# Patient Record
Sex: FEMALE | Race: WHITE | NOT HISPANIC OR LATINO | Employment: FULL TIME | URBAN - METROPOLITAN AREA
[De-identification: names, ages, dates, MRNs, and addresses within clinical notes are randomized per-mention and may not be internally consistent; named-entity substitution may affect disease eponyms.]

---

## 2017-09-11 ENCOUNTER — OFFICE VISIT (OUTPATIENT)
Dept: OBSTETRICS AND GYNECOLOGY | Age: 42
End: 2017-09-11

## 2017-09-11 VITALS
DIASTOLIC BLOOD PRESSURE: 82 MMHG | SYSTOLIC BLOOD PRESSURE: 138 MMHG | BODY MASS INDEX: 35.88 KG/M2 | WEIGHT: 195 LBS | HEIGHT: 62 IN

## 2017-09-11 DIAGNOSIS — E03.9 ACQUIRED HYPOTHYROIDISM: ICD-10-CM

## 2017-09-11 DIAGNOSIS — Z01.419 WELL WOMAN EXAM WITH ROUTINE GYNECOLOGICAL EXAM: Primary | ICD-10-CM

## 2017-09-11 DIAGNOSIS — N95.1 PERIMENOPAUSE: ICD-10-CM

## 2017-09-11 LAB
BILIRUB BLD-MCNC: NEGATIVE MG/DL
GLUCOSE UR STRIP-MCNC: NEGATIVE MG/DL
KETONES UR QL: NEGATIVE
LEUKOCYTE EST, POC: NEGATIVE
NITRITE UR-MCNC: NEGATIVE MG/ML
PH UR: 6 [PH] (ref 5–8)
PROT UR STRIP-MCNC: NEGATIVE MG/DL
RBC # UR STRIP: NEGATIVE /UL
SP GR UR: 1.02 (ref 1–1.03)
UROBILINOGEN UR QL: NORMAL

## 2017-09-11 PROCEDURE — 81003 URINALYSIS AUTO W/O SCOPE: CPT | Performed by: OBSTETRICS & GYNECOLOGY

## 2017-09-11 PROCEDURE — 99396 PREV VISIT EST AGE 40-64: CPT | Performed by: OBSTETRICS & GYNECOLOGY

## 2017-09-11 RX ORDER — FLUTICASONE PROPIONATE 50 MCG
SPRAY, SUSPENSION (ML) NASAL
Refills: 0 | COMMUNITY
Start: 2017-08-17 | End: 2022-01-28

## 2017-09-11 RX ORDER — CYANOCOBALAMIN 1000 UG/ML
INJECTION, SOLUTION INTRAMUSCULAR; SUBCUTANEOUS
Refills: 0 | COMMUNITY
Start: 2017-08-11 | End: 2021-06-21

## 2017-09-11 RX ORDER — METFORMIN HYDROCHLORIDE 500 MG/1
TABLET, EXTENDED RELEASE ORAL
Refills: 0 | COMMUNITY
Start: 2017-08-28 | End: 2021-10-26 | Stop reason: SDUPTHER

## 2017-09-11 RX ORDER — ERGOCALCIFEROL 1.25 MG/1
CAPSULE ORAL
Refills: 0 | COMMUNITY
Start: 2017-06-07 | End: 2021-08-02 | Stop reason: SDUPTHER

## 2017-09-11 RX ORDER — HYOSCYAMINE SULFATE 0.125 MG
TABLET ORAL
Refills: 0 | COMMUNITY
Start: 2017-08-14 | End: 2021-07-07

## 2017-09-11 RX ORDER — MONTELUKAST SODIUM 10 MG/1
10 TABLET ORAL NIGHTLY
Refills: 0 | COMMUNITY
Start: 2017-08-11 | End: 2022-05-19 | Stop reason: SDUPTHER

## 2017-09-11 NOTE — PROGRESS NOTES
ANNUAL GYN EXAM        2017    Subjective     Sahara Delarosa is a 42 y.o., , White  Female.    Chief Complaint   Patient presents with   • Gynecologic Exam     Sahara is here for her Annual Exam and Mammogram. Her last pap smear was 2016, negative pap , negative HR-HPV. She is having hot flashes, mood swings and night sweats          Gyn Complaints:  Hot flashes, mood swings, and night sweats.  She had labs drawn at different location and was told she needed the hormone pellets but they were too expensive ([employer]).I recommended she obtain the labs that they did in either fax them or mail them to me.      Current contraception: ablation   Exercise:   yes - 2 to 3 times per week   Mammogram: done today.  Colonoscopy: not indicated.  DEXA: not indicated.  Last Pap Smear:  2016, Negative Pap smear, Negative HR-HPV.   History of abnormal Pap smear: yes - atypical           1. Menstrual Hx:  Ablation, 2015.  Very irregular minimal bleeding since then.    2. Pap Smear Hx: Never had dysplasia.  , ASCUS ([facility]).  [id number], normal annual Pap smears.   - , normal annual Pap smears.  , negative Pap smear, negative HR-HPV.    , negative Pap smear, negative HR-HPV.    3. Breast / Ovarian Hx:   Regular SBE.  Manny (Peer39) genetic testing= Negative.      Family history of breast cancer: Maternal Aunt x4 , Paternal Aunt x1       Family history of ovarian cancer: None    4. Family Hx of Colon Ca: None      Family Hx of Uterine Ca: None    5. New Past Medical History:  Endoscopy for stomach issues, inflammation in stomach, a cyst in liver, gallbladder issues, Ultrasound for swollen lymph node under right arm, mole removed from a back, and dx with sleep apnea, she has machine.         Past Medical History:   Diagnosis Date   • Anxiety and depression    • Asthma     exercise induced   • Condyloma acuminata    • Endometrial polyp    • Family history of  breast cancer    • Fibroids    • Fibromyalgia    • History of Papanicolaou smear of cervix 2016    2. Pap Smear Hx: Never had dysplasia.1998, ASCUS ([facility]).[id number], normal annual Pap smears. - , normal annual Pap smears. ,& 2016, negative Pap smear, negative HR-HPV.   • Menorrhagia    • Osteoarthritis     In addition to fibromyalgia   • Vaginal delivery     x2        6. New Past Surgical History: Endoscopy         Past Surgical History:   Procedure Laterality Date   • BREAST BIOPSY      Hyper-pigmented lesion, left nipple.= Benign   • CYST REMOVAL      on chest and groin area    • ENDOMETRIAL ABLATION      HSC, DNC, Novasure Endometrial Ablation   • ENDOSCOPY  2017    EGD: Stomach inflammation, Neg for H Pylori.        7. New Family Medical History: None          Family History   Problem Relation Age of Onset   • Stomach cancer Maternal Grandfather      dx >50   • Breast cancer Maternal Aunt      4 different mat aunts with breast cancer.   • Cancer Maternal Aunt      stomach   • Other Mother      Multiple lipomas.   • Diabetes type II Father    • Fibromyalgia Sister    • No Known Problems Daughter    • Osteoporosis Maternal Grandmother      ?   • Parkinsonism Maternal Grandmother    • No Known Problems Paternal Grandmother    • Breast cancer Paternal Aunt 60     dx ~ age 60   • Brain cancer Paternal Grandfather      dx >50   • No Known Problems Son    • BRCA 1/2 Neg Hx    • Colon cancer Neg Hx    • Endometrial cancer Neg Hx    • Ovarian cancer Neg Hx          8.   OB History    Para Term  AB SAB TAB Ectopic Multiple Living   2 2 2       4      # Outcome Date GA Lbr Roni/2nd Weight Sex Delivery Anes PTL Lv   2 Term     M Vag-Spont   Y   1 Term     F Vag-Spont   Y           9.   Health Maintenance   Topic Date Due   • TDAP/TD VACCINES (1 - Tdap) 1992   • INFLUENZA VACCINE  2017   • PAP SMEAR  2017       The following portions  "of the patient's history were reviewed / updated as appropriate: allergies, current medications, past medical history, past surgical history, past family history, past social history, and problem list.    Review of Systems   Constitutional: Positive for fatigue and unexpected weight change.   HENT: Positive for congestion.    Eyes: Negative.    Respiratory: Negative.    Cardiovascular: Negative.    Gastrointestinal: Positive for abdominal pain.        Intolerance to certain foods   Endocrine: Positive for cold intolerance and heat intolerance.   Genitourinary: Positive for frequency.   Musculoskeletal:        Joint pain    Skin: Negative.    Allergic/Immunologic: Negative.    Neurological: Positive for headaches.   Hematological: Bruises/bleeds easily.   Psychiatric/Behavioral: Negative.        Objective     /82  Ht 62\" (157.5 cm)  Wt 195 lb (88.5 kg)  BMI 35.67 kg/m2    PHYSICAL EXAM    Constitutional: Well-developed, well-nourished, obese  female, in no distress, alert and well oriented ×3.    Skin is warm, dry, without rash.       Neck appears normal, trachea in the midline.  Thyroid exam normal.          No cervical lymphadenopathy.    Lungs clear to auscultation.  Chest wall appears normal.    Heart with regular rhythm without murmur or gallop.    Breasts:         Right breast nontender, without dominant mass.  No nipple discharge.            No superficial skin changes.  No axillary adenopathy.        Left breast nontender, without dominant mass.  No nipple discharge.            No superficial skin changes.  No axillary adenopathy.      Abdomen is round, soft and nontender.No palpable mass.           No hepatosplenomegaly.  Flanks are negative.          Bowel sounds normal.  No abdominal bruit.          No inguinal lymphadenopathy bilaterally.     Pelvic exam :  Vulva normal.           External genitalia normal.  BUS negative.             Introitus normal.  Vaginal mucosa normal.            " Cervix normal, no Pap smear, no cervical motion tenderness.          Uterus mildly retroverted, normal size, normal shape, and position.          Adnexa are negative bilaterally.  No tenderness.  No palpable mass.          Rectovaginal exam negative .     Musc /Skel: Lower extremities without edema.     Neuro: Coordination is grossly normal.  Gait is normal.    Psych: Mood and affect is normal.  Behavior normal.  Thought content normal.            Judgment normal.       =All questions were answered.   =Breast self-examination technique was reviewed and the patient encouraged to perform self breast exams monthly.   = We discussed healthy lifestyle modifications.  Concerned about weight gain  =I recommended 30 minutes of aerobic exercise 5 times a week.         Assessment/Plan   Sahara was seen today for gynecologic exam.    Diagnoses and all orders for this visit:    Well woman exam with routine gynecological exam  -     POC Urinalysis Dipstick, Automated    Perimenopause    Acquired hypothyroidism    I will mail her an order for testosterone, total and free, TSH, free T3 and free T4, FSH and estradiol.  COMMENTS: Despite her symptoms I will be a little bit surprised if she is in menopause.  But if so then we should have a discussion about HRT and testosterone.    Mekhi Nickerson MD  9/12/2017

## 2018-02-05 ENCOUNTER — OFFICE VISIT CONVERTED (OUTPATIENT)
Dept: FAMILY MEDICINE CLINIC | Facility: CLINIC | Age: 43
End: 2018-02-05
Attending: NURSE PRACTITIONER

## 2018-03-09 ENCOUNTER — OFFICE VISIT CONVERTED (OUTPATIENT)
Dept: FAMILY MEDICINE CLINIC | Facility: CLINIC | Age: 43
End: 2018-03-09
Attending: NURSE PRACTITIONER

## 2018-04-24 ENCOUNTER — OFFICE VISIT CONVERTED (OUTPATIENT)
Dept: FAMILY MEDICINE CLINIC | Facility: CLINIC | Age: 43
End: 2018-04-24
Attending: NURSE PRACTITIONER

## 2018-05-29 ENCOUNTER — OFFICE VISIT CONVERTED (OUTPATIENT)
Dept: FAMILY MEDICINE CLINIC | Facility: CLINIC | Age: 43
End: 2018-05-29
Attending: NURSE PRACTITIONER

## 2018-06-13 ENCOUNTER — CONVERSION ENCOUNTER (OUTPATIENT)
Dept: FAMILY MEDICINE CLINIC | Facility: CLINIC | Age: 43
End: 2018-06-13

## 2018-06-13 ENCOUNTER — OFFICE VISIT CONVERTED (OUTPATIENT)
Dept: FAMILY MEDICINE CLINIC | Facility: CLINIC | Age: 43
End: 2018-06-13
Attending: NURSE PRACTITIONER

## 2018-07-20 ENCOUNTER — OFFICE VISIT CONVERTED (OUTPATIENT)
Dept: FAMILY MEDICINE CLINIC | Facility: CLINIC | Age: 43
End: 2018-07-20
Attending: NURSE PRACTITIONER

## 2018-08-20 ENCOUNTER — OFFICE VISIT CONVERTED (OUTPATIENT)
Dept: FAMILY MEDICINE CLINIC | Facility: CLINIC | Age: 43
End: 2018-08-20
Attending: NURSE PRACTITIONER

## 2018-10-08 ENCOUNTER — OFFICE VISIT CONVERTED (OUTPATIENT)
Dept: FAMILY MEDICINE CLINIC | Facility: CLINIC | Age: 43
End: 2018-10-08
Attending: NURSE PRACTITIONER

## 2018-10-23 ENCOUNTER — OFFICE VISIT CONVERTED (OUTPATIENT)
Dept: FAMILY MEDICINE CLINIC | Facility: CLINIC | Age: 43
End: 2018-10-23
Attending: FAMILY MEDICINE

## 2018-11-12 ENCOUNTER — OFFICE VISIT CONVERTED (OUTPATIENT)
Dept: FAMILY MEDICINE CLINIC | Facility: CLINIC | Age: 43
End: 2018-11-12
Attending: NURSE PRACTITIONER

## 2018-12-07 ENCOUNTER — CONVERSION ENCOUNTER (OUTPATIENT)
Dept: ORTHOPEDIC SURGERY | Facility: CLINIC | Age: 43
End: 2018-12-07

## 2018-12-07 ENCOUNTER — OFFICE VISIT CONVERTED (OUTPATIENT)
Dept: ORTHOPEDIC SURGERY | Facility: CLINIC | Age: 43
End: 2018-12-07
Attending: PHYSICIAN ASSISTANT

## 2018-12-17 ENCOUNTER — OFFICE VISIT CONVERTED (OUTPATIENT)
Dept: FAMILY MEDICINE CLINIC | Facility: CLINIC | Age: 43
End: 2018-12-17
Attending: NURSE PRACTITIONER

## 2019-01-02 ENCOUNTER — OFFICE VISIT CONVERTED (OUTPATIENT)
Dept: FAMILY MEDICINE CLINIC | Facility: CLINIC | Age: 44
End: 2019-01-02
Attending: NURSE PRACTITIONER

## 2019-01-08 ENCOUNTER — OFFICE VISIT CONVERTED (OUTPATIENT)
Dept: ORTHOPEDIC SURGERY | Facility: CLINIC | Age: 44
End: 2019-01-08
Attending: PHYSICIAN ASSISTANT

## 2019-01-08 ENCOUNTER — HOSPITAL ENCOUNTER (OUTPATIENT)
Dept: MRI IMAGING | Facility: HOSPITAL | Age: 44
Discharge: HOME | End: 2019-01-08
Attending: NURSE PRACTITIONER

## 2019-01-11 ENCOUNTER — HOSPITAL ENCOUNTER (OUTPATIENT)
Dept: OTHER | Facility: HOSPITAL | Age: 44
Discharge: HOME | End: 2019-01-11
Attending: PHYSICIAN ASSISTANT

## 2019-01-15 ENCOUNTER — OFFICE VISIT CONVERTED (OUTPATIENT)
Dept: ORTHOPEDIC SURGERY | Facility: CLINIC | Age: 44
End: 2019-01-15
Attending: PHYSICIAN ASSISTANT

## 2019-01-31 ENCOUNTER — OFFICE VISIT CONVERTED (OUTPATIENT)
Dept: NEUROSURGERY | Facility: CLINIC | Age: 44
End: 2019-01-31
Attending: PHYSICIAN ASSISTANT

## 2019-02-28 ENCOUNTER — OFFICE VISIT CONVERTED (OUTPATIENT)
Dept: FAMILY MEDICINE CLINIC | Facility: CLINIC | Age: 44
End: 2019-02-28
Attending: NURSE PRACTITIONER

## 2019-03-14 ENCOUNTER — OFFICE VISIT CONVERTED (OUTPATIENT)
Dept: ORTHOPEDIC SURGERY | Facility: CLINIC | Age: 44
End: 2019-03-14
Attending: PHYSICIAN ASSISTANT

## 2019-03-26 ENCOUNTER — OFFICE VISIT CONVERTED (OUTPATIENT)
Dept: NEUROSURGERY | Facility: CLINIC | Age: 44
End: 2019-03-26
Attending: PHYSICIAN ASSISTANT

## 2019-03-26 ENCOUNTER — CONVERSION ENCOUNTER (OUTPATIENT)
Dept: NEUROLOGY | Facility: CLINIC | Age: 44
End: 2019-03-26

## 2019-03-27 ENCOUNTER — HOSPITAL ENCOUNTER (OUTPATIENT)
Dept: OTHER | Facility: HOSPITAL | Age: 44
Discharge: HOME | End: 2019-03-27
Attending: PHYSICIAN ASSISTANT

## 2019-04-02 ENCOUNTER — OFFICE VISIT CONVERTED (OUTPATIENT)
Dept: ORTHOPEDIC SURGERY | Facility: CLINIC | Age: 44
End: 2019-04-02
Attending: PHYSICIAN ASSISTANT

## 2019-04-02 ENCOUNTER — CONVERSION ENCOUNTER (OUTPATIENT)
Dept: ORTHOPEDIC SURGERY | Facility: CLINIC | Age: 44
End: 2019-04-02

## 2019-04-05 ENCOUNTER — HOSPITAL ENCOUNTER (OUTPATIENT)
Dept: OTHER | Facility: HOSPITAL | Age: 44
Discharge: HOME | End: 2019-04-05
Attending: PHYSICIAN ASSISTANT

## 2019-04-09 ENCOUNTER — CONVERSION ENCOUNTER (OUTPATIENT)
Dept: ORTHOPEDIC SURGERY | Facility: CLINIC | Age: 44
End: 2019-04-09

## 2019-04-09 ENCOUNTER — OFFICE VISIT CONVERTED (OUTPATIENT)
Dept: ORTHOPEDIC SURGERY | Facility: CLINIC | Age: 44
End: 2019-04-09
Attending: PHYSICIAN ASSISTANT

## 2019-04-25 ENCOUNTER — OFFICE VISIT CONVERTED (OUTPATIENT)
Dept: FAMILY MEDICINE CLINIC | Facility: CLINIC | Age: 44
End: 2019-04-25
Attending: NURSE PRACTITIONER

## 2019-04-25 ENCOUNTER — HOSPITAL ENCOUNTER (OUTPATIENT)
Dept: FAMILY MEDICINE CLINIC | Facility: CLINIC | Age: 44
Discharge: HOME | End: 2019-04-25
Attending: NURSE PRACTITIONER

## 2019-04-25 LAB — 25(OH)D3 SERPL-MCNC: 29.2 NG/ML (ref 30–100)

## 2019-04-27 LAB
BACTERIA SPEC AEROBE CULT: NORMAL
CONV EBV EARLY ANTIGEN: <5 U/ML (ref 0–10.9)
CONV EBV NUCLEAR ANTIGEN: <3 U/ML (ref 0–21.9)
CONV EPSTEIN BARR VIRAL CAPSID IGM: <10 U/ML (ref 0–43.9)
CONV EPSTEIN BARR VIRUS ANTIBODY TO VIRAL CAPSID IGG: 110 U/ML (ref 0–21.9)

## 2019-07-25 ENCOUNTER — OFFICE VISIT CONVERTED (OUTPATIENT)
Dept: FAMILY MEDICINE CLINIC | Facility: CLINIC | Age: 44
End: 2019-07-25
Attending: NURSE PRACTITIONER

## 2019-07-26 ENCOUNTER — HOSPITAL ENCOUNTER (OUTPATIENT)
Dept: FAMILY MEDICINE CLINIC | Facility: CLINIC | Age: 44
Discharge: HOME | End: 2019-07-26
Attending: NURSE PRACTITIONER

## 2019-07-26 LAB
25(OH)D3 SERPL-MCNC: 30.8 NG/ML (ref 30–100)
ALBUMIN SERPL-MCNC: 4.2 G/DL (ref 3.5–5)
ALBUMIN/GLOB SERPL: 1.8 {RATIO} (ref 1.4–2.6)
ALP SERPL-CCNC: 67 U/L (ref 42–98)
ALT SERPL-CCNC: 30 U/L (ref 10–40)
AMPHETAMINES UR QL SCN: NEGATIVE
ANION GAP SERPL CALC-SCNC: 25 MMOL/L (ref 8–19)
AST SERPL-CCNC: 19 U/L (ref 15–50)
BARBITURATES UR QL SCN: NEGATIVE
BASOPHILS # BLD AUTO: 0.02 10*3/UL (ref 0–0.2)
BASOPHILS NFR BLD AUTO: 0.3 % (ref 0–3)
BENZODIAZ UR QL SCN: NEGATIVE
BILIRUB SERPL-MCNC: 0.8 MG/DL (ref 0.2–1.3)
BUN SERPL-MCNC: 11 MG/DL (ref 5–25)
BUN/CREAT SERPL: 16 {RATIO} (ref 6–20)
CALCIUM SERPL-MCNC: 9.4 MG/DL (ref 8.7–10.4)
CHLORIDE SERPL-SCNC: 100 MMOL/L (ref 99–111)
CHOLEST SERPL-MCNC: 178 MG/DL (ref 107–200)
CHOLEST/HDLC SERPL: 4.6 {RATIO} (ref 3–6)
CONV ABS IMM GRAN: 0.03 10*3/UL (ref 0–0.2)
CONV CO2: 25 MMOL/L (ref 22–32)
CONV COCAINE, UR: NEGATIVE
CONV IMMATURE GRAN: 0.5 % (ref 0–1.8)
CONV TOTAL PROTEIN: 6.6 G/DL (ref 6.3–8.2)
CREAT UR-MCNC: 0.67 MG/DL (ref 0.5–0.9)
DEPRECATED RDW RBC AUTO: 45.3 FL (ref 36.4–46.3)
EOSINOPHIL # BLD AUTO: 0.13 10*3/UL (ref 0–0.7)
EOSINOPHIL # BLD AUTO: 2.2 % (ref 0–7)
ERYTHROCYTE [DISTWIDTH] IN BLOOD BY AUTOMATED COUNT: 12.9 % (ref 11.7–14.4)
EST. AVERAGE GLUCOSE BLD GHB EST-MCNC: 97 MG/DL
FOLATE SERPL-MCNC: 18.8 NG/ML (ref 4.8–20)
GFR SERPLBLD BASED ON 1.73 SQ M-ARVRAT: >60 ML/MIN/{1.73_M2}
GLOBULIN UR ELPH-MCNC: 2.4 G/DL (ref 2–3.5)
GLUCOSE SERPL-MCNC: 79 MG/DL (ref 65–99)
HBA1C MFR BLD: 13 G/DL (ref 12–16)
HBA1C MFR BLD: 5 % (ref 3.5–5.7)
HCT VFR BLD AUTO: 40.5 % (ref 37–47)
HDLC SERPL-MCNC: 39 MG/DL (ref 40–60)
LDLC SERPL CALC-MCNC: 116 MG/DL (ref 70–100)
LYMPHOCYTES # BLD AUTO: 1.28 10*3/UL (ref 1–5)
MCH RBC QN AUTO: 30.7 PG (ref 27–31)
MCHC RBC AUTO-ENTMCNC: 32.1 G/DL (ref 33–37)
MCV RBC AUTO: 95.5 FL (ref 81–99)
METHADONE UR QL SCN: NEGATIVE
MONOCYTES # BLD AUTO: 0.34 10*3/UL (ref 0.2–1.2)
MONOCYTES NFR BLD AUTO: 5.9 % (ref 3–10)
NEUTROPHILS # BLD AUTO: 4.01 10*3/UL (ref 2–8)
NEUTROPHILS NFR BLD AUTO: 69.1 % (ref 30–85)
NRBC CBCN: 0 % (ref 0–0.7)
OPIATES TESTED UR SCN: POSITIVE
OSMOLALITY SERPL CALC.SUM OF ELEC: 300 MOSM/KG (ref 273–304)
OXYCODONE UR QL SCN: NEGATIVE
PCP UR QL: NEGATIVE
PLATELET # BLD AUTO: 284 10*3/UL (ref 130–400)
PMV BLD AUTO: 9.6 FL (ref 9.4–12.3)
POTASSIUM SERPL-SCNC: 4.2 MMOL/L (ref 3.5–5.3)
RBC # BLD AUTO: 4.24 10*6/UL (ref 4.2–5.4)
SODIUM SERPL-SCNC: 146 MMOL/L (ref 135–147)
T4 FREE SERPL-MCNC: 1.2 NG/DL (ref 0.9–1.8)
THC SERPLBLD CFM-MCNC: NEGATIVE NG/ML
TRIGL SERPL-MCNC: 114 MG/DL (ref 40–150)
TSH SERPL-ACNC: 1.39 M[IU]/L (ref 0.27–4.2)
VARIANT LYMPHS NFR BLD MANUAL: 22 % (ref 20–45)
VIT B12 SERPL-MCNC: 542 PG/ML (ref 211–911)
VLDLC SERPL-MCNC: 23 MG/DL (ref 5–37)
WBC # BLD AUTO: 5.81 10*3/UL (ref 4.8–10.8)

## 2019-10-03 ENCOUNTER — CONVERSION ENCOUNTER (OUTPATIENT)
Dept: FAMILY MEDICINE CLINIC | Facility: CLINIC | Age: 44
End: 2019-10-03

## 2019-10-03 ENCOUNTER — OFFICE VISIT CONVERTED (OUTPATIENT)
Dept: FAMILY MEDICINE CLINIC | Facility: CLINIC | Age: 44
End: 2019-10-03
Attending: NURSE PRACTITIONER

## 2019-10-14 ENCOUNTER — HOSPITAL ENCOUNTER (OUTPATIENT)
Dept: OTHER | Facility: HOSPITAL | Age: 44
Discharge: HOME | End: 2019-10-14
Attending: NURSE PRACTITIONER

## 2019-10-24 ENCOUNTER — HOSPITAL ENCOUNTER (OUTPATIENT)
Dept: SLEEP MEDICINE | Facility: HOSPITAL | Age: 44
Discharge: HOME | End: 2019-10-24
Attending: INTERNAL MEDICINE

## 2019-11-04 ENCOUNTER — OFFICE VISIT CONVERTED (OUTPATIENT)
Dept: FAMILY MEDICINE CLINIC | Facility: CLINIC | Age: 44
End: 2019-11-04
Attending: NURSE PRACTITIONER

## 2019-11-04 ENCOUNTER — HOSPITAL ENCOUNTER (OUTPATIENT)
Dept: FAMILY MEDICINE CLINIC | Facility: CLINIC | Age: 44
Discharge: HOME | End: 2019-11-04
Attending: NURSE PRACTITIONER

## 2019-11-04 LAB
ALBUMIN SERPL-MCNC: 4.4 G/DL (ref 3.5–5)
ALBUMIN/GLOB SERPL: 1.6 {RATIO} (ref 1.4–2.6)
ALP SERPL-CCNC: 73 U/L (ref 42–98)
ALT SERPL-CCNC: 15 U/L (ref 10–40)
ANION GAP SERPL CALC-SCNC: 16 MMOL/L (ref 8–19)
AST SERPL-CCNC: 13 U/L (ref 15–50)
BASOPHILS # BLD AUTO: 0.03 10*3/UL (ref 0–0.2)
BASOPHILS NFR BLD AUTO: 0.5 % (ref 0–3)
BILIRUB SERPL-MCNC: 0.39 MG/DL (ref 0.2–1.3)
BUN SERPL-MCNC: 8 MG/DL (ref 5–25)
BUN/CREAT SERPL: 11 {RATIO} (ref 6–20)
CALCIUM SERPL-MCNC: 9.7 MG/DL (ref 8.7–10.4)
CHLORIDE SERPL-SCNC: 103 MMOL/L (ref 99–111)
CONV ABS IMM GRAN: 0.03 10*3/UL (ref 0–0.2)
CONV CO2: 26 MMOL/L (ref 22–32)
CONV IMMATURE GRAN: 0.5 % (ref 0–1.8)
CONV TOTAL PROTEIN: 7.1 G/DL (ref 6.3–8.2)
CREAT UR-MCNC: 0.71 MG/DL (ref 0.5–0.9)
DEPRECATED RDW RBC AUTO: 43.5 FL (ref 36.4–46.3)
EOSINOPHIL # BLD AUTO: 0.09 10*3/UL (ref 0–0.7)
EOSINOPHIL # BLD AUTO: 1.4 % (ref 0–7)
ERYTHROCYTE [DISTWIDTH] IN BLOOD BY AUTOMATED COUNT: 12.4 % (ref 11.7–14.4)
FOLATE SERPL-MCNC: 14.7 NG/ML (ref 4.8–20)
GFR SERPLBLD BASED ON 1.73 SQ M-ARVRAT: >60 ML/MIN/{1.73_M2}
GLOBULIN UR ELPH-MCNC: 2.7 G/DL (ref 2–3.5)
GLUCOSE SERPL-MCNC: 86 MG/DL (ref 65–99)
HCT VFR BLD AUTO: 41.6 % (ref 37–47)
HGB BLD-MCNC: 13.4 G/DL (ref 12–16)
LYMPHOCYTES # BLD AUTO: 1.58 10*3/UL (ref 1–5)
LYMPHOCYTES NFR BLD AUTO: 24.2 % (ref 20–45)
MCH RBC QN AUTO: 30.8 PG (ref 27–31)
MCHC RBC AUTO-ENTMCNC: 32.2 G/DL (ref 33–37)
MCV RBC AUTO: 95.6 FL (ref 81–99)
MONOCYTES # BLD AUTO: 0.47 10*3/UL (ref 0.2–1.2)
MONOCYTES NFR BLD AUTO: 7.2 % (ref 3–10)
NEUTROPHILS # BLD AUTO: 4.33 10*3/UL (ref 2–8)
NEUTROPHILS NFR BLD AUTO: 66.2 % (ref 30–85)
NRBC CBCN: 0 % (ref 0–0.7)
OSMOLALITY SERPL CALC.SUM OF ELEC: 290 MOSM/KG (ref 273–304)
PLATELET # BLD AUTO: 292 10*3/UL (ref 130–400)
PMV BLD AUTO: 9.6 FL (ref 9.4–12.3)
POTASSIUM SERPL-SCNC: 4.3 MMOL/L (ref 3.5–5.3)
RBC # BLD AUTO: 4.35 10*6/UL (ref 4.2–5.4)
SODIUM SERPL-SCNC: 141 MMOL/L (ref 135–147)
TSH SERPL-ACNC: 2.24 M[IU]/L (ref 0.27–4.2)
VIT B12 SERPL-MCNC: 517 PG/ML (ref 211–911)
WBC # BLD AUTO: 6.53 10*3/UL (ref 4.8–10.8)

## 2019-11-05 LAB — 25(OH)D3 SERPL-MCNC: 30.9 NG/ML (ref 30–100)

## 2019-11-06 ENCOUNTER — HOSPITAL ENCOUNTER (OUTPATIENT)
Dept: OTHER | Facility: HOSPITAL | Age: 44
Discharge: HOME | End: 2019-11-06
Attending: NURSE PRACTITIONER

## 2019-11-06 LAB — T3REVERSE SERPL-MCNC: 18.5 NG/DL (ref 9.2–24.1)

## 2019-12-27 ENCOUNTER — CONVERSION ENCOUNTER (OUTPATIENT)
Dept: GASTROENTEROLOGY | Facility: CLINIC | Age: 44
End: 2019-12-27

## 2019-12-27 ENCOUNTER — OFFICE VISIT CONVERTED (OUTPATIENT)
Dept: GASTROENTEROLOGY | Facility: CLINIC | Age: 44
End: 2019-12-27
Attending: PHYSICIAN ASSISTANT

## 2019-12-31 ENCOUNTER — OFFICE VISIT CONVERTED (OUTPATIENT)
Dept: FAMILY MEDICINE CLINIC | Facility: CLINIC | Age: 44
End: 2019-12-31
Attending: NURSE PRACTITIONER

## 2020-01-20 ENCOUNTER — HOSPITAL ENCOUNTER (OUTPATIENT)
Dept: GASTROENTEROLOGY | Facility: HOSPITAL | Age: 45
Setting detail: HOSPITAL OUTPATIENT SURGERY
Discharge: HOME | End: 2020-01-20
Attending: INTERNAL MEDICINE

## 2020-01-20 LAB
GLUCOSE BLD-MCNC: 81 MG/DL (ref 65–99)
HCG UR QL: NEGATIVE

## 2020-03-16 ENCOUNTER — OFFICE VISIT CONVERTED (OUTPATIENT)
Dept: GASTROENTEROLOGY | Facility: CLINIC | Age: 45
End: 2020-03-16
Attending: PHYSICIAN ASSISTANT

## 2020-03-17 ENCOUNTER — OFFICE VISIT CONVERTED (OUTPATIENT)
Dept: ORTHOPEDIC SURGERY | Facility: CLINIC | Age: 45
End: 2020-03-17
Attending: PHYSICIAN ASSISTANT

## 2020-03-23 ENCOUNTER — HOSPITAL ENCOUNTER (OUTPATIENT)
Dept: FAMILY MEDICINE CLINIC | Facility: CLINIC | Age: 45
Discharge: HOME | End: 2020-03-23
Attending: NURSE PRACTITIONER

## 2020-03-23 ENCOUNTER — OFFICE VISIT CONVERTED (OUTPATIENT)
Dept: FAMILY MEDICINE CLINIC | Facility: CLINIC | Age: 45
End: 2020-03-23
Attending: NURSE PRACTITIONER

## 2020-03-24 LAB
ASO AB SERPL-ACNC: 20 [IU]/ML (ref 0–200)
CONV RHEUMATOID FACTOR IGM: <10 [IU]/ML (ref 0–14)
CRP SERPL-MCNC: 1.1 MG/L (ref 0–5)
DSDNA AB SER-ACNC: NEGATIVE [IU]/ML
ENA AB SER IA-ACNC: ABNORMAL {RATIO}
URATE SERPL-MCNC: 4 MG/DL (ref 2.5–7.5)

## 2020-03-25 LAB
CONV ESTROGENS, TOTAL, SERUM: 95 PG/ML
FSH SERPL-ACNC: 79.8 M[IU]/ML
LH SERPL-ACNC: 54.1 M[IU]/ML
PROGEST SERPL-MCNC: 0.1 NG/ML

## 2020-03-26 LAB
CENTROMERE AB TITR SER IF: <0.4 [IU]/ML (ref 0–7)
ENA SCL70 AB SER QL IA: 6.5 [IU]/ML (ref 0–7)
JO-1 (WB)*: <0.3 [IU]/ML (ref 0–7)
RNP: 1.9 [IU]/ML (ref 0–5)
RNP: <0.3 [IU]/ML (ref 0–7)
SMI: 1.8 [IU]/ML (ref 0–7)
SSA (RO) (ENA) AB, IGG: <0.3 [IU]/ML (ref 0–7)
SSB (LA) ENA AB, IGG: <0.3 [IU]/ML (ref 0–7)

## 2020-05-11 ENCOUNTER — TELEPHONE CONVERTED (OUTPATIENT)
Dept: GASTROENTEROLOGY | Facility: CLINIC | Age: 45
End: 2020-05-11
Attending: PHYSICIAN ASSISTANT

## 2020-06-26 ENCOUNTER — HOSPITAL ENCOUNTER (OUTPATIENT)
Dept: FAMILY MEDICINE CLINIC | Facility: CLINIC | Age: 45
Discharge: HOME | End: 2020-06-26
Attending: INTERNAL MEDICINE

## 2020-06-28 LAB — SARS-COV-2 RNA SPEC QL NAA+PROBE: NOT DETECTED

## 2020-06-30 ENCOUNTER — HOSPITAL ENCOUNTER (OUTPATIENT)
Dept: GASTROENTEROLOGY | Facility: HOSPITAL | Age: 45
Setting detail: HOSPITAL OUTPATIENT SURGERY
Discharge: HOME | End: 2020-06-30
Attending: INTERNAL MEDICINE

## 2020-06-30 LAB
GLUCOSE BLD-MCNC: 96 MG/DL (ref 65–99)
HCG UR QL: NEGATIVE

## 2020-07-07 ENCOUNTER — HOSPITAL ENCOUNTER (OUTPATIENT)
Dept: OTHER | Facility: HOSPITAL | Age: 45
Discharge: HOME | End: 2020-07-07

## 2020-07-20 ENCOUNTER — HOSPITAL ENCOUNTER (OUTPATIENT)
Dept: FAMILY MEDICINE CLINIC | Facility: CLINIC | Age: 45
Discharge: HOME | End: 2020-07-20
Attending: NURSE PRACTITIONER

## 2020-07-20 ENCOUNTER — OFFICE VISIT CONVERTED (OUTPATIENT)
Dept: FAMILY MEDICINE CLINIC | Facility: CLINIC | Age: 45
End: 2020-07-20
Attending: NURSE PRACTITIONER

## 2020-07-21 LAB
AMPHETAMINES UR QL SCN: NEGATIVE
BARBITURATES UR QL SCN: NEGATIVE
BENZODIAZ UR QL SCN: NEGATIVE
CONV COCAINE, UR: NEGATIVE
METHADONE UR QL SCN: NEGATIVE
OPIATES TESTED UR SCN: POSITIVE
OXYCODONE UR QL SCN: NEGATIVE
PCP UR QL: NEGATIVE
THC SERPLBLD CFM-MCNC: NEGATIVE NG/ML

## 2020-09-03 ENCOUNTER — OFFICE VISIT CONVERTED (OUTPATIENT)
Dept: FAMILY MEDICINE CLINIC | Facility: CLINIC | Age: 45
End: 2020-09-03
Attending: NURSE PRACTITIONER

## 2020-09-03 ENCOUNTER — HOSPITAL ENCOUNTER (OUTPATIENT)
Dept: FAMILY MEDICINE CLINIC | Facility: CLINIC | Age: 45
Discharge: HOME | End: 2020-09-03
Attending: NURSE PRACTITIONER

## 2020-09-03 ENCOUNTER — CONVERSION ENCOUNTER (OUTPATIENT)
Dept: FAMILY MEDICINE CLINIC | Facility: CLINIC | Age: 45
End: 2020-09-03

## 2020-09-06 LAB — SARS-COV-2 RNA SPEC QL NAA+PROBE: NOT DETECTED

## 2020-09-25 ENCOUNTER — OFFICE VISIT CONVERTED (OUTPATIENT)
Dept: FAMILY MEDICINE CLINIC | Facility: CLINIC | Age: 45
End: 2020-09-25
Attending: FAMILY MEDICINE

## 2020-09-25 ENCOUNTER — CONVERSION ENCOUNTER (OUTPATIENT)
Dept: FAMILY MEDICINE CLINIC | Facility: CLINIC | Age: 45
End: 2020-09-25

## 2020-09-30 ENCOUNTER — OFFICE VISIT CONVERTED (OUTPATIENT)
Dept: FAMILY MEDICINE CLINIC | Facility: CLINIC | Age: 45
End: 2020-09-30
Attending: FAMILY MEDICINE

## 2020-09-30 ENCOUNTER — CONVERSION ENCOUNTER (OUTPATIENT)
Dept: FAMILY MEDICINE CLINIC | Facility: CLINIC | Age: 45
End: 2020-09-30

## 2020-10-22 ENCOUNTER — HOSPITAL ENCOUNTER (OUTPATIENT)
Dept: SLEEP MEDICINE | Facility: HOSPITAL | Age: 45
Discharge: HOME | End: 2020-10-22
Attending: INTERNAL MEDICINE

## 2020-10-27 ENCOUNTER — HOSPITAL ENCOUNTER (OUTPATIENT)
Dept: FAMILY MEDICINE CLINIC | Facility: CLINIC | Age: 45
Discharge: HOME | End: 2020-10-27
Attending: NURSE PRACTITIONER

## 2020-10-27 ENCOUNTER — OFFICE VISIT CONVERTED (OUTPATIENT)
Dept: FAMILY MEDICINE CLINIC | Facility: CLINIC | Age: 45
End: 2020-10-27
Attending: NURSE PRACTITIONER

## 2020-10-27 LAB
BASOPHILS # BLD AUTO: 0.03 10*3/UL (ref 0–0.2)
BASOPHILS NFR BLD AUTO: 0.6 % (ref 0–3)
CONV ABS IMM GRAN: 0.04 10*3/UL (ref 0–0.2)
CONV IMMATURE GRAN: 0.7 % (ref 0–1.8)
DEPRECATED RDW RBC AUTO: 50.6 FL (ref 36.4–46.3)
EOSINOPHIL # BLD AUTO: 0.09 10*3/UL (ref 0–0.7)
EOSINOPHIL # BLD AUTO: 1.7 % (ref 0–7)
ERYTHROCYTE [DISTWIDTH] IN BLOOD BY AUTOMATED COUNT: 14.6 % (ref 11.7–14.4)
HCT VFR BLD AUTO: 39.8 % (ref 37–47)
HGB BLD-MCNC: 12.7 G/DL (ref 12–16)
LYMPHOCYTES # BLD AUTO: 1.45 10*3/UL (ref 1–5)
LYMPHOCYTES NFR BLD AUTO: 27.1 % (ref 20–45)
MCH RBC QN AUTO: 30.6 PG (ref 27–31)
MCHC RBC AUTO-ENTMCNC: 31.9 G/DL (ref 33–37)
MCV RBC AUTO: 95.9 FL (ref 81–99)
MONOCYTES # BLD AUTO: 0.4 10*3/UL (ref 0.2–1.2)
MONOCYTES NFR BLD AUTO: 7.5 % (ref 3–10)
NEUTROPHILS # BLD AUTO: 3.34 10*3/UL (ref 2–8)
NEUTROPHILS NFR BLD AUTO: 62.4 % (ref 30–85)
NRBC CBCN: 0 % (ref 0–0.7)
PLATELET # BLD AUTO: 320 10*3/UL (ref 130–400)
PMV BLD AUTO: 9.5 FL (ref 9.4–12.3)
RBC # BLD AUTO: 4.15 10*6/UL (ref 4.2–5.4)
WBC # BLD AUTO: 5.35 10*3/UL (ref 4.8–10.8)

## 2020-10-28 LAB
25(OH)D3 SERPL-MCNC: 26.6 NG/ML (ref 30–100)
ALBUMIN SERPL-MCNC: 4.3 G/DL (ref 3.5–5)
ALBUMIN/GLOB SERPL: 1.7 {RATIO} (ref 1.4–2.6)
ALP SERPL-CCNC: 74 U/L (ref 42–98)
ALT SERPL-CCNC: 23 U/L (ref 10–40)
ANION GAP SERPL CALC-SCNC: 18 MMOL/L (ref 8–19)
AST SERPL-CCNC: 18 U/L (ref 15–50)
BILIRUB SERPL-MCNC: 0.43 MG/DL (ref 0.2–1.3)
BUN SERPL-MCNC: 9 MG/DL (ref 5–25)
BUN/CREAT SERPL: 11 {RATIO} (ref 6–20)
CALCIUM SERPL-MCNC: 10.1 MG/DL (ref 8.7–10.4)
CHLORIDE SERPL-SCNC: 104 MMOL/L (ref 99–111)
CONV CO2: 25 MMOL/L (ref 22–32)
CONV TOTAL PROTEIN: 6.8 G/DL (ref 6.3–8.2)
CREAT UR-MCNC: 0.83 MG/DL (ref 0.5–0.9)
FOLATE SERPL-MCNC: 13.2 NG/ML (ref 4.8–20)
GFR SERPLBLD BASED ON 1.73 SQ M-ARVRAT: >60 ML/MIN/{1.73_M2}
GLOBULIN UR ELPH-MCNC: 2.5 G/DL (ref 2–3.5)
GLUCOSE SERPL-MCNC: 76 MG/DL (ref 65–99)
OSMOLALITY SERPL CALC.SUM OF ELEC: 293 MOSM/KG (ref 273–304)
POTASSIUM SERPL-SCNC: 4 MMOL/L (ref 3.5–5.3)
SODIUM SERPL-SCNC: 143 MMOL/L (ref 135–147)
T4 FREE SERPL-MCNC: 1.2 NG/DL (ref 0.9–1.8)
TSH SERPL-ACNC: 1.12 M[IU]/L (ref 0.27–4.2)
VIT B12 SERPL-MCNC: 483 PG/ML (ref 211–911)

## 2021-01-25 ENCOUNTER — OFFICE VISIT CONVERTED (OUTPATIENT)
Dept: FAMILY MEDICINE CLINIC | Facility: CLINIC | Age: 46
End: 2021-01-25
Attending: NURSE PRACTITIONER

## 2021-01-25 ENCOUNTER — HOSPITAL ENCOUNTER (OUTPATIENT)
Dept: FAMILY MEDICINE CLINIC | Facility: CLINIC | Age: 46
Discharge: HOME | End: 2021-01-25
Attending: NURSE PRACTITIONER

## 2021-01-25 ENCOUNTER — CONVERSION ENCOUNTER (OUTPATIENT)
Dept: FAMILY MEDICINE CLINIC | Facility: CLINIC | Age: 46
End: 2021-01-25

## 2021-01-25 LAB
25(OH)D3 SERPL-MCNC: 38.6 NG/ML (ref 30–100)
ALBUMIN SERPL-MCNC: 4.3 G/DL (ref 3.5–5)
ALBUMIN/GLOB SERPL: 1.7 {RATIO} (ref 1.4–2.6)
ALP SERPL-CCNC: 87 U/L (ref 42–98)
ALT SERPL-CCNC: 14 U/L (ref 10–40)
ANION GAP SERPL CALC-SCNC: 15 MMOL/L (ref 8–19)
AST SERPL-CCNC: 13 U/L (ref 15–50)
BASOPHILS # BLD AUTO: 0.02 10*3/UL (ref 0–0.2)
BASOPHILS NFR BLD AUTO: 0.4 % (ref 0–3)
BILIRUB SERPL-MCNC: 0.34 MG/DL (ref 0.2–1.3)
BUN SERPL-MCNC: 10 MG/DL (ref 5–25)
BUN/CREAT SERPL: 14 {RATIO} (ref 6–20)
CALCIUM SERPL-MCNC: 9.7 MG/DL (ref 8.7–10.4)
CHLORIDE SERPL-SCNC: 104 MMOL/L (ref 99–111)
CONV ABS IMM GRAN: 0.03 10*3/UL (ref 0–0.2)
CONV CO2: 28 MMOL/L (ref 22–32)
CONV IMMATURE GRAN: 0.6 % (ref 0–1.8)
CONV TOTAL PROTEIN: 6.9 G/DL (ref 6.3–8.2)
CREAT UR-MCNC: 0.73 MG/DL (ref 0.5–0.9)
DEPRECATED RDW RBC AUTO: 44.6 FL (ref 36.4–46.3)
EOSINOPHIL # BLD AUTO: 0.08 10*3/UL (ref 0–0.7)
EOSINOPHIL # BLD AUTO: 1.5 % (ref 0–7)
ERYTHROCYTE [DISTWIDTH] IN BLOOD BY AUTOMATED COUNT: 12.7 % (ref 11.7–14.4)
GFR SERPLBLD BASED ON 1.73 SQ M-ARVRAT: >60 ML/MIN/{1.73_M2}
GLOBULIN UR ELPH-MCNC: 2.6 G/DL (ref 2–3.5)
GLUCOSE SERPL-MCNC: 87 MG/DL (ref 65–99)
HCT VFR BLD AUTO: 43.3 % (ref 37–47)
HGB BLD-MCNC: 13.8 G/DL (ref 12–16)
LYMPHOCYTES # BLD AUTO: 1.44 10*3/UL (ref 1–5)
LYMPHOCYTES NFR BLD AUTO: 26.6 % (ref 20–45)
MCH RBC QN AUTO: 30.3 PG (ref 27–31)
MCHC RBC AUTO-ENTMCNC: 31.9 G/DL (ref 33–37)
MCV RBC AUTO: 95.2 FL (ref 81–99)
MONOCYTES # BLD AUTO: 0.49 10*3/UL (ref 0.2–1.2)
MONOCYTES NFR BLD AUTO: 9 % (ref 3–10)
NEUTROPHILS # BLD AUTO: 3.36 10*3/UL (ref 2–8)
NEUTROPHILS NFR BLD AUTO: 61.9 % (ref 30–85)
NRBC CBCN: 0 % (ref 0–0.7)
OSMOLALITY SERPL CALC.SUM OF ELEC: 294 MOSM/KG (ref 273–304)
PLATELET # BLD AUTO: 286 10*3/UL (ref 130–400)
PMV BLD AUTO: 10 FL (ref 9.4–12.3)
POTASSIUM SERPL-SCNC: 4.3 MMOL/L (ref 3.5–5.3)
RBC # BLD AUTO: 4.55 10*6/UL (ref 4.2–5.4)
SODIUM SERPL-SCNC: 143 MMOL/L (ref 135–147)
T4 FREE SERPL-MCNC: 1.3 NG/DL (ref 0.9–1.8)
TSH SERPL-ACNC: 1.59 M[IU]/L (ref 0.27–4.2)
WBC # BLD AUTO: 5.42 10*3/UL (ref 4.8–10.8)

## 2021-01-26 LAB
FOLATE SERPL-MCNC: 11.8 NG/ML (ref 4.8–20)
VIT B12 SERPL-MCNC: 435 PG/ML (ref 211–911)

## 2021-04-26 ENCOUNTER — OFFICE VISIT CONVERTED (OUTPATIENT)
Dept: FAMILY MEDICINE CLINIC | Facility: CLINIC | Age: 46
End: 2021-04-26
Attending: NURSE PRACTITIONER

## 2021-04-30 ENCOUNTER — HOSPITAL ENCOUNTER (OUTPATIENT)
Dept: FAMILY MEDICINE CLINIC | Facility: CLINIC | Age: 46
Discharge: HOME | End: 2021-04-30
Attending: NURSE PRACTITIONER

## 2021-04-30 LAB — 25(OH)D3 SERPL-MCNC: 24.5 NG/ML (ref 30–100)

## 2021-05-05 ENCOUNTER — CONVERSION ENCOUNTER (OUTPATIENT)
Dept: FAMILY MEDICINE CLINIC | Facility: CLINIC | Age: 46
End: 2021-05-05

## 2021-05-05 ENCOUNTER — OFFICE VISIT CONVERTED (OUTPATIENT)
Dept: FAMILY MEDICINE CLINIC | Facility: CLINIC | Age: 46
End: 2021-05-05
Attending: NURSE PRACTITIONER

## 2021-05-09 VITALS — TEMPERATURE: 98.2 F | HEART RATE: 94 BPM | OXYGEN SATURATION: 98 %

## 2021-05-09 VITALS
BODY MASS INDEX: 29.13 KG/M2 | DIASTOLIC BLOOD PRESSURE: 78 MMHG | WEIGHT: 192.19 LBS | HEIGHT: 68 IN | TEMPERATURE: 98.4 F | OXYGEN SATURATION: 98 % | SYSTOLIC BLOOD PRESSURE: 120 MMHG | HEART RATE: 92 BPM

## 2021-05-09 VITALS — TEMPERATURE: 98.8 F | OXYGEN SATURATION: 94 % | HEART RATE: 113 BPM

## 2021-05-09 VITALS
DIASTOLIC BLOOD PRESSURE: 72 MMHG | BODY MASS INDEX: 36.81 KG/M2 | HEART RATE: 103 BPM | OXYGEN SATURATION: 98 % | SYSTOLIC BLOOD PRESSURE: 110 MMHG | TEMPERATURE: 98.4 F | WEIGHT: 201.25 LBS

## 2021-05-09 VITALS
DIASTOLIC BLOOD PRESSURE: 76 MMHG | OXYGEN SATURATION: 97 % | HEIGHT: 68 IN | BODY MASS INDEX: 30.17 KG/M2 | HEART RATE: 92 BPM | SYSTOLIC BLOOD PRESSURE: 132 MMHG | WEIGHT: 199.06 LBS | TEMPERATURE: 98 F

## 2021-05-09 VITALS
WEIGHT: 199.44 LBS | SYSTOLIC BLOOD PRESSURE: 128 MMHG | DIASTOLIC BLOOD PRESSURE: 72 MMHG | TEMPERATURE: 96.9 F | OXYGEN SATURATION: 98 % | HEIGHT: 67 IN | BODY MASS INDEX: 31.3 KG/M2 | HEART RATE: 107 BPM

## 2021-05-09 VITALS
WEIGHT: 200.37 LBS | TEMPERATURE: 96.9 F | HEART RATE: 83 BPM | HEIGHT: 68 IN | OXYGEN SATURATION: 100 % | SYSTOLIC BLOOD PRESSURE: 118 MMHG | BODY MASS INDEX: 30.37 KG/M2 | DIASTOLIC BLOOD PRESSURE: 80 MMHG

## 2021-05-09 VITALS
HEART RATE: 100 BPM | OXYGEN SATURATION: 97 % | SYSTOLIC BLOOD PRESSURE: 120 MMHG | WEIGHT: 195 LBS | BODY MASS INDEX: 35.67 KG/M2 | TEMPERATURE: 98 F | DIASTOLIC BLOOD PRESSURE: 80 MMHG

## 2021-05-09 VITALS
HEIGHT: 68 IN | OXYGEN SATURATION: 97 % | DIASTOLIC BLOOD PRESSURE: 72 MMHG | HEART RATE: 118 BPM | TEMPERATURE: 97.2 F | SYSTOLIC BLOOD PRESSURE: 124 MMHG | WEIGHT: 195.25 LBS | BODY MASS INDEX: 29.59 KG/M2

## 2021-05-09 VITALS
HEART RATE: 133 BPM | SYSTOLIC BLOOD PRESSURE: 150 MMHG | TEMPERATURE: 97.9 F | DIASTOLIC BLOOD PRESSURE: 86 MMHG | OXYGEN SATURATION: 98 % | BODY MASS INDEX: 36.03 KG/M2 | WEIGHT: 197 LBS

## 2021-05-09 VITALS
BODY MASS INDEX: 30.09 KG/M2 | DIASTOLIC BLOOD PRESSURE: 70 MMHG | OXYGEN SATURATION: 97 % | SYSTOLIC BLOOD PRESSURE: 128 MMHG | WEIGHT: 198.56 LBS | HEIGHT: 68 IN | TEMPERATURE: 98.9 F | HEART RATE: 107 BPM

## 2021-05-09 VITALS
DIASTOLIC BLOOD PRESSURE: 80 MMHG | HEIGHT: 67 IN | BODY MASS INDEX: 32.18 KG/M2 | OXYGEN SATURATION: 98 % | SYSTOLIC BLOOD PRESSURE: 120 MMHG | TEMPERATURE: 98 F | HEART RATE: 92 BPM | WEIGHT: 205 LBS

## 2021-05-09 VITALS
TEMPERATURE: 97.6 F | OXYGEN SATURATION: 97 % | SYSTOLIC BLOOD PRESSURE: 120 MMHG | DIASTOLIC BLOOD PRESSURE: 80 MMHG | BODY MASS INDEX: 30.24 KG/M2 | HEIGHT: 68 IN | HEART RATE: 106 BPM | WEIGHT: 199.56 LBS

## 2021-05-09 VITALS
HEART RATE: 104 BPM | HEIGHT: 68 IN | SYSTOLIC BLOOD PRESSURE: 132 MMHG | TEMPERATURE: 98 F | DIASTOLIC BLOOD PRESSURE: 72 MMHG | WEIGHT: 204.31 LBS | OXYGEN SATURATION: 98 % | BODY MASS INDEX: 30.96 KG/M2

## 2021-05-09 VITALS
DIASTOLIC BLOOD PRESSURE: 90 MMHG | WEIGHT: 198 LBS | TEMPERATURE: 97.6 F | HEART RATE: 103 BPM | OXYGEN SATURATION: 98 % | SYSTOLIC BLOOD PRESSURE: 140 MMHG | BODY MASS INDEX: 36.21 KG/M2

## 2021-05-09 VITALS
WEIGHT: 203.25 LBS | HEART RATE: 111 BPM | BODY MASS INDEX: 30.8 KG/M2 | HEIGHT: 68 IN | DIASTOLIC BLOOD PRESSURE: 80 MMHG | OXYGEN SATURATION: 98 % | TEMPERATURE: 98 F | SYSTOLIC BLOOD PRESSURE: 140 MMHG

## 2021-05-09 VITALS — TEMPERATURE: 98 F | OXYGEN SATURATION: 96 % | HEART RATE: 94 BPM

## 2021-05-09 VITALS
DIASTOLIC BLOOD PRESSURE: 74 MMHG | BODY MASS INDEX: 31.6 KG/M2 | HEART RATE: 117 BPM | TEMPERATURE: 98 F | OXYGEN SATURATION: 97 % | HEIGHT: 67 IN | WEIGHT: 201.31 LBS | SYSTOLIC BLOOD PRESSURE: 128 MMHG

## 2021-05-09 VITALS
HEART RATE: 122 BPM | BODY MASS INDEX: 32.2 KG/M2 | DIASTOLIC BLOOD PRESSURE: 74 MMHG | WEIGHT: 205.19 LBS | TEMPERATURE: 98.6 F | SYSTOLIC BLOOD PRESSURE: 124 MMHG | HEIGHT: 67 IN | OXYGEN SATURATION: 97 %

## 2021-05-09 VITALS
SYSTOLIC BLOOD PRESSURE: 118 MMHG | BODY MASS INDEX: 30.47 KG/M2 | TEMPERATURE: 97.3 F | HEIGHT: 68 IN | OXYGEN SATURATION: 99 % | HEART RATE: 101 BPM | WEIGHT: 201.06 LBS | DIASTOLIC BLOOD PRESSURE: 76 MMHG

## 2021-05-09 VITALS
HEIGHT: 68 IN | WEIGHT: 198.37 LBS | DIASTOLIC BLOOD PRESSURE: 80 MMHG | TEMPERATURE: 98 F | OXYGEN SATURATION: 99 % | BODY MASS INDEX: 30.06 KG/M2 | HEART RATE: 97 BPM | SYSTOLIC BLOOD PRESSURE: 124 MMHG

## 2021-05-09 VITALS
HEART RATE: 117 BPM | OXYGEN SATURATION: 98 % | BODY MASS INDEX: 30.35 KG/M2 | TEMPERATURE: 97.8 F | SYSTOLIC BLOOD PRESSURE: 136 MMHG | HEIGHT: 68 IN | DIASTOLIC BLOOD PRESSURE: 86 MMHG | WEIGHT: 200.25 LBS

## 2021-05-09 VITALS
HEART RATE: 91 BPM | WEIGHT: 195.37 LBS | BODY MASS INDEX: 35.73 KG/M2 | TEMPERATURE: 97.8 F | SYSTOLIC BLOOD PRESSURE: 130 MMHG | OXYGEN SATURATION: 99 % | DIASTOLIC BLOOD PRESSURE: 82 MMHG

## 2021-05-09 VITALS
WEIGHT: 199 LBS | HEIGHT: 68 IN | BODY MASS INDEX: 30.16 KG/M2 | TEMPERATURE: 98.4 F | OXYGEN SATURATION: 99 % | HEART RATE: 89 BPM

## 2021-05-09 VITALS
DIASTOLIC BLOOD PRESSURE: 74 MMHG | HEART RATE: 117 BPM | BODY MASS INDEX: 36.95 KG/M2 | OXYGEN SATURATION: 99 % | TEMPERATURE: 97.8 F | WEIGHT: 202 LBS | SYSTOLIC BLOOD PRESSURE: 126 MMHG

## 2021-05-09 VITALS — OXYGEN SATURATION: 98 % | HEART RATE: 96 BPM | TEMPERATURE: 97 F

## 2021-05-13 NOTE — PROGRESS NOTES
Quick Note      Patient Name: Sahara Delarosa   Patient ID: [id number]   Sex: Female   YOB: 1975    Primary Care Provider: Maira Gama [facility]    Visit Date: May 11, 2020    Provider: Gaetano Navarro PA-C   Location: [facility]   Location Address: [address] [place] [address]  [id number]   Location Phone: [phone]          History Of Present Illness  TELEHEALTH TELEPHONE VISIT  Chief Complaint: 8 week follow up   Sahara Delarosa is a 44 year old /White female who is presenting for evaluation via telehealth telephone visit. Verbal consent obtained before beginning visit.   Provider spent 11 minutes with the patient during telehealth visit.   The following staff were present during this visit: Arturo Wan MA   Past Medical History/Overview of Patient Symptoms     44-year-old female with history of small hiatal hernia, sleep apnea, and GERD agrees to a telephone appointment for followup.  Review of her EGD by Dr. Ghotra 01/20/2020 showed a small hiatal hernia, Schatzki's ring in the GE junction, and fundic gland polyps.  She is taking omeprazole 40mg daily with good control of esopahgeal reflux symptoms.  She was having occasional loose bowel movements and abdominal cramping.  She had previously failed a trial of bentyl and was given a trial of levsin.  She reports improvement with levsin, but still has loose bowel once a week.           Assessment  · GERD (gastroesophageal reflux disease)     530.81/K21.9  · Altered bowel habits     787.99/R19.4      Plan  · Orders  o Physician Telephone Evaluation, 11-20 minutes (31270) - 530.81/K21.9 - 05/11/2020  o Consent for Colonoscopy with Possible Biopsy - Possible risks/complications, benefits, and alternatives to surgical or invasive procedure have been explained to patient and/or legal guardian. -Patient has been evaluated and can tolerate anesthesia and/or sedation. Risks, benefits, and alternatives to anesthesia and sedation have  been explained to patient and/or legal guardian. ([id number]) - 787.99/R19.4 - 05/11/2020  · Medications  o Medications have been Reconciled  o Transition of Care or Provider Policy  · Instructions  o Plan Of Care: 44 year old female with altered bowel habits. She is to continue levsin prn. I will schedule her for a colonoscopy and she is aware of she will have to be tested for COVID-19 days prior to the procedure.            Electronically Signed by: Gaetano Navarro PA-C -Author on May 11, 2020 08:26:29 AM  Electronically Co-signed by: Michael Ghotra MD -Reviewer on May 12, 2020 03:44:44 PM

## 2021-05-15 VITALS — WEIGHT: 201 LBS | OXYGEN SATURATION: 98 % | BODY MASS INDEX: 30.46 KG/M2 | HEART RATE: 93 BPM | HEIGHT: 68 IN

## 2021-05-15 VITALS — WEIGHT: 199 LBS | HEART RATE: 93 BPM | HEIGHT: 68 IN | OXYGEN SATURATION: 99 % | BODY MASS INDEX: 30.16 KG/M2

## 2021-05-15 VITALS
WEIGHT: 198.25 LBS | HEIGHT: 68 IN | DIASTOLIC BLOOD PRESSURE: 74 MMHG | SYSTOLIC BLOOD PRESSURE: 134 MMHG | BODY MASS INDEX: 30.04 KG/M2 | HEART RATE: 95 BPM | OXYGEN SATURATION: 99 %

## 2021-05-15 VITALS — HEIGHT: 68 IN | HEART RATE: 84 BPM | WEIGHT: 195.5 LBS | OXYGEN SATURATION: 99 % | BODY MASS INDEX: 29.63 KG/M2

## 2021-05-15 VITALS — HEIGHT: 68 IN | WEIGHT: 200 LBS | BODY MASS INDEX: 30.31 KG/M2 | HEART RATE: 86 BPM | OXYGEN SATURATION: 96 %

## 2021-05-15 VITALS — OXYGEN SATURATION: 98 % | WEIGHT: 205 LBS | BODY MASS INDEX: 31.07 KG/M2 | HEART RATE: 88 BPM | HEIGHT: 68 IN

## 2021-05-15 VITALS
RESPIRATION RATE: 16 BRPM | SYSTOLIC BLOOD PRESSURE: 127 MMHG | BODY MASS INDEX: 30.16 KG/M2 | OXYGEN SATURATION: 100 % | DIASTOLIC BLOOD PRESSURE: 81 MMHG | HEIGHT: 68 IN | WEIGHT: 199 LBS | HEART RATE: 92 BPM

## 2021-05-15 VITALS — HEART RATE: 100 BPM | HEIGHT: 68 IN | WEIGHT: 200.5 LBS | OXYGEN SATURATION: 98 % | BODY MASS INDEX: 30.39 KG/M2

## 2021-05-15 VITALS — OXYGEN SATURATION: 98 % | HEART RATE: 75 BPM | BODY MASS INDEX: 30.45 KG/M2 | HEIGHT: 68 IN

## 2021-05-15 VITALS — HEIGHT: 68 IN | HEART RATE: 85 BPM | OXYGEN SATURATION: 98 % | BODY MASS INDEX: 30.31 KG/M2 | WEIGHT: 200 LBS

## 2021-05-15 VITALS
SYSTOLIC BLOOD PRESSURE: 133 MMHG | BODY MASS INDEX: 31.37 KG/M2 | DIASTOLIC BLOOD PRESSURE: 82 MMHG | RESPIRATION RATE: 16 BRPM | HEART RATE: 78 BPM | WEIGHT: 207 LBS | HEIGHT: 68 IN | OXYGEN SATURATION: 99 %

## 2021-06-05 NOTE — PROGRESS NOTES
Progress Note      Patient Name: Sahara Delarosa   Patient ID: [id number]   Sex: Female   YOB: 1975        Visit Date: May 5, 2021    Provider: ROSEANNA Short   [place] [facility]   Location Address: [address] [place] [address] [address] [place]  [address]   Location Phone: [phone]          Chief Complaint     Refills       History Of Present Illness  Sahara Delarosa is a 45 year old /White female who presents for evaluation and treatment of:      pt here for the refills on the lyrica--for the fibromyalgia pain and does well on this and no SE no issues--pt very compliant and shows no s/s of misuse nor abuse and no aberrant behaviors --pt takes meds as written--    pt also in pain mgt as well for other meds      pt reports the LLE the foot and last 3 toes left foot numbness and tingling and reported this to the pain mgt and pt reports the last 3 toes don't move--and she menntioned all this to the pain mht and they said if not improved then at F/U next month they will do MRI --sees them for the lower back and hip    pt also needs refills on the asthma and allergy meds as well--the inhaler and the nasal spray--pt reports this is the time she needs them--with season changes --overall doing well --no asthma attacks --but noticed with the masks and warmer weather some SOBO with mask on     also would like the right ear looked at again as it's hurting her again and if it is OM again she would like the referral to the ENT       Past Medical History  Disease Name Date Onset Notes   Allergic rhinitis --  --    Asthma --  --    Fibromyalgia --  --    GERD (gastroesophageal reflux disease) --  --    Irritable bowel syndrome --  --    Metabolic syndrome --  --    Sleep apnea --  --    Vitamin B 12 deficiency --  --          Medication List  Name Date Started Instructions   BD Syringe 1 mL miscellaneous syringe 03/23/2020 use as directed use once monthly for B12 injections    cyanocobalamin (vitamin B-12) 1,000 mcg/mL injection solution 01/25/2021 ADMINISTER 1 ML(1000 MCG) UNDER THE SKIN 1 TIME A MONTH   fluticasone propionate 50 mcg/actuation nasal spray,suspension 05/05/2021 spray 1 spray (50 mcg) in each nostril by intranasal route once daily for 30 days   ipratropium-albuterol 0.5 mg-3 mg(2.5 mg base)/3 mL inhalation solution for nebulization 09/30/2020 inhale 3 milliliters by nebulization route 4 times per day and as needed, up to 6 doses per day   metformin 500 mg oral tablet 01/25/2021 TAKE 2 TABLETS BY MOUTH DAILY WITH THE EVENING MEAL   omeprazole 40 mg oral capsule,delayed release(DR/EC) 01/25/2021 TAKE 1 CAPSULE BY MOUTH DAILY   oxycodone-acetaminophen 5-325 mg oral tablet  1 every 8 hrs as needed.   pregabalin 100 mg oral capsule 05/05/2021 take 1 capsule PO of am and 2 caps PO at HS   ProAir HFA 90 mcg/actuation inhalation HFA aerosol inhaler 05/05/2021 inhale 1 puff (90 mcg) by inhalation route every 6 hours as needed for 30 days   Singulair 10 mg oral tablet 01/25/2021 take 1 tablet (10 mg) by oral route once daily in the evening for 90 days   Vitamin D2 1,250 mcg (50,000 unit) oral capsule 05/03/2021 take 1 capsule by oral route once a week for 90 days         Allergy List  Allergen Name Date Reaction Notes   Bactrim --  --  --    Codiene --  --  --          Family Medical History  Disease Name Relative/Age Notes   DM Type II Father/   Father   Arthrtis Mother/   Mother         Social History  Finding Status Start/Stop Quantity Notes   Alcohol Never --/-- --  never drinks alcohol   Exercises regularly --  --/-- --  occasionally   Recreational Drug Use Never --/-- --  never used   Second hand smoke exposure Unknown --/-- --  no   Tobacco Never --/-- --  never smoker, never uses other tobacco products   Uses seatbelts --  --/-- --  yes         Immunizations  NameDate Admin Mfg Trade Name Lot Number Route Inj VIS Given VIS Publication   COVID [id number]/03/2021 MOD Moderna  COVID-19 Vaccine  NE NE 04/26/2021    Comments:    COVID [id number]/03/2021 MOD Moderna COVID-19 Vaccine  NE NE 04/26/2021    Comments:          Review of Systems  · Constitutional  o Admits  o : fatigue  · HENT  o Admits  o : nasal congestion  o Denies  o : vertigo  · Cardiovascular  o Denies  o : chest pain, irregular heart beats  · Respiratory  o Admits  o : asthma or wheezing  · Gastrointestinal  o Denies  o : nausea, vomiting, fecal incontinence  · Genitourinary  o Denies  o : incontinence  · Neurologic  o Admits  o : tingling or numbness  o Denies  o : altered mental status, seizures, tremors  · Musculoskeletal  o Admits  o : joint pain, muscle pain, limitation of motion, neck pain, back pain, hip pain, knee pain  o Denies  o : joint swelling  · Endocrine  o Denies  o : cold intolerance, heat intolerance  · Heme-Lymph  o Denies  o : petechiae, lymph node enlargement or tenderness  · Allergic-Immunologic  o Denies  o : frequent illnesses      Vitals  Date Time BP Position Site L\R Cuff Size HR RR TEMP (F) WT  HT  BMI kg/m2 BSA m2 O2 Sat FR L/min FiO2        05/05/2021 05:33 PM        98.2 204lbs 16oz    96 %            Physical Examination  · Constitutional  o Appearance  o : well developed, well-nourished, in no acute distress  · Head and Face  o HEENT  o : Unremarkable--right ear canal with tenderness and redness   · Eyes  o Conjunctivae  o : conjunctivae normal  · Neck  o Inspection/Palpation  o : supple  o Thyroid  o : no thyromegaly  · Respiratory  o Respiratory Effort  o : breathing unlabored  o Auscultation of Lungs  o : clear to ascultation  · Cardiovascular  o Heart  o :   § Auscultation of Heart  § : regular rate and rhythm  o Peripheral Vascular System  o :   § Extremities  § : no edema  · Gastrointestinal  o Abdominal Examination  o :   § Abdomen  § : bowel sounds present, non-distended, non-tender  · Lymphatic  o Neck  o : no lymphadenopathy present  · Musculoskeletal  o General  o :   § General  Musculoskeletal  § : (+) PTTP of the bilat upper and mid and lower muscles of the back and to the BUE and BLE and upper chest wall --then also to the right hip with standing and change of positiona nd AROM painful--also noted the last 3 toes on the LLE/foot that with shoe off and curls her toes they do nothing--and then the other foot and other toes she is able to do--and she already reported this to the pain mgt and they will be ordering the MRI next month  · Skin and Subcutaneous Tissue  o General Inspection  o : skin turgor normal, texture normal  · Neurologic  o Gait and Station  o :   § Gait Screening  § : normal gait  · Psychiatric  o Mood and Affect  o : mood normal, affect appropriate          Assessment  · Allergic rhinitis due to allergen     477.9/J30.9  · Asthma     493.90/J45.909  · Otitis externa     380.10/H60.90  · Fibromyalgia     729.1/M79.7  · Hip pain     719.45/M25.559  · Paresthesia     782.0/R20.2      Plan  · Orders  o ACO-39: Current medications updated and reviewed (1159F, ) - - 05/05/2021  · Medications  o ofloxacin 0.3 % otic (ear) drops   SIG: instill 10 drops into right ear by otic route once daily for 10 days   DISP: (1) Bottle with 0 refills  Prescribed on 05/05/2021     o fluticasone propionate 50 mcg/actuation nasal spray,suspension   SIG: spray 1 spray (50 mcg) in each nostril by intranasal route once daily for 30 days   DISP: (1) Inhaler with 5 refills  Adjusted on 05/05/2021     o pregabalin 100 mg oral capsule   SIG: take 1 capsule PO of am and 2 caps PO at HS   DISP: (90) Capsule with 2 refills  Adjusted on 05/05/2021     o ProAir HFA 90 mcg/actuation inhalation HFA aerosol inhaler   SIG: inhale 1 puff (90 mcg) by inhalation route every 6 hours as needed for 30 days   DISP: (1) Inhaler Refill with 1 refills  Adjusted on 05/05/2021     o Medications have been Reconciled  o Transition of Care or Provider Policy  · Instructions  o Obtained a written consent for APARNA query.  Discussed the risk and benefits of the use of controlled substances with the patient, including the risk of tolerance and drug dependence. The patient has been counseled on the need to have an exit strategy, including potentially discontinuing the use of controlled substances. APARNA has or will be reviewed as soon as it becomes avaliable.  o Take all medications as prescribed/directed.  o Patient was educated/instructed on their diagnosis, treatment and medications prior to discharge from the clinic today.  o Patient instructed to seek medical attention urgently for new or worsening symptoms.  o Call the office with any concerns or questions.  o Risks, benefits, and alternatives were discussed with the patient. The patient is aware of risks associated with:  o Chronic conditions reviewed and taken into consideration for today's treatment plan.  · Disposition  o Call or Return if symptoms worsen or persist.  o Return Visit Request in/on 3 months +/- 2 days ([id number]).            Electronically Signed by: ROSEANNA Short -Author on May 5, 2021 05:45:02 PM

## 2021-06-21 RX ORDER — CYANOCOBALAMIN 1000 UG/ML
INJECTION, SOLUTION INTRAMUSCULAR; SUBCUTANEOUS
Qty: 1 ML | Refills: 5 | Status: SHIPPED | OUTPATIENT
Start: 2021-06-21 | End: 2021-10-26 | Stop reason: SDUPTHER

## 2021-07-06 PROBLEM — E88.810 METABOLIC SYNDROME: Status: ACTIVE | Noted: 2021-07-06

## 2021-07-06 PROBLEM — M19.90 ARTHRITIS: Status: ACTIVE | Noted: 2021-07-06

## 2021-07-06 PROBLEM — J30.2 SEASONAL ALLERGIC RHINITIS: Status: ACTIVE | Noted: 2021-07-06

## 2021-07-06 PROBLEM — N32.9 BLADDER PROBLEM: Status: ACTIVE | Noted: 2021-07-06

## 2021-07-06 PROBLEM — E88.81 METABOLIC SYNDROME: Status: ACTIVE | Noted: 2021-07-06

## 2021-07-06 PROBLEM — K21.9 GERD (GASTROESOPHAGEAL REFLUX DISEASE): Status: ACTIVE | Noted: 2021-07-06

## 2021-07-06 PROBLEM — M79.7 FIBROMYALGIA: Status: ACTIVE | Noted: 2021-07-06

## 2021-07-06 PROBLEM — M46.1 SACROILIITIS (HCC): Status: ACTIVE | Noted: 2019-03-26

## 2021-07-06 PROBLEM — J45.909 ASTHMA: Status: ACTIVE | Noted: 2021-07-06

## 2021-07-06 PROBLEM — K58.9 IRRITABLE BOWEL SYNDROME: Status: ACTIVE | Noted: 2021-07-06

## 2021-07-06 PROBLEM — G47.30 SLEEP APNEA: Status: ACTIVE | Noted: 2021-07-06

## 2021-07-06 PROBLEM — M54.30 SCIATICA: Status: ACTIVE | Noted: 2019-03-26

## 2021-07-06 PROBLEM — E53.8 VITAMIN B 12 DEFICIENCY: Status: ACTIVE | Noted: 2021-07-06

## 2021-07-06 PROBLEM — J30.9 ALLERGIC RHINITIS: Status: ACTIVE | Noted: 2021-07-06

## 2021-07-06 PROBLEM — K21.00 GASTROESOPHAGEAL REFLUX DISEASE WITH ESOPHAGITIS WITHOUT HEMORRHAGE: Status: ACTIVE | Noted: 2021-07-06

## 2021-07-06 RX ORDER — OXYCODONE HYDROCHLORIDE 5 MG/1
TABLET ORAL
COMMUNITY
End: 2021-07-07

## 2021-07-06 RX ORDER — TRAMADOL HYDROCHLORIDE 50 MG/1
TABLET ORAL
COMMUNITY
End: 2021-07-07

## 2021-07-06 RX ORDER — TURMERIC 100 %
POWDER (GRAM) MISCELLANEOUS
COMMUNITY
End: 2022-01-19

## 2021-07-06 RX ORDER — METAXALONE 800 MG/1
TABLET ORAL
COMMUNITY
End: 2021-07-07

## 2021-07-06 RX ORDER — OXYCODONE HYDROCHLORIDE AND ACETAMINOPHEN 5; 325 MG/1; MG/1
TABLET ORAL
COMMUNITY

## 2021-07-06 RX ORDER — ESOMEPRAZOLE MAGNESIUM 40 MG/1
CAPSULE, DELAYED RELEASE ORAL
COMMUNITY
End: 2021-07-07

## 2021-07-06 RX ORDER — NAPROXEN 375 MG/1
TABLET ORAL
COMMUNITY
End: 2021-07-07

## 2021-07-06 RX ORDER — HYDROCODONE BITARTRATE AND ACETAMINOPHEN 5; 325 MG/1; MG/1
TABLET ORAL
COMMUNITY
End: 2021-07-07

## 2021-07-07 ENCOUNTER — OFFICE VISIT (OUTPATIENT)
Dept: FAMILY MEDICINE CLINIC | Facility: CLINIC | Age: 46
End: 2021-07-07

## 2021-07-07 VITALS
WEIGHT: 205.6 LBS | HEIGHT: 68 IN | BODY MASS INDEX: 31.16 KG/M2 | OXYGEN SATURATION: 98 % | DIASTOLIC BLOOD PRESSURE: 74 MMHG | TEMPERATURE: 97.6 F | HEART RATE: 86 BPM | SYSTOLIC BLOOD PRESSURE: 130 MMHG

## 2021-07-07 DIAGNOSIS — M51.36 DEGENERATION OF LUMBAR INTERVERTEBRAL DISC: ICD-10-CM

## 2021-07-07 DIAGNOSIS — M70.61 TROCHANTERIC BURSITIS OF RIGHT HIP: Primary | ICD-10-CM

## 2021-07-07 DIAGNOSIS — R11.0 NAUSEA: ICD-10-CM

## 2021-07-07 DIAGNOSIS — S73.191S TEAR OF RIGHT ACETABULAR LABRUM, SEQUELA: ICD-10-CM

## 2021-07-07 PROBLEM — Z98.890 S/P HIP ARTHROSCOPY: Status: ACTIVE | Noted: 2019-06-10

## 2021-07-07 PROBLEM — J30.9 ALLERGIC RHINITIS: Status: RESOLVED | Noted: 2021-07-06 | Resolved: 2021-07-07

## 2021-07-07 PROBLEM — S73.191A TEAR OF RIGHT ACETABULAR LABRUM: Status: ACTIVE | Noted: 2019-04-18

## 2021-07-07 PROBLEM — M51.369 DEGENERATION OF LUMBAR INTERVERTEBRAL DISC: Status: ACTIVE | Noted: 2019-04-16

## 2021-07-07 PROBLEM — M25.551 RIGHT HIP PAIN: Status: ACTIVE | Noted: 2019-04-16

## 2021-07-07 PROCEDURE — 99213 OFFICE O/P EST LOW 20 MIN: CPT | Performed by: NURSE PRACTITIONER

## 2021-07-07 PROCEDURE — 96372 THER/PROPH/DIAG INJ SC/IM: CPT | Performed by: NURSE PRACTITIONER

## 2021-07-07 RX ORDER — OMEPRAZOLE 40 MG/1
CAPSULE, DELAYED RELEASE ORAL
COMMUNITY
End: 2022-05-19 | Stop reason: SDUPTHER

## 2021-07-07 RX ORDER — KETOROLAC TROMETHAMINE 30 MG/ML
30 INJECTION, SOLUTION INTRAMUSCULAR; INTRAVENOUS ONCE
Status: COMPLETED | OUTPATIENT
Start: 2021-07-07 | End: 2021-07-07

## 2021-07-07 RX ORDER — KETOROLAC TROMETHAMINE 30 MG/ML
60 INJECTION, SOLUTION INTRAMUSCULAR; INTRAVENOUS ONCE
Status: DISCONTINUED | OUTPATIENT
Start: 2021-07-07 | End: 2021-07-07

## 2021-07-07 RX ORDER — ALBUTEROL SULFATE 90 UG/1
AEROSOL, METERED RESPIRATORY (INHALATION)
COMMUNITY
Start: 2021-05-05 | End: 2021-12-30

## 2021-07-07 RX ORDER — KETOROLAC TROMETHAMINE 10 MG/1
10 TABLET, FILM COATED ORAL EVERY 6 HOURS PRN
Status: SHIPPED | OUTPATIENT
Start: 2021-07-07 | End: 2021-07-12

## 2021-07-07 RX ORDER — IPRATROPIUM BROMIDE AND ALBUTEROL SULFATE 2.5; .5 MG/3ML; MG/3ML
SOLUTION RESPIRATORY (INHALATION)
COMMUNITY
End: 2022-01-19

## 2021-07-07 RX ORDER — ONDANSETRON 4 MG/1
4 TABLET, ORALLY DISINTEGRATING ORAL EVERY 8 HOURS PRN
Qty: 30 TABLET | Refills: 0 | Status: SHIPPED | OUTPATIENT
Start: 2021-07-07 | End: 2022-01-19

## 2021-07-07 RX ADMIN — KETOROLAC TROMETHAMINE 30 MG: 30 INJECTION, SOLUTION INTRAMUSCULAR; INTRAVENOUS at 15:09

## 2021-07-07 RX ADMIN — KETOROLAC TROMETHAMINE 30 MG: 30 INJECTION, SOLUTION INTRAMUSCULAR; INTRAVENOUS at 15:08

## 2021-07-07 NOTE — PROGRESS NOTES
"Chief Complaint  Back Pain (all on the Right side, started bad Saturday and the throbbing sensation all the way down) and Hip Pain (also shooting pains in her groin)    Subjective          Sahara Delarosa presents to [facility]  Pt here for the low back pain and all right sided and started Saturday and was interrupting her sleep and in pain mgt     And pt reports the ortho just gave her a shot in the groin area for the hip and tear approx May--steroid shot--and was told if still hurting in a few weeks F/U with rheumatologist and let them know could be SI joint     Then to the rheumatologist and they did an SI joint shot of steroids --in end of may----helped for approx 1 month--and pt unsure if needful and will really help--    Just had surgery May 2019 approx     Pt reports that the following areas hurts: tailbone area, right hip and groin area and the muscles, and lower back as well-and neck pain--all on the right side     And hurts more with certain positions and then they told pt the labrum was tore again and they will do another surgery if necessary and then pt       Objective   Vital Signs:   /74 (BP Location: Right arm, Patient Position: Sitting, Cuff Size: Adult)   Pulse 86   Temp 97.6 °F (36.4 °C) (Temporal)   Ht 172.1 cm (67.75\")   Wt 93.3 kg (205 lb 9.6 oz)   SpO2 98%   BMI 31.49 kg/m²     Physical Exam  Constitutional:       Appearance: Normal appearance.   HENT:      Head: Normocephalic.      Right Ear: Tympanic membrane and external ear normal.      Left Ear: Tympanic membrane and external ear normal.      Nose: Nose normal.      Mouth/Throat:      Comments: Wearing mask  Eyes:      Pupils: Pupils are equal, round, and reactive to light.   Cardiovascular:      Rate and Rhythm: Normal rate and regular rhythm.      Heart sounds: Normal heart sounds.   Pulmonary:      Effort: Pulmonary effort is normal.      Breath sounds: Normal breath sounds.   Abdominal:      " General: Bowel sounds are normal.      Palpations: Abdomen is soft.   Musculoskeletal:      Cervical back: Normal range of motion and neck supple.      Lumbar back: Tenderness and bony tenderness present. Decreased range of motion.      Right hip: Tenderness present. Decreased range of motion.   Skin:     General: Skin is warm and dry.   Neurological:      Mental Status: She is alert and oriented to person, place, and time.   Psychiatric:         Mood and Affect: Mood normal.         Behavior: Behavior normal.         Judgment: Judgment normal.        Result Review :                 Assessment and Plan    Diagnoses and all orders for this visit:    1. Trochanteric bursitis of right hip (Primary)  -     Discontinue: ketorolac (TORADOL) injection 60 mg  -     ketorolac (TORADOL) tablet 10 mg  -     ketorolac (TORADOL) injection 30 mg  -     ketorolac (TORADOL) injection 30 mg    2. Tear of right acetabular labrum, sequela  -     Discontinue: ketorolac (TORADOL) injection 60 mg  -     ketorolac (TORADOL) tablet 10 mg  -     ketorolac (TORADOL) injection 30 mg  -     ketorolac (TORADOL) injection 30 mg    3. Degeneration of lumbar intervertebral disc  -     Discontinue: ketorolac (TORADOL) injection 60 mg  -     ketorolac (TORADOL) tablet 10 mg  -     ketorolac (TORADOL) injection 30 mg  -     ketorolac (TORADOL) injection 30 mg    4. Nausea  -     ondansetron ODT (Zofran ODT) 4 MG disintegrating tablet; Place 1 tablet on the tongue Every 8 (Eight) Hours As Needed for Nausea or Vomiting.  Dispense: 30 tablet; Refill: 0        Follow Up   Return if symptoms worsen or fail to improve.  Patient was given instructions and counseling regarding her condition or for health maintenance advice. Please see specific information pulled into the AVS if appropriate.

## 2021-07-15 VITALS — BODY MASS INDEX: 32.11 KG/M2 | WEIGHT: 205 LBS | OXYGEN SATURATION: 96 % | TEMPERATURE: 98.2 F

## 2021-07-19 ENCOUNTER — OFFICE VISIT (OUTPATIENT)
Dept: FAMILY MEDICINE CLINIC | Facility: CLINIC | Age: 46
End: 2021-07-19

## 2021-07-19 VITALS
BODY MASS INDEX: 31.86 KG/M2 | SYSTOLIC BLOOD PRESSURE: 120 MMHG | TEMPERATURE: 98 F | HEART RATE: 91 BPM | DIASTOLIC BLOOD PRESSURE: 80 MMHG | OXYGEN SATURATION: 98 % | WEIGHT: 208 LBS

## 2021-07-19 DIAGNOSIS — Z09 FOLLOW UP: ICD-10-CM

## 2021-07-19 DIAGNOSIS — E66.9 CLASS 1 OBESITY WITH BODY MASS INDEX (BMI) OF 31.0 TO 31.9 IN ADULT, UNSPECIFIED OBESITY TYPE, UNSPECIFIED WHETHER SERIOUS COMORBIDITY PRESENT: ICD-10-CM

## 2021-07-19 DIAGNOSIS — M25.551 RIGHT HIP PAIN: ICD-10-CM

## 2021-07-19 DIAGNOSIS — S73.191D TEAR OF RIGHT ACETABULAR LABRUM, SUBSEQUENT ENCOUNTER: Primary | ICD-10-CM

## 2021-07-19 DIAGNOSIS — M53.3 SACRAL PAIN: ICD-10-CM

## 2021-07-19 PROCEDURE — 99214 OFFICE O/P EST MOD 30 MIN: CPT | Performed by: NURSE PRACTITIONER

## 2021-07-19 RX ORDER — KETOROLAC TROMETHAMINE 10 MG/1
10 TABLET, FILM COATED ORAL EVERY 6 HOURS PRN
COMMUNITY
Start: 2021-07-09 | End: 2021-08-19

## 2021-07-19 NOTE — PROGRESS NOTES
Chief Complaint  Hip Pain (RT hip )    Subjective            Sahara Delarosa presents to [facility]  Pt here for the persistent recurrent right hip pain and last 2 MRIs 2019 and 2020 showed the labral tear of the right hip and also OA and degenerative changes and bursitis as well--pt had one surgery regarding this in 2019 and pt with persistent pain and worsening limited ROM and pain with ROM--rates the pain daily persistent 8/10 daily with the hips R>L --throbbing pain to the right thigh and hip and calf     Also pt reports pain with even attempting to bend forward and to even try to do laundry or vacuum --she cannot do--and even issues with reaching around to wipe when on toilet     The pain mgt right now seeing rheumatologist and pain mgt and orthopedist and between the two they did injections to the SI joints and groin area--pt with no relief     And then pt reports pain mgt unable to do any injections or Tx for the back pain related to the other interventions being done--so just on Rx med as right now     Pt denies no injury no falls --pt also reports (pain with: sitting standing laying walking--pt has to change position freq--best relief if lays on belly side      Pt was sent to pain mgt several yrs ago for the back pain--and per the MRI in 2019 the Lumbar spine was fine --it was more the SI joints     Pt also would liek to try the daily injectable for  WT loss if covered by insurance       PHQ-2 Total Score:    PHQ-9 Total Score:      Past Medical History:   Diagnosis Date   • Allergic rhinitis    • Anxiety and depression    • Asthma     exercise induced   • Condyloma acuminata    • Endometrial polyp    • Family history of breast cancer    • Fibroids    • Fibromyalgia    • GERD (gastroesophageal reflux disease)    • History of Papanicolaou smear of cervix 06/23/2016    2. Pap Smear Hx: Never had dysplasia.1998, ASCUS ([facility]).[id number], normal annual  Pap smears. 2007- 2010, normal annual Pap smears. 2013,& 2016, negative Pap smear, negative HR-HPV.   • Irritable bowel syndrome    • Menorrhagia    • Metabolic syndrome    • Osteoarthritis     In addition to fibromyalgia   • Sleep apnea    • Vaginal delivery     x2   • Vitamin B 12 deficiency        Allergies   Allergen Reactions   • Bactrim [Sulfamethoxazole-Trimethoprim] Rash   • Codeine Rash        Past Surgical History:   Procedure Laterality Date   • BREAST BIOPSY  2013    Hyper-pigmented lesion, left nipple.= Benign   • CYST REMOVAL      on chest and groin area    • ENDOMETRIAL ABLATION  2015    [facility], [facility], Novasure Endometrial Ablation   • ENDOSCOPY  2017    EGD: Stomach inflammation, Neg for H Pylori.        Social History     Tobacco Use   • Smoking status: Never Smoker   • Smokeless tobacco: Never Used   Vaping Use   • Vaping Use: Never used   Substance Use Topics   • Alcohol use: Never   • Drug use: Never       Family History   Problem Relation Age of Onset   • Stomach cancer Maternal Grandfather         dx >50   • Breast cancer Maternal Aunt         4 different mat aunts with breast cancer.   • Cancer Maternal Aunt         stomach   • Other Mother         Multiple lipomas.   • Arthritis Mother    • Diabetes type II Father    • Fibromyalgia Sister    • No Known Problems Daughter    • Osteoporosis Maternal Grandmother         ?   • Parkinsonism Maternal Grandmother    • No Known Problems Paternal Grandmother    • Breast cancer Paternal Aunt 60        dx ~ age 60   • Brain cancer Paternal Grandfather         dx >50   • No Known Problems Son    • BRCA 1/2 Neg Hx    • Colon cancer Neg Hx    • Endometrial cancer Neg Hx    • Ovarian cancer Neg Hx         Health Maintenance Due   Topic Date Due   • ANNUAL PHYSICAL  Never done   • Pneumococcal Vaccine 0-64 (1 of 1 - PPSV23) Never done   • HEPATITIS C SCREENING  Never done   • PAP SMEAR  Never done        Current Outpatient Medications on File Prior to Visit    Medication Sig   • albuterol sulfate  (90 Base) MCG/ACT inhaler INHALE ONE PUFF BY MOUTH EVERY 6 HOURS AS NEEDED   • cyanocobalamin 1000 MCG/ML injection ADMINISTER 1 ML(1000 MCG) UNDER THE SKIN 1 TIME A MONTH   • fluticasone (FLONASE) 50 MCG/ACT nasal spray instill 2 sprays into each nostril at bedtime   • ipratropium-albuterol (DUO-NEB) 0.5-2.5 mg/3 ml nebulizer ipratropium 0.5 mg-albuterol 3 mg (2.5 mg base)/3 mL nebulization soln   • ketorolac (TORADOL) 10 MG tablet Take 10 mg by mouth Every 6 (Six) Hours As Needed.   • LYRICA 50 MG capsule    • metFORMIN ER (GLUCOPHAGE-XR) 500 MG 24 hr tablet    • montelukast (SINGULAIR) 10 MG tablet    • omeprazole (priLOSEC) 40 MG capsule omeprazole 40 mg capsule,delayed release   TAKE 1 CAPSULE BY MOUTH DAILY   • ondansetron ODT (Zofran ODT) 4 MG disintegrating tablet Place 1 tablet on the tongue Every 8 (Eight) Hours As Needed for Nausea or Vomiting.   • oxyCODONE-acetaminophen (PERCOCET) 5-325 MG per tablet oxycodone-acetaminophen 5-325 mg oral tablet 1 every 8 hrs as needed.   Active   • Turmeric, Curcuma Longa, (Turmeric Root) powder turmeric root extract 500 mg oral capsule take 1 capsule by oral route 2 times a day   Active   • vitamin D (ERGOCALCIFEROL) 82258 units capsule capsule      No current facility-administered medications on file prior to visit.       Immunization History   Administered Date(s) Administered   • COVID-19 (MODERNA) 02/03/2021, 03/03/2021   • Flu Vaccine Split Quad 10/19/2016, 09/20/2017, 10/14/2018   • Hepatitis A 12/06/2018   • Influenza, Unspecified 10/14/2018, 10/01/2019   • Tdap 10/14/2018       [facility]  Review of Systems   Constitutional: Positive for fatigue.   Respiratory: Negative.    Cardiovascular: Negative.    Gastrointestinal: Negative.         Denies loss of control of bowels or bladder   Genitourinary: Negative.         Denies loss of control of bowels or bladder   Musculoskeletal: Positive for arthralgias, back pain,  gait problem, myalgias, neck pain and neck stiffness.        As per HPI --also reports the neck on the right side hurts and stiffness and then also the gait changes R/T the hip pain as well    Neurological: Positive for weakness. Negative for numbness.   Hematological: Negative.         Objective     /80   Pulse 91   Temp 98 °F (36.7 °C)   Wt 94.3 kg (208 lb)   SpO2 98%   BMI 31.86 kg/m²       Physical Exam  Constitutional:       Appearance: Normal appearance. She is obese.   HENT:      Head: Normocephalic.      Right Ear: External ear normal.      Left Ear: External ear normal.      Nose: Nose normal.      Mouth/Throat:      Comments: Wearing mask   Eyes:      Pupils: Pupils are equal, round, and reactive to light.   Pulmonary:      Breath sounds: Normal breath sounds.   Abdominal:      Palpations: Abdomen is soft.   Musculoskeletal:         General: Tenderness present.      Cervical back: Normal range of motion.      Lumbar back: Spasms and tenderness present. Decreased range of motion.      Right hip: Tenderness and bony tenderness present. Decreased range of motion.      Left hip: Decreased range of motion.      Right lower leg: No edema.      Left lower leg: No edema.      Comments: Pt unable to cross the flexed right leg over to the left knee--but able to do the left--then also (+) PTTP of the right SI joint > L and then also (+) PTTP of the posterior right hip and into the greater trochanter area and then radiates tot he right groin and then also to the right calf --also limited ROM of the lower back and the right hip --also the tight tense muscles noted to the right upper rhomboid area up into the right side of the neck    Skin:     General: Skin is warm and dry.   Neurological:      Mental Status: She is alert and oriented to person, place, and time.   Psychiatric:         Mood and Affect: Mood normal.         Behavior: Behavior normal.         Thought Content: Thought content normal.          Judgment: Judgment normal.         Result Review :     The following data was reviewed by: ROSEANNA Jameson on 07/19/2021:      MRI Hip Right Without Contrast (07/07/2020 16:39)  MRI Hip Right Without Contrast (04/05/2019 16:47)  MRI Lumbar Spine Without Contrast (01/09/2019 01:11)  XR Hip With or Without Pelvis 2 - 3 View Right (03/27/2019 16:50)           Assessment and Plan      Diagnoses and all orders for this visit:    1. Tear of right acetabular labrum, subsequent encounter (Primary)  -     MRI Hip Right Without Contrast; Future    2. Right hip pain  -     MRI Hip Right Without Contrast; Future    3. Sacral pain  -     MRI Hip Right Without Contrast; Future    4. Follow up  -     MRI Hip Right Without Contrast; Future    5. Class 1 obesity with body mass index (BMI) of 31.0 to 31.9 in adult, unspecified obesity type, unspecified whether serious comorbidity present  -     Liraglutide (SAXENDA) 18 MG/3ML injection pen; Inject 0.6mg under skin daily for week one, THEN 1.2mg daily for week two, THEN 1.8mg daily for week three, then 2.4mg daily for week four.  Dispense: 3 pen; Refill: 0            Follow Up     Return in about 1 month (around 8/19/2021).

## 2021-07-30 ENCOUNTER — HOSPITAL ENCOUNTER (OUTPATIENT)
Dept: MRI IMAGING | Facility: HOSPITAL | Age: 46
Discharge: HOME | End: 2021-07-30
Admitting: NURSE PRACTITIONER

## 2021-07-30 DIAGNOSIS — Z09 FOLLOW UP: ICD-10-CM

## 2021-07-30 DIAGNOSIS — M53.3 SACRAL PAIN: ICD-10-CM

## 2021-07-30 DIAGNOSIS — S73.191D TEAR OF RIGHT ACETABULAR LABRUM, SUBSEQUENT ENCOUNTER: ICD-10-CM

## 2021-07-30 DIAGNOSIS — M25.551 RIGHT HIP PAIN: ICD-10-CM

## 2021-07-30 PROCEDURE — 73721 MRI JNT OF LWR EXTRE W/O DYE: CPT | Performed by: RADIOLOGY

## 2021-07-30 PROCEDURE — 73721 MRI JNT OF LWR EXTRE W/O DYE: CPT

## 2021-08-02 DIAGNOSIS — E55.9 VITAMIN D DEFICIENCY: Primary | ICD-10-CM

## 2021-08-02 RX ORDER — ERGOCALCIFEROL 1.25 MG/1
50000 CAPSULE ORAL
Qty: 5 CAPSULE | Refills: 2 | Status: SHIPPED | OUTPATIENT
Start: 2021-08-02 | End: 2022-03-28

## 2021-08-19 ENCOUNTER — OFFICE VISIT (OUTPATIENT)
Dept: FAMILY MEDICINE CLINIC | Facility: CLINIC | Age: 46
End: 2021-08-19

## 2021-08-19 VITALS
DIASTOLIC BLOOD PRESSURE: 70 MMHG | WEIGHT: 203 LBS | OXYGEN SATURATION: 96 % | TEMPERATURE: 98.1 F | BODY MASS INDEX: 32.62 KG/M2 | HEART RATE: 91 BPM | HEIGHT: 66 IN | SYSTOLIC BLOOD PRESSURE: 124 MMHG

## 2021-08-19 DIAGNOSIS — K59.00 CONSTIPATION, UNSPECIFIED CONSTIPATION TYPE: ICD-10-CM

## 2021-08-19 DIAGNOSIS — E66.9 CLASS 1 OBESITY WITH BODY MASS INDEX (BMI) OF 31.0 TO 31.9 IN ADULT, UNSPECIFIED OBESITY TYPE, UNSPECIFIED WHETHER SERIOUS COMORBIDITY PRESENT: Primary | ICD-10-CM

## 2021-08-19 PROBLEM — M16.11 PRIMARY OSTEOARTHRITIS OF RIGHT HIP: Status: ACTIVE | Noted: 2021-08-05

## 2021-08-19 PROCEDURE — 99213 OFFICE O/P EST LOW 20 MIN: CPT | Performed by: NURSE PRACTITIONER

## 2021-08-19 RX ORDER — PEN NEEDLE, DIABETIC 31 GX5/16"
NEEDLE, DISPOSABLE MISCELLANEOUS
COMMUNITY
Start: 2021-07-21 | End: 2021-11-08

## 2021-08-19 NOTE — PROGRESS NOTES
Answers for HPI/ROS submitted by the patient on 8/16/2021  Please describe your symptoms.: refill on medication  Have you had these symptoms before?: No  How long have you been having these symptoms?: 1-4 days  Please list any medications you are currently taking for this condition.: SAXENDA  What is the primary reason for your visit?: Other    Chief Complaint  Medication Problem (you put her on a shot and she was suppose to come back in)    Subjective            Sahara Delarosa presents to [facility]  F/U on the saxenda --pt currently on the 2.4mg daily and we tapered up slowly--each week and pt done well and lost 5# this month--  doess reports some nausea but finally passes and pt figuring out how to tolerate       PHQ-2 Total Score:    PHQ-9 Total Score:      Past Medical History:   Diagnosis Date   • Allergic rhinitis    • Anxiety and depression    • Asthma     exercise induced   • Condyloma acuminata    • Endometrial polyp    • Family history of breast cancer    • Fibroids    • Fibromyalgia    • GERD (gastroesophageal reflux disease)    • History of Papanicolaou smear of cervix 06/23/2016    2. Pap Smear Hx: Never had dysplasia.1998, ASCUS ([facility]).[id number], normal annual Pap smears. 2007- 2010, normal annual Pap smears. 2013,& 2016, negative Pap smear, negative HR-HPV.   • Irritable bowel syndrome    • Menorrhagia    • Metabolic syndrome    • Osteoarthritis     In addition to fibromyalgia   • Sleep apnea    • Vaginal delivery     x2   • Vitamin B 12 deficiency        Allergies   Allergen Reactions   • Bactrim [Sulfamethoxazole-Trimethoprim] Rash   • Codeine Rash        Past Surgical History:   Procedure Laterality Date   • BREAST BIOPSY  2013    Hyper-pigmented lesion, left nipple.= Benign   • CYST REMOVAL      on chest and groin area    • ENDOMETRIAL ABLATION  2015    [facility], [facility], Novasure Endometrial Ablation   • ENDOSCOPY  2017    EGD: Stomach  inflammation, Neg for H Pylori.        Social History     Tobacco Use   • Smoking status: Never Smoker   • Smokeless tobacco: Never Used   Vaping Use   • Vaping Use: Never used   Substance Use Topics   • Alcohol use: Never   • Drug use: Never       Family History   Problem Relation Age of Onset   • Stomach cancer Maternal Grandfather         dx >50   • Breast cancer Maternal Aunt         4 different mat aunts with breast cancer.   • Cancer Maternal Aunt         stomach   • Other Mother         Multiple lipomas.   • Arthritis Mother    • Diabetes type II Father    • Fibromyalgia Sister    • No Known Problems Daughter    • Osteoporosis Maternal Grandmother         ?   • Parkinsonism Maternal Grandmother    • No Known Problems Paternal Grandmother    • Breast cancer Paternal Aunt 60        dx ~ age 60   • Brain cancer Paternal Grandfather         dx >50   • No Known Problems Son    • BRCA 1/2 Neg Hx    • Colon cancer Neg Hx    • Endometrial cancer Neg Hx    • Ovarian cancer Neg Hx         Health Maintenance Due   Topic Date Due   • ANNUAL PHYSICAL  Never done   • Pneumococcal Vaccine 0-64 (1 of 2 - PPSV23) Never done   • HEPATITIS C SCREENING  Never done   • PAP SMEAR  Never done        Current Outpatient Medications on File Prior to Visit   Medication Sig   • albuterol sulfate  (90 Base) MCG/ACT inhaler INHALE ONE PUFF BY MOUTH EVERY 6 HOURS AS NEEDED   • B-D ULTRAFINE III SHORT PEN 31G X 8 MM misc USE WITH SAXENDA   • cyanocobalamin 1000 MCG/ML injection ADMINISTER 1 ML(1000 MCG) UNDER THE SKIN 1 TIME A MONTH   • fluticasone (FLONASE) 50 MCG/ACT nasal spray instill 2 sprays into each nostril at bedtime   • ipratropium-albuterol (DUO-NEB) 0.5-2.5 mg/3 ml nebulizer ipratropium 0.5 mg-albuterol 3 mg (2.5 mg base)/3 mL nebulization soln   • LYRICA 50 MG capsule    • metFORMIN ER (GLUCOPHAGE-XR) 500 MG 24 hr tablet    • montelukast (SINGULAIR) 10 MG tablet    • omeprazole (priLOSEC) 40 MG capsule omeprazole 40 mg  "capsule,delayed release   TAKE 1 CAPSULE BY MOUTH DAILY   • ondansetron ODT (Zofran ODT) 4 MG disintegrating tablet Place 1 tablet on the tongue Every 8 (Eight) Hours As Needed for Nausea or Vomiting.   • oxyCODONE-acetaminophen (PERCOCET) 5-325 MG per tablet oxycodone-acetaminophen 5-325 mg oral tablet 1 every 8 hrs as needed.   Active   • Turmeric, Curcuma Longa, (Turmeric Root) powder turmeric root extract 500 mg oral capsule take 1 capsule by oral route 2 times a day   Active   • vitamin D (ERGOCALCIFEROL) 1.25 MG (73243 UT) capsule capsule Take 1 capsule by mouth Every 7 (Seven) Days.   • [DISCONTINUED] Liraglutide (SAXENDA) 18 MG/3ML injection pen Inject 0.6mg under skin daily for week one, THEN 1.2mg daily for week two, THEN 1.8mg daily for week three, then 2.4mg daily for week four.   • [DISCONTINUED] ketorolac (TORADOL) 10 MG tablet Take 10 mg by mouth Every 6 (Six) Hours As Needed.     No current facility-administered medications on file prior to visit.       Immunization History   Administered Date(s) Administered   • COVID-19 (MODERNA) 02/03/2021, 03/03/2021   • Flu Vaccine Split Quad 10/19/2016, 09/20/2017, 10/14/2018   • Hepatitis A 12/06/2018   • Influenza, Unspecified 10/14/2018, 10/01/2019   • Tdap 10/14/2018       [facility]  Review of Systems   Constitutional:        5# wt loss    HENT: Negative.    Respiratory: Negative.    Cardiovascular: Negative.    Gastrointestinal: Negative.         Just some nausea as per HPI    Skin: Negative.         Objective     /70 (BP Location: Right arm, Patient Position: Sitting, Cuff Size: Adult)   Pulse 91   Temp 98.1 °F (36.7 °C) (Temporal)   Ht 167.6 cm (66\")   Wt 92.1 kg (203 lb)   SpO2 96%   BMI 32.77 kg/m²       Physical Exam  Vitals and nursing note reviewed.   Constitutional:       Appearance: Normal appearance.   HENT:      Head: Normocephalic.      Right Ear: External ear normal.      Left Ear: External ear normal.      Nose: Nose normal.     "  Mouth/Throat:      Comments: Wearing mask  Eyes:      Pupils: Pupils are equal, round, and reactive to light.   Neck:      Thyroid: No thyroid mass, thyromegaly or thyroid tenderness.   Cardiovascular:      Rate and Rhythm: Normal rate and regular rhythm.      Heart sounds: Normal heart sounds.   Pulmonary:      Effort: Pulmonary effort is normal.      Breath sounds: Normal breath sounds.   Abdominal:      General: Bowel sounds are normal.      Palpations: Abdomen is soft.   Musculoskeletal:         General: Normal range of motion.      Cervical back: Normal range of motion and neck supple.   Skin:     General: Skin is warm and dry.   Neurological:      Mental Status: She is alert and oriented to person, place, and time.   Psychiatric:         Mood and Affect: Mood normal.         Behavior: Behavior normal.         Judgment: Judgment normal.         Result Review :                          Assessment and Plan      Diagnoses and all orders for this visit:    1. Class 1 obesity with body mass index (BMI) of 31.0 to 31.9 in adult, unspecified obesity type, unspecified whether serious comorbidity present (Primary)  -     Liraglutide (SAXENDA) 18 MG/3ML injection pen; Inject 3 mg under the skin into the appropriate area as directed Daily.  Dispense: 15 pen; Refill: 0    2. Constipation, unspecified constipation type    Advised patient to make sure she is staying well-hydrated to use Colace twice daily she could use MiraLAX along with that and fiber pills and if she really needed to an occasional use of a fleets enema that she already used over-the-counter last week or glycerin suppositories patient will let me know if anything persists with constipation as she may need something like Amitiza or Trulance-we did discuss that this could be a combination of her pain management medications and/or the addition of the Saxenda        Follow Up     Return in about 3 months (around 11/19/2021), or if symptoms worsen or fail to  improve.

## 2021-08-19 NOTE — PATIENT INSTRUCTIONS
Liraglutide injection (Weight Management)  What is this medicine?  LIRAGLUTIDE (LIR a GLOO tide) is used to help people lose weight and maintain weight loss. It is used with a reduced-calorie diet and exercise.  This medicine may be used for other purposes; ask your health care provider or pharmacist if you have questions.  COMMON BRAND NAME(S): Michael  What should I tell my health care provider before I take this medicine?  They need to know if you have any of these conditions:  · endocrine tumors (MEN 2) or if someone in your family had these tumors  · gallbladder disease  · high cholesterol  · history of alcohol abuse problem  · history of pancreatitis  · kidney disease or if you are on dialysis  · liver disease  · previous swelling of the tongue, face, or lips with difficulty breathing, difficulty swallowing, hoarseness, or tightening of the throat  · stomach problems  · suicidal thoughts, plans, or attempt; a previous suicide attempt by you or a family member  · thyroid cancer or if someone in your family had thyroid cancer  · an unusual or allergic reaction to liraglutide, other medicines, foods, dyes, or preservatives  · pregnant or trying to get pregnant  · breast-feeding  How should I use this medicine?  This medicine is for injection under the skin of your upper leg, stomach area, or upper arm. You will be taught how to prepare and give this medicine. Use exactly as directed. Take your medicine at regular intervals. Do not take it more often than directed.  This medicine comes with INSTRUCTIONS FOR USE. Ask your pharmacist for directions on how to use this medicine. Read the information carefully. Talk to your pharmacist or health care provider if you have questions.  It is important that you put your used needles and syringes in a special sharps container. Do not put them in a trash can. If you do not have a sharps container, call your pharmacist or healthcare provider to get one.  A special MedGuide  will be given to you by the pharmacist with each prescription and refill. Be sure to read this information carefully each time.  Talk to your health care provider about the use of this medicine in children. While it may be prescribed for children as young as 12 years of age for selected conditions, precautions do apply.  Overdosage: If you think you have taken too much of this medicine contact a poison control center or emergency room at once.  NOTE: This medicine is only for you. Do not share this medicine with others.  What if I miss a dose?  If you miss a dose, take it as soon as you can. If it is almost time for your next dose, take only that dose. Do not take double or extra doses. If you miss your dose for 3 days or more, call your doctor or health care professional to talk about how to restart this medicine.  What may interact with this medicine?  · insulin and other medicines for diabetes  This list may not describe all possible interactions. Give your health care provider a list of all the medicines, herbs, non-prescription drugs, or dietary supplements you use. Also tell them if you smoke, drink alcohol, or use illegal drugs. Some items may interact with your medicine.  What should I watch for while using this medicine?  Visit your doctor or health care professional for regular checks on your progress.  Drink plenty of fluids while taking this medicine. Check with your doctor or health care professional if you get an attack of severe diarrhea, nausea, and vomiting. The loss of too much body fluid can make it dangerous for you to take this medicine.  This medicine may affect blood sugar levels. Ask your healthcare provider if changes in diet or medicines are needed if you have diabetes.  Patients and their families should watch out for worsening depression or thoughts of suicide. Also watch out for sudden changes in feelings such as feeling anxious, agitated, panicky, irritable, hostile, aggressive,  impulsive, severely restless, overly excited and hyperactive, or not being able to sleep. If this happens, especially at the beginning of treatment or after a change in dose, call your health care professional.  Women should inform their health care provider if they wish to become pregnant or think they might be pregnant. Losing weight while pregnant is not advised and may cause harm to the unborn child. Talk to your health care provider for more information.  What side effects may I notice from receiving this medicine?  Side effects that you should report to your doctor or health care professional as soon as possible:  · allergic reactions like skin rash, itching or hives, swelling of the face, lips, or tongue  · breathing problems  · diarrhea that continues or is severe  · lump or swelling on the neck  · severe nausea  · signs and symptoms of infection like fever or chills; cough; sore throat; pain or trouble passing urine  · signs and symptoms of low blood sugar such as feeling anxious; confusion; dizziness; increased hunger; unusually weak or tired; increased sweating; shakiness; cold, clammy skin; irritable; headache; blurred vision; fast heartbeat; loss of consciousness  · signs and symptoms of kidney injury like trouble passing urine or change in the amount of urine  · trouble swallowing  · unusual stomach upset or pain  · vomiting  Side effects that usually do not require medical attention (report to your doctor or health care professional if they continue or are bothersome):  · constipation  · decreased appetite  · diarrhea  · fatigue  · headache  · nausea  · pain, redness, or irritation at site where injected  · stomach upset  · stuffy or runny nose  This list may not describe all possible side effects. Call your doctor for medical advice about side effects. You may report side effects to FDA at 7-771-FDA-8782.  Where should I keep my medicine?  Keep out of the reach of children.  Store unopened pen in a  refrigerator between 2 and 8 degrees C (36 and 46 degrees F). Do not freeze or use if the medicine has been frozen. Protect from light and excessive heat. After you first use the pen, it can be stored at room temperature between 15 and 30 degrees C (59 and 86 degrees F) or in a refrigerator. Throw away your used pen after 30 days or after the expiration date, whichever comes first.  Do not store your pen with the needle attached. If the needle is left on, medicine may leak from the pen.  NOTE: This sheet is a summary. It may not cover all possible information. If you have questions about this medicine, talk to your doctor, pharmacist, or health care provider.  © 2021 [employer] (2020-12-10 13:56:25)

## 2021-10-09 DIAGNOSIS — M79.7 FIBROMYALGIA: Primary | ICD-10-CM

## 2021-10-11 RX ORDER — PREGABALIN 100 MG/1
CAPSULE ORAL
Qty: 90 CAPSULE | OUTPATIENT
Start: 2021-10-11

## 2021-10-11 RX ORDER — PREGABALIN 100 MG/1
CAPSULE ORAL
Qty: 90 CAPSULE | Refills: 0 | Status: SHIPPED | OUTPATIENT
Start: 2021-10-11 | End: 2021-11-23

## 2021-10-26 ENCOUNTER — OFFICE VISIT (OUTPATIENT)
Dept: FAMILY MEDICINE CLINIC | Facility: CLINIC | Age: 46
End: 2021-10-26

## 2021-10-26 VITALS
DIASTOLIC BLOOD PRESSURE: 82 MMHG | OXYGEN SATURATION: 97 % | HEIGHT: 66 IN | SYSTOLIC BLOOD PRESSURE: 124 MMHG | WEIGHT: 206.4 LBS | HEART RATE: 95 BPM | TEMPERATURE: 98 F | BODY MASS INDEX: 33.17 KG/M2

## 2021-10-26 DIAGNOSIS — J45.30 MILD PERSISTENT ASTHMA WITHOUT COMPLICATION: ICD-10-CM

## 2021-10-26 DIAGNOSIS — M79.7 FIBROMYALGIA: Primary | ICD-10-CM

## 2021-10-26 DIAGNOSIS — Z09 FOLLOW UP: ICD-10-CM

## 2021-10-26 DIAGNOSIS — J30.2 SEASONAL ALLERGIC RHINITIS, UNSPECIFIED TRIGGER: ICD-10-CM

## 2021-10-26 DIAGNOSIS — E53.8 VITAMIN B 12 DEFICIENCY: ICD-10-CM

## 2021-10-26 DIAGNOSIS — E88.81 METABOLIC SYNDROME: ICD-10-CM

## 2021-10-26 DIAGNOSIS — Z79.899 MEDICATION MANAGEMENT: ICD-10-CM

## 2021-10-26 DIAGNOSIS — K21.9 GASTROESOPHAGEAL REFLUX DISEASE WITHOUT ESOPHAGITIS: ICD-10-CM

## 2021-10-26 DIAGNOSIS — E55.9 VITAMIN D DEFICIENCY: ICD-10-CM

## 2021-10-26 DIAGNOSIS — E66.9 CLASS 1 OBESITY WITH BODY MASS INDEX (BMI) OF 31.0 TO 31.9 IN ADULT, UNSPECIFIED OBESITY TYPE, UNSPECIFIED WHETHER SERIOUS COMORBIDITY PRESENT: ICD-10-CM

## 2021-10-26 LAB
ALBUMIN SERPL-MCNC: 4.6 G/DL (ref 3.5–5.2)
ALBUMIN/GLOB SERPL: 1.8 G/DL
ALP SERPL-CCNC: 93 U/L (ref 39–117)
ALT SERPL W P-5'-P-CCNC: 12 U/L (ref 1–33)
ANION GAP SERPL CALCULATED.3IONS-SCNC: 8.7 MMOL/L (ref 5–15)
AST SERPL-CCNC: 11 U/L (ref 1–32)
BILIRUB SERPL-MCNC: 0.3 MG/DL (ref 0–1.2)
BUN SERPL-MCNC: 8 MG/DL (ref 6–20)
BUN/CREAT SERPL: 12.5 (ref 7–25)
CALCIUM SPEC-SCNC: 9.7 MG/DL (ref 8.6–10.5)
CHLORIDE SERPL-SCNC: 105 MMOL/L (ref 98–107)
CO2 SERPL-SCNC: 28.3 MMOL/L (ref 22–29)
CREAT SERPL-MCNC: 0.64 MG/DL (ref 0.57–1)
DEPRECATED RDW RBC AUTO: 42.4 FL (ref 37–54)
ERYTHROCYTE [DISTWIDTH] IN BLOOD BY AUTOMATED COUNT: 12.7 % (ref 12.3–15.4)
FOLATE SERPL-MCNC: 12.2 NG/ML (ref 4.78–24.2)
GFR SERPL CREATININE-BSD FRML MDRD: 99 ML/MIN/1.73
GLOBULIN UR ELPH-MCNC: 2.5 GM/DL
GLUCOSE SERPL-MCNC: 78 MG/DL (ref 65–99)
HCT VFR BLD AUTO: 39.4 % (ref 34–46.6)
HGB BLD-MCNC: 13 G/DL (ref 12–15.9)
MCH RBC QN AUTO: 30.4 PG (ref 26.6–33)
MCHC RBC AUTO-ENTMCNC: 33 G/DL (ref 31.5–35.7)
MCV RBC AUTO: 92.1 FL (ref 79–97)
PLATELET # BLD AUTO: 342 10*3/MM3 (ref 140–450)
PMV BLD AUTO: 9.8 FL (ref 6–12)
POTASSIUM SERPL-SCNC: 4 MMOL/L (ref 3.5–5.2)
PROT SERPL-MCNC: 7.1 G/DL (ref 6–8.5)
RBC # BLD AUTO: 4.28 10*6/MM3 (ref 3.77–5.28)
SODIUM SERPL-SCNC: 142 MMOL/L (ref 136–145)
VIT B12 BLD-MCNC: 540 PG/ML (ref 211–946)
WBC # BLD AUTO: 4.23 10*3/MM3 (ref 3.4–10.8)

## 2021-10-26 PROCEDURE — 36415 COLL VENOUS BLD VENIPUNCTURE: CPT | Performed by: NURSE PRACTITIONER

## 2021-10-26 PROCEDURE — 82306 VITAMIN D 25 HYDROXY: CPT | Performed by: NURSE PRACTITIONER

## 2021-10-26 PROCEDURE — 99214 OFFICE O/P EST MOD 30 MIN: CPT | Performed by: NURSE PRACTITIONER

## 2021-10-26 PROCEDURE — 80053 COMPREHEN METABOLIC PANEL: CPT | Performed by: NURSE PRACTITIONER

## 2021-10-26 PROCEDURE — 85027 COMPLETE CBC AUTOMATED: CPT | Performed by: NURSE PRACTITIONER

## 2021-10-26 PROCEDURE — 82607 VITAMIN B-12: CPT | Performed by: NURSE PRACTITIONER

## 2021-10-26 PROCEDURE — 82746 ASSAY OF FOLIC ACID SERUM: CPT | Performed by: NURSE PRACTITIONER

## 2021-10-26 RX ORDER — CYANOCOBALAMIN 1000 UG/ML
1000 INJECTION, SOLUTION INTRAMUSCULAR; SUBCUTANEOUS
Qty: 3 ML | Refills: 3 | Status: SHIPPED | OUTPATIENT
Start: 2021-10-26 | End: 2021-11-24

## 2021-10-26 RX ORDER — NALOXEGOL OXALATE 25 MG/1
25 TABLET, FILM COATED ORAL DAILY
COMMUNITY
End: 2022-01-19

## 2021-10-26 RX ORDER — METFORMIN HYDROCHLORIDE 500 MG/1
500 TABLET, EXTENDED RELEASE ORAL
Qty: 90 TABLET | Refills: 1 | Status: SHIPPED | OUTPATIENT
Start: 2021-10-26 | End: 2022-05-19 | Stop reason: SDUPTHER

## 2021-10-26 NOTE — PROGRESS NOTES
Venipuncture Blood Specimen Collection  Venipuncture performed in left arm  by Nevin Sierra with good hemostasis. Patient tolerated the procedure well without complications.   10/26/21   Nevin Sierra

## 2021-10-26 NOTE — PROGRESS NOTES
Answers for HPI/ROS submitted by the patient on 10/19/2021  Please describe your symptoms.: Need Medication refill  Have you had these symptoms before?: No  How long have you been having these symptoms?: Greater than 2 weeks  Please list any medications you are currently taking for this condition.: All Medication  Please describe any probable cause for these symptoms. : none  What is the primary reason for your visit?: Other    Chief Complaint  Vitamin D Deficiency (refills and labs to check Vitamin D) and Weight Check (refill Saxenda)    Subjective            Sahara Delarosa presents to [facility]  Pt here for the med refills on the ones due and then in 1-2 months will need the others--and needing labs as well    --allergies: well controlled on the singulair-no SE    --fibromyalgia: pretty well controlled on the lyrica--but pt reports still has some days of more pain and fatigue     --GERD: stable on the meds     --Vit D def and Vit B12 def--pt cont's on the monthly shots and the once weekly Vit D high dose was completed and needs rechecking     --metabolic syndrome--pt on the metformin and tolerates this well    _____________________________________________________    Obesity: pt tolerates the saxsenda well--but for the recent hip replacement surgery--pt had to stop the saxenda x 1 month then start all over again and tapered back up slowly and now ready to do the 3mg again     Then with regards to her DDD and DJD pt in pain mgt and they added the movantik--for the constipation --was only going BM once weekly             PHQ-2 Total Score:    PHQ-9 Total Score:      Past Medical History:   Diagnosis Date   • Allergic rhinitis    • Anxiety and depression    • Asthma     exercise induced   • Condyloma acuminata    • Endometrial polyp    • Family history of breast cancer    • Fibroids    • Fibromyalgia    • GERD (gastroesophageal reflux disease)    • History of Papanicolaou smear of  cervix 06/23/2016    2. Pap Smear Hx: Never had dysplasia.1998, ASCUS ([facility]).[id number], normal annual Pap smears. 2007- 2010, normal annual Pap smears. 2013,& 2016, negative Pap smear, negative HR-HPV.   • Irritable bowel syndrome    • Menorrhagia    • Metabolic syndrome    • Osteoarthritis     In addition to fibromyalgia   • Sleep apnea    • Vaginal delivery     x2   • Vitamin B 12 deficiency        Allergies   Allergen Reactions   • Bactrim [Sulfamethoxazole-Trimethoprim] Rash   • Codeine Rash        Past Surgical History:   Procedure Laterality Date   • BREAST BIOPSY  2013    Hyper-pigmented lesion, left nipple.= Benign   • CYST REMOVAL      on chest and groin area    • ENDOMETRIAL ABLATION  2015    HSC, DNC, Novasure Endometrial Ablation   • ENDOSCOPY  2017    EGD: Stomach inflammation, Neg for H Pylori.        Social History     Tobacco Use   • Smoking status: Never Smoker   • Smokeless tobacco: Never Used   Vaping Use   • Vaping Use: Never used   Substance Use Topics   • Alcohol use: Never   • Drug use: Never       Family History   Problem Relation Age of Onset   • Stomach cancer Maternal Grandfather         dx >50   • Breast cancer Maternal Aunt         4 different mat aunts with breast cancer.   • Cancer Maternal Aunt         stomach   • Other Mother         Multiple lipomas.   • Arthritis Mother    • Diabetes type II Father    • Fibromyalgia Sister    • No Known Problems Daughter    • Osteoporosis Maternal Grandmother         ?   • Parkinsonism Maternal Grandmother    • No Known Problems Paternal Grandmother    • Breast cancer Paternal Aunt 60        dx ~ age 60   • Brain cancer Paternal Grandfather         dx >50   • No Known Problems Son    • BRCA 1/2 Neg Hx    • Colon cancer Neg Hx    • Endometrial cancer Neg Hx    • Ovarian cancer Neg Hx         Health Maintenance Due   Topic Date Due   • URINE MICROALBUMIN  Never done   • ANNUAL PHYSICAL  Never done   • Pneumococcal Vaccine  0-64 (1 of 2 - PPSV23) Never done   • HEPATITIS C SCREENING  Never done   • DIABETIC FOOT EXAM  Never done   • PAP SMEAR  Never done   • DIABETIC EYE EXAM  Never done   • INFLUENZA VACCINE  08/01/2021        Current Outpatient Medications on File Prior to Visit   Medication Sig   • albuterol sulfate  (90 Base) MCG/ACT inhaler INHALE ONE PUFF BY MOUTH EVERY 6 HOURS AS NEEDED   • B-D ULTRAFINE III SHORT PEN 31G X 8 MM misc USE WITH SAXENDA   • fluticasone (FLONASE) 50 MCG/ACT nasal spray instill 2 sprays into each nostril at bedtime   • montelukast (SINGULAIR) 10 MG tablet    • Naloxegol Oxalate (Movantik) 25 MG tablet Take 25 mg by mouth Daily.   • omeprazole (priLOSEC) 40 MG capsule omeprazole 40 mg capsule,delayed release   TAKE 1 CAPSULE BY MOUTH DAILY   • ondansetron ODT (Zofran ODT) 4 MG disintegrating tablet Place 1 tablet on the tongue Every 8 (Eight) Hours As Needed for Nausea or Vomiting.   • oxyCODONE-acetaminophen (PERCOCET) 5-325 MG per tablet oxycodone-acetaminophen 5-325 mg oral tablet 1 every 8 hrs as needed.   Active   • pregabalin (LYRICA) 100 MG capsule TAKE ONE CAPSULE BY MOUTH IN THE MORNING AND TWO AT BEDTIME   • Turmeric, Curcuma Longa, (Turmeric Root) powder turmeric root extract 500 mg oral capsule take 1 capsule by oral route 2 times a day   Active   • vitamin D (ERGOCALCIFEROL) 1.25 MG (81444 UT) capsule capsule Take 1 capsule by mouth Every 7 (Seven) Days.   • [DISCONTINUED] cyanocobalamin 1000 MCG/ML injection ADMINISTER 1 ML(1000 MCG) UNDER THE SKIN 1 TIME A MONTH   • [DISCONTINUED] Liraglutide (SAXENDA) 18 MG/3ML injection pen Inject 3 mg under the skin into the appropriate area as directed Daily.   • [DISCONTINUED] metFORMIN ER (GLUCOPHAGE-XR) 500 MG 24 hr tablet    • ipratropium-albuterol (DUO-NEB) 0.5-2.5 mg/3 ml nebulizer ipratropium 0.5 mg-albuterol 3 mg (2.5 mg base)/3 mL nebulization soln   • [DISCONTINUED] LYRICA 50 MG capsule      No current facility-administered  "medications on file prior to visit.       Immunization History   Administered Date(s) Administered   • COVID-19 (MODERNA) 02/03/2021, 03/03/2021   • Flu Vaccine Quad PF >36MO 10/19/2016, 09/20/2017, 10/14/2018   • Flu Vaccine Split Quad 10/19/2016, 09/20/2017, 10/14/2018   • Hepatitis A 12/06/2018   • Influenza, Unspecified 10/14/2018, 10/01/2019   • Tdap 10/14/2018       Review of Systems   Constitutional: Positive for fatigue.   HENT: Negative.    Respiratory: Negative.         Asthma acted up approx 3 days ago and used the inhaler x 1 day and then cleared up    Cardiovascular: Negative.    Gastrointestinal: Positive for constipation and GERD. Negative for blood in stool, diarrhea, nausea and vomiting.   Genitourinary: Negative for dysuria.   Musculoskeletal: Positive for arthralgias, back pain, myalgias and neck pain.   Allergic/Immunologic: Positive for environmental allergies.   Neurological: Positive for numbness. Negative for dizziness, syncope and headache.        Left foot    Psychiatric/Behavioral: Negative for self-injury, suicidal ideas and depressed mood. The patient is not nervous/anxious.         Objective     /82 (BP Location: Left arm)   Pulse 95   Temp 98 °F (36.7 °C)   Ht 167.6 cm (66\")   Wt 93.6 kg (206 lb 6.4 oz)   SpO2 97%   BMI 33.31 kg/m²       Physical Exam  Vitals and nursing note reviewed.   Constitutional:       Appearance: Normal appearance.   HENT:      Head: Normocephalic.      Right Ear: Tympanic membrane, ear canal and external ear normal.      Left Ear: Tympanic membrane, ear canal and external ear normal.      Nose: Nose normal.      Mouth/Throat:      Comments: Wearing mask  Eyes:      Pupils: Pupils are equal, round, and reactive to light.   Cardiovascular:      Rate and Rhythm: Normal rate and regular rhythm.      Heart sounds: Normal heart sounds.   Pulmonary:      Effort: Pulmonary effort is normal.      Breath sounds: Normal breath sounds.   Abdominal:      " General: Bowel sounds are normal.      Palpations: Abdomen is soft.   Musculoskeletal:      Cervical back: Neck supple. Spasms and tenderness present. Decreased range of motion.      Lumbar back: Spasms and tenderness present. Decreased range of motion.      Right hip: Tenderness present. Decreased range of motion.      Right knee: Crepitus present.      Left knee: Crepitus present.      Comments: (+) PTTP of the bilat upper back and mid and lower back muscles and upper chest wall and BUE and BLE --pt using her cane today    Skin:     General: Skin is warm and dry.   Neurological:      Mental Status: She is alert and oriented to person, place, and time.   Psychiatric:         Mood and Affect: Mood normal.         Behavior: Behavior normal.         Judgment: Judgment normal.         Result Review :     The following data was reviewed by: ROSEANNA Jameson on 10/26/2021:      Urine Drug Screen - (07/20/2020 16:24)           Assessment and Plan      Diagnoses and all orders for this visit:    1. Fibromyalgia (Primary)    2. Vitamin D deficiency  -     Vitamin D 25 Hydroxy    3. Class 1 obesity with body mass index (BMI) of 31.0 to 31.9 in adult, unspecified obesity type, unspecified whether serious comorbidity present  -     Liraglutide (SAXENDA) 18 MG/3ML injection pen; Inject 3 mg under the skin into the appropriate area as directed Daily.  Dispense: 15 pen; Refill: 1    4. BMI 33.0-33.9,adult  -     Liraglutide (SAXENDA) 18 MG/3ML injection pen; Inject 3 mg under the skin into the appropriate area as directed Daily.  Dispense: 15 pen; Refill: 1  -     Comprehensive Metabolic Panel  -     CBC (No Diff)    5. Gastroesophageal reflux disease without esophagitis  -     CBC (No Diff)    6. Seasonal allergic rhinitis, unspecified trigger    7. Metabolic syndrome  -     metFORMIN ER (GLUCOPHAGE-XR) 500 MG 24 hr tablet; Take 1 tablet by mouth Daily With Breakfast.  Dispense: 90 tablet; Refill: 1    8. Vitamin B 12  deficiency  -     cyanocobalamin 1000 MCG/ML injection; Inject 1 mL into the appropriate muscle as directed by prescriber Every 30 (Thirty) Days.  Dispense: 3 mL; Refill: 3  -     Vitamin B12 & Folate  -     CBC (No Diff)    9. Follow up    10. Medication management  -     Comprehensive Metabolic Panel  -     CBC (No Diff)    11. Mild persistent asthma without complication    I will refill the lyrica when needed (just did the refills) x 3 months then the rest when needed x 6 months         Follow Up     Return in about 3 months (around 1/26/2022), or if symptoms worsen or fail to improve.

## 2021-10-27 LAB — 25(OH)D3 SERPL-MCNC: 60.8 NG/ML

## 2021-10-27 NOTE — PROGRESS NOTES
Please mail letter to patient stating    Sahara, your blood counts were completely normal range; your vitamin D was outstanding it was at 60.8 and normal range is only  so now you only need to take like a 1000 IU over-the-counter daily vitamin D; comprehensive panel reveals: Normal glucose normal electrolytes normal kidney and liver function tests; your B12 and folic acid were in good range but I would still continue the monthly B12 shots

## 2021-11-08 RX ORDER — PEN NEEDLE, DIABETIC 31 GX5/16"
NEEDLE, DISPOSABLE MISCELLANEOUS
Qty: 100 EACH | Refills: 1 | Status: SHIPPED | OUTPATIENT
Start: 2021-11-08 | End: 2022-01-19

## 2021-11-11 ENCOUNTER — OFFICE VISIT (OUTPATIENT)
Dept: SLEEP MEDICINE | Facility: HOSPITAL | Age: 46
End: 2021-11-11

## 2021-11-11 VITALS
SYSTOLIC BLOOD PRESSURE: 107 MMHG | DIASTOLIC BLOOD PRESSURE: 68 MMHG | OXYGEN SATURATION: 98 % | WEIGHT: 200 LBS | HEIGHT: 68 IN | HEART RATE: 81 BPM | BODY MASS INDEX: 30.31 KG/M2 | TEMPERATURE: 98.1 F

## 2021-11-11 DIAGNOSIS — G47.33 OSA (OBSTRUCTIVE SLEEP APNEA): Primary | ICD-10-CM

## 2021-11-11 PROCEDURE — G0463 HOSPITAL OUTPT CLINIC VISIT: HCPCS | Performed by: INTERNAL MEDICINE

## 2021-11-11 NOTE — PROGRESS NOTES
"      [facility]         Dr Renea Whaley  [unfilled]  Patient Care Team:  Maira Gama APRN as PCP - General (Family Medicine)    Chief Complaint:Initial sleep study AHI is 10.5 events per    hour.     Interval History: Patient here for compliance visit annual.  Currently on auto CPAP 5 to 15 cm.  Compliance today 83% average daily use of 5 hours 57 minutes.  AHI and leak look excellent.  She has Jose and currently her CPAP is on recall.  She is currently using soap clean and trying to limit the use of humidification.  Goes to bed 930 gets up 530 gets about 5 hours of sleep and feels rested.  No tobacco no alcohol no caffeine abuse.  Currently has a mask that fits well which is a nasal pillow.  Supplies been adequate    REVIEW OF SYSTEMS:   CARDIOVASCULAR: No chest pain, chest pressure or chest discomfort. No palpitations or edema.   RESPIRATORY: No shortness of breath, cough or sputum.   GASTROINTESTINAL: No anorexia, nausea, vomiting or diarrhea. No abdominal pain or blood.   HEMATOLOGIC: No bleeding or bruising.  Positive for nasal congestion  Armstrong 11 out of 24 consistent with sleepiness    Ventilator/Non-Invasive Ventilation Settings (From admission, onward)            None            Vital Signs  Temp:  [98.1 °F (36.7 °C)] 98.1 °F (36.7 °C)  Heart Rate:  [81] 81  BP: (107)/(68) 107/68  [unfilled]  Flowsheet Rows      First Filed Value   Admission Height 172.7 cm (68\") Documented at 11/11/2021 0900   Admission Weight 90.7 kg (200 lb) Documented at 11/11/2021 0900          Physical Exam:  Patient is examined using the personal protective equipment as per guidelines from infection control for this particular patient as enacted.  Hand hygiene was performed before and after patient interaction.   General Appearance:    Alert, cooperative, in no acute distress.  Following simple commands  Neck midline trachea, no thyromegaly   Lungs:     Clear to auscultation, respirations regular, even and "                  unlabored  ENT Mallampati between 3 and 4 normal nasal exam    Heart:    Regular rhythm and normal rate, normal S1 and S2, no            murmur, no gallop, no rub, no click   Chest Wall:    No abnormalities observed   Abdomen:     Normal bowel sounds, no masses, no organomegaly, soft        nontender, nondistended, no guarding, no rebound                tenderness   Extremities:   Moves all extremities well, no edema, no cyanosis, no             redness  CNS no focal neurological deficits normal sensory exam  Skin no rashes no nodules  Musculoskeletal no cyanosis no clubbing normal range of motion     Results Review:                                          I reviewed the patient's new clinical results.  I personally viewed and interpreted the patient's chest x-ray.        Medication Review:       No current facility-administered medications for this visit.      ASSESSMENT:   1.  Obstructive sleep apnea.    2.  Fibromyalgia.    3.  Diabetes mellitus.    PLAN:  Reviewed compliance download with the patient  Compliance AHI leak look excellent.  Jose recall information given.  Advised patient not to use soap clean and avoid using humidifier as much as possible  Patient has currently registered her CPAP machine for the Jose recall  Treatment of underlying comorbidities  Avoidance of alcohol at bedtime.      Renea Whaley MD  11/11/21  09:36 EST

## 2021-11-23 DIAGNOSIS — M79.7 FIBROMYALGIA: ICD-10-CM

## 2021-11-23 RX ORDER — PREGABALIN 100 MG/1
CAPSULE ORAL
Qty: 90 CAPSULE | Refills: 2 | Status: SHIPPED | OUTPATIENT
Start: 2021-11-23 | End: 2022-02-23 | Stop reason: SDUPTHER

## 2021-11-24 DIAGNOSIS — E53.8 VITAMIN B 12 DEFICIENCY: ICD-10-CM

## 2021-11-24 RX ORDER — CYANOCOBALAMIN 1000 UG/ML
INJECTION, SOLUTION INTRAMUSCULAR; SUBCUTANEOUS
Qty: 3 ML | Refills: 3 | Status: SHIPPED | OUTPATIENT
Start: 2021-11-24 | End: 2022-11-10 | Stop reason: SDUPTHER

## 2021-12-03 ENCOUNTER — OFFICE VISIT (OUTPATIENT)
Dept: FAMILY MEDICINE CLINIC | Facility: CLINIC | Age: 46
End: 2021-12-03

## 2021-12-03 VITALS
HEART RATE: 83 BPM | BODY MASS INDEX: 30.56 KG/M2 | SYSTOLIC BLOOD PRESSURE: 132 MMHG | DIASTOLIC BLOOD PRESSURE: 84 MMHG | TEMPERATURE: 98.6 F | OXYGEN SATURATION: 98 % | WEIGHT: 201 LBS

## 2021-12-03 DIAGNOSIS — J02.9 ACUTE PHARYNGITIS, UNSPECIFIED ETIOLOGY: ICD-10-CM

## 2021-12-03 DIAGNOSIS — J45.901 EXACERBATION OF ASTHMA, UNSPECIFIED ASTHMA SEVERITY, UNSPECIFIED WHETHER PERSISTENT: ICD-10-CM

## 2021-12-03 DIAGNOSIS — R04.0 EPISTAXIS: Primary | ICD-10-CM

## 2021-12-03 PROCEDURE — 99214 OFFICE O/P EST MOD 30 MIN: CPT | Performed by: FAMILY MEDICINE

## 2021-12-03 RX ORDER — PREDNISONE 20 MG/1
40 TABLET ORAL DAILY
Qty: 10 TABLET | Refills: 0 | Status: SHIPPED | OUTPATIENT
Start: 2021-12-03 | End: 2021-12-08

## 2021-12-03 RX ORDER — ECHINACEA PURPUREA EXTRACT 125 MG
1 TABLET ORAL AS NEEDED
Qty: 30 ML | Refills: 1 | Status: SHIPPED | OUTPATIENT
Start: 2021-12-03 | End: 2023-01-26

## 2021-12-03 NOTE — PROGRESS NOTES
Chief Complaint    Nasal Congestion, Cough, and Nose Bleed (WHEN BLOWING NOSE)    Subjective      Sahara Delarosa presents to [facility]     History of Present Illness    1.) ACUTE ILLNESS : Onset - > 24 hours. Patient reports initial epistaxis. She then reports postnasal drainage + sore throat. Now reporting a cough. (+) Asthma. Patient reports using her rescue inhaler > than usual. No fevers or chills. Tested for COVID 19 this AM - neg.     Objective      Vital Signs:     /84   Pulse 83   Temp 98.6 °F (37 °C)   Wt 91.2 kg (201 lb)   SpO2 98%   BMI 30.56 kg/m²       Physical Exam  Vitals reviewed.   Constitutional:       General: She is not in acute distress.     Appearance: Normal appearance. She is well-developed.   HENT:      Head: Normocephalic and atraumatic.      Right Ear: Hearing and external ear normal. Tympanic membrane is not erythematous or bulging.      Left Ear: Hearing and external ear normal. Tympanic membrane is not erythematous or bulging.      Nose: Nose normal.      Mouth/Throat:      Pharynx: No oropharyngeal exudate.   Eyes:      General: Lids are normal.         Right eye: No discharge.         Left eye: No discharge.      Conjunctiva/sclera: Conjunctivae normal.   Pulmonary:      Effort: Pulmonary effort is normal.      Breath sounds: No decreased breath sounds.   Abdominal:      General: There is no distension.   Musculoskeletal:         General: No swelling.      Cervical back: Neck supple.   Skin:     Coloration: Skin is not jaundiced.      Findings: No erythema.   Neurological:      Mental Status: She is alert. Mental status is at baseline.   Psychiatric:         Mood and Affect: Mood and affect normal.         Thought Content: Thought content normal.     Assessment and Plan     Diagnoses and all orders for this visit:    1. Epistaxis (Primary)  Comments:  1.) Recommend nasal saline.     2. Exacerbation of asthma, unspecified asthma severity,  unspecified whether persistent  Comments:  1.) Start PO steroid course, as noted. Continue SIRI, as prescribed. Follow up with any concerning sxs.     3. Acute pharyngitis, unspecified etiology  Comments:  1.) Likely secondary to post-nasal drip - recommend NSAID/cough drops/warm liquids.     Other orders  -     sodium chloride (Ocean Nasal Spray) 0.65 % nasal spray; 1 spray into the nostril(s) as directed by provider As Needed (dry nasal mucosa).  Dispense: 30 mL; Refill: 1  -     predniSONE (DELTASONE) 20 MG tablet; Take 2 tablets by mouth Daily for 5 days.  Dispense: 10 tablet; Refill: 0    Follow Up     Return if symptoms worsen or fail to improve.     Patient was given instructions and counseling regarding her condition or for health maintenance advice. Please see specific information pulled into the AVS if appropriate.

## 2021-12-09 ENCOUNTER — TELEPHONE (OUTPATIENT)
Dept: FAMILY MEDICINE CLINIC | Facility: CLINIC | Age: 46
End: 2021-12-09

## 2021-12-09 DIAGNOSIS — J01.90 ACUTE NON-RECURRENT SINUSITIS, UNSPECIFIED LOCATION: Primary | ICD-10-CM

## 2021-12-09 RX ORDER — AZITHROMYCIN 250 MG/1
TABLET, FILM COATED ORAL
Qty: 6 TABLET | Refills: 0 | Status: SHIPPED | OUTPATIENT
Start: 2021-12-09 | End: 2022-01-19

## 2021-12-09 NOTE — TELEPHONE ENCOUNTER
Caller: Sahara Delarosa    Relationship: Self    Best call back number:[phone]     What medication are you requesting: ANTIBIOTIC    What are your current symptoms: SINUS, CONGESTION    How long have you been experiencing symptoms: ABOUT A WEEK    Have you had these symptoms before:    [x] Yes  [] No    Have you been treated for these symptoms before:   [x] Yes  [] No    If a prescription is needed, what is your preferred pharmacy and phone number: [id number] [place], [place] - [address] AT [facility] - [phone]  - [phone] FX     Additional notes:PATIENT WAS SEEN ON Friday AND SAID SHE STILL HAS SINUS SYMPTOMS.

## 2021-12-30 RX ORDER — ALBUTEROL SULFATE 90 UG/1
AEROSOL, METERED RESPIRATORY (INHALATION)
Qty: 8.5 G | Refills: 0 | Status: SHIPPED | OUTPATIENT
Start: 2021-12-30 | End: 2022-02-21

## 2022-01-19 ENCOUNTER — APPOINTMENT (OUTPATIENT)
Dept: WOMENS IMAGING | Facility: HOSPITAL | Age: 47
End: 2022-01-19

## 2022-01-19 ENCOUNTER — OFFICE VISIT (OUTPATIENT)
Dept: OBSTETRICS AND GYNECOLOGY | Age: 47
End: 2022-01-19

## 2022-01-19 ENCOUNTER — PROCEDURE VISIT (OUTPATIENT)
Dept: OBSTETRICS AND GYNECOLOGY | Age: 47
End: 2022-01-19

## 2022-01-19 VITALS
BODY MASS INDEX: 30.31 KG/M2 | DIASTOLIC BLOOD PRESSURE: 70 MMHG | WEIGHT: 200 LBS | HEIGHT: 68 IN | SYSTOLIC BLOOD PRESSURE: 110 MMHG

## 2022-01-19 DIAGNOSIS — Z12.31 VISIT FOR SCREENING MAMMOGRAM: Primary | ICD-10-CM

## 2022-01-19 DIAGNOSIS — N81.4 UTERINE PROLAPSE: ICD-10-CM

## 2022-01-19 DIAGNOSIS — E66.9 OBESITY (BMI 30-39.9): ICD-10-CM

## 2022-01-19 DIAGNOSIS — Z80.3 FAMILY HISTORY OF BREAST CANCER: ICD-10-CM

## 2022-01-19 DIAGNOSIS — Z12.4 SCREENING FOR CERVICAL CANCER: ICD-10-CM

## 2022-01-19 DIAGNOSIS — K59.00 CONSTIPATION, UNSPECIFIED CONSTIPATION TYPE: ICD-10-CM

## 2022-01-19 DIAGNOSIS — Z01.419 WELL WOMAN EXAM WITH ROUTINE GYNECOLOGICAL EXAM: Primary | ICD-10-CM

## 2022-01-19 PROCEDURE — 99396 PREV VISIT EST AGE 40-64: CPT | Performed by: NURSE PRACTITIONER

## 2022-01-19 PROCEDURE — 77063 BREAST TOMOSYNTHESIS BI: CPT | Performed by: NURSE PRACTITIONER

## 2022-01-19 PROCEDURE — 77063 BREAST TOMOSYNTHESIS BI: CPT | Performed by: RADIOLOGY

## 2022-01-19 PROCEDURE — 77067 SCR MAMMO BI INCL CAD: CPT | Performed by: RADIOLOGY

## 2022-01-19 PROCEDURE — 77067 SCR MAMMO BI INCL CAD: CPT | Performed by: NURSE PRACTITIONER

## 2022-01-19 NOTE — PROGRESS NOTES
"Subjective     Chief Complaint   Patient presents with   • Gynecologic Exam     AE, mg 2022, csy 2020       History of Present Illness    Sahara Delarosa is a 46 y.o.  who presents for annual exam.    New GYN  Previous Dr Nickerson patient, last seen in , new to me  Mammogram today  Pt had colonoscopy in    Family hx of breast cancer - pt has had negative myrisk screening  Mother had estrogen depending breast cancer and recently had a mastectomy   S/p endometrial ablation  Does have HF/NS but not candidate for HRT due to family hx  Declines SSRI at this time  Was previously on saxenda for weight loss but had severe constipation r/t this  She takes movantik   Still having constipation. Has been on narcotic pain medication for years.   She has noticed a bulge from vagina that causes her discomfort  Doesn't notice all the time but after using restroom states \"it is hanging out\"  Denies urinary incontinence  Pt will see Dr. Chiu going forward    Obstetric History:  OB History        2    Para   2    Term   2            AB        Living   4       SAB        IAB        Ectopic        Molar        Multiple        Live Births   2               Menstrual History:     No LMP recorded. Patient has had an ablation.         Current contraception: none  History of abnormal Pap smear: yes - ascus, , normal since  Received Gardasil immunization: no  Perform regular self breast exam: yes - regularly  Family history of uterine or ovarian cancer: no  Family History of colon cancer: no  Family history of breast cancer: yes - see hx - pt had negative myriad    Mammogram: done today.  Colonoscopy: up to date.  DEXA: not indicated.    Exercise: moderately active  Calcium/Vitamin D: adequate intake, takes supplements     The following portions of the patient's history were reviewed and updated as appropriate: allergies, current medications, past family history, past medical history, past social " history, past surgical history and problem list.    Review of Systems   Constitutional: Negative.    Respiratory: Negative.    Cardiovascular: Negative.    Gastrointestinal: Positive for constipation.   Genitourinary: Negative.    Skin: Negative.    Psychiatric/Behavioral: Negative.            Objective   Physical Exam  Constitutional:       General: She is awake.      Appearance: Normal appearance. She is well-developed. She is obese.   HENT:      Head: Normocephalic and atraumatic.      Nose: Nose normal.   Neck:      Thyroid: No thyroid mass, thyromegaly or thyroid tenderness.   Cardiovascular:      Rate and Rhythm: Normal rate and regular rhythm.      Pulses: Normal pulses.      Heart sounds: Normal heart sounds.   Pulmonary:      Effort: Pulmonary effort is normal.      Breath sounds: Normal breath sounds.   Chest:   Breasts: Breasts are symmetrical.      Right: Normal. No swelling, bleeding, inverted nipple, mass, nipple discharge, skin change, tenderness or supraclavicular adenopathy.      Left: Normal. No swelling, bleeding, inverted nipple, mass, nipple discharge, skin change, tenderness or supraclavicular adenopathy.       Abdominal:      General: Abdomen is flat. Bowel sounds are normal.      Palpations: Abdomen is soft.      Tenderness: There is no abdominal tenderness.   Genitourinary:     General: Normal vulva.      Labia:         Right: No rash, tenderness, lesion or injury.         Left: No rash, tenderness, lesion or injury.       Urethra: No prolapse, urethral pain, urethral swelling or urethral lesion.      Vagina: Normal. No signs of injury. No vaginal discharge, erythema, tenderness, bleeding, lesions or prolapsed vaginal walls.      Cervix: No discharge, friability, lesion, erythema or cervical bleeding.      Uterus: Normal. Not enlarged, not tender and no uterine prolapse.       Adnexa: Right adnexa normal and left adnexa normal.        Right: No mass, tenderness or fullness.          Left: No  "mass, tenderness or fullness.        Rectum: Normal. No mass.      Comments: Grade 1 uterine prolapse noted on dorsal lithotomy position   Musculoskeletal:      Cervical back: Normal range of motion and neck supple.   Lymphadenopathy:      Upper Body:      Right upper body: No supraclavicular adenopathy.      Left upper body: No supraclavicular adenopathy.   Skin:     General: Skin is warm and dry.   Neurological:      General: No focal deficit present.      Mental Status: She is alert and oriented to person, place, and time.   Psychiatric:         Mood and Affect: Mood normal.         Behavior: Behavior normal. Behavior is cooperative.         Thought Content: Thought content normal.         Judgment: Judgment normal.         /70   Ht 172.7 cm (68\")   Wt 90.7 kg (200 lb)   BMI 30.41 kg/m²     Assessment/Plan   Diagnoses and all orders for this visit:    1. Well woman exam with routine gynecological exam (Primary)    2. Screening for cervical cancer  -     IGP, Apt HPV,rfx 16 / 18,45    3. Obesity (BMI 30-39.9)    4. Uterine prolapse    5. Constipation, unspecified constipation type    6. Family history of breast cancer        All questions answered.  Breast self exam technique reviewed and patient encouraged to perform self-exam monthly.  Discussed healthy lifestyle modifications.  Recommended 30 minutes of aerobic exercise five times per week.  Discussed calcium needs to prevent osteoporosis.    -Pap smear and mammogram today  -Colonoscopy up to date  -Pt will return with Dr. Chiu to discuss treatment options for POP                 "

## 2022-01-21 LAB
CYTOLOGIST CVX/VAG CYTO: NORMAL
CYTOLOGY CVX/VAG DOC CYTO: NORMAL
CYTOLOGY CVX/VAG DOC THIN PREP: NORMAL
DX ICD CODE: NORMAL
HIV 1 & 2 AB SER-IMP: NORMAL
HPV I/H RISK 4 DNA CVX QL PROBE+SIG AMP: NEGATIVE
OTHER STN SPEC: NORMAL
STAT OF ADQ CVX/VAG CYTO-IMP: NORMAL

## 2022-01-24 ENCOUNTER — OFFICE VISIT (OUTPATIENT)
Dept: OBSTETRICS AND GYNECOLOGY | Age: 47
End: 2022-01-24

## 2022-01-24 VITALS
SYSTOLIC BLOOD PRESSURE: 120 MMHG | WEIGHT: 201 LBS | BODY MASS INDEX: 30.46 KG/M2 | HEIGHT: 68 IN | DIASTOLIC BLOOD PRESSURE: 78 MMHG

## 2022-01-24 DIAGNOSIS — K59.09 CHRONIC CONSTIPATION: ICD-10-CM

## 2022-01-24 DIAGNOSIS — N81.10 VAGINAL PROLAPSE: Primary | ICD-10-CM

## 2022-01-24 PROCEDURE — 99214 OFFICE O/P EST MOD 30 MIN: CPT | Performed by: STUDENT IN AN ORGANIZED HEALTH CARE EDUCATION/TRAINING PROGRAM

## 2022-01-24 NOTE — PROGRESS NOTES
"[facility]   Obstetrics and Gynecology     2022      Patient:  Sahara Delarosa   MR#:[id number]    Office note    Chief Complaint   Patient presents with   • Follow-up     Gyn follow up uterine prolapse US today       Subjective     History of Present Illness  46 y.o. female  presents for \"bulge\" in vagina.  Reports severe constipation that started about 5 months ago when she started taking Saxenda injections for weight loss.  She is also on chronic opioids for back pain (5 mg/day) so this is likely contributory.  She now takes MiraLAX and docusate daily, as well as another stool softener prescribed by her pain management doctor that she cannot recall the name of.  She still has to do enemas sometimes.  She has also stopped the Saxenda injections since 10/2021.  Reports last BM Saturday now.    Reports seeing GI in [place] named Dr. Michael Moseley for screening colonoscopy in .  Reports no abnormalities.  She thinks constipation worsens as the bulge increases.  Thinks bulge gets worse throughout day.  Never sees anything sticking out of vagina but can feel hard object if she inserts fingers into vagina.  Has not tried splinting.  Reports one painful intercourse experience recently but this is not her usual.  Denies vaginal bleeding.    H/o  x 2, largest almost 8#    Relevant data reviewed:  IGP, Apt HPV,rfx 16 / 18,45 (2022 13:55) - NIL/HPV neg    Patient Active Problem List   Diagnosis   • Vitamin B 12 deficiency   • Sacroiliitis (HCC)   • Metabolic syndrome   • Irritable bowel syndrome   • Fibromyalgia   • Sciatica   • GERD (gastroesophageal reflux disease)   • Bladder problem   • Sleep apnea   • Asthma   • Arthritis   • Seasonal allergic rhinitis   • Gastroesophageal reflux disease with esophagitis without hemorrhage   • Degeneration of lumbar intervertebral disc   • Right hip pain   • S/P hip arthroscopy   • Tear of right acetabular labrum   • Trochanteric bursitis of " right hip   • Primary osteoarthritis of right hip   • Vitamin D deficiency       Past Medical History:   Diagnosis Date   • Abnormal Pap smear of cervix    • Allergic rhinitis    • Anxiety and depression    • Asthma     exercise induced   • Condyloma acuminata    • Endometrial polyp    • Family history of breast cancer    • Fibroids    • Fibromyalgia    • GERD (gastroesophageal reflux disease)    • History of Papanicolaou smear of cervix 2016    2. Pap Smear Hx: Never had dysplasia., [facility] ([facility][id number], normal annual Pap smears. - , normal annual Pap smears. ,& , negative Pap smear, negative HR-HPV.   • Irritable bowel syndrome    • Metabolic syndrome    • Osteoarthritis     In addition to fibromyalgia   • Sleep apnea    • Vitamin B 12 deficiency      Past Surgical History:   Procedure Laterality Date   • BREAST BIOPSY      Hyper-pigmented lesion, left nipple.= Benign   • CYST REMOVAL      on chest and groin area    • ENDOMETRIAL ABLATION      [facility], [facility], Novasure Endometrial Ablation   • ENDOSCOPY      EGD: Stomach inflammation, Neg for H Pylori.   • HIP SURGERY  2021     Obstetric History:  OB History        2    Para   2    Term   2            AB        Living   2       SAB        IAB        Ectopic        Molar        Multiple        Live Births   2               Menstrual History:     No LMP recorded (lmp unknown). Patient has had an ablation.       # 1 - Date: , Sex: Female, Weight: 3600 g (7 lb 15 oz), GA: None, Delivery: Vaginal, Spontaneous, Apgar1: None, Apgar5: None, Living: Living, Birth Comments: None    # 2 - Date: , Sex: Male, Weight: 3232 g (7 lb 2 oz), GA: None, Delivery: Vaginal, Spontaneous, Apgar1: None, Apgar5: None, Living: Living, Birth Comments: None    Family History   Problem Relation Age of Onset   • Stomach cancer Maternal Grandfather         dx >50   • Breast cancer Maternal Aunt         4  different mat aunts with breast cancer.   • Cancer Maternal Aunt         stomach   • Other Mother         Multiple lipomas.   • Arthritis Mother    • Breast cancer Mother 70        Double mastectomy    • Diabetes type II Father    • Fibromyalgia Sister    • No Known Problems Daughter    • Osteoporosis Maternal Grandmother         ?   • Parkinsonism Maternal Grandmother    • No Known Problems Paternal Grandmother    • Breast cancer Paternal Aunt 60        dx ~ age 60   • Brain cancer Paternal Grandfather         dx >50   • No Known Problems Son    • BRCA 1/2 Neg Hx    • Colon cancer Neg Hx    • Endometrial cancer Neg Hx    • Ovarian cancer Neg Hx      Social History     Tobacco Use   • Smoking status: Never Smoker   • Smokeless tobacco: Never Used   Vaping Use   • Vaping Use: Never used   Substance Use Topics   • Alcohol use: Never   • Drug use: Never     Bactrim [sulfamethoxazole-trimethoprim] and Codeine    Current Outpatient Medications:   •  albuterol sulfate  (90 Base) MCG/ACT inhaler, INHALE ONE PUFF BY MOUTH EVERY 6 HOURS AS NEEDED, Disp: 8.5 g, Rfl: 0  •  cyanocobalamin 1000 MCG/ML injection, ADMINISTER 1 ML(1000 MCG) UNDER THE SKIN 1 TIME A MONTH, Disp: 3 mL, Rfl: 3  •  fluticasone (FLONASE) 50 MCG/ACT nasal spray, instill 2 sprays into each nostril at bedtime, Disp: , Rfl: 0  •  metFORMIN ER (GLUCOPHAGE-XR) 500 MG 24 hr tablet, Take 1 tablet by mouth Daily With Breakfast., Disp: 90 tablet, Rfl: 1  •  montelukast (SINGULAIR) 10 MG tablet, , Disp: , Rfl: 0  •  omeprazole (priLOSEC) 40 MG capsule, omeprazole 40 mg capsule,delayed release  TAKE 1 CAPSULE BY MOUTH DAILY, Disp: , Rfl:   •  oxyCODONE-acetaminophen (PERCOCET) 5-325 MG per tablet, oxycodone-acetaminophen 5-325 mg oral tablet 1 every 8 hrs as needed.   Active, Disp: , Rfl:   •  pregabalin (LYRICA) 100 MG capsule, TAKE 1 CAPSULE BY MOUTH EVERY MORNING AND 2 CAPSULE EVERY NIGHT AT BEDTIME, Disp: 90 capsule, Rfl: 2  •  vitamin D (ERGOCALCIFEROL)  "1.25 MG (62283 UT) capsule capsule, Take 1 capsule by mouth Every 7 (Seven) Days., Disp: 5 capsule, Rfl: 2  •  sodium chloride (Ocean Nasal Spray) 0.65 % nasal spray, 1 spray into the nostril(s) as directed by provider As Needed (dry nasal mucosa)., Disp: 30 mL, Rfl: 1    The following portions of the patient's history were reviewed and updated as appropriate: allergies, current medications, past family history, past medical history, past social history, past surgical history and problem list.    Review of Systems   Gastrointestinal: Positive for constipation.   Genitourinary:        Vaginal bulge   All other systems reviewed and are negative.      BP Readings from Last 3 Encounters:   01/24/22 120/78   01/19/22 110/70   12/03/21 132/84      Wt Readings from Last 3 Encounters:   01/24/22 91.2 kg (201 lb)   01/19/22 90.7 kg (200 lb)   12/03/21 91.2 kg (201 lb)      BMI: Estimated body mass index is 30.56 kg/m² as calculated from the following:    Height as of this encounter: 172.7 cm (68\").    Weight as of this encounter: 91.2 kg (201 lb). BSA: Estimated body surface area is 2.05 meters squared as calculated from the following:    Height as of this encounter: 172.7 cm (68\").    Weight as of this encounter: 91.2 kg (201 lb).    Objective   Physical Exam  Vitals and nursing note reviewed.   Constitutional:       General: She is not in acute distress.     Appearance: Normal appearance.   Pulmonary:      Effort: Pulmonary effort is normal. No respiratory distress.   Genitourinary:     General: Normal vulva.      Vagina: Prolapsed vaginal walls present. Lesions: grade 1 apical prolapse, minimal A/P prolapse.      Cervix: Normal.      Uterus: Normal.       Adnexa: Right adnexa normal and left adnexa normal.      Rectum: Normal.   Musculoskeletal:         General: Normal range of motion.   Skin:     General: Skin is warm and dry.   Neurological:      General: No focal deficit present.      Mental Status: She is alert and " oriented to person, place, and time.   Psychiatric:         Mood and Affect: Mood normal.         Behavior: Behavior normal.         Thought Content: Thought content normal.         Judgment: Judgment normal.         Assessment/Plan     Diagnoses and all orders for this visit:    1. Vaginal prolapse (Primary)  -     Ambulatory Referral to Gynecologic Urology    2. Chronic constipation  -     Ambulatory Referral to Gastroenterology    -Discussed some apical prolapse noted on exam but minimal anterior or posterior prolapse.  However I do think evaluation by urogynecologist would be useful.  -We discussed that hysterectomy could relieve the bulge sensation but may not improve her constipation.  I do think the constipation and subsequent straining may be contributing to her prolapse however.  I would like her to go to her GI Dr. Maki for management of her chronic constipation.  While rectocele can cause mechanical obstruction, I do not see this on exam today so suspect her constipation is more related to her medications.  -Will plan for urogyn eval for any additional procedures.  They can perform hysterectomy as well.  If no other procedures recommended, I will happily perform her hysterectomy.  -Will schedule appt after Urogyn scheduled to discuss next steps and schedule hyst if needed    I spent 45 minutes caring for Sahara on this date of service. This time includes time spent by me in the following activities: preparing for the visit, reviewing tests, obtaining and/or reviewing a separately obtained history, performing a medically appropriate examination and/or evaluation, counseling and educating the patient/family/caregiver and documenting information in the medical record    Rosalba Chiu MD   1/24/2022 12:55 EST

## 2022-01-25 ENCOUNTER — TELEPHONE (OUTPATIENT)
Dept: OBSTETRICS AND GYNECOLOGY | Age: 47
End: 2022-01-25

## 2022-01-25 NOTE — TELEPHONE ENCOUNTER
Dr. Chiu,    Another update for pt, Gastro has her on a cancellation list, but she is scheduled for April, seeing Dr. Carson 1/31.    Tonie

## 2022-01-28 RX ORDER — FLUTICASONE PROPIONATE 50 MCG
SPRAY, SUSPENSION (ML) NASAL
Qty: 16 G | Refills: 1 | Status: SHIPPED | OUTPATIENT
Start: 2022-01-28 | End: 2022-05-19 | Stop reason: SDUPTHER

## 2022-01-31 ENCOUNTER — TELEPHONE (OUTPATIENT)
Dept: OBSTETRICS AND GYNECOLOGY | Age: 47
End: 2022-01-31

## 2022-02-17 ENCOUNTER — OFFICE VISIT (OUTPATIENT)
Dept: OBSTETRICS AND GYNECOLOGY | Age: 47
End: 2022-02-17

## 2022-02-17 VITALS
DIASTOLIC BLOOD PRESSURE: 78 MMHG | HEIGHT: 68 IN | SYSTOLIC BLOOD PRESSURE: 138 MMHG | BODY MASS INDEX: 30.98 KG/M2 | WEIGHT: 204.4 LBS

## 2022-02-17 DIAGNOSIS — R10.2 PELVIC PAIN: ICD-10-CM

## 2022-02-17 DIAGNOSIS — N81.4 UTERINE PROLAPSE: Primary | ICD-10-CM

## 2022-02-17 PROCEDURE — 99214 OFFICE O/P EST MOD 30 MIN: CPT | Performed by: STUDENT IN AN ORGANIZED HEALTH CARE EDUCATION/TRAINING PROGRAM

## 2022-02-17 RX ORDER — ACETAMINOPHEN 500 MG
1000 TABLET ORAL ONCE
Status: CANCELLED | OUTPATIENT
Start: 2022-03-30 | End: 2022-02-17

## 2022-02-17 RX ORDER — GABAPENTIN 300 MG/1
600 CAPSULE ORAL ONCE
Status: CANCELLED | OUTPATIENT
Start: 2022-03-30 | End: 2022-02-17

## 2022-02-17 RX ORDER — PHENAZOPYRIDINE HYDROCHLORIDE 200 MG/1
200 TABLET, FILM COATED ORAL ONCE
Status: CANCELLED | OUTPATIENT
Start: 2022-03-30 | End: 2022-02-17

## 2022-02-17 RX ORDER — CEFAZOLIN SODIUM 2 G/100ML
2 INJECTION, SOLUTION INTRAVENOUS ONCE
Status: CANCELLED | OUTPATIENT
Start: 2022-03-30 | End: 2022-02-17

## 2022-02-17 RX ORDER — SCOLOPAMINE TRANSDERMAL SYSTEM 1 MG/1
1 PATCH, EXTENDED RELEASE TRANSDERMAL CONTINUOUS
Status: CANCELLED | OUTPATIENT
Start: 2022-03-30 | End: 2022-04-02

## 2022-02-17 RX ORDER — NALOXEGOL OXALATE 25 MG/1
25 TABLET, FILM COATED ORAL EVERY MORNING
COMMUNITY
End: 2022-05-11

## 2022-02-17 NOTE — PROGRESS NOTES
[facility]   Obstetrics and Gynecology     2022      Patient:  Sahara Delarosa   MR#:[id number]    Office note    Chief Complaint   Patient presents with   • Follow-up     Gyn follow up       Subjective     History of Present Illness  46 y.o. female  with h/o endometrial ablation presents for f/u of vaginal bulge.  Recently saw Dr. Carson for eval.  He agreed with stage 2 apical prolapse and recommended TVH with USLS.  Ultimately she was not pleased with his care and declined surgery from him.  She would like me to complete procedure instead.  She reports rare stress incontinence but this is not bothersome to her.  The bulge sensation is her prior concern.    Codeine allergy - rash, okay with oxycodone  H/o endometrial ablation but no pelvic procedures  H/o  x 2, largest 7#15oz    Relevant data reviewed:  US Non-ob Transvaginal (2022 09:05)  IGP, Apt HPV,rfx 16 / 18,45 (2022 13:55)      Patient Active Problem List   Diagnosis   • Vitamin B 12 deficiency   • Sacroiliitis (HCC)   • Metabolic syndrome   • Irritable bowel syndrome   • Fibromyalgia   • Sciatica   • GERD (gastroesophageal reflux disease)   • Bladder problem   • Sleep apnea   • Asthma   • Arthritis   • Seasonal allergic rhinitis   • Gastroesophageal reflux disease with esophagitis without hemorrhage   • Degeneration of lumbar intervertebral disc   • Right hip pain   • S/P hip arthroscopy   • Tear of right acetabular labrum   • Trochanteric bursitis of right hip   • Primary osteoarthritis of right hip   • Vitamin D deficiency   • Uterine prolapse       Past Medical History:   Diagnosis Date   • Allergic rhinitis    • Anxiety and depression    • Asthma     exercise induced   • Condyloma acuminata    • Endometrial polyp    • Family history of breast cancer    • Fibroids    • Fibromyalgia    • GERD (gastroesophageal reflux disease)    • Irritable bowel syndrome    • Metabolic syndrome    • Osteoarthritis     In  addition to fibromyalgia   • Sleep apnea    • Vitamin B 12 deficiency      Past Surgical History:   Procedure Laterality Date   • BREAST BIOPSY      Hyper-pigmented lesion, left nipple.= Benign   • CYST REMOVAL      on chest and groin area    • ENDOMETRIAL ABLATION      [facility] [place], [place], Novasure Endometrial Ablation   • ENDOSCOPY  2017    EGD: Stomach inflammation, Neg for H Pylori.   • HIP SURGERY  2021     Obstetric History:  OB History        2    Para   2    Term   2            AB        Living   2       SAB        IAB        Ectopic        Molar        Multiple        Live Births   2               Menstrual History:     No LMP recorded (lmp unknown). Patient has had an ablation.       # 1 - Date: , Sex: Female, Weight: 3600 g (7 lb 15 oz), GA: None, Delivery: Vaginal, Spontaneous, Apgar1: None, Apgar5: None, Living: Living, Birth Comments: None    # 2 - Date: , Sex: Male, Weight: 3232 g (7 lb 2 oz), GA: None, Delivery: Vaginal, Spontaneous, Apgar1: None, Apgar5: None, Living: Living, Birth Comments: None    Family History   Problem Relation Age of Onset   • Stomach cancer Maternal Grandfather         dx >50   • Breast cancer Maternal Aunt         4 different mat aunts with breast cancer.   • Cancer Maternal Aunt         stomach   • Other Mother         Multiple lipomas.   • Arthritis Mother    • Breast cancer Mother 70        Double mastectomy    • Diabetes type II Father    • Fibromyalgia Sister    • No Known Problems Daughter    • Osteoporosis Maternal Grandmother         ?   • Parkinsonism Maternal Grandmother    • No Known Problems Paternal Grandmother    • Breast cancer Paternal Aunt 60        dx ~ age 60   • Brain cancer Paternal Grandfather         dx >50   • No Known Problems Son    • BRCA 1/2 Neg Hx    • Colon cancer Neg Hx    • Endometrial cancer Neg Hx    • Ovarian cancer Neg Hx      Social History     Tobacco Use   • Smoking status: Never Smoker   • Smokeless tobacco: Never  Used   Vaping Use   • Vaping Use: Never used   Substance Use Topics   • Alcohol use: Never   • Drug use: Never     Bactrim [sulfamethoxazole-trimethoprim] and Codeine    Current Outpatient Medications:   •  albuterol sulfate  (90 Base) MCG/ACT inhaler, INHALE ONE PUFF BY MOUTH EVERY 6 HOURS AS NEEDED, Disp: 8.5 g, Rfl: 0  •  cyanocobalamin 1000 MCG/ML injection, ADMINISTER 1 ML(1000 MCG) UNDER THE SKIN 1 TIME A MONTH, Disp: 3 mL, Rfl: 3  •  fluticasone (FLONASE) 50 MCG/ACT nasal spray, SHAKE LIQUID AND USE 1 SPRAY(50 MCG) IN EACH NOSTRIL EVERY DAY, Disp: 16 g, Rfl: 1  •  metFORMIN ER (GLUCOPHAGE-XR) 500 MG 24 hr tablet, Take 1 tablet by mouth Daily With Breakfast., Disp: 90 tablet, Rfl: 1  •  montelukast (SINGULAIR) 10 MG tablet, , Disp: , Rfl: 0  •  Naloxegol Oxalate (Movantik) 25 MG tablet, Take 25 mg by mouth Every Morning., Disp: , Rfl:   •  omeprazole (priLOSEC) 40 MG capsule, omeprazole 40 mg capsule,delayed release  TAKE 1 CAPSULE BY MOUTH DAILY, Disp: , Rfl:   •  oxyCODONE-acetaminophen (PERCOCET) 5-325 MG per tablet, oxycodone-acetaminophen 5-325 mg oral tablet 1 every 8 hrs as needed.   Active, Disp: , Rfl:   •  pregabalin (LYRICA) 100 MG capsule, TAKE 1 CAPSULE BY MOUTH EVERY MORNING AND 2 CAPSULE EVERY NIGHT AT BEDTIME, Disp: 90 capsule, Rfl: 2  •  vitamin D (ERGOCALCIFEROL) 1.25 MG (29245 UT) capsule capsule, Take 1 capsule by mouth Every 7 (Seven) Days., Disp: 5 capsule, Rfl: 2  •  sodium chloride (Ocean Nasal Spray) 0.65 % nasal spray, 1 spray into the nostril(s) as directed by provider As Needed (dry nasal mucosa)., Disp: 30 mL, Rfl: 1    The following portions of the patient's history were reviewed and updated as appropriate: allergies, current medications, past family history, past medical history, past social history, past surgical history and problem list.    Review of Systems   Genitourinary: Positive for vaginal pain.   All other systems reviewed and are negative.      BP Readings from  "Last 3 Encounters:   02/17/22 138/78   01/24/22 120/78   01/19/22 110/70      Wt Readings from Last 3 Encounters:   02/17/22 92.7 kg (204 lb 6.4 oz)   01/24/22 91.2 kg (201 lb)   01/19/22 90.7 kg (200 lb)      BMI: Estimated body mass index is 31.08 kg/m² as calculated from the following:    Height as of this encounter: 172.7 cm (68\").    Weight as of this encounter: 92.7 kg (204 lb 6.4 oz). BSA: Estimated body surface area is 2.06 meters squared as calculated from the following:    Height as of this encounter: 172.7 cm (68\").    Weight as of this encounter: 92.7 kg (204 lb 6.4 oz).    Objective   Physical Exam  Vitals and nursing note reviewed.   Constitutional:       General: She is not in acute distress.     Appearance: Normal appearance.   Pulmonary:      Effort: Pulmonary effort is normal. No respiratory distress.   Neurological:      General: No focal deficit present.      Mental Status: She is alert and oriented to person, place, and time.   Psychiatric:         Mood and Affect: Mood normal.         Behavior: Behavior normal.         Thought Content: Thought content normal.         Judgment: Judgment normal.         Assessment/Plan     Diagnoses and all orders for this visit:    1. Uterine prolapse (Primary)  -     Case Request; Standing  -     COVID PRE-OP / PRE-PROCEDURE SCREENING ORDER (NO ISOLATION) - Swab, Nasopharynx; Future  -     Type & Screen; Future  -     Case Request    2. Pelvic pain    Other orders  -     Follow Anesthesia Guidelines / Standing Orders; Future  -     Chlorhexidine Skin Prep; Future  -     Provide Patient With ERAS Hydration Instructions  -     Provide Patient With ERAS Booklet(s)/Handout    -We discussed that I would be happy to complete her hysterectomy laparoscopically to address the bulge sensation she is feeling but this procedure will not address her stress incontinence.  If her incontinence worsens, she may need an additional procedure.  She is comfortable with this and " "would like to proceed with TLH-BS and cystoscopy.  Ovaries are to remain in situ.  -Minimally invasive approaches to hysterectomy were reviewed with the patient.  The surgical procedure was discussed with the patient in detail.  I discussed the risks of the surgical procedure including, but not limited to the risk of pain, bleeding, infection, and damage to internal organs.  In exceedingly rare cases, death has been reported from surgical complications involving hysterectomy.    -It is customary with this procedure to remove both fallopian tubes.  Research has suggested that fallopian tubes may be the source of a type of cancer that was previously attributed to the ovaries.  Also, it is usual practice to conserve the ovaries outside of significant pathology.   -In cases where extensive scar tissue, fibroids, or uncontrolled bleeding is encountered, it may become necessary to convert the procedure to an “open\" laparotomy hysterectomy involving a longer recovery.   -The procedure entails very close operative proximity to the bladder and ureters.  There is a risk of injury to these structures.  It is usual practice to inspect the bladder and ensure functioning ureters at the conclusion of the procedure using cystoscopy (camera in the bladder).  In exceptionally straightforward cases, selective cystoscopy may be employed to reduce the incidence of iatrogenic urinary tract infection  -In addition to routine postoperative instructions provided, all patients are advised that they MUST avoid vaginal penetration for 6-8 weeks postoperative until the upper vaginal cuff is inspected for proper healing.  There is a rare incidence of vaginal cuff separation that can require emergent intervention.  -Patient would like to schedule procedure in late March so she can use spring break the first week of April as part of her recovery time.    Rosalba Chiu MD   2/18/2022 09:53 EST  "

## 2022-02-21 RX ORDER — ALBUTEROL SULFATE 90 UG/1
AEROSOL, METERED RESPIRATORY (INHALATION)
Qty: 8.5 G | Refills: 0 | Status: SHIPPED | OUTPATIENT
Start: 2022-02-21 | End: 2022-04-11

## 2022-02-23 ENCOUNTER — OFFICE VISIT (OUTPATIENT)
Dept: FAMILY MEDICINE CLINIC | Facility: CLINIC | Age: 47
End: 2022-02-23

## 2022-02-23 VITALS
TEMPERATURE: 97.6 F | BODY MASS INDEX: 31.67 KG/M2 | OXYGEN SATURATION: 97 % | DIASTOLIC BLOOD PRESSURE: 78 MMHG | HEIGHT: 68 IN | SYSTOLIC BLOOD PRESSURE: 126 MMHG | WEIGHT: 209 LBS | HEART RATE: 101 BPM

## 2022-02-23 DIAGNOSIS — M79.7 FIBROMYALGIA: Primary | ICD-10-CM

## 2022-02-23 DIAGNOSIS — Z23 NEED FOR PNEUMOCOCCAL VACCINE: ICD-10-CM

## 2022-02-23 DIAGNOSIS — E66.09 CLASS 1 OBESITY DUE TO EXCESS CALORIES WITHOUT SERIOUS COMORBIDITY WITH BODY MASS INDEX (BMI) OF 31.0 TO 31.9 IN ADULT: ICD-10-CM

## 2022-02-23 PROBLEM — E66.811 CLASS 1 OBESITY DUE TO EXCESS CALORIES WITHOUT SERIOUS COMORBIDITY WITH BODY MASS INDEX (BMI) OF 31.0 TO 31.9 IN ADULT: Status: ACTIVE | Noted: 2021-09-15

## 2022-02-23 PROCEDURE — 90732 PPSV23 VACC 2 YRS+ SUBQ/IM: CPT | Performed by: NURSE PRACTITIONER

## 2022-02-23 PROCEDURE — 99213 OFFICE O/P EST LOW 20 MIN: CPT | Performed by: NURSE PRACTITIONER

## 2022-02-23 PROCEDURE — 90471 IMMUNIZATION ADMIN: CPT | Performed by: NURSE PRACTITIONER

## 2022-02-23 RX ORDER — PREGABALIN 100 MG/1
CAPSULE ORAL
Qty: 90 CAPSULE | Refills: 2 | Status: SHIPPED | OUTPATIENT
Start: 2022-02-23 | End: 2022-08-10 | Stop reason: SDUPTHER

## 2022-02-23 RX ORDER — ORLISTAT 120 MG/1
120 CAPSULE ORAL
Qty: 90 CAPSULE | Refills: 2 | Status: SHIPPED | OUTPATIENT
Start: 2022-02-23 | End: 2023-01-26

## 2022-02-23 NOTE — PROGRESS NOTES
Answers for HPI/ROS submitted by the patient on 2/19/2022  Please describe your symptoms.: Medication refill  Have you had these symptoms before?: Yes  How long have you been having these symptoms?: Greater than 2 weeks  Please list any medications you are currently taking for this condition.: 3 month refill  Please describe any probable cause for these symptoms. : Check up  What is the primary reason for your visit?: Other      Chief Complaint  Med Refill (care gaps,pneumonia,she is boder line so I dont know about the diabetic eye and foot exam,annual phy,hep c screen,urine micro)    Subjective            Sahara Delarosa presents to [facility]  Patient reports that she is very frustrated as she was losing weight terrifically on the Saxenda but it constipated her so badly and patient had been seeing her GYN and had actually been diagnosed with a uterine prolapse and is going to be undergoing surgery so therefore she cannot tolerate being constipated and she was using every type of over-the-counter medication possible and then once she is off of the Saxenda it improved a little bit but the patient also admits that most likely it also contributes to the constipation being on her chronic pain medication through pain management and she will be having this hysterectomy next month    But patient would like to try something else if possible I explained to her that Contrave is out of the question because of the pain management medicine she is on; and we do not do any type of controlled substances for weight loss such as phentermine containing medications; there are other injectables we could try that they could also cause constipation-and patient wanted no part of that; I told her that another one we could do would be prescription strength abisai for weight loss and I cautioned her that it goes to the other extreme and can cause diarrhea and she takes it with her meals 3 times a day prior to  any type of a fat-containing meal patient would be willing to try that-and other than that I explained to the patient that she just needs to do diet and exercise measures attempting to keep her calorie intake less than 1800 guillermina/day and staying as active as she can tolerate    And then the patient also is here for refills on her Lyrica for the fibromyalgia and she reports that this does very well for her fibropain although she is experiencing a fibroflare right now because of the dramatic changes in the weather and the barometric changes-and it is causing her upper right shoulder into the neck and ear area to flareup-patient is very compliant with her 3-month follow-ups and her Rick report was appropriate and she shows no aberrant behaviors no signs and symptoms of misuse nor abuse and she takes her medication as prescribed-and she also does her yearly urine tox screen    Also patient is due for her pneumococcal vaccine since she has asthma and we will be giving this to her today      PHQ-2 Total Score: 0  PHQ-9 Total Score: 0    Past Medical History:   Diagnosis Date   • Allergic rhinitis    • Anxiety and depression    • Asthma     exercise induced   • Condyloma acuminata    • Endometrial polyp    • Family history of breast cancer    • Fibroids    • Fibromyalgia    • GERD (gastroesophageal reflux disease)    • Irritable bowel syndrome    • Metabolic syndrome    • Osteoarthritis     In addition to fibromyalgia   • Sleep apnea    • Vitamin B 12 deficiency        Allergies   Allergen Reactions   • Bactrim [Sulfamethoxazole-Trimethoprim] Rash   • Codeine Rash        Past Surgical History:   Procedure Laterality Date   • BREAST BIOPSY  2013    Hyper-pigmented lesion, left nipple.= Benign   • CYST REMOVAL      on chest and groin area    • ENDOMETRIAL ABLATION  2015    [facility] [place], Novasure Endometrial Ablation   • ENDOSCOPY  2017    EGD: Stomach inflammation, Neg for H Pylori.   • HIP SURGERY  09/2021        Social History      Tobacco Use   • Smoking status: Never Smoker   • Smokeless tobacco: Never Used   Vaping Use   • Vaping Use: Never used   Substance Use Topics   • Alcohol use: Never   • Drug use: Never       Family History   Problem Relation Age of Onset   • Stomach cancer Maternal Grandfather         dx >50   • Breast cancer Maternal Aunt         4 different mat aunts with breast cancer.   • Cancer Maternal Aunt         stomach   • Other Mother         Multiple lipomas.   • Arthritis Mother    • Breast cancer Mother 70        Double mastectomy    • Diabetes type II Father    • Fibromyalgia Sister    • No Known Problems Daughter    • Osteoporosis Maternal Grandmother         ?   • Parkinsonism Maternal Grandmother    • No Known Problems Paternal Grandmother    • Breast cancer Paternal Aunt 60        dx ~ age 60   • Brain cancer Paternal Grandfather         dx >50   • No Known Problems Son    • BRCA 1/2 Neg Hx    • Colon cancer Neg Hx    • Endometrial cancer Neg Hx    • Ovarian cancer Neg Hx         Health Maintenance Due   Topic Date Due   • URINE MICROALBUMIN  Never done   • ANNUAL PHYSICAL  Never done   • HEPATITIS C SCREENING  Never done   • DIABETIC FOOT EXAM  Never done   • DIABETIC EYE EXAM  Never done   • INFLUENZA VACCINE  08/01/2021   • HEMOGLOBIN A1C  02/23/2022        Current Outpatient Medications on File Prior to Visit   Medication Sig   • albuterol sulfate  (90 Base) MCG/ACT inhaler INHALE 1 PUFF BY MOUTH EVERY 6 HOURS AS NEEDED   • cyanocobalamin 1000 MCG/ML injection ADMINISTER 1 ML(1000 MCG) UNDER THE SKIN 1 TIME A MONTH   • fluticasone (FLONASE) 50 MCG/ACT nasal spray SHAKE LIQUID AND USE 1 SPRAY(50 MCG) IN EACH NOSTRIL EVERY DAY   • metFORMIN ER (GLUCOPHAGE-XR) 500 MG 24 hr tablet Take 1 tablet by mouth Daily With Breakfast.   • montelukast (SINGULAIR) 10 MG tablet    • Naloxegol Oxalate (Movantik) 25 MG tablet Take 25 mg by mouth Every Morning.   • omeprazole (priLOSEC) 40 MG capsule omeprazole 40 mg  "capsule,delayed release   TAKE 1 CAPSULE BY MOUTH DAILY   • oxyCODONE-acetaminophen (PERCOCET) 5-325 MG per tablet oxycodone-acetaminophen 5-325 mg oral tablet 1 every 8 hrs as needed.   Active   • sodium chloride (Ocean Nasal Spray) 0.65 % nasal spray 1 spray into the nostril(s) as directed by provider As Needed (dry nasal mucosa).   • vitamin D (ERGOCALCIFEROL) 1.25 MG (22232 UT) capsule capsule Take 1 capsule by mouth Every 7 (Seven) Days.   • [DISCONTINUED] pregabalin (LYRICA) 100 MG capsule TAKE 1 CAPSULE BY MOUTH EVERY MORNING AND 2 CAPSULE EVERY NIGHT AT BEDTIME     No current facility-administered medications on file prior to visit.       Immunization History   Administered Date(s) Administered   • COVID-19 (MODERNA) 1st, 2nd, 3rd Dose Only 02/03/2021, 03/03/2021   • COVID-19 (UNSPECIFIED) 02/03/2021, 03/03/2021, 11/05/2021   • Flu Vaccine Quad PF >36MO 10/19/2016, 09/20/2017, 10/14/2018   • Flu Vaccine Split Quad 10/19/2016, 09/20/2017, 10/14/2018   • Hepatitis A 12/06/2018   • Influenza, Unspecified 10/14/2018, 10/01/2019   • Pneumococcal Polysaccharide (PPSV23) 02/23/2022   • Tdap 10/14/2018       Review of Systems   Constitutional: Positive for fatigue and unexpected weight gain.   HENT: Negative.    Eyes: Negative.    Respiratory: Negative.    Cardiovascular: Negative.    Gastrointestinal: Positive for constipation.   Endocrine: Negative.    Genitourinary: Positive for frequency and pelvic pressure.   Musculoskeletal: Positive for arthralgias and myalgias.   Allergic/Immunologic: Negative.    Neurological: Negative.    Psychiatric/Behavioral: Positive for sleep disturbance. Negative for self-injury and suicidal ideas.        Objective     /78 (BP Location: Left arm, Patient Position: Sitting, Cuff Size: Adult)   Pulse 101   Temp 97.6 °F (36.4 °C) (Temporal)   Ht 172.7 cm (68\")   Wt 94.8 kg (209 lb)   SpO2 97%   BMI 31.78 kg/m²       Physical Exam  Vitals and nursing note reviewed. "   Constitutional:       Appearance: Normal appearance. She is obese.   HENT:      Head: Normocephalic.      Right Ear: External ear normal.      Left Ear: External ear normal.      Nose: Nose normal.      Mouth/Throat:      Comments: wearingmask  Eyes:      Pupils: Pupils are equal, round, and reactive to light.   Cardiovascular:      Rate and Rhythm: Normal rate and regular rhythm.      Heart sounds: Normal heart sounds.   Pulmonary:      Effort: Pulmonary effort is normal.      Breath sounds: Normal breath sounds.   Abdominal:      General: Bowel sounds are normal.      Palpations: Abdomen is soft.   Musculoskeletal:         General: Tenderness present.      Cervical back: Normal range of motion and neck supple.      Comments: (+) PTTP of the bilat upper chest wall and upper mid and lower back and the BUE and BLE and the neck and shoulders    Skin:     General: Skin is warm and dry.   Neurological:      Mental Status: She is alert and oriented to person, place, and time.   Psychiatric:         Mood and Affect: Mood normal.         Behavior: Behavior normal.         Judgment: Judgment normal.         Result Review :                          Assessment and Plan      Diagnoses and all orders for this visit:    1. Fibromyalgia (Primary)  -     pregabalin (LYRICA) 100 MG capsule; Take 1 cap PO Q am and 2 caps PO Q HS  Dispense: 90 capsule; Refill: 2    2. Class 1 obesity due to excess calories without serious comorbidity with body mass index (BMI) of 31.0 to 31.9 in adult  -     orlistat (Xenical) 120 MG capsule; Take 1 capsule by mouth 3 (Three) Times a Day With Meals.  Dispense: 90 capsule; Refill: 2    3. Need for pneumococcal vaccine  -     Pneumococcal polysaccharide vaccine 23-valent >= 3yo subcutaneous/IM (PPSV23)            Follow Up     Return in about 3 months (around 5/23/2022), or if symptoms worsen or fail to improve.

## 2022-03-28 ENCOUNTER — PRE-ADMISSION TESTING (OUTPATIENT)
Dept: PREADMISSION TESTING | Facility: HOSPITAL | Age: 47
End: 2022-03-28

## 2022-03-28 VITALS
TEMPERATURE: 98.8 F | DIASTOLIC BLOOD PRESSURE: 89 MMHG | HEART RATE: 76 BPM | SYSTOLIC BLOOD PRESSURE: 135 MMHG | HEIGHT: 68 IN | OXYGEN SATURATION: 100 % | WEIGHT: 209.2 LBS | RESPIRATION RATE: 18 BRPM | BODY MASS INDEX: 31.71 KG/M2

## 2022-03-28 DIAGNOSIS — N81.4 UTERINE PROLAPSE: ICD-10-CM

## 2022-03-28 LAB
ABO GROUP BLD: NORMAL
ANION GAP SERPL CALCULATED.3IONS-SCNC: 10 MMOL/L (ref 5–15)
BLD GP AB SCN SERPL QL: NEGATIVE
BUN SERPL-MCNC: 11 MG/DL (ref 6–20)
BUN/CREAT SERPL: 13.1 (ref 7–25)
CALCIUM SPEC-SCNC: 9.8 MG/DL (ref 8.6–10.5)
CHLORIDE SERPL-SCNC: 106 MMOL/L (ref 98–107)
CO2 SERPL-SCNC: 28 MMOL/L (ref 22–29)
CREAT SERPL-MCNC: 0.84 MG/DL (ref 0.57–1)
DEPRECATED RDW RBC AUTO: 43.6 FL (ref 37–54)
EGFRCR SERPLBLD CKD-EPI 2021: 85.8 ML/MIN/1.73
ERYTHROCYTE [DISTWIDTH] IN BLOOD BY AUTOMATED COUNT: 13 % (ref 12.3–15.4)
GLUCOSE SERPL-MCNC: 91 MG/DL (ref 65–99)
HCG SERPL QL: NEGATIVE
HCT VFR BLD AUTO: 41.7 % (ref 34–46.6)
HGB BLD-MCNC: 13.8 G/DL (ref 12–15.9)
MCH RBC QN AUTO: 30.5 PG (ref 26.6–33)
MCHC RBC AUTO-ENTMCNC: 33.1 G/DL (ref 31.5–35.7)
MCV RBC AUTO: 92.1 FL (ref 79–97)
PLATELET # BLD AUTO: 266 10*3/MM3 (ref 140–450)
PMV BLD AUTO: 9.5 FL (ref 6–12)
POTASSIUM SERPL-SCNC: 4.1 MMOL/L (ref 3.5–5.2)
QT INTERVAL: 382 MS
RBC # BLD AUTO: 4.53 10*6/MM3 (ref 3.77–5.28)
RH BLD: POSITIVE
SARS-COV-2 ORF1AB RESP QL NAA+PROBE: NOT DETECTED
SODIUM SERPL-SCNC: 144 MMOL/L (ref 136–145)
T&S EXPIRATION DATE: NORMAL
WBC NRBC COR # BLD: 4.99 10*3/MM3 (ref 3.4–10.8)

## 2022-03-28 PROCEDURE — 80048 BASIC METABOLIC PNL TOTAL CA: CPT

## 2022-03-28 PROCEDURE — 86850 RBC ANTIBODY SCREEN: CPT

## 2022-03-28 PROCEDURE — 93005 ELECTROCARDIOGRAM TRACING: CPT

## 2022-03-28 PROCEDURE — 85027 COMPLETE CBC AUTOMATED: CPT

## 2022-03-28 PROCEDURE — U0004 COV-19 TEST NON-CDC HGH THRU: HCPCS

## 2022-03-28 PROCEDURE — 86901 BLOOD TYPING SEROLOGIC RH(D): CPT

## 2022-03-28 PROCEDURE — 36415 COLL VENOUS BLD VENIPUNCTURE: CPT

## 2022-03-28 PROCEDURE — 86900 BLOOD TYPING SEROLOGIC ABO: CPT

## 2022-03-28 PROCEDURE — C9803 HOPD COVID-19 SPEC COLLECT: HCPCS

## 2022-03-28 PROCEDURE — 93010 ELECTROCARDIOGRAM REPORT: CPT | Performed by: INTERNAL MEDICINE

## 2022-03-28 PROCEDURE — 84703 CHORIONIC GONADOTROPIN ASSAY: CPT

## 2022-03-30 ENCOUNTER — ANESTHESIA (OUTPATIENT)
Dept: PERIOP | Facility: HOSPITAL | Age: 47
End: 2022-03-30

## 2022-03-30 ENCOUNTER — READMISSION MANAGEMENT (OUTPATIENT)
Dept: CALL CENTER | Facility: HOSPITAL | Age: 47
End: 2022-03-30

## 2022-03-30 ENCOUNTER — HOSPITAL ENCOUNTER (OUTPATIENT)
Facility: HOSPITAL | Age: 47
Discharge: HOME | End: 2022-03-30
Attending: STUDENT IN AN ORGANIZED HEALTH CARE EDUCATION/TRAINING PROGRAM | Admitting: STUDENT IN AN ORGANIZED HEALTH CARE EDUCATION/TRAINING PROGRAM

## 2022-03-30 ENCOUNTER — ANESTHESIA EVENT (OUTPATIENT)
Dept: PERIOP | Facility: HOSPITAL | Age: 47
End: 2022-03-30

## 2022-03-30 VITALS
DIASTOLIC BLOOD PRESSURE: 82 MMHG | SYSTOLIC BLOOD PRESSURE: 126 MMHG | HEIGHT: 68 IN | HEART RATE: 79 BPM | OXYGEN SATURATION: 95 % | RESPIRATION RATE: 14 BRPM | TEMPERATURE: 97.6 F | WEIGHT: 206.79 LBS | BODY MASS INDEX: 31.34 KG/M2

## 2022-03-30 DIAGNOSIS — Z90.710 S/P HYSTERECTOMY: Primary | ICD-10-CM

## 2022-03-30 DIAGNOSIS — N81.4 UTERINE PROLAPSE: ICD-10-CM

## 2022-03-30 PROCEDURE — 25010000002 NEOSTIGMINE 5 MG/10ML SOLUTION: Performed by: ANESTHESIOLOGY

## 2022-03-30 PROCEDURE — 25010000002 KETOROLAC TROMETHAMINE PER 15 MG: Performed by: ANESTHESIOLOGY

## 2022-03-30 PROCEDURE — 25010000002 DEXAMETHASONE PER 1 MG: Performed by: ANESTHESIOLOGY

## 2022-03-30 PROCEDURE — G0378 HOSPITAL OBSERVATION PER HR: HCPCS

## 2022-03-30 PROCEDURE — 25010000002 PROCHLORPERAZINE 10 MG/2ML SOLUTION: Performed by: STUDENT IN AN ORGANIZED HEALTH CARE EDUCATION/TRAINING PROGRAM

## 2022-03-30 PROCEDURE — 25010000002 ONDANSETRON PER 1 MG: Performed by: ANESTHESIOLOGY

## 2022-03-30 PROCEDURE — C1889 IMPLANT/INSERT DEVICE, NOC: HCPCS | Performed by: STUDENT IN AN ORGANIZED HEALTH CARE EDUCATION/TRAINING PROGRAM

## 2022-03-30 PROCEDURE — 88305 TISSUE EXAM BY PATHOLOGIST: CPT | Performed by: STUDENT IN AN ORGANIZED HEALTH CARE EDUCATION/TRAINING PROGRAM

## 2022-03-30 PROCEDURE — 25010000002 MIDAZOLAM PER 1 MG: Performed by: ANESTHESIOLOGY

## 2022-03-30 PROCEDURE — 25010000002 ROPIVACAINE PER 1 MG: Performed by: ANESTHESIOLOGY

## 2022-03-30 PROCEDURE — S0260 H&P FOR SURGERY: HCPCS | Performed by: STUDENT IN AN ORGANIZED HEALTH CARE EDUCATION/TRAINING PROGRAM

## 2022-03-30 PROCEDURE — 58571 TLH W/T/O 250 G OR LESS: CPT | Performed by: STUDENT IN AN ORGANIZED HEALTH CARE EDUCATION/TRAINING PROGRAM

## 2022-03-30 PROCEDURE — 25010000002 PROPOFOL 10 MG/ML EMULSION: Performed by: ANESTHESIOLOGY

## 2022-03-30 PROCEDURE — 25010000002 CEFAZOLIN IN DEXTROSE 2-4 GM/100ML-% SOLUTION: Performed by: STUDENT IN AN ORGANIZED HEALTH CARE EDUCATION/TRAINING PROGRAM

## 2022-03-30 PROCEDURE — 25010000002 MAGNESIUM SULFATE PER 500 MG OF MAGNESIUM: Performed by: ANESTHESIOLOGY

## 2022-03-30 PROCEDURE — 25010000002 FENTANYL CITRATE (PF) 50 MCG/ML SOLUTION: Performed by: ANESTHESIOLOGY

## 2022-03-30 DEVICE — ABSORBABLE RELOAD
Type: IMPLANTABLE DEVICE | Site: UTERUS | Status: FUNCTIONAL
Brand: V-LOC 180

## 2022-03-30 RX ORDER — MAGNESIUM SULFATE HEPTAHYDRATE 500 MG/ML
INJECTION, SOLUTION INTRAMUSCULAR; INTRAVENOUS AS NEEDED
Status: DISCONTINUED | OUTPATIENT
Start: 2022-03-30 | End: 2022-03-30 | Stop reason: SURG

## 2022-03-30 RX ORDER — DOCUSATE SODIUM 250 MG
250 CAPSULE ORAL 2 TIMES DAILY PRN
Qty: 30 CAPSULE | Refills: 1 | Status: SHIPPED | OUTPATIENT
Start: 2022-03-30 | End: 2022-05-11

## 2022-03-30 RX ORDER — DIPHENHYDRAMINE HYDROCHLORIDE 50 MG/ML
12.5 INJECTION INTRAMUSCULAR; INTRAVENOUS
Status: DISCONTINUED | OUTPATIENT
Start: 2022-03-30 | End: 2022-03-30 | Stop reason: HOSPADM

## 2022-03-30 RX ORDER — KETAMINE HCL IN NACL, ISO-OSM 100MG/10ML
5 SYRINGE (ML) INJECTION
Status: DISCONTINUED | OUTPATIENT
Start: 2022-03-30 | End: 2022-03-30 | Stop reason: HOSPADM

## 2022-03-30 RX ORDER — HYDROCODONE BITARTRATE AND ACETAMINOPHEN 7.5; 325 MG/1; MG/1
1 TABLET ORAL ONCE AS NEEDED
Status: DISCONTINUED | OUTPATIENT
Start: 2022-03-30 | End: 2022-03-30 | Stop reason: HOSPADM

## 2022-03-30 RX ORDER — IBUPROFEN 600 MG/1
600 TABLET ORAL EVERY 6 HOURS
Qty: 30 TABLET | Refills: 1 | Status: SHIPPED | OUTPATIENT
Start: 2022-03-30 | End: 2022-05-11

## 2022-03-30 RX ORDER — ACETAMINOPHEN 650 MG
TABLET, EXTENDED RELEASE ORAL AS NEEDED
Status: DISCONTINUED | OUTPATIENT
Start: 2022-03-30 | End: 2022-03-30 | Stop reason: HOSPADM

## 2022-03-30 RX ORDER — OXYCODONE AND ACETAMINOPHEN 7.5; 325 MG/1; MG/1
1 TABLET ORAL ONCE AS NEEDED
Status: DISCONTINUED | OUTPATIENT
Start: 2022-03-30 | End: 2022-03-30 | Stop reason: HOSPADM

## 2022-03-30 RX ORDER — MAGNESIUM HYDROXIDE 1200 MG/15ML
LIQUID ORAL AS NEEDED
Status: DISCONTINUED | OUTPATIENT
Start: 2022-03-30 | End: 2022-03-30 | Stop reason: HOSPADM

## 2022-03-30 RX ORDER — GLYCOPYRROLATE 0.2 MG/ML
INJECTION INTRAMUSCULAR; INTRAVENOUS AS NEEDED
Status: DISCONTINUED | OUTPATIENT
Start: 2022-03-30 | End: 2022-03-30 | Stop reason: SURG

## 2022-03-30 RX ORDER — EPHEDRINE SULFATE 50 MG/ML
5 INJECTION, SOLUTION INTRAVENOUS ONCE AS NEEDED
Status: DISCONTINUED | OUTPATIENT
Start: 2022-03-30 | End: 2022-03-30 | Stop reason: HOSPADM

## 2022-03-30 RX ORDER — ACETAMINOPHEN 325 MG/1
650 TABLET ORAL EVERY 6 HOURS
Qty: 30 TABLET | Refills: 1 | Status: SHIPPED | OUTPATIENT
Start: 2022-03-30 | End: 2022-05-11

## 2022-03-30 RX ORDER — SCOLOPAMINE TRANSDERMAL SYSTEM 1 MG/1
1 PATCH, EXTENDED RELEASE TRANSDERMAL CONTINUOUS
Status: DISCONTINUED | OUTPATIENT
Start: 2022-03-30 | End: 2022-03-30 | Stop reason: HOSPADM

## 2022-03-30 RX ORDER — OXYCODONE AND ACETAMINOPHEN 7.5; 325 MG/1; MG/1
1 TABLET ORAL EVERY 4 HOURS PRN
Status: DISCONTINUED | OUTPATIENT
Start: 2022-03-30 | End: 2022-03-30 | Stop reason: HOSPADM

## 2022-03-30 RX ORDER — MIDAZOLAM HYDROCHLORIDE 1 MG/ML
1 INJECTION INTRAMUSCULAR; INTRAVENOUS
Status: DISCONTINUED | OUTPATIENT
Start: 2022-03-30 | End: 2022-03-30 | Stop reason: HOSPADM

## 2022-03-30 RX ORDER — FENTANYL CITRATE 50 UG/ML
INJECTION, SOLUTION INTRAMUSCULAR; INTRAVENOUS AS NEEDED
Status: DISCONTINUED | OUTPATIENT
Start: 2022-03-30 | End: 2022-03-30 | Stop reason: SURG

## 2022-03-30 RX ORDER — SODIUM CHLORIDE 0.9 % (FLUSH) 0.9 %
3-10 SYRINGE (ML) INJECTION AS NEEDED
Status: DISCONTINUED | OUTPATIENT
Start: 2022-03-30 | End: 2022-03-30 | Stop reason: HOSPADM

## 2022-03-30 RX ORDER — PROPOFOL 10 MG/ML
VIAL (ML) INTRAVENOUS AS NEEDED
Status: DISCONTINUED | OUTPATIENT
Start: 2022-03-30 | End: 2022-03-30 | Stop reason: SURG

## 2022-03-30 RX ORDER — OXYCODONE HYDROCHLORIDE AND ACETAMINOPHEN 5; 325 MG/1; MG/1
1 TABLET ORAL EVERY 4 HOURS PRN
Qty: 15 TABLET | Refills: 0 | Status: SHIPPED | OUTPATIENT
Start: 2022-03-30 | End: 2022-04-14

## 2022-03-30 RX ORDER — CEFAZOLIN SODIUM 2 G/100ML
2 INJECTION, SOLUTION INTRAVENOUS ONCE
Status: COMPLETED | OUTPATIENT
Start: 2022-03-30 | End: 2022-03-30

## 2022-03-30 RX ORDER — HYDRALAZINE HYDROCHLORIDE 20 MG/ML
5 INJECTION INTRAMUSCULAR; INTRAVENOUS
Status: DISCONTINUED | OUTPATIENT
Start: 2022-03-30 | End: 2022-03-30 | Stop reason: HOSPADM

## 2022-03-30 RX ORDER — KETOROLAC TROMETHAMINE 30 MG/ML
INJECTION, SOLUTION INTRAMUSCULAR; INTRAVENOUS AS NEEDED
Status: DISCONTINUED | OUTPATIENT
Start: 2022-03-30 | End: 2022-03-30 | Stop reason: SURG

## 2022-03-30 RX ORDER — LABETALOL HYDROCHLORIDE 5 MG/ML
5 INJECTION, SOLUTION INTRAVENOUS
Status: DISCONTINUED | OUTPATIENT
Start: 2022-03-30 | End: 2022-03-30 | Stop reason: HOSPADM

## 2022-03-30 RX ORDER — DEXAMETHASONE SODIUM PHOSPHATE 10 MG/ML
INJECTION INTRAMUSCULAR; INTRAVENOUS AS NEEDED
Status: DISCONTINUED | OUTPATIENT
Start: 2022-03-30 | End: 2022-03-30 | Stop reason: SURG

## 2022-03-30 RX ORDER — PROMETHAZINE HYDROCHLORIDE 25 MG/1
25 TABLET ORAL ONCE AS NEEDED
Status: DISCONTINUED | OUTPATIENT
Start: 2022-03-30 | End: 2022-03-30 | Stop reason: HOSPADM

## 2022-03-30 RX ORDER — SENNA PLUS 8.6 MG/1
1 TABLET ORAL ONCE
Status: COMPLETED | OUTPATIENT
Start: 2022-03-30 | End: 2022-03-30

## 2022-03-30 RX ORDER — ONDANSETRON 2 MG/ML
4 INJECTION INTRAMUSCULAR; INTRAVENOUS ONCE AS NEEDED
Status: COMPLETED | OUTPATIENT
Start: 2022-03-30 | End: 2022-03-30

## 2022-03-30 RX ORDER — HYDROMORPHONE HYDROCHLORIDE 1 MG/ML
0.25 INJECTION, SOLUTION INTRAMUSCULAR; INTRAVENOUS; SUBCUTANEOUS
Status: DISCONTINUED | OUTPATIENT
Start: 2022-03-30 | End: 2022-03-30 | Stop reason: HOSPADM

## 2022-03-30 RX ORDER — SODIUM CHLORIDE 0.9 % (FLUSH) 0.9 %
3 SYRINGE (ML) INJECTION EVERY 12 HOURS SCHEDULED
Status: DISCONTINUED | OUTPATIENT
Start: 2022-03-30 | End: 2022-03-30 | Stop reason: HOSPADM

## 2022-03-30 RX ORDER — ACETAMINOPHEN 500 MG
1000 TABLET ORAL ONCE
Status: COMPLETED | OUTPATIENT
Start: 2022-03-30 | End: 2022-03-30

## 2022-03-30 RX ORDER — ONDANSETRON 2 MG/ML
INJECTION INTRAMUSCULAR; INTRAVENOUS AS NEEDED
Status: DISCONTINUED | OUTPATIENT
Start: 2022-03-30 | End: 2022-03-30 | Stop reason: SURG

## 2022-03-30 RX ORDER — FENTANYL CITRATE 50 UG/ML
25 INJECTION, SOLUTION INTRAMUSCULAR; INTRAVENOUS
Status: DISCONTINUED | OUTPATIENT
Start: 2022-03-30 | End: 2022-03-30 | Stop reason: HOSPADM

## 2022-03-30 RX ORDER — FENTANYL CITRATE 50 UG/ML
50 INJECTION, SOLUTION INTRAMUSCULAR; INTRAVENOUS
Status: DISCONTINUED | OUTPATIENT
Start: 2022-03-30 | End: 2022-03-30 | Stop reason: HOSPADM

## 2022-03-30 RX ORDER — ROCURONIUM BROMIDE 10 MG/ML
INJECTION, SOLUTION INTRAVENOUS AS NEEDED
Status: DISCONTINUED | OUTPATIENT
Start: 2022-03-30 | End: 2022-03-30 | Stop reason: SURG

## 2022-03-30 RX ORDER — DOCUSATE SODIUM 100 MG/1
100 CAPSULE, LIQUID FILLED ORAL 2 TIMES DAILY
COMMUNITY
End: 2022-04-14

## 2022-03-30 RX ORDER — SODIUM CHLORIDE, SODIUM LACTATE, POTASSIUM CHLORIDE, CALCIUM CHLORIDE 600; 310; 30; 20 MG/100ML; MG/100ML; MG/100ML; MG/100ML
9 INJECTION, SOLUTION INTRAVENOUS CONTINUOUS
Status: DISCONTINUED | OUTPATIENT
Start: 2022-03-30 | End: 2022-03-30 | Stop reason: HOSPADM

## 2022-03-30 RX ORDER — NEOSTIGMINE METHYLSULFATE 0.5 MG/ML
INJECTION, SOLUTION INTRAVENOUS AS NEEDED
Status: DISCONTINUED | OUTPATIENT
Start: 2022-03-30 | End: 2022-03-30 | Stop reason: SURG

## 2022-03-30 RX ORDER — PROMETHAZINE HYDROCHLORIDE 25 MG/1
25 SUPPOSITORY RECTAL ONCE AS NEEDED
Status: DISCONTINUED | OUTPATIENT
Start: 2022-03-30 | End: 2022-03-30 | Stop reason: HOSPADM

## 2022-03-30 RX ORDER — FLUMAZENIL 0.1 MG/ML
0.2 INJECTION INTRAVENOUS AS NEEDED
Status: DISCONTINUED | OUTPATIENT
Start: 2022-03-30 | End: 2022-03-30 | Stop reason: HOSPADM

## 2022-03-30 RX ORDER — SODIUM CHLORIDE 9 MG/ML
INJECTION, SOLUTION INTRAVENOUS AS NEEDED
Status: DISCONTINUED | OUTPATIENT
Start: 2022-03-30 | End: 2022-03-30 | Stop reason: HOSPADM

## 2022-03-30 RX ORDER — FAMOTIDINE 10 MG/ML
20 INJECTION, SOLUTION INTRAVENOUS ONCE
Status: COMPLETED | OUTPATIENT
Start: 2022-03-30 | End: 2022-03-30

## 2022-03-30 RX ORDER — ROPIVACAINE HYDROCHLORIDE 5 MG/ML
INJECTION, SOLUTION EPIDURAL; INFILTRATION; PERINEURAL
Status: COMPLETED | OUTPATIENT
Start: 2022-03-30 | End: 2022-03-30

## 2022-03-30 RX ORDER — LIDOCAINE HYDROCHLORIDE 20 MG/ML
INJECTION, SOLUTION INFILTRATION; PERINEURAL AS NEEDED
Status: DISCONTINUED | OUTPATIENT
Start: 2022-03-30 | End: 2022-03-30 | Stop reason: SURG

## 2022-03-30 RX ORDER — PROCHLORPERAZINE EDISYLATE 5 MG/ML
10 INJECTION INTRAMUSCULAR; INTRAVENOUS ONCE
Status: COMPLETED | OUTPATIENT
Start: 2022-03-30 | End: 2022-03-30

## 2022-03-30 RX ORDER — LIDOCAINE HYDROCHLORIDE 10 MG/ML
0.5 INJECTION, SOLUTION EPIDURAL; INFILTRATION; INTRACAUDAL; PERINEURAL ONCE AS NEEDED
Status: DISCONTINUED | OUTPATIENT
Start: 2022-03-30 | End: 2022-03-30 | Stop reason: HOSPADM

## 2022-03-30 RX ORDER — PHENAZOPYRIDINE HYDROCHLORIDE 200 MG/1
200 TABLET, FILM COATED ORAL ONCE
Status: COMPLETED | OUTPATIENT
Start: 2022-03-30 | End: 2022-03-30

## 2022-03-30 RX ORDER — GABAPENTIN 300 MG/1
600 CAPSULE ORAL ONCE
Status: COMPLETED | OUTPATIENT
Start: 2022-03-30 | End: 2022-03-30

## 2022-03-30 RX ORDER — DIPHENHYDRAMINE HCL 25 MG
25 CAPSULE ORAL
Status: DISCONTINUED | OUTPATIENT
Start: 2022-03-30 | End: 2022-03-30 | Stop reason: HOSPADM

## 2022-03-30 RX ORDER — NALOXONE HCL 0.4 MG/ML
0.2 VIAL (ML) INJECTION AS NEEDED
Status: DISCONTINUED | OUTPATIENT
Start: 2022-03-30 | End: 2022-03-30 | Stop reason: HOSPADM

## 2022-03-30 RX ADMIN — KETOROLAC TROMETHAMINE 30 MG: 30 INJECTION, SOLUTION INTRAMUSCULAR at 17:38

## 2022-03-30 RX ADMIN — FENTANYL CITRATE 50 MCG: 0.05 INJECTION, SOLUTION INTRAMUSCULAR; INTRAVENOUS at 15:29

## 2022-03-30 RX ADMIN — GABAPENTIN 600 MG: 300 CAPSULE ORAL at 14:23

## 2022-03-30 RX ADMIN — GLYCOPYRROLATE 0.6 MG: 0.2 INJECTION INTRAMUSCULAR; INTRAVENOUS at 17:38

## 2022-03-30 RX ADMIN — DEXAMETHASONE SODIUM PHOSPHATE 8 MG: 10 INJECTION INTRAMUSCULAR; INTRAVENOUS at 15:57

## 2022-03-30 RX ADMIN — OXYCODONE HYDROCHLORIDE AND ACETAMINOPHEN 1 TABLET: 7.5; 325 TABLET ORAL at 18:34

## 2022-03-30 RX ADMIN — PROPOFOL 200 MG: 10 INJECTION, EMULSION INTRAVENOUS at 15:31

## 2022-03-30 RX ADMIN — SENNOSIDES 1 TABLET: 8.6 TABLET, FILM COATED ORAL at 18:45

## 2022-03-30 RX ADMIN — ACETAMINOPHEN 1000 MG: 500 TABLET ORAL at 14:23

## 2022-03-30 RX ADMIN — FAMOTIDINE 20 MG: 10 INJECTION INTRAVENOUS at 14:53

## 2022-03-30 RX ADMIN — MIDAZOLAM 1 MG: 1 INJECTION INTRAMUSCULAR; INTRAVENOUS at 14:53

## 2022-03-30 RX ADMIN — FENTANYL CITRATE 50 MCG: 0.05 INJECTION, SOLUTION INTRAMUSCULAR; INTRAVENOUS at 16:12

## 2022-03-30 RX ADMIN — NEOSTIGMINE METHYLSULFATE 3 MG: 0.5 INJECTION INTRAVENOUS at 17:38

## 2022-03-30 RX ADMIN — CEFAZOLIN SODIUM 2 G: 2 INJECTION, SOLUTION INTRAVENOUS at 15:18

## 2022-03-30 RX ADMIN — LIDOCAINE HYDROCHLORIDE 90 MG: 20 INJECTION, SOLUTION INFILTRATION; PERINEURAL at 15:31

## 2022-03-30 RX ADMIN — SODIUM CHLORIDE, POTASSIUM CHLORIDE, SODIUM LACTATE AND CALCIUM CHLORIDE 9 ML/HR: 600; 310; 30; 20 INJECTION, SOLUTION INTRAVENOUS at 14:53

## 2022-03-30 RX ADMIN — ROPIVACAINE HYDROCHLORIDE 30 ML: 5 INJECTION, SOLUTION EPIDURAL; INFILTRATION; PERINEURAL at 15:43

## 2022-03-30 RX ADMIN — SCOPALAMINE 1 PATCH: 1 PATCH, EXTENDED RELEASE TRANSDERMAL at 14:22

## 2022-03-30 RX ADMIN — ONDANSETRON 4 MG: 2 INJECTION INTRAMUSCULAR; INTRAVENOUS at 15:57

## 2022-03-30 RX ADMIN — MAGNESIUM SULFATE HEPTAHYDRATE 2 G: 500 INJECTION, SOLUTION INTRAMUSCULAR; INTRAVENOUS at 17:05

## 2022-03-30 RX ADMIN — PROCHLORPERAZINE EDISYLATE 10 MG: 5 INJECTION INTRAMUSCULAR; INTRAVENOUS at 20:03

## 2022-03-30 RX ADMIN — ROCURONIUM BROMIDE 30 MG: 50 INJECTION INTRAVENOUS at 15:32

## 2022-03-30 RX ADMIN — PHENAZOPYRIDINE 200 MG: 200 TABLET ORAL at 14:22

## 2022-03-30 RX ADMIN — ONDANSETRON 4 MG: 2 INJECTION INTRAMUSCULAR; INTRAVENOUS at 18:34

## 2022-03-30 NOTE — ANESTHESIA PROCEDURE NOTES
Airway  Urgency: elective    Date/Time: 3/30/2022 3:33 PM  Airway not difficult    General Information and Staff    Patient location during procedure: OR  Anesthesiologist: Leon Ordoñez MD    Indications and Patient Condition  Indications for airway management: airway protection    Preoxygenated: yes  Mask difficulty assessment: 1 - vent by mask    Final Airway Details  Final airway type: endotracheal airway      Successful airway: ETT  Cuffed: yes   Successful intubation technique: direct laryngoscopy  Endotracheal tube insertion site: oral  Blade: Kye  Blade size: 4  ETT size (mm): 6.5  Cormack-Lehane Classification: grade IIb - view of arytenoids or posterior of glottis only  Placement verified by: chest auscultation and capnometry   Measured from: teeth  ETT/EBT  to teeth (cm): 21  Number of attempts at approach: 1  Assessment: lips, teeth, and gum same as pre-op and atraumatic intubation               pt to improve static and dynamic standing balance by full grade in 4 weeks.

## 2022-03-30 NOTE — ANESTHESIA PREPROCEDURE EVALUATION
Anesthesia Evaluation     Patient summary reviewed and Nursing notes reviewed   no history of anesthetic complications:  NPO Solid Status: > 8 hours  NPO Liquid Status: > 2 hours           Airway   Mallampati: II  TM distance: >3 FB  Neck ROM: full  no difficulty expected  Dental - normal exam     Pulmonary - normal exam   (+) asthma,sleep apnea on CPAP,   (-) COPD, not a smoker, lung cancer  Cardiovascular - negative cardio ROS and normal exam  Exercise tolerance: good (4-7 METS)    ECG reviewed  Rhythm: regular  Rate: normal    (-) hypertension, valvular problems/murmurs, past MI, CAD, dysrhythmias, angina, CHF, cardiac stents, CABG, pericardial effusion      Neuro/Psych  (+) numbness, psychiatric history Anxiety and Depression,    (-) seizures, neuromuscular disease, TIA, CVA  GI/Hepatic/Renal/Endo    (+) obesity,  GERD well controlled,    (-) hiatal hernia, PUD, hepatitis, liver disease, no renal disease, diabetes, GI bleed, no thyroid disorder    Musculoskeletal     (+) back pain,   Abdominal  - normal exam   Substance History - negative use     OB/GYN negative ob/gyn ROS         Other   arthritis, autoimmune disease ,        Other Comment: Fibromyalgia                   Anesthesia Plan    ASA 2     general and ERAS Protocol   (GA w/ ERAS protocol and TAP blocks.)  intravenous induction     Anesthetic plan, all risks, benefits, and alternatives have been provided, discussed and informed consent has been obtained with: patient.    Plan discussed with CRNA.        CODE STATUS:

## 2022-03-30 NOTE — ANESTHESIA POSTPROCEDURE EVALUATION
Patient: Sahara Delarosa    Procedure Summary     Date: 03/30/22 Room / Location: [facility] OR 09 / [facility] MAIN OR    Anesthesia Start: 1523 Anesthesia Stop: 1800    Procedure: TOTAL LAPAROSCOPIC HYSTERECTOMY, BILATERAL SALPINGECTOMY, AND CYSTOSCOPY (Bilateral Abdomen) Diagnosis:       Uterine prolapse      (Uterine prolapse [N81.4])    Surgeons: Rosalba Chiu MD Provider: Leon Ordoñez MD    Anesthesia Type: general, ERAS Protocol ASA Status: 2          Anesthesia Type: general, ERAS Protocol    Vitals  Vitals Value Taken Time   /70 03/30/22 1855   Temp 36.4 °C (97.6 °F) 03/30/22 1755   Pulse 71 03/30/22 1857   Resp 16 03/30/22 1830   SpO2 96 % 03/30/22 1857   Vitals shown include unvalidated device data.        Post Anesthesia Care and Evaluation    Patient location during evaluation: bedside  Patient participation: complete - patient participated  Level of consciousness: awake and alert  Pain management: adequate  Airway patency: patent  Anesthetic complications: No anesthetic complications  PONV Status: controlled  Cardiovascular status: blood pressure returned to baseline and acceptable  Respiratory status: acceptable  Hydration status: acceptable

## 2022-03-30 NOTE — ANESTHESIA PROCEDURE NOTES
Peripheral Block      Patient reassessed immediately prior to procedure    Patient location during procedure: OR  Start time: 3/30/2022 3:38 PM  Stop time: 3/30/2022 3:43 PM  Reason for block: at surgeon's request and post-op pain management  Performed by  Anesthesiologist: Leon Ordoñez MD  Preanesthetic Checklist  Completed: patient identified, IV checked, site marked, risks and benefits discussed, surgical consent, monitors and equipment checked, pre-op evaluation and timeout performed  Prep:  Pt Position: supine  Sterile barriers:cap, gloves, mask and washed/disinfected hands  Prep: ChloraPrep  Patient monitoring: blood pressure monitoring, continuous pulse oximetry and EKG  Procedure  Performed under: general  Guidance:ultrasound guided    ULTRASOUND INTERPRETATION. Using ultrasound guidance a 21 G gauge needle was placed in close proximity to the nerve, at which point, under ultrasound guidance anesthetic was injected in the area of the nerve and spread of the anesthesia was seen on ultrasound in close proximity thereto.  There were no abnormalities seen on ultrasound; a digital image was taken; and the patient tolerated the procedure with no complications. Images:still images obtained, printed/placed on chart    Laterality:Bilateral  Block Type:TAP  Injection Technique:single-shot  Needle Type:echogenic  Resistance on Injection: less than 15 psi    Medications Used: ropivacaine (NAROPIN) 0.5 % injection, 30 mL  Med administered at 3/30/2022 3:43 PM      Medications  Comment:15cc 0.5% Ropivacaine with 15cc 0.9% NS injected bilaterally  (0.25% ropivacaine in 30cc total each side)    Post Assessment  Injection Assessment: negative aspiration for heme, no paresthesia on injection and incremental injection  Patient Tolerance:comfortable throughout block  Complications:no  Additional Notes  Ultrasound guidance utilized for placement of needle and visualization of medication dispersement.

## 2022-03-31 ENCOUNTER — TRANSITIONAL CARE MANAGEMENT TELEPHONE ENCOUNTER (OUTPATIENT)
Dept: CALL CENTER | Facility: HOSPITAL | Age: 47
End: 2022-03-31

## 2022-03-31 NOTE — OUTREACH NOTE
Call Center TCM Note    Flowsheet Row Responses   [facility] patient discharged from? [place]   Does the patient have one of the following disease processes/diagnoses(primary or secondary)? General Surgery   TCM attempt successful? Yes   Discharge diagnosis Total lap hysterectomy   Meds reviewed with patient/caregiver? Yes   Is the patient having any side effects they believe may be caused by any medication additions or changes? No   Does the patient have all medications related to this admission filled (includes all antibiotics, pain medications, etc.) Yes   Is the patient taking all medications as directed (includes completed medication regime)? Yes   Does the patient have a follow up appointment scheduled with their surgeon? Yes   Has the patient kept scheduled appointments due by today? N/A   Has home health visited the patient within 72 hours of discharge? N/A   Psychosocial issues? No   Did the patient receive a copy of their discharge instructions? Yes   Nursing interventions Reviewed instructions with patient   What is the patient's perception of their health status since discharge? Improving   Nursing interventions Nurse provided patient education   Is the patient /caregiver able to teach back basic post-op care? Continue use of incentive spirometry at least 1 week post discharge, Take showers only when approved by MD-sponge bathe until then, Do not remove steri-strips, Lifting as instructed by MD in discharge instructions, No tub bath, swimming, or hot tub until instructed by MD, Practice 'cough and deep breath', Drive as instructed by MD in discharge instructions, Keep incision areas clean,dry and protected   Is the patient/caregiver able to teach back signs and symptoms of incisional infection? Increased redness, swelling or pain at the incisonal site, Pus or odor from incision, Fever, Increased drainage or bleeding, Incisional warmth   Is the patient/caregiver able to teach back steps to  recovery at home? Set small, achievable goals for return to baseline health, Practice good oral hygiene, Eat a well-balance diet, Rest and rebuild strength, gradually increase activity, Weigh daily, Make a list of questions for surgeon's appointment   If the patient is a current smoker, are they able to teach back resources for cessation? Not a smoker   Is the patient/caregiver able to teach back the hierarchy of who to call/visit for symptoms/problems? PCP, Specialist, Home health nurse, Urgent Care, ED, 911 Yes   TCM call completed? Yes   Wrap up additional comments Pt doing well s/p planned hysterectomy. Some mild abdominal cramping. No N/V/D. All meds in place, Eating and drinking fine. No questions at this time. POST OP is 05/11/2022, LENORA RODRIGUEZ 04/14/2022, pt will keep sched PCP appt in 05/2022.          Ary Heredia MA    3/31/2022, 15:58 EDT

## 2022-03-31 NOTE — OUTREACH NOTE
Call Center TCM Note    Flowsheet Row Responses   [facility] patient discharged from? [place]   Does the patient have one of the following disease processes/diagnoses(primary or secondary)? General Surgery   TCM attempt successful? No   Unsuccessful attempts Attempt 1          Ary Heredia MA    3/31/2022, 12:44 EDT

## 2022-03-31 NOTE — OUTREACH NOTE
Prep Survey    Flowsheet Row Responses   [facility] discharged from? [place]   Is LACE score < 7 ? Yes   Emergency Room discharge w/ pulse ox? No   Eligibility [facility]   Date of Admission 03/30/22   Date of Discharge 03/30/22   Discharge Disposition Home or Self Care   Discharge diagnosis Total lap hysterectomy   Does the patient have one of the following disease processes/diagnoses(primary or secondary)? General Surgery   Does the patient have Home health ordered? No   Is there a DME ordered? No   Prep survey completed? Yes          AV GARCIA - Registered Nurse

## 2022-04-01 ENCOUNTER — TELEPHONE (OUTPATIENT)
Dept: OBSTETRICS AND GYNECOLOGY | Age: 47
End: 2022-04-01

## 2022-04-01 LAB
LAB AP CASE REPORT: NORMAL
PATH REPORT.FINAL DX SPEC: NORMAL
PATH REPORT.GROSS SPEC: NORMAL

## 2022-04-01 NOTE — TELEPHONE ENCOUNTER
----- Message from Genie Jimenez RN sent at 4/1/2022 10:56 AM EDT -----  Spoke w/pt.  She states she is sore but doing okay.  Pt states she is urinating but has not had a BM. She is tolerating a diet but her throat is still sore.  She is asking if she can resume taking her vitamin supplements (B-12, Vitamin D, and a diet pill).  She also wants to know if she should be taking aspirin.    ----- Message -----  From: Rosalba Chiu MD  Sent: 3/30/2022   5:42 PM EDT  To: Genie Jimenez RN    Please call patient on Friday to check in since hysterectomy.  I would like to know if pain is well controlled, if she is tolerating her diet, if she is urinating okay, and if she has had a bowel movement.

## 2022-04-01 NOTE — TELEPHONE ENCOUNTER
Spoke with patient.  Okay to restart all meds.  Pain manageable.  Had minor spotting when wiping yesterday and none today.  Nausea resolved and tolerating regular diet.

## 2022-04-11 RX ORDER — ALBUTEROL SULFATE 90 UG/1
AEROSOL, METERED RESPIRATORY (INHALATION)
Qty: 8.5 G | Refills: 0 | Status: SHIPPED | OUTPATIENT
Start: 2022-04-11 | End: 2022-11-10 | Stop reason: SDUPTHER

## 2022-04-12 NOTE — PROGRESS NOTES
[facility] Complaint   chronic constipation      History of Present Illness       Sahara Delarosa is a 46 y.o. female who presents to [facility] for follow-up with a history of chronic constipation patient continues on Movantik currently.  Patient reports that Movantik is daily medication that she has tried for constipation and it still takes about a week to have a bowel movement with use of Movantik.  She has previously tried Metamucil, fiber, stool softeners, laxatives, suppositories and enemas with minimal relief.  She reports her constipation developed November of last year prior she had fairly normal bowel movements.  She reports having a prolapsed uterus that was corrected 2 weeks ago with surgical intervention.  She reports she was given a trial of Saxenda for weight loss in November and that she became constipated she discontinued the medication quickly but she continues to be constipated.  Patient reports no change in her eating habits, medication changes, patient denies family history of colorectal cancer or gastric cancer.  Patient denies fever, nausea, vomiting, weight loss, night sweats, melena, hematochezia, hematemesis.    Endoscopy: Review of the patient's most recent EGD and colonoscopy performed by Dr. Ghotra on 1/20/2020 hiatal hernia, Schatzki's ring in the GE junction and fundic gland polyps.    Colonoscopy: Review of the patient's most recent colonoscopy performed by Dr. Ghotra on 6/30/2020 revealed a few nonbleeding diverticula in the sigmoid colon grade 1 internal hemorrhoids.       Results       Result Review :       CMP    CMP 10/26/21 3/28/22   Glucose 78 91   BUN 8 11   Creatinine 0.64 0.84   eGFR Non African Am 99    Sodium 142 144   Potassium 4.0 4.1   Chloride 105 106   Calcium 9.7 9.8   Albumin 4.60    Total Bilirubin 0.3    Alkaline Phosphatase 93    AST (SGOT) 11    ALT (SGPT) 12            CBC    CBC 9/2/21 10/26/21 3/28/22   WBC 5.66 4.23 4.99    RBC 4.47 4.28 4.53   Hemoglobin 13.6 13.0 13.8   Hematocrit 40.8 39.4 41.7   MCV 91.3 92.1 92.1   MCH 30.4 30.4 30.5   MCHC 33.3 33.0 33.1   RDW 12.7 12.7 13.0   Platelets 271 342 266                         Past Medical History       Past Medical History:   Diagnosis Date   • Allergic rhinitis    • Anxiety and depression    • Asthma     exercise induced   • Back pain    • Bowel incontinence    • Condyloma acuminata    • Diverticulitis of colon    • Endometrial polyp    • Family history of breast cancer    • Fatty liver    • Fibroids    • Fibromyalgia    • GERD (gastroesophageal reflux disease)    • Hernia    • Irritable bowel syndrome    • Metabolic syndrome    • Osteoarthritis     In addition to fibromyalgia   • Sleep apnea     CPAP   • Urinary frequency    • Uterine prolapse    • Vitamin B 12 deficiency        Past Surgical History:   Procedure Laterality Date   • BREAST BIOPSY Left 2013    Hyper-pigmented lesion, left nipple.= Benign   • COLONOSCOPY     • CYST REMOVAL      on chest and groin area    • ENDOMETRIAL ABLATION  2015    HSC, DNC, Novasure Endometrial Ablation   • ENDOSCOPY  2017    EGD: Stomach inflammation, Neg for H Pylori.   • HIP ARTHROPLASTY Right 09/2021   • HIP SURGERY Right 2019    LABRUM REPAIR   • TOTAL LAPAROSCOPIC HYSTERECTOMY SALPINGO OOPHORECTOMY Bilateral 03/30/2022    Procedure: TOTAL LAPAROSCOPIC HYSTERECTOMY, BILATERAL SALPINGECTOMY, AND CYSTOSCOPY;  Surgeon: Rosalba Chiu MD;  Location: [facility];  Service: Obstetrics/Gynecology;  Laterality: Bilateral;   • UPPER GASTROINTESTINAL ENDOSCOPY           Current Outpatient Medications:   •  acetaminophen (TYLENOL) 325 MG tablet, Take 2 tablets by mouth Every 6 (Six) Hours., Disp: 30 tablet, Rfl: 1  •  albuterol sulfate  (90 Base) MCG/ACT inhaler, INHALE 1 PUFF BY MOUTH EVERY 6 HOURS AS NEEDED, Disp: 8.5 g, Rfl: 0  •  cyanocobalamin 1000 MCG/ML injection, ADMINISTER 1 ML(1000 MCG) UNDER THE SKIN 1 TIME A MONTH, Disp:  3 mL, Rfl: 3  •  docusate sodium (COLACE) 250 MG capsule, Take 1 capsule by mouth 2 (Two) Times a Day As Needed for Constipation., Disp: 30 capsule, Rfl: 1  •  fluticasone (FLONASE) 50 MCG/ACT nasal spray, SHAKE LIQUID AND USE 1 SPRAY(50 MCG) IN EACH NOSTRIL EVERY DAY, Disp: 16 g, Rfl: 1  •  ibuprofen (ADVIL,MOTRIN) 600 MG tablet, Take 1 tablet by mouth Every 6 (Six) Hours., Disp: 30 tablet, Rfl: 1  •  metFORMIN ER (GLUCOPHAGE-XR) 500 MG 24 hr tablet, Take 1 tablet by mouth Daily With Breakfast., Disp: 90 tablet, Rfl: 1  •  montelukast (SINGULAIR) 10 MG tablet, Take 10 mg by mouth Every Night., Disp: , Rfl: 0  •  Naloxegol Oxalate (Movantik) 25 MG tablet, Take 25 mg by mouth Every Morning., Disp: , Rfl:   •  omeprazole (priLOSEC) 40 MG capsule, omeprazole 40 mg capsule,delayed release  TAKE 1 CAPSULE BY MOUTH DAILY, Disp: , Rfl:   •  orlistat (Xenical) 120 MG capsule, Take 1 capsule by mouth 3 (Three) Times a Day With Meals. (Patient taking differently: Take 120 mg by mouth 3 (Three) Times a Day With Meals. Holding for surgery/LAST DOSE 3/14/2022), Disp: 90 capsule, Rfl: 2  •  oxyCODONE-acetaminophen (PERCOCET) 5-325 MG per tablet, oxycodone-acetaminophen 5-325 mg oral tablet 1 every 8 hrs as needed.   Active, Disp: , Rfl:   •  pregabalin (LYRICA) 100 MG capsule, Take 1 cap PO Q am and 2 caps PO Q HS, Disp: 90 capsule, Rfl: 2  •  sodium chloride (Ocean Nasal Spray) 0.65 % nasal spray, 1 spray into the nostril(s) as directed by provider As Needed (dry nasal mucosa)., Disp: 30 mL, Rfl: 1  •  Plecanatide (Trulance) 3 MG tablet, Take 1 tablet by mouth Daily., Disp: 30 tablet, Rfl: 5  •  Sodium Sulfate-Mag Sulfate-KCl (Sutab) 3111-078-047 MG tablet, Take 12 tablets by mouth Take As Directed., Disp: 24 tablet, Rfl: 0     Allergies   Allergen Reactions   • Bactrim [Sulfamethoxazole-Trimethoprim] Rash   • Codeine Rash       Family History   Problem Relation Age of Onset   • Other Mother         Multiple lipomas.   •  "Arthritis Mother    • Breast cancer Mother 70        Double mastectomy    • Diabetes type II Father    • Fibromyalgia Sister    • No Known Problems Daughter    • No Known Problems Son    • Breast cancer Maternal Aunt         4 different mat aunts with breast cancer.   • Cancer Maternal Aunt         stomach   • Breast cancer Paternal Aunt 60        dx ~ age 60   • Osteoporosis Maternal Grandmother         ?   • Parkinsonism Maternal Grandmother    • Stomach cancer Maternal Grandfather         dx >50   • No Known Problems Paternal Grandmother    • Brain cancer Paternal Grandfather         dx >50   • BRCA 1/2 Neg Hx    • Colon cancer Neg Hx    • Endometrial cancer Neg Hx    • Ovarian cancer Neg Hx    • Malig Hyperthermia Neg Hx         Social History     Social History Narrative   • Not on file       Objective     Vital Signs:   /81 (BP Location: Right arm, Patient Position: Sitting, Cuff Size: Adult)   Pulse 83   Ht 172.7 cm (68\")   Wt 93.4 kg (206 lb)   SpO2 100%   BMI 31.32 kg/m²       Physical Exam  Constitutional:       General: She is not in acute distress.     Appearance: Normal appearance. She is well-developed and normal weight.   Eyes:      Conjunctiva/sclera: Conjunctivae normal.      Pupils: Pupils are equal, round, and reactive to light.      Visual Fields: Right eye visual fields normal and left eye visual fields normal.   Cardiovascular:      Rate and Rhythm: Normal rate and regular rhythm.      Heart sounds: Normal heart sounds.   Pulmonary:      Effort: Pulmonary effort is normal. No retractions.      Breath sounds: Normal breath sounds and air entry.      Comments: Inspection of chest: normal appearance  Abdominal:      General: Bowel sounds are normal.      Palpations: Abdomen is soft.      Tenderness: There is no abdominal tenderness.      Comments: No appreciable hepatosplenomegaly   Musculoskeletal:      Cervical back: Neck supple.      Right lower leg: No edema.      Left lower leg: No " edema.   Lymphadenopathy:      Cervical: No cervical adenopathy.   Skin:     Findings: No lesion.      Comments: Turgor normal   Neurological:      Mental Status: She is alert and oriented to person, place, and time.   Psychiatric:         Mood and Affect: Mood and affect normal.           Assessment & Plan          Assessment and Plan    Diagnoses and all orders for this visit:    1. Altered bowel habits (Primary)    2. Chronic idiopathic constipation    3. Irritable bowel syndrome with constipation    4. Change in bowel habits    Other orders  -     Plecanatide (Trulance) 3 MG tablet; Take 1 tablet by mouth Daily.  Dispense: 30 tablet; Refill: 5  -     Sodium Sulfate-Mag Sulfate-KCl (Sutab) 8700-140-133 MG tablet; Take 12 tablets by mouth Take As Directed.  Dispense: 24 tablet; Refill: 0      46-year-old female presenting the office as a new patient with a history of altered bowel habits, chronic constipation, irritable bowel syndrome and change in bowel habits.  With the patient's unexplained abrupt change in bowel movements I have recommended that the patient undergo further evaluation with a colonoscopy.  I have discussed this procedure in detail with the patient.  I have discussed the risks, benefits and alternatives.  I have discussed the risk of anesthesia, bleeding and perforation.  Patient understands these risks, benefits and alternatives and wishes to proceed.  I will schedule her at her earliest convenience.  I have started the patient on Trulance 3 mg daily and provided her with samples.  We will follow-up in the office after endoscopy.  Patient agreeable to this plan will call with any questions or concerns.            Follow Up       Follow Up   Return for Follow up after endoscopy in office.  Patient was given instructions and counseling regarding her condition or for health maintenance advice. Please see specific information pulled into the AVS if appropriate.

## 2022-04-14 ENCOUNTER — OFFICE VISIT (OUTPATIENT)
Dept: GASTROENTEROLOGY | Facility: CLINIC | Age: 47
End: 2022-04-14

## 2022-04-14 ENCOUNTER — PREP FOR SURGERY (OUTPATIENT)
Dept: OTHER | Facility: HOSPITAL | Age: 47
End: 2022-04-14

## 2022-04-14 VITALS
WEIGHT: 206 LBS | BODY MASS INDEX: 31.22 KG/M2 | HEIGHT: 68 IN | DIASTOLIC BLOOD PRESSURE: 81 MMHG | HEART RATE: 83 BPM | SYSTOLIC BLOOD PRESSURE: 142 MMHG | OXYGEN SATURATION: 100 %

## 2022-04-14 DIAGNOSIS — K58.1 IRRITABLE BOWEL SYNDROME WITH CONSTIPATION: ICD-10-CM

## 2022-04-14 DIAGNOSIS — R19.4 ALTERED BOWEL HABITS: Primary | ICD-10-CM

## 2022-04-14 DIAGNOSIS — R19.4 CHANGE IN BOWEL HABITS: ICD-10-CM

## 2022-04-14 DIAGNOSIS — K59.04 CHRONIC IDIOPATHIC CONSTIPATION: ICD-10-CM

## 2022-04-14 PROCEDURE — 99214 OFFICE O/P EST MOD 30 MIN: CPT | Performed by: NURSE PRACTITIONER

## 2022-04-14 RX ORDER — PLECANATIDE 3 MG/1
3 TABLET ORAL DAILY
Qty: 30 TABLET | Refills: 5 | Status: SHIPPED | OUTPATIENT
Start: 2022-04-14 | End: 2022-04-21 | Stop reason: CLARIF

## 2022-04-14 RX ORDER — SOD SULF/POT CHLORIDE/MAG SULF 1.479 G
12 TABLET ORAL TAKE AS DIRECTED
Qty: 24 TABLET | Refills: 0 | Status: SHIPPED | OUTPATIENT
Start: 2022-04-14 | End: 2022-05-19

## 2022-04-21 ENCOUNTER — TELEPHONE (OUTPATIENT)
Dept: OBSTETRICS AND GYNECOLOGY | Age: 47
End: 2022-04-21

## 2022-04-21 ENCOUNTER — TELEPHONE (OUTPATIENT)
Dept: GASTROENTEROLOGY | Facility: CLINIC | Age: 47
End: 2022-04-21

## 2022-04-21 DIAGNOSIS — K59.04 CHRONIC IDIOPATHIC CONSTIPATION: Primary | ICD-10-CM

## 2022-04-21 DIAGNOSIS — R30.0 DYSURIA: Primary | ICD-10-CM

## 2022-04-21 NOTE — TELEPHONE ENCOUNTER
I received a fax from insurance companying denying Trulance. Patient must try Linzess first. Per Ashley, send in rx for Linzess 145 to [facility] in [place]. I left pt a detailed VM of these changes.

## 2022-04-21 NOTE — TELEPHONE ENCOUNTER
Pt called the office stating [facility] was [facility]. I advised patient that I would send in Clenpiq to her pharmacy and send her a [employer] message with the bowel prep instructions. Patient agreed to this plan.

## 2022-04-21 NOTE — TELEPHONE ENCOUNTER
Pt calls stating she recently had a TLH and has been having right pelvic cramping/discomfort and a burning sensation when she feels the urge to urinate. Denies burning or cramping with urination. Also c/o frequency. Pt thinks she may have a UTI, please advise

## 2022-04-21 NOTE — TELEPHONE ENCOUNTER
Please have her come in to drop off a urine sample.  I will order urinalysis and culture.  As soon as that returns, I will call her with results and send in antibiotics if needed.

## 2022-04-22 ENCOUNTER — LAB (OUTPATIENT)
Dept: LAB | Facility: HOSPITAL | Age: 47
End: 2022-04-22

## 2022-04-22 PROCEDURE — 87086 URINE CULTURE/COLONY COUNT: CPT | Performed by: STUDENT IN AN ORGANIZED HEALTH CARE EDUCATION/TRAINING PROGRAM

## 2022-04-22 NOTE — TELEPHONE ENCOUNTER
Pt going to go to PCP for UA and culture and have them fax results, Her Pcp is an hour closer than us

## 2022-04-23 LAB — BACTERIA SPEC AEROBE CULT: NO GROWTH

## 2022-04-25 ENCOUNTER — TELEPHONE (OUTPATIENT)
Dept: OBSTETRICS AND GYNECOLOGY | Age: 47
End: 2022-04-25

## 2022-04-25 RX ORDER — PHENAZOPYRIDINE HYDROCHLORIDE 200 MG/1
200 TABLET, FILM COATED ORAL 3 TIMES DAILY PRN
Qty: 9 TABLET | Refills: 0 | Status: SHIPPED | OUTPATIENT
Start: 2022-04-25 | End: 2022-04-28

## 2022-04-25 NOTE — TELEPHONE ENCOUNTER
Called patient to discuss RLQ pain.  Pain is located b/w RLQ laparoscopic site and R hip.  Patient does have history of right hip replacement and is wondering if it got irritated during surgery and may be this is the pain she is feeling.  Does feel like the pain gets worse after walking around for long periods of time.  Also notes a burning sensation in the same area when she has to urinate but not actually at her urethra as she urinates.  Pain rated at 7 out of 10 when it is at its worst but it is not there all of the time.  Currently taking Percocet twice daily for back pain and it does also improve this pain.  Saw GI recently and changed her to Linzess so she is having daily bowel movements and does not feel like it is related to constipation.    We will trial a course of Pyridium for 3 days to see if this helps.

## 2022-05-11 ENCOUNTER — OFFICE VISIT (OUTPATIENT)
Dept: OBSTETRICS AND GYNECOLOGY | Age: 47
End: 2022-05-11

## 2022-05-11 DIAGNOSIS — Z90.710 S/P LAPAROSCOPIC HYSTERECTOMY: Primary | ICD-10-CM

## 2022-05-11 PROCEDURE — 99024 POSTOP FOLLOW-UP VISIT: CPT | Performed by: STUDENT IN AN ORGANIZED HEALTH CARE EDUCATION/TRAINING PROGRAM

## 2022-05-11 NOTE — PROGRESS NOTES
[facility]   Obstetrics and Gynecology     2022      Patient:  Sahara Delarosa   MR#:[id number]    Office note    Subjective     History of Present Illness  46 y.o. female  presents for follow-up after TLH-BS on 3/30/2022 for uterine prolapse and vaginal bulge.  She is doing very well.  No abdominal pain or bleeding.  Has not yet had intercourse.  Does note that right hip is still bothersome.  Worse with prolonged periods of movement.      Relevant data reviewed:  Tissue Pathology Exam (2022 16:39)      Patient Active Problem List   Diagnosis   • Vitamin B 12 deficiency   • Sacroiliitis (HCC)   • Metabolic syndrome   • Irritable bowel syndrome   • Fibromyalgia   • Sciatica   • GERD (gastroesophageal reflux disease)   • Bladder problem   • Sleep apnea   • Asthma   • Arthritis   • Seasonal allergic rhinitis   • Gastroesophageal reflux disease with esophagitis without hemorrhage   • Degeneration of lumbar intervertebral disc   • Right hip pain   • S/P hip arthroscopy   • Tear of right acetabular labrum   • Trochanteric bursitis of right hip   • Primary osteoarthritis of right hip   • Vitamin D deficiency   • Uterine prolapse   • Personal history of other diseases of the female genital tract   • Menorrhagia   • Class 1 obesity due to excess calories without serious comorbidity with body mass index (BMI) of 31.0 to 31.9 in adult   • Altered bowel habits   • Chronic idiopathic constipation   • Change in bowel habits       Past Medical History:   Diagnosis Date   • Allergic rhinitis    • Anxiety and depression    • Asthma     exercise induced   • Back pain    • Bowel incontinence    • Condyloma acuminata    • Diverticulitis of colon    • Endometrial polyp    • Family history of breast cancer    • Fatty liver    • Fibroids    • Fibromyalgia    • GERD (gastroesophageal reflux disease)    • Hernia    • Irritable bowel syndrome    • Metabolic syndrome    • Osteoarthritis     In addition to  fibromyalgia   • Sleep apnea     CPAP   • Urinary frequency    • Uterine prolapse    • Vitamin B 12 deficiency      Past Surgical History:   Procedure Laterality Date   • BREAST BIOPSY Left 2013    Hyper-pigmented lesion, left nipple.= Benign   • COLONOSCOPY     • CYST REMOVAL      on chest and groin area    • ENDOMETRIAL ABLATION      SABAS, SHANTANUC, Novasure Endometrial Ablation   • ENDOSCOPY  2017    EGD: Stomach inflammation, Neg for H Pylori.   • HIP ARTHROPLASTY Right 2021   • HIP SURGERY Right 2019    LABRUM REPAIR   • TOTAL LAPAROSCOPIC HYSTERECTOMY SALPINGO OOPHORECTOMY Bilateral 2022    Procedure: TOTAL LAPAROSCOPIC HYSTERECTOMY, BILATERAL SALPINGECTOMY, AND CYSTOSCOPY;  Surgeon: Rosalba Chiu MD;  Location: [facility] OR;  Service: Obstetrics/Gynecology;  Laterality: Bilateral;   • UPPER GASTROINTESTINAL ENDOSCOPY       Obstetric History:  OB History        2    Para   2    Term   2            AB        Living   2       SAB        IAB        Ectopic        Molar        Multiple        Live Births   2               Menstrual History:     No LMP recorded. Patient has had an ablation.       # 1 - Date: , Sex: Female, Weight: 3600 g (7 lb 15 oz), GA: None, Delivery: Vaginal, Spontaneous, Apgar1: None, Apgar5: None, Living: Living, Birth Comments: None    # 2 - Date: , Sex: Male, Weight: 3232 g (7 lb 2 oz), GA: None, Delivery: Vaginal, Spontaneous, Apgar1: None, Apgar5: None, Living: Living, Birth Comments: None    Family History   Problem Relation Age of Onset   • Other Mother         Multiple lipomas.   • Arthritis Mother    • Breast cancer Mother 70        Double mastectomy    • Diabetes type II Father    • Fibromyalgia Sister    • No Known Problems Daughter    • No Known Problems Son    • Breast cancer Maternal Aunt         4 different mat aunts with breast cancer.   • Cancer Maternal Aunt         stomach   • Breast cancer Paternal Aunt 60        dx ~ age 60   •  Osteoporosis Maternal Grandmother         ?   • Parkinsonism Maternal Grandmother    • Stomach cancer Maternal Grandfather         dx >50   • No Known Problems Paternal Grandmother    • Brain cancer Paternal Grandfather         dx >50   • BRCA 1/2 Neg Hx    • Colon cancer Neg Hx    • Endometrial cancer Neg Hx    • Ovarian cancer Neg Hx    • Malig Hyperthermia Neg Hx      Social History     Tobacco Use   • Smoking status: Passive Smoke Exposure - Never Smoker   • Smokeless tobacco: Never Used   Vaping Use   • Vaping Use: Never used   Substance Use Topics   • Alcohol use: Never   • Drug use: Never     Bactrim [sulfamethoxazole-trimethoprim] and Codeine    Current Outpatient Medications:   •  albuterol sulfate  (90 Base) MCG/ACT inhaler, INHALE 1 PUFF BY MOUTH EVERY 6 HOURS AS NEEDED, Disp: 8.5 g, Rfl: 0  •  cyanocobalamin 1000 MCG/ML injection, ADMINISTER 1 ML(1000 MCG) UNDER THE SKIN 1 TIME A MONTH, Disp: 3 mL, Rfl: 3  •  fluticasone (FLONASE) 50 MCG/ACT nasal spray, SHAKE LIQUID AND USE 1 SPRAY(50 MCG) IN EACH NOSTRIL EVERY DAY, Disp: 16 g, Rfl: 1  •  linaclotide (Linzess) 145 MCG capsule capsule, Take 1 capsule by mouth Every Morning Before Breakfast for 30 days., Disp: 30 capsule, Rfl: 3  •  metFORMIN ER (GLUCOPHAGE-XR) 500 MG 24 hr tablet, Take 1 tablet by mouth Daily With Breakfast., Disp: 90 tablet, Rfl: 1  •  montelukast (SINGULAIR) 10 MG tablet, Take 10 mg by mouth Every Night., Disp: , Rfl: 0  •  omeprazole (priLOSEC) 40 MG capsule, omeprazole 40 mg capsule,delayed release  TAKE 1 CAPSULE BY MOUTH DAILY, Disp: , Rfl:   •  orlistat (Xenical) 120 MG capsule, Take 1 capsule by mouth 3 (Three) Times a Day With Meals. (Patient taking differently: Take 120 mg by mouth 3 (Three) Times a Day With Meals. Holding for surgery/LAST DOSE 3/14/2022), Disp: 90 capsule, Rfl: 2  •  oxyCODONE-acetaminophen (PERCOCET) 5-325 MG per tablet, oxycodone-acetaminophen 5-325 mg oral tablet 1 every 8 hrs as needed.   Active,  "Disp: , Rfl:   •  pregabalin (LYRICA) 100 MG capsule, Take 1 cap PO Q am and 2 caps PO Q HS, Disp: 90 capsule, Rfl: 2  •  sodium chloride (Ocean Nasal Spray) 0.65 % nasal spray, 1 spray into the nostril(s) as directed by provider As Needed (dry nasal mucosa)., Disp: 30 mL, Rfl: 1  •  Sodium Sulfate-Mag Sulfate-KCl (Sutab) 3453-820-533 MG tablet, Take 12 tablets by mouth Take As Directed., Disp: 24 tablet, Rfl: 0    The following portions of the patient's history were reviewed and updated as appropriate: allergies, current medications, past family history, past medical history, past social history, past surgical history and problem list.    Review of Systems   All other systems reviewed and are negative.      BP Readings from Last 3 Encounters:   04/14/22 142/81   03/30/22 126/82   03/28/22 135/89      Wt Readings from Last 3 Encounters:   04/14/22 93.4 kg (206 lb)   03/30/22 93.8 kg (206 lb 12.7 oz)   03/28/22 94.9 kg (209 lb 3.2 oz)      BMI: Estimated body mass index is 31.32 kg/m² as calculated from the following:    Height as of 4/14/22: 172.7 cm (68\").    Weight as of 4/14/22: 93.4 kg (206 lb). BSA: Estimated body surface area is 2.07 meters squared as calculated from the following:    Height as of 4/14/22: 172.7 cm (68\").    Weight as of 4/14/22: 93.4 kg (206 lb).    Objective   Physical Exam  Vitals and nursing note reviewed.   Constitutional:       General: She is not in acute distress.     Appearance: Normal appearance.   Pulmonary:      Effort: Pulmonary effort is normal. No respiratory distress.   Abdominal:      General: There is no distension.      Palpations: Abdomen is soft.      Tenderness: There is no abdominal tenderness.      Comments: Incisions c/d/i   Genitourinary:     General: Normal vulva.      Vagina: Normal.      Uterus: Absent.       Adnexa: Right adnexa normal and left adnexa normal.      Comments: Vaginal cuff intact on visual inspection and palpation, minimal AP prolapse  Neurological: "      General: No focal deficit present.      Mental Status: She is alert and oriented to person, place, and time.         Assessment & Plan     Diagnoses and all orders for this visit:    1. S/P laparoscopic hysterectomy (Primary)    -Healing well, cuff intact  - Cleared for work and intercourse  - Discussed benign pathology    Return in about 8 months (around 1/20/2023) for Annual w/ mammo.    Rosalba Chiu MD   5/11/2022 10:42 EDT

## 2022-05-19 ENCOUNTER — OFFICE VISIT (OUTPATIENT)
Dept: FAMILY MEDICINE CLINIC | Facility: CLINIC | Age: 47
End: 2022-05-19

## 2022-05-19 VITALS
HEIGHT: 68 IN | WEIGHT: 209.6 LBS | TEMPERATURE: 98.2 F | BODY MASS INDEX: 31.77 KG/M2 | DIASTOLIC BLOOD PRESSURE: 68 MMHG | SYSTOLIC BLOOD PRESSURE: 128 MMHG | OXYGEN SATURATION: 98 % | HEART RATE: 97 BPM

## 2022-05-19 DIAGNOSIS — M79.7 FIBROMYALGIA: ICD-10-CM

## 2022-05-19 DIAGNOSIS — J30.2 SEASONAL ALLERGIC RHINITIS, UNSPECIFIED TRIGGER: ICD-10-CM

## 2022-05-19 DIAGNOSIS — Z11.59 ENCOUNTER FOR HEPATITIS C SCREENING TEST FOR LOW RISK PATIENT: ICD-10-CM

## 2022-05-19 DIAGNOSIS — E55.9 VITAMIN D DEFICIENCY: ICD-10-CM

## 2022-05-19 DIAGNOSIS — E88.81 METABOLIC SYNDROME: Primary | ICD-10-CM

## 2022-05-19 DIAGNOSIS — J45.30 MILD PERSISTENT ASTHMA WITHOUT COMPLICATION: ICD-10-CM

## 2022-05-19 DIAGNOSIS — K21.9 GASTROESOPHAGEAL REFLUX DISEASE WITHOUT ESOPHAGITIS: ICD-10-CM

## 2022-05-19 DIAGNOSIS — E53.8 VITAMIN B 12 DEFICIENCY: ICD-10-CM

## 2022-05-19 DIAGNOSIS — Z79.899 HIGH RISK MEDICATION USE: ICD-10-CM

## 2022-05-19 LAB
25(OH)D3 SERPL-MCNC: 48.1 NG/ML (ref 30–100)
ALBUMIN SERPL-MCNC: 4.2 G/DL (ref 3.5–5.2)
ALBUMIN/GLOB SERPL: 1.2 G/DL
ALP SERPL-CCNC: 112 U/L (ref 39–117)
ALT SERPL W P-5'-P-CCNC: 15 U/L (ref 1–33)
AMPHET+METHAMPHET UR QL: NEGATIVE
ANION GAP SERPL CALCULATED.3IONS-SCNC: 10.5 MMOL/L (ref 5–15)
AST SERPL-CCNC: 10 U/L (ref 1–32)
BARBITURATES UR QL SCN: NEGATIVE
BENZODIAZ UR QL SCN: NEGATIVE
BILIRUB SERPL-MCNC: 0.4 MG/DL (ref 0–1.2)
BUN SERPL-MCNC: 10 MG/DL (ref 6–20)
BUN/CREAT SERPL: 13.9 (ref 7–25)
CALCIUM SPEC-SCNC: 10.1 MG/DL (ref 8.6–10.5)
CANNABINOIDS SERPL QL: NEGATIVE
CHLORIDE SERPL-SCNC: 104 MMOL/L (ref 98–107)
CO2 SERPL-SCNC: 26.5 MMOL/L (ref 22–29)
COCAINE UR QL: NEGATIVE
CREAT SERPL-MCNC: 0.72 MG/DL (ref 0.57–1)
DEPRECATED RDW RBC AUTO: 41.5 FL (ref 37–54)
EGFRCR SERPLBLD CKD-EPI 2021: 103.3 ML/MIN/1.73
ERYTHROCYTE [DISTWIDTH] IN BLOOD BY AUTOMATED COUNT: 12.6 % (ref 12.3–15.4)
FOLATE SERPL-MCNC: 11.3 NG/ML (ref 4.78–24.2)
GLOBULIN UR ELPH-MCNC: 3.4 GM/DL
GLUCOSE SERPL-MCNC: 88 MG/DL (ref 65–99)
HCT VFR BLD AUTO: 39.6 % (ref 34–46.6)
HCV AB SER DONR QL: NORMAL
HGB BLD-MCNC: 13.1 G/DL (ref 12–15.9)
MCH RBC QN AUTO: 30.3 PG (ref 26.6–33)
MCHC RBC AUTO-ENTMCNC: 33.1 G/DL (ref 31.5–35.7)
MCV RBC AUTO: 91.5 FL (ref 79–97)
METHADONE UR QL SCN: NEGATIVE
OPIATES UR QL: NEGATIVE
OXYCODONE UR QL SCN: POSITIVE
PLATELET # BLD AUTO: 288 10*3/MM3 (ref 140–450)
PMV BLD AUTO: 9.8 FL (ref 6–12)
POTASSIUM SERPL-SCNC: 4 MMOL/L (ref 3.5–5.2)
PROT SERPL-MCNC: 7.6 G/DL (ref 6–8.5)
RBC # BLD AUTO: 4.33 10*6/MM3 (ref 3.77–5.28)
SODIUM SERPL-SCNC: 141 MMOL/L (ref 136–145)
TSH SERPL DL<=0.05 MIU/L-ACNC: 1.24 UIU/ML (ref 0.27–4.2)
VIT B12 BLD-MCNC: 591 PG/ML (ref 211–946)
WBC NRBC COR # BLD: 5.78 10*3/MM3 (ref 3.4–10.8)

## 2022-05-19 PROCEDURE — 80050 GENERAL HEALTH PANEL: CPT | Performed by: NURSE PRACTITIONER

## 2022-05-19 PROCEDURE — 80307 DRUG TEST PRSMV CHEM ANLYZR: CPT | Performed by: NURSE PRACTITIONER

## 2022-05-19 PROCEDURE — 82607 VITAMIN B-12: CPT | Performed by: NURSE PRACTITIONER

## 2022-05-19 PROCEDURE — 86803 HEPATITIS C AB TEST: CPT | Performed by: NURSE PRACTITIONER

## 2022-05-19 PROCEDURE — 82306 VITAMIN D 25 HYDROXY: CPT | Performed by: NURSE PRACTITIONER

## 2022-05-19 PROCEDURE — 82746 ASSAY OF FOLIC ACID SERUM: CPT | Performed by: NURSE PRACTITIONER

## 2022-05-19 PROCEDURE — 99214 OFFICE O/P EST MOD 30 MIN: CPT | Performed by: NURSE PRACTITIONER

## 2022-05-19 PROCEDURE — 83036 HEMOGLOBIN GLYCOSYLATED A1C: CPT | Performed by: NURSE PRACTITIONER

## 2022-05-19 RX ORDER — MONTELUKAST SODIUM 10 MG/1
10 TABLET ORAL NIGHTLY
Qty: 90 TABLET | Refills: 1 | Status: SHIPPED | OUTPATIENT
Start: 2022-05-19 | End: 2022-11-10 | Stop reason: SDUPTHER

## 2022-05-19 RX ORDER — OMEPRAZOLE 40 MG/1
40 CAPSULE, DELAYED RELEASE ORAL DAILY
Qty: 90 CAPSULE | Refills: 1 | Status: SHIPPED | OUTPATIENT
Start: 2022-05-19 | End: 2022-11-10 | Stop reason: SDUPTHER

## 2022-05-19 RX ORDER — METFORMIN HYDROCHLORIDE 500 MG/1
500 TABLET, EXTENDED RELEASE ORAL
Qty: 90 TABLET | Refills: 1 | Status: SHIPPED | OUTPATIENT
Start: 2022-05-19 | End: 2022-11-10 | Stop reason: SDUPTHER

## 2022-05-19 RX ORDER — FLUTICASONE PROPIONATE 50 MCG
2 SPRAY, SUSPENSION (ML) NASAL DAILY
Qty: 16 G | Refills: 1 | Status: SHIPPED | OUTPATIENT
Start: 2022-05-19 | End: 2022-11-10 | Stop reason: SDUPTHER

## 2022-05-19 NOTE — PROGRESS NOTES
Chief Complaint  Fibromyalgia (Follow up and med refills)    Subjective            Sahara Delarosa presents to [facility]  Patient is here for medication refills and will need some labs today as well and her chronic comorbid conditions    --F/U on the fibromyalgia meds--lyrica--doing well on this with regards to no SE----but not sleeping well --just got cortisone shot yesterday to the right hip--hurting all over--generalized pain--and stressed a little more--pt has 1 more week of school and then th stress should lighten and then maybe the hip pain will improve--then pt will seeif needs to increase the lyrica to the highest dose of 450 mg daily--very compliant with her follow-up visit--Rick is appropriate--shows no signs and symptoms of misuse nor abuse nor aberrant behaviors--5 and failed multiple meds for the fibromyalgia in the past and the Lyrica seems to be most effective with less side effects    --Asthma and allergies: Patient reports well-controlled on the current medication regimen of her Flonase albuterol inhaler and Singulair no side effects no issues reported    --Vitamin B12 and vitamin D deficiencies patient does need labs rechecked today as she is already completed all of the high-dose once weekly for the vitamin D and she still remains on the vitamin B12 shots--and still does report having fatigue    --GERD: Well-controlled on the medication no reports of breakthrough symptoms    -- Metabolic syndrome: She is working on her weight loss as well as her diet and attempting to stay active although she reports she just received a cortisone shot in the right hip again yesterday--and remains on the metformin and tolerates this well with no side effects      Fibromyalgia  Associated symptoms include arthralgias, fatigue and myalgias. Pertinent negatives include no abdominal pain, chest pain or rash.       PHQ-2 Total Score:    PHQ-9 Total Score:      Past Medical History:    Diagnosis Date   • Allergic rhinitis    • Anxiety and depression    • Asthma     exercise induced   • Back pain    • Bowel incontinence    • Condyloma acuminata    • Diverticulitis of colon    • Endometrial polyp    • Family history of breast cancer    • Fatty liver    • Fibroids    • Fibromyalgia    • GERD (gastroesophageal reflux disease)    • Hernia    • Irritable bowel syndrome    • Metabolic syndrome    • Osteoarthritis     In addition to fibromyalgia   • Sleep apnea     CPAP   • Urinary frequency    • Uterine prolapse    • Vitamin B 12 deficiency        Allergies   Allergen Reactions   • Bactrim [Sulfamethoxazole-Trimethoprim] Rash   • Codeine Rash        Past Surgical History:   Procedure Laterality Date   • BREAST BIOPSY Left 2013    Hyper-pigmented lesion, left nipple.= Benign   • COLONOSCOPY     • CYST REMOVAL      on chest and groin area    • ENDOMETRIAL ABLATION  2015    HSC, DNC, Novasure Endometrial Ablation   • ENDOSCOPY  2017    EGD: Stomach inflammation, Neg for H Pylori.   • HIP ARTHROPLASTY Right 09/2021   • HIP SURGERY Right 2019    LABRUM REPAIR   • TOTAL LAPAROSCOPIC HYSTERECTOMY SALPINGO OOPHORECTOMY Bilateral 03/30/2022    Procedure: TOTAL LAPAROSCOPIC HYSTERECTOMY, BILATERAL SALPINGECTOMY, AND CYSTOSCOPY;  Surgeon: Rosalba Chiu MD;  Location: [facility] OR;  Service: Obstetrics/Gynecology;  Laterality: Bilateral;   • UPPER GASTROINTESTINAL ENDOSCOPY          Social History     Tobacco Use   • Smoking status: Passive Smoke Exposure - Never Smoker   • Smokeless tobacco: Never Used   Vaping Use   • Vaping Use: Never used   Substance Use Topics   • Alcohol use: Never   • Drug use: Never       Family History   Problem Relation Age of Onset   • Other Mother         Multiple lipomas.   • Arthritis Mother    • Breast cancer Mother 70        Double mastectomy    • Diabetes type II Father    • Fibromyalgia Sister    • No Known Problems Daughter    • No Known Problems Son    • Breast cancer  Maternal Aunt         4 different mat aunts with breast cancer.   • Cancer Maternal Aunt         stomach   • Breast cancer Paternal Aunt 60        dx ~ age 60   • Osteoporosis Maternal Grandmother         ?   • Parkinsonism Maternal Grandmother    • Stomach cancer Maternal Grandfather         dx >50   • No Known Problems Paternal Grandmother    • Brain cancer Paternal Grandfather         dx >50   • BRCA 1/2 Neg Hx    • Colon cancer Neg Hx    • Endometrial cancer Neg Hx    • Ovarian cancer Neg Hx    • Malig Hyperthermia Neg Hx         Health Maintenance Due   Topic Date Due   • ANNUAL PHYSICAL  Never done   • HEPATITIS C SCREENING  Never done        Current Outpatient Medications on File Prior to Visit   Medication Sig   • albuterol sulfate  (90 Base) MCG/ACT inhaler INHALE 1 PUFF BY MOUTH EVERY 6 HOURS AS NEEDED   • cyanocobalamin 1000 MCG/ML injection ADMINISTER 1 ML(1000 MCG) UNDER THE SKIN 1 TIME A MONTH   • linaclotide (Linzess) 145 MCG capsule capsule Take 1 capsule by mouth Every Morning Before Breakfast for 30 days.   • orlistat (Xenical) 120 MG capsule Take 1 capsule by mouth 3 (Three) Times a Day With Meals. (Patient taking differently: Take 120 mg by mouth 3 (Three) Times a Day With Meals. Holding for surgery/LAST DOSE 3/14/2022)   • oxyCODONE-acetaminophen (PERCOCET) 5-325 MG per tablet oxycodone-acetaminophen 5-325 mg oral tablet 1 every 8 hrs as needed.   Active   • pregabalin (LYRICA) 100 MG capsule Take 1 cap PO Q am and 2 caps PO Q HS   • [DISCONTINUED] fluticasone (FLONASE) 50 MCG/ACT nasal spray SHAKE LIQUID AND USE 1 SPRAY(50 MCG) IN EACH NOSTRIL EVERY DAY   • [DISCONTINUED] metFORMIN ER (GLUCOPHAGE-XR) 500 MG 24 hr tablet Take 1 tablet by mouth Daily With Breakfast.   • [DISCONTINUED] montelukast (SINGULAIR) 10 MG tablet Take 10 mg by mouth Every Night.   • [DISCONTINUED] omeprazole (priLOSEC) 40 MG capsule omeprazole 40 mg capsule,delayed release   TAKE 1 CAPSULE BY MOUTH DAILY   • sodium  "chloride (Ocean Nasal Spray) 0.65 % nasal spray 1 spray into the nostril(s) as directed by provider As Needed (dry nasal mucosa).   • [DISCONTINUED] Sodium Sulfate-Mag Sulfate-KCl (Sutab) 9882-578-303 MG tablet Take 12 tablets by mouth Take As Directed.     No current facility-administered medications on file prior to visit.       Immunization History   Administered Date(s) Administered   • COVID-19 (MODERNA) 1st, 2nd, 3rd Dose Only 02/03/2021, 03/03/2021, 11/05/2021   • COVID-19 (UNSPECIFIED) 02/03/2021, 03/03/2021, 11/05/2021   • Flu Vaccine Quad PF >36MO 10/19/2016, 09/20/2017, 10/14/2018   • Flu Vaccine Split Quad 10/19/2016, 09/20/2017, 10/14/2018   • Hepatitis A 12/06/2018   • Influenza, Unspecified 10/14/2018, 10/01/2019   • Pneumococcal Polysaccharide (PPSV23) 02/23/2022   • Tdap 10/14/2018       Review of Systems   Constitutional: Positive for fatigue.   HENT: Positive for ear pain.    Eyes: Negative.    Respiratory: Negative for shortness of breath.    Cardiovascular: Negative for chest pain.   Gastrointestinal: Positive for GERD. Negative for abdominal pain and blood in stool.   Genitourinary: Negative for dysuria.   Musculoskeletal: Positive for arthralgias and myalgias.   Skin: Negative for rash and wound.   Allergic/Immunologic: Positive for environmental allergies.   Neurological: Negative for dizziness, syncope and light-headedness.   Psychiatric/Behavioral: Positive for sleep disturbance. Negative for suicidal ideas and depressed mood. The patient is nervous/anxious.         Objective     /68 (BP Location: Left arm, Patient Position: Sitting, Cuff Size: Adult)   Pulse 97   Temp 98.2 °F (36.8 °C) (Temporal)   Ht 172.7 cm (68\")   Wt 95.1 kg (209 lb 9.6 oz)   SpO2 98%   BMI 31.87 kg/m²       Physical Exam  Vitals and nursing note reviewed.   Constitutional:       Appearance: Normal appearance.   HENT:      Head: Normocephalic.      Right Ear: Tympanic membrane, ear canal and external ear " normal.      Left Ear: Tympanic membrane, ear canal and external ear normal.      Nose: Nose normal.   Eyes:      Pupils: Pupils are equal, round, and reactive to light.   Cardiovascular:      Rate and Rhythm: Normal rate and regular rhythm.      Heart sounds: Normal heart sounds.   Pulmonary:      Effort: Pulmonary effort is normal.      Breath sounds: Normal breath sounds.   Abdominal:      Palpations: Abdomen is soft.   Musculoskeletal:      Cervical back: Normal range of motion and neck supple.      Lumbar back: Tenderness and bony tenderness present.      Right hip: Tenderness and bony tenderness present. Decreased range of motion.      Comments: bilat muscle tenderness to the upper chest wall and neck and whole back and to BUE and BLE    Skin:     General: Skin is warm and dry.   Neurological:      Mental Status: She is alert and oriented to person, place, and time.   Psychiatric:         Mood and Affect: Mood normal.         Behavior: Behavior normal.         Thought Content: Thought content normal.         Judgment: Judgment normal.         Result Review :                          Assessment and Plan      Diagnoses and all orders for this visit:    1. Metabolic syndrome (Primary)  -     metFORMIN ER (GLUCOPHAGE-XR) 500 MG 24 hr tablet; Take 1 tablet by mouth Daily With Breakfast.  Dispense: 90 tablet; Refill: 1  -     Hemoglobin A1c    2. Fibromyalgia  Comments:  pt will let me know if with the next refills we need to increase to 450mg max dosing   Orders:  -     Comprehensive Metabolic Panel  -     TSH  -     CBC (No Diff)  -     Urine Drug Screen - Urine, Clean Catch    3. Seasonal allergic rhinitis, unspecified trigger  -     fluticasone (FLONASE) 50 MCG/ACT nasal spray; 2 sprays into the nostril(s) as directed by provider Daily.  Dispense: 16 g; Refill: 1  -     montelukast (SINGULAIR) 10 MG tablet; Take 1 tablet by mouth Every Night.  Dispense: 90 tablet; Refill: 1    4. Vitamin D deficiency  -      Vitamin D 25 Hydroxy    5. Vitamin B 12 deficiency  -     Vitamin B12 & Folate    6. Mild persistent asthma without complication  -     montelukast (SINGULAIR) 10 MG tablet; Take 1 tablet by mouth Every Night.  Dispense: 90 tablet; Refill: 1  -     CBC (No Diff)    7. Gastroesophageal reflux disease without esophagitis  -     omeprazole (priLOSEC) 40 MG capsule; Take 1 capsule by mouth Daily.  Dispense: 90 capsule; Refill: 1    8. High risk medication use  -     Urine Drug Screen - Urine, Clean Catch    9. Encounter for hepatitis C screening test for low risk patient  -     Hepatitis C Antibody            Follow Up     Return if symptoms worsen or fail to improve, for Annual physical, Recheck.      Answers for HPI/ROS submitted by the patient on 5/12/2022  Please describe your symptoms.: Refill on Medication  Have you had these symptoms before?: Yes  How long have you been having these symptoms?: Greater than 2 weeks  What is the primary reason for your visit?: Other

## 2022-05-19 NOTE — PROGRESS NOTES
Venipuncture Blood Specimen Collection  Venipuncture performed in left arm  by Nevin Sierra with good hemostasis. Patient tolerated the procedure well without complications.   05/19/22   Nevin Sierra

## 2022-05-20 LAB — HBA1C MFR BLD: 5.3 % (ref 4.8–5.6)

## 2022-05-23 NOTE — PROGRESS NOTES
Please mail letter to patient stating    Sahara, the hemoglobin A1c was normal range; vitamin D levels were normal range; hepatitis C antibody screening was completely negative; comprehensive panel shows normal glucose and normal electrolytes and normal kidney and liver function tests; vitamin B12 and folic acid levels were normal range; thyroid levels were normal range; and all of your blood counts were completely normal; and then the yearly urine tox screen was as expected since you are under the care of pain management

## 2022-06-03 ENCOUNTER — TELEPHONE (OUTPATIENT)
Dept: OBSTETRICS AND GYNECOLOGY | Facility: CLINIC | Age: 47
End: 2022-06-03

## 2022-06-03 NOTE — TELEPHONE ENCOUNTER
Caller: WIN FERRELL     Relationship: SELF    Best call back number: [phone] OK TO M     WOULD LIKE A COPY OF [facility] PAPERWORK TO HER HOME   ADDRESS HAS BEEN CONFIRMED

## 2022-08-10 ENCOUNTER — OFFICE VISIT (OUTPATIENT)
Dept: FAMILY MEDICINE CLINIC | Facility: CLINIC | Age: 47
End: 2022-08-10

## 2022-08-10 VITALS
WEIGHT: 207 LBS | BODY MASS INDEX: 31.37 KG/M2 | OXYGEN SATURATION: 97 % | TEMPERATURE: 96.7 F | SYSTOLIC BLOOD PRESSURE: 122 MMHG | HEART RATE: 86 BPM | DIASTOLIC BLOOD PRESSURE: 68 MMHG | HEIGHT: 68 IN

## 2022-08-10 DIAGNOSIS — G47.00 INSOMNIA, UNSPECIFIED TYPE: ICD-10-CM

## 2022-08-10 DIAGNOSIS — M79.7 FIBROMYALGIA: Primary | ICD-10-CM

## 2022-08-10 PROCEDURE — 99214 OFFICE O/P EST MOD 30 MIN: CPT | Performed by: NURSE PRACTITIONER

## 2022-08-10 RX ORDER — PREGABALIN 100 MG/1
CAPSULE ORAL
Qty: 90 CAPSULE | Refills: 2 | Status: SHIPPED | OUTPATIENT
Start: 2022-08-10 | End: 2023-01-11 | Stop reason: SDUPTHER

## 2022-08-10 RX ORDER — TRAZODONE HYDROCHLORIDE 50 MG/1
50 TABLET ORAL NIGHTLY
Qty: 30 TABLET | Refills: 2 | Status: SHIPPED | OUTPATIENT
Start: 2022-08-10 | End: 2022-11-04

## 2022-08-10 NOTE — PROGRESS NOTES
Answers for HPI/ROS submitted by the patient on 8/10/2022  Please describe your symptoms.: medication renewal  Have you had these symptoms before?: Yes  How long have you been having these symptoms?: Greater than 2 weeks  Please list any medications you are currently taking for this condition.: Lyrica  What is the primary reason for your visit?: Other    Chief Complaint  Med Refill    Subjective            Sahara Delarosa presents to [facility]  Pt here for the Q 3 month F/U on lyrica--the fibromyalgia tried and failed multiple other medications and modalities and had failed those or had reaction over just ineffective --and does very well on this with no reported problems and reports overall working well no SE no issues--shows no signs and symptoms of misuse nor abuse no aberrant behaviors very compliant with her every 3 month follow-up Rick report was appropriate takes meds as directed    ____________________________________________________    But then patient also reports having experiencing  a new problem--with insomnia and that she is throbbing all over at night her back issues and taking her meds as directed and reported that she said something to the pain mgt they suggested the magnesium and melatonin and pt taking this regularly and just not helping--with her ability to go to sleep--and she is just desperate to be able to get any rest at night so she is even started taking some Benadryl sometimes--patient denies all anxiety and depression issues no SI no HI      PHQ-2 Total Score:    PHQ-9 Total Score:      Past Medical History:   Diagnosis Date   • Allergic rhinitis    • Anxiety and depression    • Asthma     exercise induced   • Back pain    • Bowel incontinence    • Condyloma acuminata    • Diverticulitis of colon    • Endometrial polyp    • Family history of breast cancer    • Fatty liver    • Fibroids    • Fibromyalgia    • GERD (gastroesophageal reflux disease)    •  Hernia    • Irritable bowel syndrome    • Metabolic syndrome    • Osteoarthritis     In addition to fibromyalgia   • Sleep apnea     CPAP   • Urinary frequency    • Uterine prolapse    • Vitamin B 12 deficiency        Allergies   Allergen Reactions   • Bactrim [Sulfamethoxazole-Trimethoprim] Rash   • Codeine Rash        Past Surgical History:   Procedure Laterality Date   • BREAST BIOPSY Left 2013    Hyper-pigmented lesion, left nipple.= Benign   • COLONOSCOPY     • CYST REMOVAL      on chest and groin area    • ENDOMETRIAL ABLATION  2015    HSC, DNC, Novasure Endometrial Ablation   • ENDOSCOPY  2017    EGD: Stomach inflammation, Neg for H Pylori.   • HIP ARTHROPLASTY Right 09/2021   • HIP SURGERY Right 2019    LABRUM REPAIR   • TOTAL LAPAROSCOPIC HYSTERECTOMY SALPINGO OOPHORECTOMY Bilateral 03/30/2022    Procedure: TOTAL LAPAROSCOPIC HYSTERECTOMY, BILATERAL SALPINGECTOMY, AND CYSTOSCOPY;  Surgeon: Rosalba Chiu MD;  Location: [facility];  Service: Obstetrics/Gynecology;  Laterality: Bilateral;   • UPPER GASTROINTESTINAL ENDOSCOPY          Social History     Tobacco Use   • Smoking status: Passive Smoke Exposure - Never Smoker   • Smokeless tobacco: Never Used   Vaping Use   • Vaping Use: Never used   Substance Use Topics   • Alcohol use: Never   • Drug use: Never       Family History   Problem Relation Age of Onset   • Other Mother         Multiple lipomas.   • Arthritis Mother    • Breast cancer Mother 70        Double mastectomy    • Diabetes type II Father    • Fibromyalgia Sister    • No Known Problems Daughter    • No Known Problems Son    • Breast cancer Maternal Aunt         4 different mat aunts with breast cancer.   • Cancer Maternal Aunt         stomach   • Breast cancer Paternal Aunt 60        dx ~ age 60   • Osteoporosis Maternal Grandmother         ?   • Parkinsonism Maternal Grandmother    • Stomach cancer Maternal Grandfather         dx >50   • No Known Problems Paternal Grandmother    •  Brain cancer Paternal Grandfather         dx >50   • BRCA 1/2 Neg Hx    • Colon cancer Neg Hx    • Endometrial cancer Neg Hx    • Ovarian cancer Neg Hx    • Malig Hyperthermia Neg Hx         Health Maintenance Due   Topic Date Due   • ANNUAL PHYSICAL  Never done        Current Outpatient Medications on File Prior to Visit   Medication Sig   • albuterol sulfate  (90 Base) MCG/ACT inhaler INHALE 1 PUFF BY MOUTH EVERY 6 HOURS AS NEEDED   • cyanocobalamin 1000 MCG/ML injection ADMINISTER 1 ML(1000 MCG) UNDER THE SKIN 1 TIME A MONTH   • fluticasone (FLONASE) 50 MCG/ACT nasal spray 2 sprays into the nostril(s) as directed by provider Daily.   • metFORMIN ER (GLUCOPHAGE-XR) 500 MG 24 hr tablet Take 1 tablet by mouth Daily With Breakfast.   • montelukast (SINGULAIR) 10 MG tablet Take 1 tablet by mouth Every Night.   • omeprazole (priLOSEC) 40 MG capsule Take 1 capsule by mouth Daily.   • orlistat (Xenical) 120 MG capsule Take 1 capsule by mouth 3 (Three) Times a Day With Meals. (Patient taking differently: Take 120 mg by mouth 3 (Three) Times a Day With Meals. Holding for surgery/LAST DOSE 3/14/2022)   • oxyCODONE-acetaminophen (PERCOCET) 5-325 MG per tablet oxycodone-acetaminophen 5-325 mg oral tablet 1 every 8 hrs as needed.   Active   • sodium chloride (Ocean Nasal Spray) 0.65 % nasal spray 1 spray into the nostril(s) as directed by provider As Needed (dry nasal mucosa).   • [DISCONTINUED] pregabalin (LYRICA) 100 MG capsule Take 1 cap PO Q am and 2 caps PO Q HS     No current facility-administered medications on file prior to visit.       Immunization History   Administered Date(s) Administered   • COVID-19 (MODERNA) 1st, 2nd, 3rd Dose Only 02/03/2021, 03/03/2021, 11/05/2021   • COVID-19 (UNSPECIFIED) 02/03/2021, 03/03/2021, 11/05/2021   • Flu Vaccine Quad PF >36MO 10/19/2016, 09/20/2017, 10/14/2018   • Flu Vaccine Split Quad 10/19/2016, 09/20/2017, 10/14/2018   • Hepatitis A 12/06/2018   • Influenza, Unspecified  "10/14/2018, 10/01/2019   • Pneumococcal Polysaccharide (PPSV23) 02/23/2022   • Tdap 10/14/2018       Review of Systems   Constitutional: Positive for fatigue.   HENT: Negative.    Eyes: Negative.    Respiratory: Negative for shortness of breath.    Cardiovascular: Negative for chest pain.   Gastrointestinal: Negative for abdominal pain.   Endocrine: Negative for cold intolerance and heat intolerance.   Genitourinary: Negative.    Musculoskeletal: Positive for arthralgias, back pain and myalgias.        Chronic   Skin: Negative.    Allergic/Immunologic: Negative.    Neurological: Negative for seizures and syncope.   Hematological: Negative.    Psychiatric/Behavioral: Positive for sleep disturbance. Negative for self-injury, suicidal ideas and depressed mood. The patient is not nervous/anxious.         Objective     /68 (BP Location: Left arm, Patient Position: Sitting, Cuff Size: Adult)   Pulse 86   Temp 96.7 °F (35.9 °C) (Temporal)   Ht 172.7 cm (68\")   Wt 93.9 kg (207 lb)   SpO2 97%   BMI 31.47 kg/m²       Physical Exam  Vitals and nursing note reviewed.   Constitutional:       Appearance: Normal appearance.   HENT:      Head: Normocephalic.      Right Ear: External ear normal.      Left Ear: External ear normal.      Nose: Nose normal.      Mouth/Throat:      Comments: Wearing mask  Eyes:      Pupils: Pupils are equal, round, and reactive to light.   Cardiovascular:      Rate and Rhythm: Normal rate and regular rhythm.      Heart sounds: Normal heart sounds.   Pulmonary:      Effort: Pulmonary effort is normal.      Breath sounds: Normal breath sounds.   Musculoskeletal:         General: Tenderness present.      Cervical back: Normal range of motion.      Comments: (+) PTTP of the bilat muscles of the upper mid and lower back and upper chest wall and muscles in the arms neck and BLE   Skin:     General: Skin is warm and dry.   Neurological:      Mental Status: She is alert and oriented to person, " place, and time.   Psychiatric:         Mood and Affect: Mood normal.         Behavior: Behavior normal.         Thought Content: Thought content normal.         Judgment: Judgment normal.         Result Review :                          Assessment and Plan      Diagnoses and all orders for this visit:    1. Fibromyalgia (Primary)  -     pregabalin (LYRICA) 100 MG capsule; Take 1 cap PO Q am and 2 caps PO Q HS  Dispense: 90 capsule; Refill: 2    2. Insomnia, unspecified type  -     traZODone (DESYREL) 50 MG tablet; Take 1 tablet by mouth Every Night.  Dispense: 30 tablet; Refill: 2    Refilled the Lyrica as patient is very stable on this 1 capsule in the morning and 2 at bedtime--    And then we will trial the lowest dose on the trazodone and even advise the patient that since the tablets are scored she could actually cut it in half and start off with 25 mg at hour of sleep--        Follow Up     Return in about 3 months (around 11/10/2022), or if symptoms worsen or fail to improve, for Recheck.

## 2022-08-26 ENCOUNTER — HOSPITAL ENCOUNTER (OUTPATIENT)
Dept: GENERAL RADIOLOGY | Facility: HOSPITAL | Age: 47
Discharge: HOME | End: 2022-08-26
Admitting: NURSE PRACTITIONER

## 2022-08-26 ENCOUNTER — TRANSCRIBE ORDERS (OUTPATIENT)
Dept: ADMINISTRATIVE | Facility: HOSPITAL | Age: 47
End: 2022-08-26

## 2022-08-26 DIAGNOSIS — M47.896 OTHER OSTEOARTHRITIS OF SPINE, LUMBAR REGION: ICD-10-CM

## 2022-08-26 DIAGNOSIS — M47.896 OTHER OSTEOARTHRITIS OF SPINE, LUMBAR REGION: Primary | ICD-10-CM

## 2022-08-26 PROCEDURE — 72100 X-RAY EXAM L-S SPINE 2/3 VWS: CPT

## 2022-11-04 DIAGNOSIS — G47.00 INSOMNIA, UNSPECIFIED TYPE: ICD-10-CM

## 2022-11-04 RX ORDER — TRAZODONE HYDROCHLORIDE 50 MG/1
50 TABLET ORAL NIGHTLY
Qty: 30 TABLET | Refills: 0 | Status: SHIPPED | OUTPATIENT
Start: 2022-11-04 | End: 2022-11-10 | Stop reason: SDUPTHER

## 2022-11-10 ENCOUNTER — OFFICE VISIT (OUTPATIENT)
Dept: FAMILY MEDICINE CLINIC | Facility: CLINIC | Age: 47
End: 2022-11-10

## 2022-11-10 VITALS
HEIGHT: 68 IN | OXYGEN SATURATION: 97 % | BODY MASS INDEX: 31.07 KG/M2 | WEIGHT: 205 LBS | TEMPERATURE: 97.2 F | HEART RATE: 92 BPM | SYSTOLIC BLOOD PRESSURE: 124 MMHG | DIASTOLIC BLOOD PRESSURE: 72 MMHG

## 2022-11-10 DIAGNOSIS — E53.8 VITAMIN B 12 DEFICIENCY: ICD-10-CM

## 2022-11-10 DIAGNOSIS — J30.2 SEASONAL ALLERGIC RHINITIS, UNSPECIFIED TRIGGER: ICD-10-CM

## 2022-11-10 DIAGNOSIS — R68.89 FLU-LIKE SYMPTOMS: ICD-10-CM

## 2022-11-10 DIAGNOSIS — R09.81 SINUS CONGESTION: ICD-10-CM

## 2022-11-10 DIAGNOSIS — G47.00 INSOMNIA, UNSPECIFIED TYPE: ICD-10-CM

## 2022-11-10 DIAGNOSIS — E88.81 METABOLIC SYNDROME: ICD-10-CM

## 2022-11-10 DIAGNOSIS — H65.01 NON-RECURRENT ACUTE SEROUS OTITIS MEDIA OF RIGHT EAR: ICD-10-CM

## 2022-11-10 DIAGNOSIS — E55.9 VITAMIN D DEFICIENCY: ICD-10-CM

## 2022-11-10 DIAGNOSIS — K21.9 GASTROESOPHAGEAL REFLUX DISEASE WITHOUT ESOPHAGITIS: ICD-10-CM

## 2022-11-10 DIAGNOSIS — J02.0 STREP PHARYNGITIS: ICD-10-CM

## 2022-11-10 DIAGNOSIS — U07.1 COVID-19: Primary | ICD-10-CM

## 2022-11-10 DIAGNOSIS — J45.30 MILD PERSISTENT ASTHMA WITHOUT COMPLICATION: ICD-10-CM

## 2022-11-10 DIAGNOSIS — J45.31 MILD PERSISTENT ASTHMA WITH EXACERBATION: ICD-10-CM

## 2022-11-10 LAB
EXPIRATION DATE: ABNORMAL
EXPIRATION DATE: ABNORMAL
EXPIRATION DATE: NORMAL
FLUAV AG NPH QL: NEGATIVE
FLUBV AG NPH QL: NEGATIVE
INTERNAL CONTROL: ABNORMAL
INTERNAL CONTROL: ABNORMAL
INTERNAL CONTROL: NORMAL
Lab: ABNORMAL
Lab: ABNORMAL
Lab: NORMAL
S PYO AG THROAT QL: POSITIVE
SARS-COV-2 AG UPPER RESP QL IA.RAPID: DETECTED

## 2022-11-10 PROCEDURE — 99214 OFFICE O/P EST MOD 30 MIN: CPT | Performed by: NURSE PRACTITIONER

## 2022-11-10 PROCEDURE — 87426 SARSCOV CORONAVIRUS AG IA: CPT | Performed by: NURSE PRACTITIONER

## 2022-11-10 PROCEDURE — 87880 STREP A ASSAY W/OPTIC: CPT | Performed by: NURSE PRACTITIONER

## 2022-11-10 PROCEDURE — 87804 INFLUENZA ASSAY W/OPTIC: CPT | Performed by: NURSE PRACTITIONER

## 2022-11-10 RX ORDER — ALBUTEROL SULFATE 90 UG/1
2 AEROSOL, METERED RESPIRATORY (INHALATION) EVERY 6 HOURS PRN
Qty: 8.5 G | Refills: 3 | Status: SHIPPED | OUTPATIENT
Start: 2022-11-10 | End: 2023-03-01 | Stop reason: SDUPTHER

## 2022-11-10 RX ORDER — TRAZODONE HYDROCHLORIDE 50 MG/1
50 TABLET ORAL NIGHTLY
Qty: 90 TABLET | Refills: 1 | Status: SHIPPED | OUTPATIENT
Start: 2022-11-10 | End: 2023-03-01 | Stop reason: SDUPTHER

## 2022-11-10 RX ORDER — METFORMIN HYDROCHLORIDE 500 MG/1
500 TABLET, EXTENDED RELEASE ORAL
Qty: 90 TABLET | Refills: 1 | Status: SHIPPED | OUTPATIENT
Start: 2022-11-10 | End: 2023-03-01 | Stop reason: SDUPTHER

## 2022-11-10 RX ORDER — AZITHROMYCIN 250 MG/1
TABLET, FILM COATED ORAL
Qty: 6 TABLET | Refills: 0 | Status: SHIPPED | OUTPATIENT
Start: 2022-11-10 | End: 2022-11-19

## 2022-11-10 RX ORDER — FLUTICASONE PROPIONATE 50 MCG
2 SPRAY, SUSPENSION (ML) NASAL DAILY
Qty: 16 G | Refills: 1 | Status: SHIPPED | OUTPATIENT
Start: 2022-11-10 | End: 2023-01-09

## 2022-11-10 RX ORDER — OMEPRAZOLE 40 MG/1
40 CAPSULE, DELAYED RELEASE ORAL DAILY
Qty: 90 CAPSULE | Refills: 1 | Status: SHIPPED | OUTPATIENT
Start: 2022-11-10 | End: 2023-03-01 | Stop reason: SDUPTHER

## 2022-11-10 RX ORDER — MONTELUKAST SODIUM 10 MG/1
10 TABLET ORAL NIGHTLY
Qty: 90 TABLET | Refills: 1 | Status: SHIPPED | OUTPATIENT
Start: 2022-11-10 | End: 2023-03-01 | Stop reason: SDUPTHER

## 2022-11-10 RX ORDER — CYANOCOBALAMIN 1000 UG/ML
1000 INJECTION, SOLUTION INTRAMUSCULAR; SUBCUTANEOUS
Qty: 3 ML | Refills: 3 | Status: SHIPPED | OUTPATIENT
Start: 2022-11-10

## 2022-11-10 RX ORDER — METHYLPREDNISOLONE 4 MG/1
TABLET ORAL
Qty: 21 EACH | Refills: 0 | Status: SHIPPED | OUTPATIENT
Start: 2022-11-10 | End: 2022-11-19

## 2022-11-10 NOTE — PROGRESS NOTES
Chief Complaint  Med Refill (Medicine refills and she is not feeling well.  Sore throat, drainage, cough, asthma flared up, headache and just not feeling well.)    Subjective            Sahara Delarosa presents to [facility]  History of Present Illness  Patient reports she originally was here for all of her medication refills for the chronic comorbid conditions and will come back for fasting labs    That she started feeling ill yesterday fever chills sore throat congestion body aches and was sent home early from work yesterday and was not able to work today    -- Metabolic syndrome: Patient tolerates metformin very well and has slowly steadily lost weight    -- Asthma: Normally very well controlled that with this congestion and this croupy cough from the acute illness she feels a little flared up    -- Allergies: Patient reports normally very well controlled except for this acute illness that started yesterday    -- Vitamin B-12 deficiency and vitamin D deficiency: Patient still deals with chronic fatigue does very well on the monthly B12 shots and does need the levels checked for both      PHQ-2 Total Score: 0  PHQ-9 Total Score: 0    Past Medical History:   Diagnosis Date   • Allergic rhinitis    • Anxiety and depression    • Asthma     exercise induced   • Back pain    • Bowel incontinence    • Condyloma acuminata    • Diverticulitis of colon    • Endometrial polyp    • Family history of breast cancer    • Fatty liver    • Fibroids    • Fibromyalgia    • GERD (gastroesophageal reflux disease)    • Hernia    • Irritable bowel syndrome    • Metabolic syndrome    • Osteoarthritis     In addition to fibromyalgia   • Sleep apnea     CPAP   • Urinary frequency    • Uterine prolapse    • Vitamin B 12 deficiency        Allergies   Allergen Reactions   • Bactrim [Sulfamethoxazole-Trimethoprim] Rash   • Codeine Rash        Past Surgical History:   Procedure Laterality Date   • BREAST BIOPSY  Left 2013    Hyper-pigmented lesion, left nipple.= Benign   • COLONOSCOPY     • CYST REMOVAL      on chest and groin area    • ENDOMETRIAL ABLATION  2015    HSC, DNC, Novasure Endometrial Ablation   • ENDOSCOPY  2017    EGD: Stomach inflammation, Neg for H Pylori.   • HIP ARTHROPLASTY Right 09/2021   • HIP SURGERY Right 2019    LABRUM REPAIR   • TOTAL LAPAROSCOPIC HYSTERECTOMY SALPINGO OOPHORECTOMY Bilateral 03/30/2022    Procedure: TOTAL LAPAROSCOPIC HYSTERECTOMY, BILATERAL SALPINGECTOMY, AND CYSTOSCOPY;  Surgeon: Rosalba Chiu MD;  Location: [facility] OR;  Service: Obstetrics/Gynecology;  Laterality: Bilateral;   • UPPER GASTROINTESTINAL ENDOSCOPY          Social History     Tobacco Use   • Smoking status: Never     Passive exposure: Yes   • Smokeless tobacco: Never   Vaping Use   • Vaping Use: Never used   Substance Use Topics   • Alcohol use: Never   • Drug use: Never       Family History   Problem Relation Age of Onset   • Other Mother         Multiple lipomas.   • Arthritis Mother    • Breast cancer Mother 70        Double mastectomy    • Diabetes type II Father    • Fibromyalgia Sister    • No Known Problems Daughter    • No Known Problems Son    • Breast cancer Maternal Aunt         4 different mat aunts with breast cancer.   • Cancer Maternal Aunt         stomach   • Breast cancer Paternal Aunt 60        dx ~ age 60   • Osteoporosis Maternal Grandmother         ?   • Parkinsonism Maternal Grandmother    • Stomach cancer Maternal Grandfather         dx >50   • No Known Problems Paternal Grandmother    • Brain cancer Paternal Grandfather         dx >50   • BRCA 1/2 Neg Hx    • Colon cancer Neg Hx    • Endometrial cancer Neg Hx    • Ovarian cancer Neg Hx    • Malig Hyperthermia Neg Hx         Health Maintenance Due   Topic Date Due   • ANNUAL PHYSICAL  Never done   • COVID-19 Vaccine (4 - Booster for Moderna series) 12/31/2021   • INFLUENZA VACCINE  08/01/2022        Current Outpatient Medications on  File Prior to Visit   Medication Sig   • orlistat (Xenical) 120 MG capsule Take 1 capsule by mouth 3 (Three) Times a Day With Meals. (Patient taking differently: Take 1 capsule by mouth 3 (Three) Times a Day With Meals. Holding for surgery/LAST DOSE 3/14/2022)   • oxyCODONE-acetaminophen (PERCOCET) 5-325 MG per tablet oxycodone-acetaminophen 5-325 mg oral tablet 1 every 8 hrs as needed.   Active   • pregabalin (LYRICA) 100 MG capsule Take 1 cap PO Q am and 2 caps PO Q HS   • sodium chloride (Ocean Nasal Spray) 0.65 % nasal spray 1 spray into the nostril(s) as directed by provider As Needed (dry nasal mucosa).   • [DISCONTINUED] albuterol sulfate  (90 Base) MCG/ACT inhaler INHALE 1 PUFF BY MOUTH EVERY 6 HOURS AS NEEDED   • [DISCONTINUED] cyanocobalamin 1000 MCG/ML injection ADMINISTER 1 ML(1000 MCG) UNDER THE SKIN 1 TIME A MONTH   • [DISCONTINUED] fluticasone (FLONASE) 50 MCG/ACT nasal spray 2 sprays into the nostril(s) as directed by provider Daily.   • [DISCONTINUED] metFORMIN ER (GLUCOPHAGE-XR) 500 MG 24 hr tablet Take 1 tablet by mouth Daily With Breakfast.   • [DISCONTINUED] montelukast (SINGULAIR) 10 MG tablet Take 1 tablet by mouth Every Night.   • [DISCONTINUED] omeprazole (priLOSEC) 40 MG capsule Take 1 capsule by mouth Daily.   • [DISCONTINUED] traZODone (DESYREL) 50 MG tablet TAKE 1 TABLET BY MOUTH EVERY NIGHT     No current facility-administered medications on file prior to visit.       Immunization History   Administered Date(s) Administered   • COVID-19 (MODERNA) 1st, 2nd, 3rd Dose Only 02/03/2021, 03/03/2021, 11/05/2021   • COVID-19 (UNSPECIFIED) 02/03/2021, 03/03/2021, 11/05/2021   • Flu Vaccine Quad PF >36MO 10/19/2016, 09/20/2017, 10/14/2018   • Flu Vaccine Split Quad 10/19/2016, 09/20/2017, 10/14/2018   • Hepatitis A 12/06/2018   • Influenza, Unspecified 10/14/2018, 10/01/2019   • Pneumococcal Polysaccharide (PPSV23) 02/23/2022   • Tdap 10/14/2018       Review of Systems   Constitutional:  "Positive for chills, fatigue and fever.   HENT: Positive for postnasal drip, rhinorrhea, sinus pressure and sore throat. Negative for ear pain.    Eyes: Negative for blurred vision and double vision.   Respiratory: Positive for cough and wheezing. Negative for shortness of breath.         Tight with the asthma    Cardiovascular: Negative for chest pain.   Gastrointestinal: Negative for abdominal pain, diarrhea, nausea and vomiting.   Genitourinary: Negative for dysuria.   Musculoskeletal: Positive for arthralgias.   Neurological: Positive for headache.   Psychiatric/Behavioral: Positive for sleep disturbance. Negative for suicidal ideas. The patient is not nervous/anxious.         Objective     /72 (BP Location: Left arm, Patient Position: Sitting, Cuff Size: Adult)   Pulse 92   Temp 97.2 °F (36.2 °C) (Temporal)   Ht 172.7 cm (68\")   Wt 93 kg (205 lb)   SpO2 97%   BMI 31.17 kg/m²       Physical Exam  Vitals and nursing note reviewed.   Constitutional:       Appearance: Normal appearance.      Comments: Looks like feels bad    HENT:      Head: Normocephalic.      Right Ear: Ear canal and external ear normal. Tympanic membrane is injected and erythematous.      Left Ear: Tympanic membrane, ear canal and external ear normal.      Nose:      Right Sinus: Maxillary sinus tenderness present.      Left Sinus: Maxillary sinus tenderness present.      Comments: Sounds congested      Mouth/Throat:      Mouth: Mucous membranes are moist.   Eyes:      Pupils: Pupils are equal, round, and reactive to light.   Cardiovascular:      Rate and Rhythm: Normal rate and regular rhythm.      Heart sounds: Normal heart sounds.   Pulmonary:      Effort: Pulmonary effort is normal.      Breath sounds: Normal breath sounds.      Comments: Croupy cough   Abdominal:      Palpations: Abdomen is soft.   Musculoskeletal:         General: Normal range of motion.      Cervical back: Normal range of motion and neck supple.   Skin:     " General: Skin is warm and dry.   Neurological:      Mental Status: She is alert and oriented to person, place, and time.   Psychiatric:         Mood and Affect: Mood normal.         Behavior: Behavior normal.         Thought Content: Thought content normal.         Judgment: Judgment normal.         Result Review :             POCT rapid strep A (11/10/2022 16:53)  POCT SARS-CoV-2 Antigen HILLARY (11/10/2022 17:05)  POCT Influenza A/B (11/10/2022 17:06)               Assessment and Plan      Diagnoses and all orders for this visit:    1. COVID-19 (Primary)  -     methylPREDNISolone (MEDROL) 4 MG dose pack; Take as directed on package instructions.  Dispense: 21 each; Refill: 0    2. Strep pharyngitis  -     azithromycin (Zithromax Z-Wally) 250 MG tablet; Take 2 tablets by mouth on day 1, then 1 tablet daily on days 2-5  Dispense: 6 tablet; Refill: 0  -     methylPREDNISolone (MEDROL) 4 MG dose pack; Take as directed on package instructions.  Dispense: 21 each; Refill: 0    3. Metabolic syndrome  -     metFORMIN ER (GLUCOPHAGE-XR) 500 MG 24 hr tablet; Take 1 tablet by mouth Daily With Breakfast.  Dispense: 90 tablet; Refill: 1  -     CBC & Differential  -     Comprehensive Metabolic Panel  -     Lipid Panel  -     TSH Rfx On Abnormal To Free T4  -     Urinalysis With Culture If Indicated -  -     Hemoglobin A1c    4. Mild persistent asthma without complication  -     albuterol sulfate  (90 Base) MCG/ACT inhaler; Inhale 2 puffs Every 6 (Six) Hours As Needed for Wheezing.  Dispense: 8.5 g; Refill: 3  -     montelukast (SINGULAIR) 10 MG tablet; Take 1 tablet by mouth Every Night.  Dispense: 90 tablet; Refill: 1  -     CBC & Differential    5. Insomnia, unspecified type  -     traZODone (DESYREL) 50 MG tablet; Take 1 tablet by mouth Every Night.  Dispense: 90 tablet; Refill: 1  -     Vitamin B12 & Folate  -     TSH Rfx On Abnormal To Free T4    6. Gastroesophageal reflux disease without esophagitis  -     omeprazole  (priLOSEC) 40 MG capsule; Take 1 capsule by mouth Daily.  Dispense: 90 capsule; Refill: 1  -     CBC & Differential  -     Comprehensive Metabolic Panel    7. Vitamin B 12 deficiency  -     cyanocobalamin 1000 MCG/ML injection; Inject 1 mL into the appropriate muscle as directed by prescriber Every 30 (Thirty) Days.  Dispense: 3 mL; Refill: 3  -     Vitamin B12 & Folate    8. Vitamin D deficiency  -     Vitamin D,25-Hydroxy    9. Seasonal allergic rhinitis, unspecified trigger  -     fluticasone (FLONASE) 50 MCG/ACT nasal spray; 2 sprays into the nostril(s) as directed by provider Daily.  Dispense: 16 g; Refill: 1  -     montelukast (SINGULAIR) 10 MG tablet; Take 1 tablet by mouth Every Night.  Dispense: 90 tablet; Refill: 1  -     CBC & Differential    10. Flu-like symptoms  -     POCT SARS-CoV-2 Antigen HILLARY  -     POCT Influenza A/B  -     POCT rapid strep A    11. Non-recurrent acute serous otitis media of right ear  -     azithromycin (Zithromax Z-Wally) 250 MG tablet; Take 2 tablets by mouth on day 1, then 1 tablet daily on days 2-5  Dispense: 6 tablet; Refill: 0  -     methylPREDNISolone (MEDROL) 4 MG dose pack; Take as directed on package instructions.  Dispense: 21 each; Refill: 0    12. Sinus congestion  -     POCT SARS-CoV-2 Antigen HILLARY  -     POCT Influenza A/B  -     POCT rapid strep A  -     azithromycin (Zithromax Z-Wally) 250 MG tablet; Take 2 tablets by mouth on day 1, then 1 tablet daily on days 2-5  Dispense: 6 tablet; Refill: 0  -     methylPREDNISolone (MEDROL) 4 MG dose pack; Take as directed on package instructions.  Dispense: 21 each; Refill: 0    13. Mild persistent asthma with exacerbation  -     POCT SARS-CoV-2 Antigen HILLARY  -     POCT Influenza A/B  -     POCT rapid strep A  -     azithromycin (Zithromax Z-Wally) 250 MG tablet; Take 2 tablets by mouth on day 1, then 1 tablet daily on days 2-5  Dispense: 6 tablet; Refill: 0  -     methylPREDNISolone (MEDROL) 4 MG dose pack; Take as directed on  package instructions.  Dispense: 21 each; Refill: 0    With her mildly flared up asthma as well as sore throat and the COVID we will stay with the Medrol Dosepak that is already been sent in and prescribed    With the right ear infection and the strep throat we will continue with the Z-Wally that has been sent in    Medication refills were done on all of her medications for her chronic comorbid conditions and patient will stop back once she feels better and is out of quarantine for her fasting labs    We will give her a work note for Wednesday/yesterday through to Wednesday this next week as she is dealing with the asthma the COVID strep throat and the ear infection and needs to rest hydrate quarantine and may return at 5 days from the onset of symptoms as long as she is asymptomatic and wears a mask (if coughing)  and then she is going to change her toothbrush after 24 to 48 hours        Follow Up     Return in about 6 months (around 5/10/2023), or if symptoms worsen or fail to improve, for Recheck.      Answers for HPI/ROS submitted by the patient on 11/3/2022  Please describe your symptoms.: refill on medication  Have you had these symptoms before?: Yes  How long have you been having these symptoms?: Greater than 2 weeks  What is the primary reason for your visit?: Other

## 2022-11-14 ENCOUNTER — TELEPHONE (OUTPATIENT)
Dept: FAMILY MEDICINE CLINIC | Facility: CLINIC | Age: 47
End: 2022-11-14

## 2022-11-14 DIAGNOSIS — R06.2 WHEEZES: ICD-10-CM

## 2022-11-14 DIAGNOSIS — J06.9 ACUTE URI: ICD-10-CM

## 2022-11-14 DIAGNOSIS — U07.1 COVID-19: Primary | ICD-10-CM

## 2022-11-14 NOTE — TELEPHONE ENCOUNTER
Caller: Sahara Delarosa    Relationship: Self    Best call back number:[phone]    What orders are you requesting (i.e. lab or imaging): BREATHING MACHINE, MACHINE FOR ALBUTEROL/BREATHING TREATMENT    Additional notes:  PLEASE CALL PATIENT REGARDING THIS. SHE IS UNSURE OF WHERE TO GET THIS MACHINE.

## 2022-11-15 RX ORDER — ALBUTEROL SULFATE 2.5 MG/3ML
2.5 SOLUTION RESPIRATORY (INHALATION) EVERY 4 HOURS PRN
Qty: 120 EACH | Refills: 2 | Status: SHIPPED | OUTPATIENT
Start: 2022-11-15

## 2022-11-18 ENCOUNTER — TELEPHONE (OUTPATIENT)
Dept: FAMILY MEDICINE CLINIC | Facility: CLINIC | Age: 47
End: 2022-11-18

## 2022-11-18 NOTE — TELEPHONE ENCOUNTER
Caller: Sahara Delarosa    Relationship: Self    Best call back number: [phone]    Requested Prescriptions:   Requested Prescriptions      No prescriptions requested or ordered in this encounter        Pharmacy where request should be sent: "[facility] #[id number] - [place] - [address] AT [facility] - [phone]  - [phone] FX     Additional details provided by patient: PATIENT IS REQEUSTING A REFILL ON THE STEROID SHE WAS PRESCRIBED AT HER LAST VISIT ON 11.10.2022. SHE IS STILL NOT FEELING WELL     Does the patient have less than a 3 day supply:  [x] Yes  [] No    Mingo Correa Rep   11/18/22 08:22 EST

## 2022-11-19 ENCOUNTER — OFFICE VISIT (OUTPATIENT)
Dept: FAMILY MEDICINE CLINIC | Facility: CLINIC | Age: 47
End: 2022-11-19

## 2022-11-19 VITALS
WEIGHT: 205 LBS | HEART RATE: 94 BPM | BODY MASS INDEX: 31.07 KG/M2 | TEMPERATURE: 98 F | OXYGEN SATURATION: 98 % | HEIGHT: 68 IN | DIASTOLIC BLOOD PRESSURE: 78 MMHG | SYSTOLIC BLOOD PRESSURE: 120 MMHG

## 2022-11-19 DIAGNOSIS — Z87.892: ICD-10-CM

## 2022-11-19 DIAGNOSIS — R11.0 NAUSEA: ICD-10-CM

## 2022-11-19 DIAGNOSIS — J45.31 MILD PERSISTENT ASTHMA WITH EXACERBATION: Primary | ICD-10-CM

## 2022-11-19 PROCEDURE — 99213 OFFICE O/P EST LOW 20 MIN: CPT | Performed by: NURSE PRACTITIONER

## 2022-11-19 RX ORDER — PREDNISONE 20 MG/1
40 TABLET ORAL DAILY
Qty: 10 TABLET | Refills: 0 | Status: SHIPPED | OUTPATIENT
Start: 2022-11-19 | End: 2023-01-26

## 2022-11-19 RX ORDER — ONDANSETRON 4 MG/1
4 TABLET, ORALLY DISINTEGRATING ORAL EVERY 8 HOURS PRN
Qty: 15 TABLET | Refills: 0 | Status: SHIPPED | OUTPATIENT
Start: 2022-11-19 | End: 2023-01-26

## 2022-11-19 RX ORDER — DOXYCYCLINE HYCLATE 100 MG/1
100 CAPSULE ORAL 2 TIMES DAILY
Qty: 20 CAPSULE | Refills: 0 | Status: SHIPPED | OUTPATIENT
Start: 2022-11-19 | End: 2023-01-26

## 2022-11-19 RX ORDER — BENZONATATE 100 MG/1
100 CAPSULE ORAL 3 TIMES DAILY PRN
Qty: 30 CAPSULE | Refills: 0 | Status: SHIPPED | OUTPATIENT
Start: 2022-11-19 | End: 2023-01-26

## 2022-11-19 NOTE — PROGRESS NOTES
Chief Complaint  Asthma (Patient wants another round of steroids)    Subjective            Sahara Delarosa presents to [facility]  History of Present Illness  Pt was Dx'd with COVID on the 10th last week and then asthma is flared up     And using nebulizer and the inhaler     Completed all the meds Tuesday and still with wheezes and the sputum yellow       PHQ-2 Total Score:    PHQ-9 Total Score:      Past Medical History:   Diagnosis Date   • Allergic    • Allergic rhinitis    • Anxiety and depression    • Asthma     exercise induced   • Back pain    • Bowel incontinence    • Condyloma acuminata    • Diverticulitis of colon    • Endometrial polyp    • Family history of breast cancer    • Fatty liver    • Fibroids    • Fibromyalgia    • Fibromyalgia, primary    • GERD (gastroesophageal reflux disease)    • Hernia    • Irritable bowel syndrome    • Low back pain    • Metabolic syndrome    • Osteoarthritis     In addition to fibromyalgia   • Sleep apnea     CPAP   • Urinary frequency    • Uterine prolapse    • Vitamin B 12 deficiency        Allergies   Allergen Reactions   • Bactrim [Sulfamethoxazole-Trimethoprim] Rash   • Codeine Rash        Past Surgical History:   Procedure Laterality Date   • BREAST BIOPSY Left 2013    Hyper-pigmented lesion, left nipple.= Benign   • COLONOSCOPY     • CYST REMOVAL      on chest and groin area    • ENDOMETRIAL ABLATION  2015    HSC, DNC, Novasure Endometrial Ablation   • ENDOSCOPY  2017    EGD: Stomach inflammation, Neg for H Pylori.   • HIP ARTHROPLASTY Right 09/2021   • HIP SURGERY Right 2019    LABRUM REPAIR   • JOINT REPLACEMENT  September 2021    Rt Hip   • TOTAL LAPAROSCOPIC HYSTERECTOMY SALPINGO OOPHORECTOMY Bilateral 03/30/2022    Procedure: TOTAL LAPAROSCOPIC HYSTERECTOMY, BILATERAL SALPINGECTOMY, AND CYSTOSCOPY;  Surgeon: Rosalba Chiu MD;  Location: [facility];  Service: Obstetrics/Gynecology;  Laterality: Bilateral;   • UPPER  GASTROINTESTINAL ENDOSCOPY          Social History     Tobacco Use   • Smoking status: Never     Passive exposure: Yes   • Smokeless tobacco: Never   Vaping Use   • Vaping Use: Never used   Substance Use Topics   • Alcohol use: Never   • Drug use: Never       Family History   Problem Relation Age of Onset   • Other Mother         Multiple lipomas.   • Arthritis Mother    • Breast cancer Mother 70        Double mastectomy    • Cancer Mother         breast cancer   • Diabetes type II Father    • Diabetes Father    • Fibromyalgia Sister    • No Known Problems Daughter    • No Known Problems Son    • Breast cancer Maternal Aunt         4 different mat aunts with breast cancer.   • Cancer Maternal Aunt         stomach   • Breast cancer Paternal Aunt 60        dx ~ age 60   • Osteoporosis Maternal Grandmother         ?   • Parkinsonism Maternal Grandmother    • Stomach cancer Maternal Grandfather         dx >50   • No Known Problems Paternal Grandmother    • Brain cancer Paternal Grandfather         dx >50   • BRCA 1/2 Neg Hx    • Colon cancer Neg Hx    • Endometrial cancer Neg Hx    • Ovarian cancer Neg Hx    • Malig Hyperthermia Neg Hx         Health Maintenance Due   Topic Date Due   • ANNUAL PHYSICAL  Never done   • COVID-19 Vaccine (4 - Booster for Moderna series) 12/31/2021        Current Outpatient Medications on File Prior to Visit   Medication Sig   • albuterol (PROVENTIL) (2.5 MG/3ML) 0.083% nebulizer solution Take 2.5 mg by nebulization Every 4 (Four) Hours As Needed for Wheezing or Shortness of Air.   • albuterol sulfate  (90 Base) MCG/ACT inhaler Inhale 2 puffs Every 6 (Six) Hours As Needed for Wheezing.   • cyanocobalamin 1000 MCG/ML injection Inject 1 mL into the appropriate muscle as directed by prescriber Every 30 (Thirty) Days.   • fluticasone (FLONASE) 50 MCG/ACT nasal spray 2 sprays into the nostril(s) as directed by provider Daily.   • metFORMIN ER (GLUCOPHAGE-XR) 500 MG 24 hr tablet Take 1  tablet by mouth Daily With Breakfast.   • montelukast (SINGULAIR) 10 MG tablet Take 1 tablet by mouth Every Night.   • omeprazole (priLOSEC) 40 MG capsule Take 1 capsule by mouth Daily.   • orlistat (Xenical) 120 MG capsule Take 1 capsule by mouth 3 (Three) Times a Day With Meals. (Patient taking differently: Take 120 mg by mouth 3 (Three) Times a Day With Meals. Holding for surgery/LAST DOSE 3/14/2022)   • oxyCODONE-acetaminophen (PERCOCET) 5-325 MG per tablet oxycodone-acetaminophen 5-325 mg oral tablet 1 every 8 hrs as needed.   Active   • pregabalin (LYRICA) 100 MG capsule Take 1 cap PO Q am and 2 caps PO Q HS   • sodium chloride (Ocean Nasal Spray) 0.65 % nasal spray 1 spray into the nostril(s) as directed by provider As Needed (dry nasal mucosa).   • traZODone (DESYREL) 50 MG tablet Take 1 tablet by mouth Every Night.   • [DISCONTINUED] azithromycin (Zithromax Z-Wally) 250 MG tablet Take 2 tablets by mouth on day 1, then 1 tablet daily on days 2-5   • [DISCONTINUED] methylPREDNISolone (MEDROL) 4 MG dose pack Take as directed on package instructions.     No current facility-administered medications on file prior to visit.       Immunization History   Administered Date(s) Administered   • COVID-19 (MODERNA) 1st, 2nd, 3rd Dose Only 02/03/2021, 03/03/2021, 11/05/2021   • COVID-19 (UNSPECIFIED) 02/03/2021, 03/03/2021, 11/05/2021   • Flu Vaccine Quad PF >36MO 10/19/2016, 09/20/2017, 10/14/2018   • Flu Vaccine Split Quad 10/19/2016, 09/20/2017, 10/14/2018   • FluLaval/Fluzone >6mos 11/08/2022   • Hepatitis A 12/06/2018   • Influenza, Unspecified 10/14/2018, 10/01/2019   • Pneumococcal Polysaccharide (PPSV23) 02/23/2022   • Tdap 10/14/2018       Review of Systems   Constitutional: Positive for fatigue. Negative for chills and fever.   HENT: Negative for sinus pressure.    Respiratory: Positive for cough and wheezing.         Sputum discolored yellowish    Cardiovascular: Negative for chest pain.   Gastrointestinal:  "Negative.    Neurological: Positive for headache.        Objective     /78 (BP Location: Left arm)   Pulse 94   Temp 98 °F (36.7 °C)   Ht 172.7 cm (68\")   Wt 93 kg (205 lb)   SpO2 98%   BMI 31.17 kg/m²       Physical Exam  Vitals and nursing note reviewed.   Constitutional:       Appearance: Normal appearance.   HENT:      Head: Normocephalic.      Right Ear: External ear normal.      Left Ear: External ear normal.      Nose: Nose normal.      Mouth/Throat:      Comments: Wearing mask  Eyes:      Pupils: Pupils are equal, round, and reactive to light.   Cardiovascular:      Rate and Rhythm: Normal rate and regular rhythm.      Heart sounds: Normal heart sounds.   Pulmonary:      Effort: Pulmonary effort is normal.      Breath sounds: Normal breath sounds.      Comments: Harsh croupy cough  Abdominal:      Palpations: Abdomen is soft.   Musculoskeletal:         General: Normal range of motion.      Cervical back: Normal range of motion and neck supple.   Skin:     General: Skin is warm and dry.   Neurological:      Mental Status: She is alert and oriented to person, place, and time.   Psychiatric:         Mood and Affect: Mood normal.         Behavior: Behavior normal.         Thought Content: Thought content normal.         Judgment: Judgment normal.         Result Review :                          Assessment and Plan      Diagnoses and all orders for this visit:    1. Mild persistent asthma with exacerbation (Primary)  -     predniSONE (DELTASONE) 20 MG tablet; Take 2 tablets by mouth Daily.  Dispense: 10 tablet; Refill: 0  -     doxycycline (VIBRAMYCIN) 100 MG capsule; Take 1 capsule by mouth 2 (Two) Times a Day.  Dispense: 20 capsule; Refill: 0  -     benzonatate (Tessalon Perles) 100 MG capsule; Take 1 capsule by mouth 3 (Three) Times a Day As Needed for Cough.  Dispense: 30 capsule; Refill: 0    2. History of anaphylaxis to COVID-19 vaccine  Comments:  just last week   Orders:  -     predniSONE " (DELTASONE) 20 MG tablet; Take 2 tablets by mouth Daily.  Dispense: 10 tablet; Refill: 0  -     doxycycline (VIBRAMYCIN) 100 MG capsule; Take 1 capsule by mouth 2 (Two) Times a Day.  Dispense: 20 capsule; Refill: 0    3. Nausea  Comments:  R/T antibiotic   Orders:  -     ondansetron ODT (Zofran ODT) 4 MG disintegrating tablet; Place 1 tablet on the tongue Every 8 (Eight) Hours As Needed for Nausea or Vomiting.  Dispense: 15 tablet; Refill: 0            Follow Up     Return if symptoms worsen or fail to improve.

## 2022-11-30 ENCOUNTER — TELEPHONE (OUTPATIENT)
Dept: FAMILY MEDICINE CLINIC | Facility: CLINIC | Age: 47
End: 2022-11-30

## 2022-12-01 ENCOUNTER — CLINICAL SUPPORT (OUTPATIENT)
Dept: FAMILY MEDICINE CLINIC | Facility: CLINIC | Age: 47
End: 2022-12-01

## 2022-12-01 DIAGNOSIS — M19.90 ARTHRITIS: ICD-10-CM

## 2022-12-01 DIAGNOSIS — R19.4 ALTERED BOWEL HABITS: ICD-10-CM

## 2022-12-01 LAB
25(OH)D3 SERPL-MCNC: 51.6 NG/ML (ref 30–100)
ALBUMIN SERPL-MCNC: 4.3 G/DL (ref 3.5–5.2)
ALBUMIN/GLOB SERPL: 1.7 G/DL
ALP SERPL-CCNC: 91 U/L (ref 39–117)
ALT SERPL W P-5'-P-CCNC: 15 U/L (ref 1–33)
ANION GAP SERPL CALCULATED.3IONS-SCNC: 8.3 MMOL/L (ref 5–15)
AST SERPL-CCNC: 12 U/L (ref 1–32)
BACTERIA UR QL AUTO: ABNORMAL /HPF
BASOPHILS # BLD AUTO: 0.03 10*3/MM3 (ref 0–0.2)
BASOPHILS NFR BLD AUTO: 0.5 % (ref 0–1.5)
BILIRUB SERPL-MCNC: 0.7 MG/DL (ref 0–1.2)
BILIRUB UR QL STRIP: NEGATIVE
BUN SERPL-MCNC: 8 MG/DL (ref 6–20)
BUN/CREAT SERPL: 10.3 (ref 7–25)
CALCIUM SPEC-SCNC: 9.7 MG/DL (ref 8.6–10.5)
CHLORIDE SERPL-SCNC: 102 MMOL/L (ref 98–107)
CHOLEST SERPL-MCNC: 195 MG/DL (ref 0–200)
CLARITY UR: CLEAR
CO2 SERPL-SCNC: 28.7 MMOL/L (ref 22–29)
COLOR UR: YELLOW
CREAT SERPL-MCNC: 0.78 MG/DL (ref 0.57–1)
DEPRECATED RDW RBC AUTO: 41 FL (ref 37–54)
EGFRCR SERPLBLD CKD-EPI 2021: 93.2 ML/MIN/1.73
EOSINOPHIL # BLD AUTO: 0.09 10*3/MM3 (ref 0–0.4)
EOSINOPHIL NFR BLD AUTO: 1.6 % (ref 0.3–6.2)
ERYTHROCYTE [DISTWIDTH] IN BLOOD BY AUTOMATED COUNT: 12.5 % (ref 12.3–15.4)
FOLATE SERPL-MCNC: >20 NG/ML (ref 4.78–24.2)
GLOBULIN UR ELPH-MCNC: 2.6 GM/DL
GLUCOSE SERPL-MCNC: 84 MG/DL (ref 65–99)
GLUCOSE UR STRIP-MCNC: NEGATIVE MG/DL
HBA1C MFR BLD: 5.4 % (ref 4.8–5.6)
HCT VFR BLD AUTO: 41.3 % (ref 34–46.6)
HDLC SERPL-MCNC: 44 MG/DL (ref 40–60)
HGB BLD-MCNC: 13.8 G/DL (ref 12–15.9)
HGB UR QL STRIP.AUTO: NEGATIVE
HYALINE CASTS UR QL AUTO: ABNORMAL /LPF
IMM GRANULOCYTES # BLD AUTO: 0.04 10*3/MM3 (ref 0–0.05)
IMM GRANULOCYTES NFR BLD AUTO: 0.7 % (ref 0–0.5)
KETONES UR QL STRIP: NEGATIVE
LDLC SERPL CALC-MCNC: 123 MG/DL (ref 0–100)
LDLC/HDLC SERPL: 2.71 {RATIO}
LEUKOCYTE ESTERASE UR QL STRIP.AUTO: ABNORMAL
LYMPHOCYTES # BLD AUTO: 1.47 10*3/MM3 (ref 0.7–3.1)
LYMPHOCYTES NFR BLD AUTO: 25.9 % (ref 19.6–45.3)
MCH RBC QN AUTO: 29.8 PG (ref 26.6–33)
MCHC RBC AUTO-ENTMCNC: 33.4 G/DL (ref 31.5–35.7)
MCV RBC AUTO: 89.2 FL (ref 79–97)
MONOCYTES # BLD AUTO: 0.45 10*3/MM3 (ref 0.1–0.9)
MONOCYTES NFR BLD AUTO: 7.9 % (ref 5–12)
NEUTROPHILS NFR BLD AUTO: 3.59 10*3/MM3 (ref 1.7–7)
NEUTROPHILS NFR BLD AUTO: 63.4 % (ref 42.7–76)
NITRITE UR QL STRIP: NEGATIVE
NRBC BLD AUTO-RTO: 0 /100 WBC (ref 0–0.2)
PH UR STRIP.AUTO: 6 [PH] (ref 5–8)
PLATELET # BLD AUTO: 237 10*3/MM3 (ref 140–450)
PMV BLD AUTO: 9.8 FL (ref 6–12)
POTASSIUM SERPL-SCNC: 3.9 MMOL/L (ref 3.5–5.2)
PROT SERPL-MCNC: 6.9 G/DL (ref 6–8.5)
PROT UR QL STRIP: NEGATIVE
RBC # BLD AUTO: 4.63 10*6/MM3 (ref 3.77–5.28)
RBC # UR STRIP: ABNORMAL /HPF
REF LAB TEST METHOD: ABNORMAL
SODIUM SERPL-SCNC: 139 MMOL/L (ref 136–145)
SP GR UR STRIP: 1.02 (ref 1–1.03)
SQUAMOUS #/AREA URNS HPF: ABNORMAL /HPF
TRIGL SERPL-MCNC: 158 MG/DL (ref 0–150)
TSH SERPL DL<=0.05 MIU/L-ACNC: 1.81 UIU/ML (ref 0.27–4.2)
UROBILINOGEN UR QL STRIP: ABNORMAL
VIT B12 BLD-MCNC: 545 PG/ML (ref 211–946)
VLDLC SERPL-MCNC: 28 MG/DL (ref 5–40)
WBC # UR STRIP: ABNORMAL /HPF
WBC NRBC COR # BLD: 5.67 10*3/MM3 (ref 3.4–10.8)

## 2022-12-01 PROCEDURE — 82607 VITAMIN B-12: CPT | Performed by: NURSE PRACTITIONER

## 2022-12-01 PROCEDURE — 81001 URINALYSIS AUTO W/SCOPE: CPT | Performed by: NURSE PRACTITIONER

## 2022-12-01 PROCEDURE — 82306 VITAMIN D 25 HYDROXY: CPT | Performed by: NURSE PRACTITIONER

## 2022-12-01 PROCEDURE — 80050 GENERAL HEALTH PANEL: CPT | Performed by: NURSE PRACTITIONER

## 2022-12-01 PROCEDURE — 80061 LIPID PANEL: CPT | Performed by: NURSE PRACTITIONER

## 2022-12-01 PROCEDURE — 82746 ASSAY OF FOLIC ACID SERUM: CPT | Performed by: NURSE PRACTITIONER

## 2022-12-01 PROCEDURE — 83036 HEMOGLOBIN GLYCOSYLATED A1C: CPT | Performed by: NURSE PRACTITIONER

## 2022-12-01 PROCEDURE — 36415 COLL VENOUS BLD VENIPUNCTURE: CPT | Performed by: NURSE PRACTITIONER

## 2022-12-01 NOTE — PROGRESS NOTES
Venipuncture Blood Specimen Collection  Venipuncture performed in left arm  by Nevin Sierra with good hemostasis. Patient tolerated the procedure well without complications.   12/01/22   Nevin Sierra

## 2023-01-09 DIAGNOSIS — J30.2 SEASONAL ALLERGIC RHINITIS, UNSPECIFIED TRIGGER: ICD-10-CM

## 2023-01-09 RX ORDER — FLUTICASONE PROPIONATE 50 MCG
SPRAY, SUSPENSION (ML) NASAL
Qty: 48 G | Refills: 0 | Status: SHIPPED | OUTPATIENT
Start: 2023-01-09 | End: 2023-03-01 | Stop reason: SDUPTHER

## 2023-01-10 DIAGNOSIS — M79.7 FIBROMYALGIA: ICD-10-CM

## 2023-01-11 ENCOUNTER — TELEPHONE (OUTPATIENT)
Dept: FAMILY MEDICINE CLINIC | Facility: CLINIC | Age: 48
End: 2023-01-11

## 2023-01-11 DIAGNOSIS — K59.04 CHRONIC IDIOPATHIC CONSTIPATION: ICD-10-CM

## 2023-01-11 DIAGNOSIS — M79.7 FIBROMYALGIA: ICD-10-CM

## 2023-01-11 RX ORDER — PREGABALIN 100 MG/1
CAPSULE ORAL
Qty: 90 CAPSULE | OUTPATIENT
Start: 2023-01-11

## 2023-01-11 RX ORDER — PREGABALIN 100 MG/1
CAPSULE ORAL
Qty: 90 CAPSULE | Refills: 1 | Status: SHIPPED | OUTPATIENT
Start: 2023-01-11 | End: 2023-03-01 | Stop reason: SDUPTHER

## 2023-01-11 RX ORDER — LINACLOTIDE 145 UG/1
CAPSULE, GELATIN COATED ORAL
Qty: 30 CAPSULE | Refills: 3 | Status: SHIPPED | OUTPATIENT
Start: 2023-01-11

## 2023-01-11 NOTE — TELEPHONE ENCOUNTER
Caller: Sahara Delarosa    Relationship: Self    Best call back number: [phone]    Requested Prescriptions:   Requested Prescriptions     Pending Prescriptions Disp Refills   • pregabalin (LYRICA) 100 MG capsule 90 capsule 2     Sig: Take 1 cap PO Q am and 2 caps PO Q HS        Pharmacy where request should be sent: [facility] #[id number] [address] AT [facility] [phone] [facility] [phone] FX       Does the patient have less than a 3 day supply:  [x] Yes  [] No    Would you like a call back once the refill request has been completed: [x] Yes [] No    If the office needs to give you a call back, can they leave a voicemail: [x] Yes [] No    Mingo Judge Rep   01/11/23 09:28 EST

## 2023-01-26 ENCOUNTER — OFFICE VISIT (OUTPATIENT)
Dept: OBSTETRICS AND GYNECOLOGY | Age: 48
End: 2023-01-26
Payer: COMMERCIAL

## 2023-01-26 ENCOUNTER — PROCEDURE VISIT (OUTPATIENT)
Dept: OBSTETRICS AND GYNECOLOGY | Age: 48
End: 2023-01-26
Payer: COMMERCIAL

## 2023-01-26 VITALS
HEIGHT: 68 IN | SYSTOLIC BLOOD PRESSURE: 120 MMHG | BODY MASS INDEX: 31.22 KG/M2 | WEIGHT: 206 LBS | DIASTOLIC BLOOD PRESSURE: 70 MMHG

## 2023-01-26 DIAGNOSIS — N95.1 MENOPAUSAL SYMPTOMS: ICD-10-CM

## 2023-01-26 DIAGNOSIS — Z90.710 S/P LAPAROSCOPIC HYSTERECTOMY: ICD-10-CM

## 2023-01-26 DIAGNOSIS — Z01.419 WELL WOMAN EXAM WITH ROUTINE GYNECOLOGICAL EXAM: Primary | ICD-10-CM

## 2023-01-26 DIAGNOSIS — R45.86 MOOD CHANGE: ICD-10-CM

## 2023-01-26 DIAGNOSIS — N89.8 VAGINAL DISCHARGE: ICD-10-CM

## 2023-01-26 DIAGNOSIS — Z12.31 VISIT FOR SCREENING MAMMOGRAM: Primary | ICD-10-CM

## 2023-01-26 PROCEDURE — 77067 SCR MAMMO BI INCL CAD: CPT | Performed by: NURSE PRACTITIONER

## 2023-01-26 PROCEDURE — 99213 OFFICE O/P EST LOW 20 MIN: CPT | Performed by: NURSE PRACTITIONER

## 2023-01-26 PROCEDURE — 99396 PREV VISIT EST AGE 40-64: CPT | Performed by: NURSE PRACTITIONER

## 2023-01-26 PROCEDURE — 77063 BREAST TOMOSYNTHESIS BI: CPT | Performed by: NURSE PRACTITIONER

## 2023-01-26 RX ORDER — SERTRALINE HYDROCHLORIDE 25 MG/1
25 TABLET, FILM COATED ORAL DAILY
Qty: 30 TABLET | Refills: 2 | Status: SHIPPED | OUTPATIENT
Start: 2023-01-26 | End: 2023-03-09

## 2023-01-26 NOTE — PROGRESS NOTES
"Subjective     Chief Complaint   Patient presents with   • Gynecologic Exam     AE, last pap 2022 neg/hpv neg, mg 2023  Cc;  no problems       History of Present Illness    Sahara Delarosa is a 47 y.o.  who presents for annual exam.    Doing well  Had TVH last year with Dr. Chiu for prolapse  Since having total hysterectomy she feels irritable, mood changes, emotional roller coaster  She was put on trazodone by her PCP for insomnia - has seen some improvement  She is interested in something for her mood  She was on meds postpartum short term but otherwise she hasn't   Feels like her vagina is \"a little swollen\" wants to check for yeast    Pt had colonoscopy in    Family hx of breast cancer - pt has had negative myrisk screening  Mother had estrogen depending breast cancer and recently had a mastectomy   Does have HF/NS but not candidate for HRT due to family hx    Obstetric History:  OB History        2    Para   2    Term   2            AB        Living   2       SAB        IAB        Ectopic        Molar        Multiple        Live Births   2               Menstrual History:     No LMP recorded (lmp unknown). Patient has had a hysterectomy.         Current contraception: status post hysterectomy  History of abnormal Pap smear: no  Received Gardasil immunization: no  Perform regular self breast exam: yes - .  Family history of uterine or ovarian cancer: no  Family History of colon cancer: no  Family history of breast cancer: yes - see hx    Mammogram: done today.  Colonoscopy: up to date.  DEXA: not indicated.    Exercise: moderately active  Calcium/Vitamin D: adequate intake    The following portions of the patient's history were reviewed and updated as appropriate: allergies, current medications, past family history, past medical history, past social history, past surgical history and problem list.    Review of Systems   Respiratory: Negative.    Cardiovascular: Negative.  "   Gastrointestinal: Negative.    Genitourinary: Negative.    Skin: Negative.    Psychiatric/Behavioral: Positive for agitation, dysphoric mood and sleep disturbance.           Objective   Physical Exam  Constitutional:       General: She is awake.      Appearance: Normal appearance. She is well-developed.   HENT:      Head: Normocephalic and atraumatic.      Nose: Nose normal.   Neck:      Thyroid: No thyroid mass, thyromegaly or thyroid tenderness.   Cardiovascular:      Rate and Rhythm: Normal rate and regular rhythm.      Pulses: Normal pulses.      Heart sounds: Normal heart sounds.   Pulmonary:      Effort: Pulmonary effort is normal.      Breath sounds: Normal breath sounds.   Chest:   Breasts:     Breasts are symmetrical.      Right: Normal. No swelling, bleeding, inverted nipple, mass, nipple discharge, skin change or tenderness.      Left: Normal. No swelling, bleeding, inverted nipple, mass, nipple discharge, skin change or tenderness.   Abdominal:      General: Abdomen is flat. Bowel sounds are normal.      Palpations: Abdomen is soft.      Tenderness: There is no abdominal tenderness.   Genitourinary:     General: Normal vulva.      Labia:         Right: No rash, tenderness, lesion or injury.         Left: No rash, tenderness, lesion or injury.       Urethra: No prolapse, urethral pain, urethral swelling or urethral lesion.      Vagina: Normal. No signs of injury. No vaginal discharge, erythema, tenderness, bleeding, lesions or prolapsed vaginal walls.      Adnexa: Right adnexa normal and left adnexa normal.        Right: No mass, tenderness or fullness.          Left: No mass, tenderness or fullness.        Rectum: Normal. No mass.      Comments: Uterus and cervix absent   Musculoskeletal:      Cervical back: Normal range of motion and neck supple.   Lymphadenopathy:      Upper Body:      Right upper body: No supraclavicular adenopathy.      Left upper body: No supraclavicular adenopathy.   Skin:      "General: Skin is warm and dry.   Neurological:      General: No focal deficit present.      Mental Status: She is alert and oriented to person, place, and time.   Psychiatric:         Mood and Affect: Mood normal.         Behavior: Behavior normal. Behavior is cooperative.         Thought Content: Thought content normal.         Judgment: Judgment normal.         /70   Ht 172.7 cm (68\")   Wt 93.4 kg (206 lb)   LMP  (LMP Unknown)   BMI 31.32 kg/m²     Assessment & Plan   Diagnoses and all orders for this visit:    1. Well woman exam with routine gynecological exam (Primary)    2. S/P laparoscopic hysterectomy    3. Vaginal discharge  -     NuSwab BV & Candida - Swab, Vagina    4. Menopausal symptoms  -     sertraline (Zoloft) 25 MG tablet; Take 1 tablet by mouth Daily.  Dispense: 30 tablet; Refill: 2    5. Mood change  -     sertraline (Zoloft) 25 MG tablet; Take 1 tablet by mouth Daily.  Dispense: 30 tablet; Refill: 2        All questions answered.  Breast self exam technique reviewed and patient encouraged to perform self-exam monthly.  Discussed healthy lifestyle modifications.  Recommended 30 minutes of aerobic exercise five times per week.  Discussed calcium needs to prevent osteoporosis.    -No longer needs pap smears  -Vaginal swab for BV and yeast  -Mammogram today  -Will try zoloft for mood changes. Plan telehealth f/u in 6 weeks.                "

## 2023-01-28 LAB
A VAGINAE DNA VAG QL NAA+PROBE: NORMAL SCORE
BVAB2 DNA VAG QL NAA+PROBE: NORMAL SCORE
C ALBICANS DNA VAG QL NAA+PROBE: NEGATIVE
C GLABRATA DNA VAG QL NAA+PROBE: NEGATIVE
MEGA1 DNA VAG QL NAA+PROBE: NORMAL SCORE

## 2023-03-01 ENCOUNTER — OFFICE VISIT (OUTPATIENT)
Dept: FAMILY MEDICINE CLINIC | Facility: CLINIC | Age: 48
End: 2023-03-01
Payer: COMMERCIAL

## 2023-03-01 VITALS
BODY MASS INDEX: 31.06 KG/M2 | TEMPERATURE: 97.4 F | HEART RATE: 87 BPM | DIASTOLIC BLOOD PRESSURE: 70 MMHG | OXYGEN SATURATION: 95 % | SYSTOLIC BLOOD PRESSURE: 120 MMHG | WEIGHT: 204.3 LBS

## 2023-03-01 DIAGNOSIS — J45.30 MILD PERSISTENT ASTHMA WITHOUT COMPLICATION: ICD-10-CM

## 2023-03-01 DIAGNOSIS — M79.7 FIBROMYALGIA: ICD-10-CM

## 2023-03-01 DIAGNOSIS — K21.9 GASTROESOPHAGEAL REFLUX DISEASE WITHOUT ESOPHAGITIS: ICD-10-CM

## 2023-03-01 DIAGNOSIS — G47.00 INSOMNIA, UNSPECIFIED TYPE: ICD-10-CM

## 2023-03-01 DIAGNOSIS — E88.81 METABOLIC SYNDROME: ICD-10-CM

## 2023-03-01 DIAGNOSIS — J30.2 SEASONAL ALLERGIC RHINITIS, UNSPECIFIED TRIGGER: ICD-10-CM

## 2023-03-01 PROCEDURE — 99214 OFFICE O/P EST MOD 30 MIN: CPT | Performed by: NURSE PRACTITIONER

## 2023-03-01 RX ORDER — ALBUTEROL SULFATE 90 UG/1
2 AEROSOL, METERED RESPIRATORY (INHALATION) EVERY 6 HOURS PRN
Qty: 8.5 G | Refills: 3 | Status: SHIPPED | OUTPATIENT
Start: 2023-03-01

## 2023-03-01 RX ORDER — METFORMIN HYDROCHLORIDE 500 MG/1
500 TABLET, EXTENDED RELEASE ORAL
Qty: 90 TABLET | Refills: 1 | Status: SHIPPED | OUTPATIENT
Start: 2023-03-01

## 2023-03-01 RX ORDER — TRAZODONE HYDROCHLORIDE 100 MG/1
100 TABLET ORAL NIGHTLY
Qty: 90 TABLET | Refills: 1 | Status: SHIPPED | OUTPATIENT
Start: 2023-03-01

## 2023-03-01 RX ORDER — PREGABALIN 100 MG/1
CAPSULE ORAL
Qty: 90 CAPSULE | Refills: 2 | Status: SHIPPED | OUTPATIENT
Start: 2023-03-01

## 2023-03-01 RX ORDER — MONTELUKAST SODIUM 10 MG/1
10 TABLET ORAL NIGHTLY
Qty: 90 TABLET | Refills: 1 | Status: SHIPPED | OUTPATIENT
Start: 2023-03-01

## 2023-03-01 RX ORDER — OMEPRAZOLE 40 MG/1
40 CAPSULE, DELAYED RELEASE ORAL DAILY
Qty: 90 CAPSULE | Refills: 1 | Status: SHIPPED | OUTPATIENT
Start: 2023-03-01

## 2023-03-01 RX ORDER — FLUTICASONE PROPIONATE 50 MCG
2 SPRAY, SUSPENSION (ML) NASAL DAILY
Qty: 48 G | Refills: 1 | Status: SHIPPED | OUTPATIENT
Start: 2023-03-01

## 2023-03-01 NOTE — PROGRESS NOTES
Chief Complaint  Fibromyalgia (Refills and check up)    Subjective            Sahara Delarosa presents to [facility]  History of Present Illness  Patient is here for the fibromyalgia medication refill and does need some refills on some of her other medication and she just did all of her fasting labs on the chronic comorbid conditions late December    Patient reports that with the recent loss of a family friend that she has been having issues sleeping and that the trazodone is not as effective and she wondered if this could be changed or increased and she denies all SI/HI and no side effects no issues reported from the trazodone--also reports she has been clenching her teeth and feels like her jaws have been popping wondered about TMJ    Regarding her fibromyalgia pain patient does well on the Lyrica with no side effects no issues reported no signs and symptoms of misuse nor abuse no aberrant behaviors patient follows up every 3 month and her Rick report is appropriate is also in pain management as well--and overall reports that the Lyrica improves her quality of life and she is able to work and do her ADLs with no issues although during fibro flares she does have severe fatigue    Metabolic syndrome patient has lost weight and is continuing the metformin and tolerating this very well with no side effects no issues reported and overall is stable    GERD: Stable on the medication with no side effects no issues reported no breakthrough symptoms    Asthma and allergy: Well-controlled on the medications and no exacerbations reported at this time although a few weeks ago she was having some congestion and wheezes      PHQ-2 Total Score:    PHQ-9 Total Score:      Past Medical History:   Diagnosis Date   • Allergic    • Allergic rhinitis    • Anxiety and depression    • Asthma     exercise induced   • Back pain    • Bowel incontinence    • Condyloma acuminata    • Diverticulitis of colon     • Endometrial polyp    • Family history of breast cancer    • Fatty liver    • Fibroids    • Fibromyalgia    • Fibromyalgia, primary    • GERD (gastroesophageal reflux disease)    • Hernia    • Irritable bowel syndrome    • Low back pain    • Metabolic syndrome    • Osteoarthritis     In addition to fibromyalgia   • Sleep apnea     CPAP   • Urinary frequency    • Uterine prolapse    • Vitamin B 12 deficiency        Allergies   Allergen Reactions   • Bactrim [Sulfamethoxazole-Trimethoprim] Rash   • Codeine Rash        Past Surgical History:   Procedure Laterality Date   • BREAST BIOPSY Left 2013    Hyper-pigmented lesion, left nipple.= Benign   • COLONOSCOPY     • CYST REMOVAL      on chest and groin area    • ENDOMETRIAL ABLATION  2015    HSC, DNC, Novasure Endometrial Ablation   • ENDOSCOPY  2017    EGD: Stomach inflammation, Neg for H Pylori.   • HIP ARTHROPLASTY Right 09/2021   • HIP SURGERY Right 2019    LABRUM REPAIR   • JOINT REPLACEMENT  September 2021    Rt Hip   • TOTAL LAPAROSCOPIC HYSTERECTOMY SALPINGO OOPHORECTOMY Bilateral 03/30/2022    Procedure: TOTAL LAPAROSCOPIC HYSTERECTOMY, BILATERAL SALPINGECTOMY, AND CYSTOSCOPY;  Surgeon: Rosalba Chiu MD;  Location: [facility];  Service: Obstetrics/Gynecology;  Laterality: Bilateral;   • UPPER GASTROINTESTINAL ENDOSCOPY          Social History     Tobacco Use   • Smoking status: Never     Passive exposure: Yes   • Smokeless tobacco: Never   Vaping Use   • Vaping Use: Never used   Substance Use Topics   • Alcohol use: Never   • Drug use: Never       Family History   Problem Relation Age of Onset   • Other Mother         Multiple lipomas.   • Arthritis Mother    • Breast cancer Mother 70        Double mastectomy    • Cancer Mother         breast cancer   • Diabetes type II Father    • Diabetes Father    • Fibromyalgia Sister    • No Known Problems Daughter    • No Known Problems Son    • Breast cancer Maternal Aunt         4 different mat aunts with  breast cancer.   • Cancer Maternal Aunt         stomach   • Breast cancer Paternal Aunt 60        dx ~ age 60   • Osteoporosis Maternal Grandmother         ?   • Parkinsonism Maternal Grandmother    • Stomach cancer Maternal Grandfather         dx >50   • No Known Problems Paternal Grandmother    • Brain cancer Paternal Grandfather         dx >50   • BRCA 1/2 Neg Hx    • Colon cancer Neg Hx    • Endometrial cancer Neg Hx    • Ovarian cancer Neg Hx    • Malig Hyperthermia Neg Hx         Health Maintenance Due   Topic Date Due   • URINE MICROALBUMIN  Never done   • ANNUAL PHYSICAL  Never done   • DIABETIC FOOT EXAM  Never done   • DIABETIC EYE EXAM  Never done   • Pneumococcal Vaccine 0-64 (2 - PCV) 02/23/2023        Current Outpatient Medications on File Prior to Visit   Medication Sig   • albuterol (PROVENTIL) (2.5 MG/3ML) 0.083% nebulizer solution Take 2.5 mg by nebulization Every 4 (Four) Hours As Needed for Wheezing or Shortness of Air.   • cyanocobalamin 1000 MCG/ML injection Inject 1 mL into the appropriate muscle as directed by prescriber Every 30 (Thirty) Days.   • Linzess 145 MCG capsule capsule TAKE 1 CAPSULE BY MOUTH EVERY MORNING BEFORE BREAKFAST   • oxyCODONE-acetaminophen (PERCOCET) 5-325 MG per tablet oxycodone-acetaminophen 5-325 mg oral tablet 1 every 8 hrs as needed.   Active   • sertraline (Zoloft) 25 MG tablet Take 1 tablet by mouth Daily.   • [DISCONTINUED] albuterol sulfate  (90 Base) MCG/ACT inhaler Inhale 2 puffs Every 6 (Six) Hours As Needed for Wheezing.   • [DISCONTINUED] fluticasone (FLONASE) 50 MCG/ACT nasal spray SPRAY 2 SPRAYS IN EACH NOSTRIL DAILY   • [DISCONTINUED] metFORMIN ER (GLUCOPHAGE-XR) 500 MG 24 hr tablet Take 1 tablet by mouth Daily With Breakfast.   • [DISCONTINUED] montelukast (SINGULAIR) 10 MG tablet Take 1 tablet by mouth Every Night.   • [DISCONTINUED] omeprazole (priLOSEC) 40 MG capsule Take 1 capsule by mouth Daily.   • [DISCONTINUED] pregabalin (LYRICA) 100 MG  capsule Take 1 cap PO Q am and 2 caps PO Q HS   • [DISCONTINUED] traZODone (DESYREL) 50 MG tablet Take 1 tablet by mouth Every Night.     No current facility-administered medications on file prior to visit.       Immunization History   Administered Date(s) Administered   • COVID-19 (MODERNA) 1st, 2nd, 3rd Dose Only 02/03/2021, 03/03/2021, 11/05/2021   • COVID-19 (UNSPECIFIED) 02/03/2021, 03/03/2021, 11/05/2021   • Flu Vaccine Quad PF >36MO 10/19/2016, 09/20/2017, 10/14/2018   • Flu Vaccine Split Quad 10/19/2016, 09/20/2017, 10/14/2018   • FluLaval/Fluzone >6mos 11/08/2022   • Hepatitis A 12/06/2018   • Influenza, Unspecified 10/14/2018, 10/01/2019   • Pneumococcal Polysaccharide (PPSV23) 02/23/2022   • Tdap 10/14/2018       Review of Systems   Constitutional: Positive for fatigue.   HENT: Negative for sinus pressure and trouble swallowing.    Respiratory: Positive for cough and shortness of breath. Negative for choking and wheezing.         Only with the asthma and certain activity    Cardiovascular: Negative for chest pain.   Gastrointestinal: Positive for diarrhea. Negative for abdominal pain and blood in stool.        For approx 1 week    Genitourinary: Negative for dysuria.   Musculoskeletal: Positive for myalgias.        Fibro pain   Allergic/Immunologic: Positive for environmental allergies.   Neurological: Positive for headache. Negative for dizziness, syncope and light-headedness.        X 1 week more R/T to the stress and clenching the teeth    Hematological: Does not bruise/bleed easily.   Psychiatric/Behavioral: Positive for sleep disturbance and stress. Negative for self-injury and suicidal ideas.        Death with a friend of the family         Objective     /70   Pulse 87   Temp 97.4 °F (36.3 °C)   Wt 92.7 kg (204 lb 4.8 oz)   SpO2 95%   BMI 31.06 kg/m²       Physical Exam  Vitals and nursing note reviewed.   Constitutional:       Appearance: Normal appearance.   HENT:      Head:  Normocephalic.      Comments: bilat crepitus and popping with AROM of the mandible      Right Ear: External ear normal.      Left Ear: External ear normal.      Nose: Nose normal.      Mouth/Throat:      Comments: Wearing mask  Eyes:      Pupils: Pupils are equal, round, and reactive to light.   Cardiovascular:      Rate and Rhythm: Normal rate and regular rhythm.      Heart sounds: Normal heart sounds.   Pulmonary:      Effort: Pulmonary effort is normal.      Breath sounds: Normal breath sounds.   Abdominal:      Palpations: Abdomen is soft.   Musculoskeletal:         General: Tenderness present. Normal range of motion.      Cervical back: Normal range of motion.   Skin:     General: Skin is warm and dry.   Neurological:      Mental Status: She is alert and oriented to person, place, and time.   Psychiatric:         Mood and Affect: Mood normal.         Behavior: Behavior normal.         Thought Content: Thought content normal.         Judgment: Judgment normal.         Result Review :             Urinalysis, Microscopic Only - Urine, Clean Catch (12/01/2022 09:13)  Hemoglobin A1c (12/01/2022 09:13)  Vitamin D,25-Hydroxy (12/01/2022 09:13)  Urinalysis With Culture If Indicated - Urine, Clean Catch (12/01/2022 09:13)  TSH Rfx On Abnormal To Free T4 (12/01/2022 09:13)  Lipid Panel (12/01/2022 09:13)  Comprehensive Metabolic Panel (12/01/2022 09:13)  CBC & Differential (12/01/2022 09:13)  Vitamin B12 & Folate (12/01/2022 09:13)               Assessment and Plan      Diagnoses and all orders for this visit:    1. Insomnia, unspecified type  -     traZODone (DESYREL) 100 MG tablet; Take 1 tablet by mouth Every Night.  Dispense: 90 tablet; Refill: 1    2. Mild persistent asthma without complication  -     albuterol sulfate  (90 Base) MCG/ACT inhaler; Inhale 2 puffs Every 6 (Six) Hours As Needed for Wheezing.  Dispense: 8.5 g; Refill: 3  -     montelukast (SINGULAIR) 10 MG tablet; Take 1 tablet by mouth Every  Night.  Dispense: 90 tablet; Refill: 1    3. Seasonal allergic rhinitis, unspecified trigger  -     fluticasone (FLONASE) 50 MCG/ACT nasal spray; 2 sprays by Each Nare route Daily.  Dispense: 48 g; Refill: 1  -     montelukast (SINGULAIR) 10 MG tablet; Take 1 tablet by mouth Every Night.  Dispense: 90 tablet; Refill: 1    4. Metabolic syndrome  -     metFORMIN ER (GLUCOPHAGE-XR) 500 MG 24 hr tablet; Take 1 tablet by mouth Daily With Breakfast.  Dispense: 90 tablet; Refill: 1    5. Gastroesophageal reflux disease without esophagitis  -     omeprazole (priLOSEC) 40 MG capsule; Take 1 capsule by mouth Daily.  Dispense: 90 capsule; Refill: 1    6. Fibromyalgia  -     pregabalin (LYRICA) 100 MG capsule; Take 1 cap PO Q am and 2 caps PO Q HS  Dispense: 90 capsule; Refill: 2    pt may have TMJ and would recommend F/U with dentist and an orthotic    Also with regards to the insomnia being worse since the added stress on the family with the loss of a family friend--we will increase the trazodone from 50 mg nightly to 100 mg nightly and advised the patient to let her psychiatrist know that we have done this as they are the ones that prescribed the Zoloft 25 mg daily and she will soon be following up with them        Follow Up     Return in about 3 months (around 6/1/2023), or if symptoms worsen or fail to improve, for Recheck.

## 2023-03-09 ENCOUNTER — OFFICE VISIT (OUTPATIENT)
Dept: OBSTETRICS AND GYNECOLOGY | Age: 48
End: 2023-03-09
Payer: COMMERCIAL

## 2023-03-09 DIAGNOSIS — R45.86 MOOD CHANGE: Primary | ICD-10-CM

## 2023-03-09 DIAGNOSIS — L23.7 POISON IVY DERMATITIS: ICD-10-CM

## 2023-03-09 RX ORDER — METHYLPREDNISOLONE 4 MG/1
TABLET ORAL
Qty: 21 TABLET | Refills: 0 | Status: SHIPPED | OUTPATIENT
Start: 2023-03-09 | End: 2023-03-16
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
## 2023-03-16 ENCOUNTER — OFFICE VISIT (OUTPATIENT)
Dept: FAMILY MEDICINE CLINIC | Facility: CLINIC | Age: 48
End: 2023-03-16
Payer: COMMERCIAL

## 2023-03-16 VITALS
BODY MASS INDEX: 30.87 KG/M2 | SYSTOLIC BLOOD PRESSURE: 122 MMHG | TEMPERATURE: 97.8 F | HEART RATE: 105 BPM | WEIGHT: 203 LBS | DIASTOLIC BLOOD PRESSURE: 80 MMHG | OXYGEN SATURATION: 99 %

## 2023-03-16 DIAGNOSIS — J02.9 SORE THROAT: ICD-10-CM

## 2023-03-16 DIAGNOSIS — L24.7 IRRITANT CONTACT DERMATITIS DUE TO PLANTS, EXCEPT FOOD: Primary | ICD-10-CM

## 2023-03-16 LAB
EXPIRATION DATE: NORMAL
INTERNAL CONTROL: NORMAL
Lab: NORMAL
S PYO AG THROAT QL: NEGATIVE

## 2023-03-16 PROCEDURE — 96372 THER/PROPH/DIAG INJ SC/IM: CPT | Performed by: NURSE PRACTITIONER

## 2023-03-16 PROCEDURE — 87880 STREP A ASSAY W/OPTIC: CPT | Performed by: NURSE PRACTITIONER

## 2023-03-16 PROCEDURE — 99213 OFFICE O/P EST LOW 20 MIN: CPT | Performed by: NURSE PRACTITIONER

## 2023-03-16 RX ORDER — HYDROXYZINE HYDROCHLORIDE 25 MG/1
25 TABLET, FILM COATED ORAL 3 TIMES DAILY PRN
Qty: 30 TABLET | Refills: 0 | Status: SHIPPED | OUTPATIENT
Start: 2023-03-16

## 2023-03-16 RX ORDER — METHYLPREDNISOLONE ACETATE 40 MG/ML
40 INJECTION, SUSPENSION INTRA-ARTICULAR; INTRALESIONAL; INTRAMUSCULAR; SOFT TISSUE ONCE
Status: COMPLETED | OUTPATIENT
Start: 2023-03-16 | End: 2023-03-16

## 2023-03-16 RX ADMIN — METHYLPREDNISOLONE ACETATE 40 MG: 40 INJECTION, SUSPENSION INTRA-ARTICULAR; INTRALESIONAL; INTRAMUSCULAR; SOFT TISSUE at 14:37

## 2023-03-16 NOTE — PROGRESS NOTES
Chief Complaint  Poison Ivy (X1.5 weeks)    Subjective        Past Medical History:   Diagnosis Date   • Allergic    • Allergic rhinitis    • Anxiety and depression    • Asthma     exercise induced   • Back pain    • Bowel incontinence    • Condyloma acuminata    • Diverticulitis of colon    • Endometrial polyp    • Family history of breast cancer    • Fatty liver    • Fibroids    • Fibromyalgia    • Fibromyalgia, primary    • GERD (gastroesophageal reflux disease)    • Hernia    • Irritable bowel syndrome    • Low back pain    • Metabolic syndrome    • Osteoarthritis     In addition to fibromyalgia   • Sleep apnea     CPAP   • Urinary frequency    • Uterine prolapse    • Vitamin B 12 deficiency      Social History     Tobacco Use   • Smoking status: Never     Passive exposure: Yes   • Smokeless tobacco: Never   Vaping Use   • Vaping Use: Never used   Substance Use Topics   • Alcohol use: Never   • Drug use: Never      Current Outpatient Medications on File Prior to Visit   Medication Sig   • albuterol (PROVENTIL) (2.5 MG/3ML) 0.083% nebulizer solution Take 2.5 mg by nebulization Every 4 (Four) Hours As Needed for Wheezing or Shortness of Air.   • albuterol sulfate  (90 Base) MCG/ACT inhaler Inhale 2 puffs Every 6 (Six) Hours As Needed for Wheezing.   • cyanocobalamin 1000 MCG/ML injection Inject 1 mL into the appropriate muscle as directed by prescriber Every 30 (Thirty) Days.   • fluticasone (FLONASE) 50 MCG/ACT nasal spray 2 sprays by Each Nare route Daily.   • Linzess 145 MCG capsule capsule TAKE 1 CAPSULE BY MOUTH EVERY MORNING BEFORE BREAKFAST   • metFORMIN ER (GLUCOPHAGE-XR) 500 MG 24 hr tablet Take 1 tablet by mouth Daily With Breakfast.   • montelukast (SINGULAIR) 10 MG tablet Take 1 tablet by mouth Every Night.   • omeprazole (priLOSEC) 40 MG capsule Take 1 capsule by mouth Daily.   • oxyCODONE-acetaminophen (PERCOCET) 5-325 MG per tablet oxycodone-acetaminophen 5-325 mg oral tablet 1 every 8 hrs as  needed.   Active   • pregabalin (LYRICA) 100 MG capsule Take 1 cap PO Q am and 2 caps PO Q HS   • sertraline (Zoloft) 50 MG tablet Take 1 tablet by mouth Daily.   • traZODone (DESYREL) 100 MG tablet Take 1 tablet by mouth Every Night.     No current facility-administered medications on file prior to visit.      Allergies   Allergen Reactions   • Bactrim [Sulfamethoxazole-Trimethoprim] Rash   • Codeine Rash      Health Maintenance Due   Topic Date Due   • URINE MICROALBUMIN  Never done   • ANNUAL PHYSICAL  Never done   • DIABETIC FOOT EXAM  Never done   • DIABETIC EYE EXAM  Never done   • Pneumococcal Vaccine 0-64 (2 - PCV) 02/23/2023      Sahara Delarosa presents to [facility]  History of Present Illness  Here for dermatitis to the left eyelid, left lower leg (ankle, calf, posterior knee, thigh), neck, and right breast. Pt has been on a steroid pack and continues to have eruptions. Also notes scratchy throat.       Objective   Vital Signs:   /80   Pulse 105   Temp 97.8 °F (36.6 °C)   Wt 92.1 kg (203 lb)   SpO2 99%   BMI 30.87 kg/m²     Review of Systems   Physical Exam  Vitals reviewed.   Constitutional:       General: She is not in acute distress.     Appearance: Normal appearance. She is well-developed.   HENT:      Head: Normocephalic and atraumatic.      Mouth/Throat:      Pharynx: Posterior oropharyngeal erythema present.   Eyes:      Conjunctiva/sclera: Conjunctivae normal.      Pupils: Pupils are equal, round, and reactive to light.   Cardiovascular:      Rate and Rhythm: Normal rate and regular rhythm.      Heart sounds: Normal heart sounds.   Pulmonary:      Effort: Pulmonary effort is normal.      Breath sounds: Normal breath sounds.   Musculoskeletal:      Cervical back: Neck supple.      Left lower leg: No edema.   Skin:     General: Skin is warm and dry.          Neurological:      Mental Status: She is alert and oriented to person, place, and time.    Psychiatric:         Mood and Affect: Mood and affect normal.         Behavior: Behavior normal.         Thought Content: Thought content normal.         Judgment: Judgment normal.        Result Review :                 Assessment and Plan    Diagnoses and all orders for this visit:    1. Irritant contact dermatitis due to plants, except food (Primary)  -     hydrOXYzine (ATARAX) 25 MG tablet; Take 1 tablet by mouth 3 (Three) Times a Day As Needed for Itching.  Dispense: 30 tablet; Refill: 0  -     methylPREDNISolone acetate (DEPO-medrol) injection 40 mg  -     methylPREDNISolone acetate (DEPO-medrol) injection 40 mg    2. Sore throat  -     POCT rapid strep A        Follow Up   Return if symptoms worsen or fail to improve.  Patient was given instructions and counseling regarding her condition or for health maintenance advice. Please see specific information pulled into the AVS if appropriate.

## 2023-03-22 ENCOUNTER — OFFICE VISIT (OUTPATIENT)
Dept: FAMILY MEDICINE CLINIC | Facility: CLINIC | Age: 48
End: 2023-03-22
Payer: COMMERCIAL

## 2023-03-22 ENCOUNTER — TELEPHONE (OUTPATIENT)
Dept: FAMILY MEDICINE CLINIC | Facility: CLINIC | Age: 48
End: 2023-03-22

## 2023-03-22 VITALS
WEIGHT: 202 LBS | DIASTOLIC BLOOD PRESSURE: 72 MMHG | HEIGHT: 68 IN | SYSTOLIC BLOOD PRESSURE: 124 MMHG | HEART RATE: 90 BPM | BODY MASS INDEX: 30.62 KG/M2 | OXYGEN SATURATION: 99 % | TEMPERATURE: 97.3 F

## 2023-03-22 DIAGNOSIS — H57.89 REDNESS OF EYE, RIGHT: ICD-10-CM

## 2023-03-22 DIAGNOSIS — B02.9 HERPES ZOSTER WITHOUT COMPLICATION: ICD-10-CM

## 2023-03-22 DIAGNOSIS — H57.11 ACUTE RIGHT EYE PAIN: Primary | ICD-10-CM

## 2023-03-22 PROCEDURE — 99213 OFFICE O/P EST LOW 20 MIN: CPT | Performed by: NURSE PRACTITIONER

## 2023-03-22 RX ORDER — ERYTHROMYCIN 5 MG/G
OINTMENT OPHTHALMIC EVERY 6 HOURS
Qty: 1 G | Refills: 0 | Status: SHIPPED | OUTPATIENT
Start: 2023-03-22

## 2023-03-22 RX ORDER — VALACYCLOVIR HYDROCHLORIDE 1 G/1
1000 TABLET, FILM COATED ORAL 3 TIMES DAILY
Qty: 21 TABLET | Refills: 0 | Status: SHIPPED | OUTPATIENT
Start: 2023-03-22 | End: 2023-03-29

## 2023-03-22 NOTE — PROGRESS NOTES
Answers for HPI/ROS submitted by the patient on 3/22/2023  Please describe your symptoms.: R eye  Have you had these symptoms before?: No  How long have you been having these symptoms?: 1-4 days  What is the primary reason for your visit?: Other    Chief Complaint  Conjunctivitis (Patent is on day 2 of her Right eye being swollen, red and is painful. )    Subjective            Sahara Delarosa presents to [facility]  History of Present Illness  Pt is here for the right eye pain and redness --nothing matted nothing with discharge--but is watery at times--  Started some eye drop tears--    And then also pt reports started 2 days ago    ______________________________________    Then pt reports also was just here for contact derm /poinson ivy and sore throat--3/16/23 --and was given the steroids and then the atarax--then started with a palmar hands and soles of feet rash--and then all gone now except for the few on the inner hands--    Pt thought hand foot and mouth--and unsure if exposed or not for this     ___________________________________________    Also pt mentioned a rash to the right breast--started couple days ago--and thought was poinson ivy but this is tingling and burning and sensitive         PHQ-2 Total Score: 0  PHQ-9 Total Score: 0    Past Medical History:   Diagnosis Date   • Allergic    • Allergic rhinitis    • Anxiety and depression    • Asthma     exercise induced   • Back pain    • Bowel incontinence    • Condyloma acuminata    • Diverticulitis of colon    • Endometrial polyp    • Family history of breast cancer    • Fatty liver    • Fibroids    • Fibromyalgia    • Fibromyalgia, primary    • GERD (gastroesophageal reflux disease)    • Hernia    • Irritable bowel syndrome    • Low back pain    • Metabolic syndrome    • Osteoarthritis     In addition to fibromyalgia   • Sleep apnea     CPAP   • Urinary frequency    • Uterine prolapse    • Vitamin B 12 deficiency         Allergies   Allergen Reactions   • Bactrim [Sulfamethoxazole-Trimethoprim] Rash   • Codeine Rash        Past Surgical History:   Procedure Laterality Date   • BREAST BIOPSY Left 2013    Hyper-pigmented lesion, left nipple.= Benign   • COLONOSCOPY     • CYST REMOVAL      on chest and groin area    • ENDOMETRIAL ABLATION  2015    HSC, DNC, Novasure Endometrial Ablation   • ENDOSCOPY  2017    EGD: Stomach inflammation, Neg for H Pylori.   • HIP ARTHROPLASTY Right 09/2021   • HIP SURGERY Right 2019    LABRUM REPAIR   • JOINT REPLACEMENT  September 2021    Rt Hip   • TOTAL LAPAROSCOPIC HYSTERECTOMY SALPINGO OOPHORECTOMY Bilateral 03/30/2022    Procedure: TOTAL LAPAROSCOPIC HYSTERECTOMY, BILATERAL SALPINGECTOMY, AND CYSTOSCOPY;  Surgeon: Rosalba Chiu MD;  Location: [facility] OR;  Service: Obstetrics/Gynecology;  Laterality: Bilateral;   • UPPER GASTROINTESTINAL ENDOSCOPY          Social History     Tobacco Use   • Smoking status: Never     Passive exposure: Yes   • Smokeless tobacco: Never   Vaping Use   • Vaping Use: Never used   Substance Use Topics   • Alcohol use: Never   • Drug use: Never       Family History   Problem Relation Age of Onset   • Other Mother         Multiple lipomas.   • Arthritis Mother    • Breast cancer Mother 70        Double mastectomy    • Cancer Mother         breast cancer   • Diabetes type II Father    • Diabetes Father    • Fibromyalgia Sister    • No Known Problems Daughter    • No Known Problems Son    • Breast cancer Maternal Aunt         4 different mat aunts with breast cancer.   • Cancer Maternal Aunt         stomach   • Breast cancer Paternal Aunt 60        dx ~ age 60   • Osteoporosis Maternal Grandmother         ?   • Parkinsonism Maternal Grandmother    • Stomach cancer Maternal Grandfather         dx >50   • No Known Problems Paternal Grandmother    • Brain cancer Paternal Grandfather         dx >50   • BRCA 1/2 Neg Hx    • Colon cancer Neg Hx    • Endometrial  cancer Neg Hx    • Ovarian cancer Neg Hx    • Malig Hyperthermia Neg Hx         Health Maintenance Due   Topic Date Due   • URINE MICROALBUMIN  Never done   • ANNUAL PHYSICAL  Never done   • DIABETIC FOOT EXAM  Never done   • DIABETIC EYE EXAM  Never done   • Pneumococcal Vaccine 0-64 (2 - PCV) 02/23/2023        Current Outpatient Medications on File Prior to Visit   Medication Sig   • albuterol (PROVENTIL) (2.5 MG/3ML) 0.083% nebulizer solution Take 2.5 mg by nebulization Every 4 (Four) Hours As Needed for Wheezing or Shortness of Air.   • albuterol sulfate  (90 Base) MCG/ACT inhaler Inhale 2 puffs Every 6 (Six) Hours As Needed for Wheezing.   • cyanocobalamin 1000 MCG/ML injection Inject 1 mL into the appropriate muscle as directed by prescriber Every 30 (Thirty) Days.   • fluticasone (FLONASE) 50 MCG/ACT nasal spray 2 sprays by Each Nare route Daily.   • hydrOXYzine (ATARAX) 25 MG tablet Take 1 tablet by mouth 3 (Three) Times a Day As Needed for Itching.   • Linzess 145 MCG capsule capsule TAKE 1 CAPSULE BY MOUTH EVERY MORNING BEFORE BREAKFAST   • metFORMIN ER (GLUCOPHAGE-XR) 500 MG 24 hr tablet Take 1 tablet by mouth Daily With Breakfast.   • montelukast (SINGULAIR) 10 MG tablet Take 1 tablet by mouth Every Night.   • omeprazole (priLOSEC) 40 MG capsule Take 1 capsule by mouth Daily.   • oxyCODONE-acetaminophen (PERCOCET) 5-325 MG per tablet oxycodone-acetaminophen 5-325 mg oral tablet 1 every 8 hrs as needed.   Active   • pregabalin (LYRICA) 100 MG capsule Take 1 cap PO Q am and 2 caps PO Q HS   • sertraline (Zoloft) 50 MG tablet Take 1 tablet by mouth Daily.   • traZODone (DESYREL) 100 MG tablet Take 1 tablet by mouth Every Night.     No current facility-administered medications on file prior to visit.       Immunization History   Administered Date(s) Administered   • COVID-19 (MODERNA) 1st, 2nd, 3rd Dose Only 02/03/2021, 03/03/2021, 11/05/2021   • COVID-19 (UNSPECIFIED) 02/03/2021, 03/03/2021,  "11/05/2021   • Flu Vaccine Quad PF >36MO 10/19/2016, 09/20/2017, 10/14/2018   • Flu Vaccine Split Quad 10/19/2016, 09/20/2017, 10/14/2018   • FluLaval/Fluzone >6mos 11/08/2022   • Hepatitis A 12/06/2018   • Influenza, Unspecified 10/14/2018, 10/01/2019   • Pneumococcal Polysaccharide (PPSV23) 02/23/2022   • Tdap 10/14/2018       Review of Systems   Constitutional: Negative for chills and fever.        Pt reports the other day at work 99.4    HENT: Positive for sore throat.    Eyes: Positive for pain and redness. Negative for blurred vision, double vision, photophobia, discharge, itching and visual disturbance.        More burning than itching    Skin: Positive for rash.        Started 2 days ago to the breast   Neurological: Positive for headache.        Objective     /72 (BP Location: Left arm, Patient Position: Sitting, Cuff Size: Adult)   Pulse 90   Temp 97.3 °F (36.3 °C) (Temporal)   Ht 172.7 cm (68\")   Wt 91.6 kg (202 lb)   SpO2 99%   BMI 30.71 kg/m²       Physical Exam  Constitutional:       Appearance: Normal appearance.   HENT:      Head: Normocephalic.      Right Ear: External ear normal.      Left Ear: Tympanic membrane and external ear normal.      Nose: Nose normal.   Eyes:      General: Lids are normal. Lids are everted, no foreign bodies appreciated. Gaze aligned appropriately. Allergic shiner present. No visual field deficit.        Right eye: No foreign body, discharge or hordeolum.         Left eye: No foreign body, discharge or hordeolum.      Extraocular Movements:      Right eye: Normal extraocular motion and no nystagmus.      Left eye: Normal extraocular motion and no nystagmus.      Conjunctiva/sclera:      Right eye: Right conjunctiva is injected. No exudate or hemorrhage.     Left eye: Left conjunctiva is not injected. No chemosis, exudate or hemorrhage.     Pupils: Pupils are equal, round, and reactive to light.        Comments: Pt reports some pain with movement of the right " globe/eyeball    Cardiovascular:      Rate and Rhythm: Normal rate.   Pulmonary:      Effort: Pulmonary effort is normal.   Chest:       Musculoskeletal:         General: Normal range of motion.      Cervical back: Normal range of motion.   Skin:     General: Skin is warm and dry.      Comments: Space scattered pinpoint areas of the feet and then also to the inner hand/fingers--and pt report is resolving    Neurological:      Mental Status: She is alert and oriented to person, place, and time.   Psychiatric:         Mood and Affect: Mood normal.         Behavior: Behavior normal.         Thought Content: Thought content normal.         Judgment: Judgment normal.         Result Review :           POCT rapid strep A (03/16/2023 14:38)                 Assessment and Plan      Diagnoses and all orders for this visit:    1. Acute right eye pain (Primary)  -     erythromycin (ROMYCIN) 5 MG/GM ophthalmic ointment; Administer  to the right eye Every 6 (Six) Hours.  Dispense: 1 g; Refill: 0    2. Redness of eye, right  -     erythromycin (ROMYCIN) 5 MG/GM ophthalmic ointment; Administer  to the right eye Every 6 (Six) Hours.  Dispense: 1 g; Refill: 0    3. Herpes zoster without complication  Comments:  shingles   Orders:  -     valACYclovir (Valtrex) 1000 MG tablet; Take 1 tablet by mouth 3 (Three) Times a Day for 7 days.  Dispense: 21 tablet; Refill: 0    unsure if this is an abrasion of some sore--will do the erythromycin ophthalmic oint--and recommended pt seeing eye MD soon--and no vision changes and no impediment with eye movements    And at the end of the visit patient did mention a small patchy rash that just started approximately 2 days ago to the right breast and that it is sort of tingly burning and sensitive and appears to be early shingles    Therefore advised the pt to not use the antibiotic oint to the right eye unless approved of by the eye specialist    Also excuse for work to be off the rest of the  week    Patient will reach back out to me if she is having any issues to get in with the eye doctor today or tomorrow        Follow Up     Return if symptoms worsen or fail to improve.

## 2023-03-22 NOTE — TELEPHONE ENCOUNTER
"  Caller: Sahara Delarosa \"AYO\"    Relationship: Self    Best call back number: [phone]    What is the best time to reach you: ANYTIME    Who are you requesting to speak with (clinical staff, provider,  specific staff member): CLINICAL      What was the call regarding: THE PATIENT'S RIGHT EYE IS PAINFUL, SWOLLEN, RED, AND WATERY. IT'S BEEN LIKE THIS FOR TWO DAYS. NO APPOINTMENTS AVAILABLE UNTIL 03/27/23. SHE WOULD LIKE A CALL BACK WITH PCP RECOMMENDATION ASAP    Do you require a callback: YES        "

## 2023-04-10 ENCOUNTER — PATIENT MESSAGE (OUTPATIENT)
Dept: OBSTETRICS AND GYNECOLOGY | Age: 48
End: 2023-04-10
Payer: COMMERCIAL

## 2023-04-10 NOTE — TELEPHONE ENCOUNTER
From: Sahara Delarosa  To: Sohna Washington  Sent: 4/10/2023 8:46 AM EDT  Subject: Current Medication    Good Morning Mrs. Delarosa,  I have a medical question about Zoloft.  I have noticed that the increase in Zoloft is definitely working for my mood swings. With that been said, I have also noticed this happening more often, the inability to have a orgasm. Is this one of the side effects to taking Zoloft? Does this side effect happen with most medications I would be prescribe me for mood swings? I am unsure of what other options I may have.

## 2023-04-11 ENCOUNTER — OFFICE VISIT (OUTPATIENT)
Dept: OBSTETRICS AND GYNECOLOGY | Age: 48
End: 2023-04-11
Payer: COMMERCIAL

## 2023-04-11 DIAGNOSIS — N95.1 MENOPAUSAL SYMPTOMS: Primary | ICD-10-CM

## 2023-04-11 RX ORDER — BUPROPION HYDROCHLORIDE 150 MG/1
150 TABLET ORAL DAILY
Qty: 30 TABLET | Refills: 11 | Status: SHIPPED | OUTPATIENT
Start: 2023-04-11

## 2023-04-11 NOTE — PROGRESS NOTES
[facility]   Obstetrics and Gynecology     2023      Patient:  Sahara Delarosa   MR#:[id number]    Office note    No chief complaint on file.      Subjective     History of Present Illness  47 y.o. female  presents via telephone visit at patient request (audio only).  She is taking Zoloft for perimenopausal symptoms, namely for mood changes, and really liking it.  She feels like her mood has stabilized significantly.  However, she is having difficulty achieving orgasm.  She feels interested in sex but cannot complete orgasm.      Patient Active Problem List   Diagnosis   • Vitamin B 12 deficiency   • Sacroiliitis   • Metabolic syndrome   • Irritable bowel syndrome   • Fibromyalgia   • Sciatica   • GERD (gastroesophageal reflux disease)   • Bladder problem   • Sleep apnea   • Asthma   • Arthritis   • Seasonal allergic rhinitis   • Gastroesophageal reflux disease with esophagitis without hemorrhage   • Degeneration of lumbar intervertebral disc   • Right hip pain   • S/P hip arthroscopy   • Tear of right acetabular labrum   • Trochanteric bursitis of right hip   • Primary osteoarthritis of right hip   • Vitamin D deficiency   • Uterine prolapse   • Personal history of other diseases of the female genital tract   • Menorrhagia   • Class 1 obesity due to excess calories without serious comorbidity with body mass index (BMI) of 31.0 to 31.9 in adult   • Altered bowel habits   • Chronic idiopathic constipation   • Change in bowel habits       Past Medical History:   Diagnosis Date   • Allergic    • Allergic rhinitis    • Anxiety and depression    • Asthma     exercise induced   • Back pain    • Bowel incontinence    • Condyloma acuminata    • Diverticulitis of colon    • Endometrial polyp    • Family history of breast cancer    • Fatty liver    • Fibroids    • Fibromyalgia    • Fibromyalgia, primary    • GERD (gastroesophageal reflux disease)    • Hernia    • Irritable bowel syndrome    • Low  back pain    • Metabolic syndrome    • Osteoarthritis     In addition to fibromyalgia   • Sleep apnea     CPAP   • Urinary frequency    • Uterine prolapse    • Vitamin B 12 deficiency      Past Surgical History:   Procedure Laterality Date   • BREAST BIOPSY Left     Hyper-pigmented lesion, left nipple.= Benign   • COLONOSCOPY     • CYST REMOVAL      on chest and groin area    • ENDOMETRIAL ABLATION      HSC, DNC, Novasure Endometrial Ablation   • ENDOSCOPY      EGD: Stomach inflammation, Neg for H Pylori.   • HIP ARTHROPLASTY Right 2021   • HIP SURGERY Right 2019    LABRUM REPAIR   • JOINT REPLACEMENT  2021    Rt Hip   • TOTAL LAPAROSCOPIC HYSTERECTOMY SALPINGO OOPHORECTOMY Bilateral 2022    Procedure: TOTAL LAPAROSCOPIC HYSTERECTOMY, BILATERAL SALPINGECTOMY, AND CYSTOSCOPY;  Surgeon: Rosalba Chiu MD;  Location: [facility];  Service: Obstetrics/Gynecology;  Laterality: Bilateral;   • UPPER GASTROINTESTINAL ENDOSCOPY       Obstetric History:  OB History        2    Para   2    Term   2            AB        Living   2       SAB        IAB        Ectopic        Molar        Multiple        Live Births   2               Menstrual History:     No LMP recorded (lmp unknown). Patient has had a hysterectomy.       # 1 - Date: , Sex: Female, Weight: 3600 g (7 lb 15 oz), GA: None, Delivery: Vaginal, Spontaneous, Apgar1: None, Apgar5: None, Living: Living, Birth Comments: None    # 2 - Date: , Sex: Male, Weight: 3232 g (7 lb 2 oz), GA: None, Delivery: Vaginal, Spontaneous, Apgar1: None, Apgar5: None, Living: Living, Birth Comments: None    Family History   Problem Relation Age of Onset   • Other Mother         Multiple lipomas.   • Arthritis Mother    • Breast cancer Mother 70        Double mastectomy    • Cancer Mother         breast cancer   • Diabetes type II Father    • Diabetes Father    • Fibromyalgia Sister    • No Known Problems Daughter    • No Known  Problems Son    • Breast cancer Maternal Aunt         4 different mat aunts with breast cancer.   • Cancer Maternal Aunt         stomach   • Breast cancer Paternal Aunt 60        dx ~ age 60   • Osteoporosis Maternal Grandmother         ?   • Parkinsonism Maternal Grandmother    • Stomach cancer Maternal Grandfather         dx >50   • No Known Problems Paternal Grandmother    • Brain cancer Paternal Grandfather         dx >50   • BRCA 1/2 Neg Hx    • Colon cancer Neg Hx    • Endometrial cancer Neg Hx    • Ovarian cancer Neg Hx    • Malig Hyperthermia Neg Hx      Social History     Tobacco Use   • Smoking status: Never     Passive exposure: Yes   • Smokeless tobacco: Never   Vaping Use   • Vaping Use: Never used   Substance Use Topics   • Alcohol use: Never   • Drug use: Never     Bactrim [sulfamethoxazole-trimethoprim] and Codeine    Current Outpatient Medications:   •  albuterol (PROVENTIL) (2.5 MG/3ML) 0.083% nebulizer solution, Take 2.5 mg by nebulization Every 4 (Four) Hours As Needed for Wheezing or Shortness of Air., Disp: 120 each, Rfl: 2  •  albuterol sulfate  (90 Base) MCG/ACT inhaler, Inhale 2 puffs Every 6 (Six) Hours As Needed for Wheezing., Disp: 8.5 g, Rfl: 3  •  buPROPion XL (Wellbutrin XL) 150 MG 24 hr tablet, Take 1 tablet by mouth Daily., Disp: 30 tablet, Rfl: 11  •  cyanocobalamin 1000 MCG/ML injection, Inject 1 mL into the appropriate muscle as directed by prescriber Every 30 (Thirty) Days., Disp: 3 mL, Rfl: 3  •  fluticasone (FLONASE) 50 MCG/ACT nasal spray, 2 sprays by Each Nare route Daily., Disp: 48 g, Rfl: 1  •  Linzess 145 MCG capsule capsule, TAKE 1 CAPSULE BY MOUTH EVERY MORNING BEFORE BREAKFAST, Disp: 30 capsule, Rfl: 3  •  metFORMIN ER (GLUCOPHAGE-XR) 500 MG 24 hr tablet, Take 1 tablet by mouth Daily With Breakfast., Disp: 90 tablet, Rfl: 1  •  montelukast (SINGULAIR) 10 MG tablet, Take 1 tablet by mouth Every Night., Disp: 90 tablet, Rfl: 1  •  omeprazole (priLOSEC) 40 MG  "capsule, Take 1 capsule by mouth Daily., Disp: 90 capsule, Rfl: 1  •  oxyCODONE-acetaminophen (PERCOCET) 5-325 MG per tablet, oxycodone-acetaminophen 5-325 mg oral tablet 1 every 8 hrs as needed.   Active, Disp: , Rfl:   •  pregabalin (LYRICA) 100 MG capsule, Take 1 cap PO Q am and 2 caps PO Q HS, Disp: 90 capsule, Rfl: 2  •  sertraline (Zoloft) 50 MG tablet, Take 1 tablet by mouth Daily., Disp: 30 tablet, Rfl: 11  •  traZODone (DESYREL) 100 MG tablet, Take 1 tablet by mouth Every Night., Disp: 90 tablet, Rfl: 1    The following portions of the patient's history were reviewed and updated as appropriate: allergies, current medications, past family history, past medical history, past social history, past surgical history and problem list.    Review of Systems   All other systems reviewed and are negative.      BP Readings from Last 3 Encounters:   03/22/23 124/72   03/16/23 122/80   03/01/23 120/70      Wt Readings from Last 3 Encounters:   03/22/23 91.6 kg (202 lb)   03/16/23 92.1 kg (203 lb)   03/01/23 92.7 kg (204 lb 4.8 oz)      BMI: Estimated body mass index is 30.71 kg/m² as calculated from the following:    Height as of 3/22/23: 172.7 cm (68\").    Weight as of 3/22/23: 91.6 kg (202 lb). BSA: Estimated body surface area is 2.05 meters squared as calculated from the following:    Height as of 3/22/23: 172.7 cm (68\").    Weight as of 3/22/23: 91.6 kg (202 lb).    Objective   Physical Exam  Constitutional:       General: She is not in acute distress.  Neurological:      General: No focal deficit present.      Mental Status: She is alert and oriented to person, place, and time.   Psychiatric:         Mood and Affect: Mood normal.         Behavior: Behavior normal.         Thought Content: Thought content normal.         Judgment: Judgment normal.         Assessment & Plan     Diagnoses and all orders for this visit:    1. Menopausal symptoms (Primary)    Other orders  -     buPROPion XL (Wellbutrin XL) 150 MG 24 hr " tablet; Take 1 tablet by mouth Daily.  Dispense: 30 tablet; Refill: 11      -We discussed that anorgasmia is a common side effect of Zoloft.  Patient would like to try Wellbutrin.  Discussed that she may experience increased anxiety for first 1 to 2 weeks but that generally subsides soon thereafter.  We discussed Paxil could be another good option if she does not tolerate Wellbutrin since it could potentially address her vasomotor symptoms as well.    Return in about 5 weeks (around 5/16/2023) for F/u menopausal symptoms.    I spent 20 minutes caring for Sahara Delarosa on this date of service. This time includes time spent by me in the following activities: preparing for the visit, reviewing tests, obtaining and/or reviewing a separately obtained history, performing a medically appropriate examination and/or evaluation, counseling and educating the patient/family/caregiver, ordering medications, tests, or procedures and documenting information in the medical record.    Rosalba Chiu MD   4/11/2023 13:08 EDT

## 2023-05-11 ENCOUNTER — OFFICE VISIT (OUTPATIENT)
Dept: SLEEP MEDICINE | Facility: HOSPITAL | Age: 48
End: 2023-05-11
Payer: COMMERCIAL

## 2023-05-11 VITALS
HEIGHT: 68 IN | BODY MASS INDEX: 29.96 KG/M2 | SYSTOLIC BLOOD PRESSURE: 121 MMHG | WEIGHT: 197.7 LBS | HEART RATE: 83 BPM | OXYGEN SATURATION: 98 % | DIASTOLIC BLOOD PRESSURE: 66 MMHG

## 2023-05-11 DIAGNOSIS — G47.33 OSA (OBSTRUCTIVE SLEEP APNEA): Primary | ICD-10-CM

## 2023-05-11 PROCEDURE — G0463 HOSPITAL OUTPT CLINIC VISIT: HCPCS

## 2023-05-11 RX ORDER — PREDNISOLONE ACETATE 10 MG/ML
SUSPENSION/ DROPS OPHTHALMIC
COMMUNITY
Start: 2023-03-22

## 2023-05-12 DIAGNOSIS — N95.1 MENOPAUSAL SYMPTOMS: ICD-10-CM

## 2023-05-12 DIAGNOSIS — R45.86 MOOD CHANGE: ICD-10-CM

## 2023-05-12 DIAGNOSIS — K59.04 CHRONIC IDIOPATHIC CONSTIPATION: ICD-10-CM

## 2023-05-12 RX ORDER — LINACLOTIDE 145 UG/1
CAPSULE, GELATIN COATED ORAL
Qty: 30 CAPSULE | Refills: 1 | Status: SHIPPED | OUTPATIENT
Start: 2023-05-12

## 2023-05-12 RX ORDER — SERTRALINE HYDROCHLORIDE 25 MG/1
25 TABLET, FILM COATED ORAL DAILY
Qty: 30 TABLET | Refills: 2 | OUTPATIENT
Start: 2023-05-12

## 2023-05-18 ENCOUNTER — OFFICE VISIT (OUTPATIENT)
Dept: OBSTETRICS AND GYNECOLOGY | Age: 48
End: 2023-05-18
Payer: COMMERCIAL

## 2023-05-18 DIAGNOSIS — R45.86 MOOD CHANGE: ICD-10-CM

## 2023-05-18 DIAGNOSIS — N95.1 MENOPAUSAL SYMPTOMS: Primary | ICD-10-CM

## 2023-05-18 NOTE — PROGRESS NOTES
[facility]   Obstetrics and Gynecology     2023      Patient:  Sahara Delarosa   MR#:[id number]    Office note    No chief complaint on file.      Subjective     History of Present Illness  47 y.o. female  presents for f/u of menopausal symptoms, namely mood changes.  This is a telephone visit with audio only at patient request.  She understands the limitations of telephone visits and that she is welcome to ask for an in person visit at any point.    She first tried Zoloft and felt really good from a mood standpoint but had anorgasmia which was very frustrating.  She then switched to Wellbutrin.  She is now able to orgasm but feels like her mood is insufficiently stable.  Feels edgy and irritable.      Patient Active Problem List   Diagnosis   • Vitamin B 12 deficiency   • Sacroiliitis   • Metabolic syndrome   • Irritable bowel syndrome   • Fibromyalgia   • Sciatica   • GERD (gastroesophageal reflux disease)   • Bladder problem   • Sleep apnea   • Asthma   • Arthritis   • Seasonal allergic rhinitis   • Gastroesophageal reflux disease with esophagitis without hemorrhage   • Degeneration of lumbar intervertebral disc   • Right hip pain   • S/P hip arthroscopy   • Tear of right acetabular labrum   • Trochanteric bursitis of right hip   • Primary osteoarthritis of right hip   • Vitamin D deficiency   • Uterine prolapse   • Personal history of other diseases of the female genital tract   • Menorrhagia   • Class 1 obesity due to excess calories without serious comorbidity with body mass index (BMI) of 31.0 to 31.9 in adult   • Altered bowel habits   • Chronic idiopathic constipation   • Change in bowel habits       Past Medical History:   Diagnosis Date   • Allergic    • Allergic rhinitis    • Anxiety and depression    • Asthma     exercise induced   • Back pain    • Bowel incontinence    • Condyloma acuminata    • Diverticulitis of colon    • Endometrial polyp    • Family history of breast  cancer    • Fatty liver    • Fibroids    • Fibromyalgia    • Fibromyalgia, primary    • GERD (gastroesophageal reflux disease)    • Hernia    • Irritable bowel syndrome    • Low back pain    • Metabolic syndrome    • Osteoarthritis     In addition to fibromyalgia   • Sleep apnea     CPAP   • Urinary frequency    • Uterine prolapse    • Vitamin B 12 deficiency      Past Surgical History:   Procedure Laterality Date   • BREAST BIOPSY Left     Hyper-pigmented lesion, left nipple.= Benign   • COLONOSCOPY     • CYST REMOVAL      on chest and groin area    • ENDOMETRIAL ABLATION      HSC, DNC, Novasure Endometrial Ablation   • ENDOSCOPY      EGD: Stomach inflammation, Neg for H Pylori.   • HIP ARTHROPLASTY Right 2021   • HIP SURGERY Right 2019    LABRUM REPAIR   • JOINT REPLACEMENT  2021    Rt Hip   • TOTAL LAPAROSCOPIC HYSTERECTOMY SALPINGO OOPHORECTOMY Bilateral 2022    Procedure: TOTAL LAPAROSCOPIC HYSTERECTOMY, BILATERAL SALPINGECTOMY, AND CYSTOSCOPY;  Surgeon: Rosalba Chiu MD;  Location: [facility];  Service: Obstetrics/Gynecology;  Laterality: Bilateral;   • UPPER GASTROINTESTINAL ENDOSCOPY       Obstetric History:  OB History        2    Para   2    Term   2            AB        Living   2       SAB        IAB        Ectopic        Molar        Multiple        Live Births   2               Menstrual History:     No LMP recorded (lmp unknown). Patient has had a hysterectomy.       # 1 - Date: , Sex: Female, Weight: 3600 g (7 lb 15 oz), GA: None, Delivery: Vaginal, Spontaneous, Apgar1: None, Apgar5: None, Living: Living, Birth Comments: None    # 2 - Date: , Sex: Male, Weight: 3232 g (7 lb 2 oz), GA: None, Delivery: Vaginal, Spontaneous, Apgar1: None, Apgar5: None, Living: Living, Birth Comments: None    Family History   Problem Relation Age of Onset   • Other Mother         Multiple lipomas.   • Arthritis Mother    • Breast cancer Mother 70         Double mastectomy    • Cancer Mother         breast cancer   • Diabetes type II Father    • Diabetes Father    • Fibromyalgia Sister    • No Known Problems Daughter    • No Known Problems Son    • Breast cancer Maternal Aunt         4 different mat aunts with breast cancer.   • Cancer Maternal Aunt         stomach   • Breast cancer Paternal Aunt 60        dx ~ age 60   • Osteoporosis Maternal Grandmother         ?   • Parkinsonism Maternal Grandmother    • Stomach cancer Maternal Grandfather         dx >50   • No Known Problems Paternal Grandmother    • Brain cancer Paternal Grandfather         dx >50   • BRCA 1/2 Neg Hx    • Colon cancer Neg Hx    • Endometrial cancer Neg Hx    • Ovarian cancer Neg Hx    • Malig Hyperthermia Neg Hx      Social History     Tobacco Use   • Smoking status: Never     Passive exposure: Yes   • Smokeless tobacco: Never   Vaping Use   • Vaping Use: Never used   Substance Use Topics   • Alcohol use: Never   • Drug use: Never     Bactrim [sulfamethoxazole-trimethoprim] and Codeine    Current Outpatient Medications:   •  sertraline (Zoloft) 50 MG tablet, Take 1 tablet by mouth Daily., Disp: 30 tablet, Rfl: 11  •  albuterol (PROVENTIL) (2.5 MG/3ML) 0.083% nebulizer solution, Take 2.5 mg by nebulization Every 4 (Four) Hours As Needed for Wheezing or Shortness of Air., Disp: 120 each, Rfl: 2  •  albuterol sulfate  (90 Base) MCG/ACT inhaler, Inhale 2 puffs Every 6 (Six) Hours As Needed for Wheezing., Disp: 8.5 g, Rfl: 3  •  buPROPion XL (Wellbutrin XL) 150 MG 24 hr tablet, Take 1 tablet by mouth Daily., Disp: 30 tablet, Rfl: 11  •  cyanocobalamin 1000 MCG/ML injection, Inject 1 mL into the appropriate muscle as directed by prescriber Every 30 (Thirty) Days., Disp: 3 mL, Rfl: 3  •  fluticasone (FLONASE) 50 MCG/ACT nasal spray, 2 sprays by Each Nare route Daily., Disp: 48 g, Rfl: 1  •  Linzess 145 MCG capsule capsule, TAKE 1 CAPSULE BY MOUTH EVERY MORNING BEFORE BREAKFAST, Disp: 30 capsule,  "Rfl: 1  •  metFORMIN ER (GLUCOPHAGE-XR) 500 MG 24 hr tablet, Take 1 tablet by mouth Daily With Breakfast., Disp: 90 tablet, Rfl: 1  •  montelukast (SINGULAIR) 10 MG tablet, Take 1 tablet by mouth Every Night., Disp: 90 tablet, Rfl: 1  •  omeprazole (priLOSEC) 40 MG capsule, Take 1 capsule by mouth Daily., Disp: 90 capsule, Rfl: 1  •  oxyCODONE-acetaminophen (PERCOCET) 5-325 MG per tablet, oxycodone-acetaminophen 5-325 mg oral tablet 1 every 8 hrs as needed.   Active, Disp: , Rfl:   •  prednisoLONE acetate (PRED FORTE) 1 % ophthalmic suspension, , Disp: , Rfl:   •  pregabalin (LYRICA) 100 MG capsule, Take 1 cap PO Q am and 2 caps PO Q HS, Disp: 90 capsule, Rfl: 2  •  traZODone (DESYREL) 100 MG tablet, Take 1 tablet by mouth Every Night., Disp: 90 tablet, Rfl: 1    The following portions of the patient's history were reviewed and updated as appropriate: allergies, current medications, past family history, past medical history, past social history, past surgical history and problem list.    Review of Systems   All other systems reviewed and are negative.      BP Readings from Last 3 Encounters:   05/11/23 121/66   03/22/23 124/72   03/16/23 122/80      Wt Readings from Last 3 Encounters:   05/11/23 89.7 kg (197 lb 11.2 oz)   03/22/23 91.6 kg (202 lb)   03/16/23 92.1 kg (203 lb)      BMI: Estimated body mass index is 30.07 kg/m² as calculated from the following:    Height as of 5/11/23: 172.7 cm (67.99\").    Weight as of 5/11/23: 89.7 kg (197 lb 11.2 oz). BSA: Estimated body surface area is 2.03 meters squared as calculated from the following:    Height as of 5/11/23: 172.7 cm (67.99\").    Weight as of 5/11/23: 89.7 kg (197 lb 11.2 oz).    Objective   Physical Exam  Constitutional:       General: She is not in acute distress.  Pulmonary:      Effort: Pulmonary effort is normal. No respiratory distress.   Neurological:      General: No focal deficit present.      Mental Status: She is alert and oriented to person, place, " and time.   Psychiatric:         Mood and Affect: Mood normal.         Behavior: Behavior normal.         Thought Content: Thought content normal.         Judgment: Judgment normal.         Assessment & Plan     Diagnoses and all orders for this visit:    1. Menopausal symptoms (Primary)    2. Mood change    Other orders  -     sertraline (Zoloft) 50 MG tablet; Take 1 tablet by mouth Daily.  Dispense: 30 tablet; Refill: 11    -We discussed various treatment strategies.  Through shared decision-making, patient opted to add Zoloft to her Wellbutrin to see if we can find an optimal balance of benefits and side effects.  We discussed that if this does not work, I would really like her to see a psychiatrist for further management.  - Discussed GeneSight testing as well.  Patient would like to obtain.  We discussed that I do not interpret these results so she would need to see a psychiatrist for interpretation.  I am happy to order it for her though.  She will see if her PCP and [facility] can draw the lab.  If not, I can see if I can send the lab order somewhere close to home for her.  She will let me know if this is needed.    Return in about 2 months (around 7/18/2023) for telephone visit.    I spent 20 minutes caring for Sahara Delarosa on this date of service. This time includes time spent by me in the following activities: preparing for the visit, reviewing tests, obtaining and/or reviewing a separately obtained history, performing a medically appropriate examination and/or evaluation, counseling and educating the patient/family/caregiver, ordering medications, tests, or procedures and documenting information in the medical record.    Rosalba Chiu MD   5/18/2023 09:35 EDT

## 2023-05-22 ENCOUNTER — TRANSCRIBE ORDERS (OUTPATIENT)
Dept: ADMINISTRATIVE | Facility: HOSPITAL | Age: 48
End: 2023-05-22
Payer: COMMERCIAL

## 2023-05-22 DIAGNOSIS — M54.6 PAIN IN THORACIC SPINE: Primary | ICD-10-CM

## 2023-05-22 DIAGNOSIS — M47.14 SPONDYLOSIS WITH MYELOPATHY, THORACIC REGION: ICD-10-CM

## 2023-06-01 DIAGNOSIS — J45.30 MILD PERSISTENT ASTHMA WITHOUT COMPLICATION: ICD-10-CM

## 2023-06-01 RX ORDER — ALBUTEROL SULFATE 90 UG/1
AEROSOL, METERED RESPIRATORY (INHALATION)
Qty: 8.5 G | Refills: 3 | Status: SHIPPED | OUTPATIENT
Start: 2023-06-01

## 2023-06-09 ENCOUNTER — HOSPITAL ENCOUNTER (OUTPATIENT)
Dept: MRI IMAGING | Facility: HOSPITAL | Age: 48
Discharge: HOME | End: 2023-06-09
Payer: COMMERCIAL

## 2023-06-09 DIAGNOSIS — M54.6 PAIN IN THORACIC SPINE: ICD-10-CM

## 2023-06-09 DIAGNOSIS — M47.14 SPONDYLOSIS WITH MYELOPATHY, THORACIC REGION: ICD-10-CM

## 2023-06-09 PROCEDURE — 72146 MRI CHEST SPINE W/O DYE: CPT

## 2023-06-12 ENCOUNTER — OFFICE VISIT (OUTPATIENT)
Dept: FAMILY MEDICINE CLINIC | Facility: CLINIC | Age: 48
End: 2023-06-12
Payer: COMMERCIAL

## 2023-06-12 VITALS
OXYGEN SATURATION: 99 % | DIASTOLIC BLOOD PRESSURE: 72 MMHG | HEART RATE: 82 BPM | HEIGHT: 68 IN | BODY MASS INDEX: 30.16 KG/M2 | SYSTOLIC BLOOD PRESSURE: 134 MMHG | WEIGHT: 199 LBS | TEMPERATURE: 97.5 F

## 2023-06-12 DIAGNOSIS — J01.40 ACUTE NON-RECURRENT PANSINUSITIS: ICD-10-CM

## 2023-06-12 DIAGNOSIS — M79.7 FIBROMYALGIA: Primary | ICD-10-CM

## 2023-06-12 DIAGNOSIS — F41.9 ANXIETY: ICD-10-CM

## 2023-06-12 DIAGNOSIS — J45.41 MODERATE PERSISTENT ASTHMA WITH EXACERBATION: ICD-10-CM

## 2023-06-12 PROCEDURE — 99214 OFFICE O/P EST MOD 30 MIN: CPT | Performed by: NURSE PRACTITIONER

## 2023-06-12 RX ORDER — PREGABALIN 100 MG/1
CAPSULE ORAL
Qty: 90 CAPSULE | Refills: 2 | Status: SHIPPED | OUTPATIENT
Start: 2023-06-12

## 2023-06-12 RX ORDER — DOXYCYCLINE HYCLATE 100 MG/1
100 CAPSULE ORAL 2 TIMES DAILY
Qty: 20 CAPSULE | Refills: 0 | Status: SHIPPED | OUTPATIENT
Start: 2023-06-12

## 2023-06-12 RX ORDER — METHYLPREDNISOLONE 4 MG/1
TABLET ORAL
Qty: 21 EACH | Refills: 0 | Status: SHIPPED | OUTPATIENT
Start: 2023-06-12

## 2023-06-12 RX ORDER — DULOXETIN HYDROCHLORIDE 20 MG/1
20 CAPSULE, DELAYED RELEASE ORAL DAILY
Qty: 90 CAPSULE | Refills: 0 | Status: SHIPPED | OUTPATIENT
Start: 2023-06-12

## 2023-06-12 RX ORDER — BENZONATATE 100 MG/1
100 CAPSULE ORAL 3 TIMES DAILY PRN
Qty: 30 CAPSULE | Refills: 0 | Status: SHIPPED | OUTPATIENT
Start: 2023-06-12

## 2023-06-12 NOTE — PROGRESS NOTES
Chief Complaint  Facial Pain (She thinks this started out as a small cold and now maybe a sinus infection.  She has thick green mucus. )    Subjective            Sahara Delarosa presents to [facility]  History of Present Illness  Pt reports started with cold like s/s >1 week ago and then now reports the sinus pressure and tenderness and then the eyes as well --and also the asthma been flaring up a little and then already on all her regular allergy and asthma meds     ________________________________________    Also needs her Lyrica refilled for the fibro pain as this has been the only medication that been effective she probably failed multiple meds in the past and is even been under pain management and this was the most effective medication regarding her fibromyalgia pain she has no signs and symptoms of misuse nor abuse no aberrant behaviors takes medications as prescribed follows up every 3 months and Rick report is appropriate and will need this refilled in just a few weeks overall reports that the fibro is better controlled on this medication is anything else that she has tried in the past    __________________________________________    Patient also reports that she was wondering if there is any way to test to see what medications were best regarding any type of anxiety disorder as her GYN in the past had started her on the zoloft in the past for anxiety then had some SE which were more low libido type symptoms--and then changed to wellbutrin and caused HA's but the libido symptoms resolved and currently she is off of all meds for anxiety except continues the trazodone for sleep and tolerates this very well with no side effects or issues no SI/HI but does report that the anxiety is increased--and then in the past was on cymbalta--for the fibro pain and tolerated well but did not really help with the fibro but never took it for anxiety so we can go ahead and proceed with a  trial of this and then patient reports that in the future her GYN recommended psychiatry referral and evaluation and patient would proceed with this if absolutely necessary but would like to trial the Cymbalta first and then also possible testing/swab to see what SSRI or SNRI would be the best medication for her anxiety in the future    PHQ-2 Total Score: 0  PHQ-9 Total Score: 0    Past Medical History:   Diagnosis Date    Allergic     Allergic rhinitis     Anxiety and depression     Asthma     exercise induced    Back pain     Bowel incontinence     Condyloma acuminata     Diverticulitis of colon     Endometrial polyp     Family history of breast cancer     Fatty liver     Fibroids     Fibromyalgia     Fibromyalgia, primary     GERD (gastroesophageal reflux disease)     Hernia     Irritable bowel syndrome     Low back pain     Metabolic syndrome     Osteoarthritis     In addition to fibromyalgia    Sleep apnea     CPAP    Urinary frequency     Uterine prolapse     Vitamin B 12 deficiency        Allergies   Allergen Reactions    Bactrim [Sulfamethoxazole-Trimethoprim] Rash    Codeine Rash        Past Surgical History:   Procedure Laterality Date    BREAST BIOPSY Left 2013    Hyper-pigmented lesion, left nipple.= Benign    COLONOSCOPY      CYST REMOVAL      on chest and groin area     ENDOMETRIAL ABLATION  2015    HSC, DNC, Novasure Endometrial Ablation    ENDOSCOPY  2017    EGD: Stomach inflammation, Neg for H Pylori.    HIP ARTHROPLASTY Right 09/2021    HIP SURGERY Right 2019    LABRUM REPAIR    JOINT REPLACEMENT  September 2021    Rt Hip    TOTAL LAPAROSCOPIC HYSTERECTOMY SALPINGO OOPHORECTOMY Bilateral 03/30/2022    Procedure: TOTAL LAPAROSCOPIC HYSTERECTOMY, BILATERAL SALPINGECTOMY, AND CYSTOSCOPY;  Surgeon: Rosalba Chiu MD;  Location: [facility];  Service: Obstetrics/Gynecology;  Laterality: Bilateral;    UPPER GASTROINTESTINAL ENDOSCOPY          Social History     Tobacco Use    Smoking status:  Never     Passive exposure: Yes    Smokeless tobacco: Never   Vaping Use    Vaping Use: Never used   Substance Use Topics    Alcohol use: Never    Drug use: Never       Family History   Problem Relation Age of Onset    Other Mother         Multiple lipomas.    Arthritis Mother     Breast cancer Mother 70        Double mastectomy     Cancer Mother         breast cancer    Diabetes type II Father     Diabetes Father     Fibromyalgia Sister     No Known Problems Daughter     No Known Problems Son     Breast cancer Maternal Aunt         4 different mat aunts with breast cancer.    Cancer Maternal Aunt         stomach    Breast cancer Paternal Aunt 60        dx ~ age 60    Osteoporosis Maternal Grandmother         ?    Parkinsonism Maternal Grandmother     Stomach cancer Maternal Grandfather         dx >50    No Known Problems Paternal Grandmother     Brain cancer Paternal Grandfather         dx >50    BRCA 1/2 Neg Hx     Colon cancer Neg Hx     Endometrial cancer Neg Hx     Ovarian cancer Neg Hx     Malig Hyperthermia Neg Hx         Health Maintenance Due   Topic Date Due    URINE MICROALBUMIN  Never done    ANNUAL PHYSICAL  Never done    DIABETIC FOOT EXAM  Never done    DIABETIC EYE EXAM  Never done    COVID-19 Vaccine (6 - Moderna series) 12/31/2021    Pneumococcal Vaccine 0-64 (2 - PCV) 02/23/2023    HEMOGLOBIN A1C  06/01/2023        Current Outpatient Medications on File Prior to Visit   Medication Sig    albuterol (PROVENTIL) (2.5 MG/3ML) 0.083% nebulizer solution Take 2.5 mg by nebulization Every 4 (Four) Hours As Needed for Wheezing or Shortness of Air.    albuterol sulfate  (90 Base) MCG/ACT inhaler INHALE 2 PUFFS BY MOUTH EVERY 6 HOURS AS NEEDED FOR WHEEZING    cyanocobalamin 1000 MCG/ML injection Inject 1 mL into the appropriate muscle as directed by prescriber Every 30 (Thirty) Days.    fluticasone (FLONASE) 50 MCG/ACT nasal spray 2 sprays by Each Nare route Daily.    Linzess 145 MCG capsule capsule  TAKE 1 CAPSULE BY MOUTH EVERY MORNING BEFORE BREAKFAST    metFORMIN ER (GLUCOPHAGE-XR) 500 MG 24 hr tablet Take 1 tablet by mouth Daily With Breakfast.    montelukast (SINGULAIR) 10 MG tablet Take 1 tablet by mouth Every Night.    omeprazole (priLOSEC) 40 MG capsule Take 1 capsule by mouth Daily.    oxyCODONE-acetaminophen (PERCOCET) 5-325 MG per tablet oxycodone-acetaminophen 5-325 mg oral tablet 1 every 8 hrs as needed.   Active    prednisoLONE acetate (PRED FORTE) 1 % ophthalmic suspension     traZODone (DESYREL) 100 MG tablet Take 1 tablet by mouth Every Night.    [DISCONTINUED] pregabalin (LYRICA) 100 MG capsule Take 1 cap PO Q am and 2 caps PO Q HS    [DISCONTINUED] buPROPion XL (Wellbutrin XL) 150 MG 24 hr tablet Take 1 tablet by mouth Daily.    [DISCONTINUED] sertraline (Zoloft) 50 MG tablet Take 1 tablet by mouth Daily.     No current facility-administered medications on file prior to visit.       Immunization History   Administered Date(s) Administered    COVID-19 (MODERNA) 1st,2nd,3rd Dose Monovalent 02/03/2021, 03/03/2021, 11/05/2021    COVID-19 (UNSPECIFIED) 02/03/2021, 03/03/2021, 11/05/2021    Flu Vaccine Quad PF >36MO 10/19/2016, 09/20/2017, 10/14/2018    Flu Vaccine Split Quad 10/19/2016, 09/20/2017, 10/14/2018    FluLaval/Fluzone >6mos 11/08/2022    Hepatitis A 12/06/2018    Influenza, Unspecified 10/14/2018, 10/01/2019    Pneumococcal Polysaccharide (PPSV23) 02/23/2022    Tdap 10/14/2018       Review of Systems   Constitutional:  Negative for chills and fever.   HENT:  Positive for postnasal drip, rhinorrhea and sinus pressure. Negative for sore throat.    Eyes:  Positive for discharge.        Crusts a little of the morning    Respiratory:  Positive for cough. Negative for shortness of breath.         Croupy cough --asthma cough    Cardiovascular:  Negative for chest pain.   Gastrointestinal:  Negative for diarrhea, nausea and vomiting.   Musculoskeletal:  Positive for arthralgias, back pain and  "myalgias.        These are chronically    Neurological:  Positive for headache. Negative for dizziness, syncope and light-headedness.   Psychiatric/Behavioral:  Negative for self-injury, suicidal ideas and depressed mood. The patient is nervous/anxious.  +     Objective     /72 (BP Location: Right arm, Patient Position: Sitting, Cuff Size: Adult)   Pulse 82   Temp 97.5 °F (36.4 °C) (Temporal)   Ht 172.1 cm (67.75\")   Wt 90.3 kg (199 lb)   SpO2 99%   BMI 30.48 kg/m²       Physical Exam  Vitals and nursing note reviewed.   Constitutional:       Appearance: Normal appearance.   HENT:      Head: Normocephalic.      Right Ear: Tympanic membrane, ear canal and external ear normal.      Left Ear: Tympanic membrane, ear canal and external ear normal.      Nose: Nose normal.      Right Sinus: Maxillary sinus tenderness and frontal sinus tenderness present.      Left Sinus: Maxillary sinus tenderness and frontal sinus tenderness present.      Mouth/Throat:      Mouth: Mucous membranes are moist.   Eyes:      Pupils: Pupils are equal, round, and reactive to light.   Cardiovascular:      Rate and Rhythm: Normal rate and regular rhythm.      Heart sounds: Normal heart sounds.   Pulmonary:      Effort: Pulmonary effort is normal.      Breath sounds: Normal breath sounds.   Abdominal:      Palpations: Abdomen is soft.   Musculoskeletal:         General: Normal range of motion.      Cervical back: Normal range of motion.   Skin:     General: Skin is warm and dry.   Neurological:      Mental Status: She is alert and oriented to person, place, and time.   Psychiatric:         Mood and Affect: Mood normal.         Behavior: Behavior normal.         Thought Content: Thought content normal.         Judgment: Judgment normal.       Result Review :                          Assessment and Plan      Diagnoses and all orders for this visit:    1. Fibromyalgia (Primary)  -     pregabalin (LYRICA) 100 MG capsule; Take 1 cap PO Q am " and 2 caps PO Q HS  Dispense: 90 capsule; Refill: 2    2. Acute non-recurrent pansinusitis  -     doxycycline (VIBRAMYCIN) 100 MG capsule; Take 1 capsule by mouth 2 (Two) Times a Day.  Dispense: 20 capsule; Refill: 0    3. Moderate persistent asthma with exacerbation  -     doxycycline (VIBRAMYCIN) 100 MG capsule; Take 1 capsule by mouth 2 (Two) Times a Day.  Dispense: 20 capsule; Refill: 0  -     methylPREDNISolone (MEDROL) 4 MG dose pack; Take as directed on package instructions.  Dispense: 21 each; Refill: 0  -     benzonatate (Tessalon Perles) 100 MG capsule; Take 1 capsule by mouth 3 (Three) Times a Day As Needed for Cough.  Dispense: 30 capsule; Refill: 0    4. Anxiety  -     DULoxetine (CYMBALTA) 20 MG capsule; Take 1 capsule by mouth Daily.  Dispense: 90 capsule; Refill: 0    We will treat the sinusitis infection and asthma exacerbation as noted above    And then refilled the Lyrica with regards to the fibromyalgia pain and patient continues in pain management as well for other musculoskeletal type symptoms and issues    We will do the trial of the Cymbalta lowest dose for the anxiety to see if this is effective and does not cause any side effects as in the past she tolerated it well with no side effects reported at that time        Follow Up     Return in about 3 months (around 9/5/2023), or if symptoms worsen or fail to improve, for Recheck, Annual physical, fasting labs and refills.    Answers submitted by the patient for this visit:  Other (Submitted on 6/12/2023)  Please describe your symptoms.: Sinus pressure  Have you had these symptoms before?: No  How long have you been having these symptoms?: 5-7 days  Primary Reason for Visit (Submitted on 6/12/2023)  What is the primary reason for your visit?: Other

## 2023-08-10 DIAGNOSIS — G47.00 INSOMNIA, UNSPECIFIED TYPE: ICD-10-CM

## 2023-08-10 DIAGNOSIS — J45.30 MILD PERSISTENT ASTHMA WITHOUT COMPLICATION: ICD-10-CM

## 2023-08-10 DIAGNOSIS — J30.2 SEASONAL ALLERGIC RHINITIS, UNSPECIFIED TRIGGER: ICD-10-CM

## 2023-08-10 RX ORDER — TRAZODONE HYDROCHLORIDE 100 MG/1
100 TABLET ORAL NIGHTLY
Qty: 90 TABLET | Refills: 1 | OUTPATIENT
Start: 2023-08-10

## 2023-08-10 RX ORDER — FLUTICASONE PROPIONATE 50 MCG
SPRAY, SUSPENSION (ML) NASAL
Qty: 48 G | Refills: 1 | OUTPATIENT
Start: 2023-08-10

## 2023-08-10 RX ORDER — MONTELUKAST SODIUM 10 MG/1
10 TABLET ORAL NIGHTLY
Qty: 90 TABLET | Refills: 1 | OUTPATIENT
Start: 2023-08-10

## 2023-08-30 DIAGNOSIS — Z12.31 VISIT FOR SCREENING MAMMOGRAM: Primary | ICD-10-CM

## 2023-09-04 DIAGNOSIS — K59.04 CHRONIC IDIOPATHIC CONSTIPATION: ICD-10-CM

## 2023-09-05 RX ORDER — LINACLOTIDE 145 UG/1
CAPSULE, GELATIN COATED ORAL
Qty: 30 CAPSULE | Refills: 1 | OUTPATIENT
Start: 2023-09-05

## 2023-09-06 ENCOUNTER — OFFICE VISIT (OUTPATIENT)
Dept: FAMILY MEDICINE CLINIC | Facility: CLINIC | Age: 48
End: 2023-09-06
Payer: COMMERCIAL

## 2023-09-06 VITALS
SYSTOLIC BLOOD PRESSURE: 130 MMHG | OXYGEN SATURATION: 97 % | HEIGHT: 68 IN | BODY MASS INDEX: 30.46 KG/M2 | TEMPERATURE: 96.7 F | WEIGHT: 201 LBS | DIASTOLIC BLOOD PRESSURE: 72 MMHG | HEART RATE: 78 BPM

## 2023-09-06 DIAGNOSIS — E88.81 METABOLIC SYNDROME: ICD-10-CM

## 2023-09-06 DIAGNOSIS — R41.3 MEMORY CHANGES: ICD-10-CM

## 2023-09-06 DIAGNOSIS — R47.89 WORD FINDING DIFFICULTY: ICD-10-CM

## 2023-09-06 DIAGNOSIS — E53.8 VITAMIN B 12 DEFICIENCY: ICD-10-CM

## 2023-09-06 DIAGNOSIS — M79.7 FIBROMYALGIA: ICD-10-CM

## 2023-09-06 DIAGNOSIS — K21.9 GASTROESOPHAGEAL REFLUX DISEASE WITHOUT ESOPHAGITIS: ICD-10-CM

## 2023-09-06 DIAGNOSIS — E55.9 VITAMIN D DEFICIENCY: ICD-10-CM

## 2023-09-06 DIAGNOSIS — G47.00 INSOMNIA, UNSPECIFIED TYPE: ICD-10-CM

## 2023-09-06 DIAGNOSIS — R25.2 NOCTURNAL FOOT CRAMPS: ICD-10-CM

## 2023-09-06 DIAGNOSIS — R41.89 BRAIN FOG: ICD-10-CM

## 2023-09-06 DIAGNOSIS — J45.30 MILD PERSISTENT ASTHMA WITHOUT COMPLICATION: Primary | ICD-10-CM

## 2023-09-06 DIAGNOSIS — R42 DIZZINESS: ICD-10-CM

## 2023-09-06 DIAGNOSIS — J30.2 SEASONAL ALLERGIC RHINITIS, UNSPECIFIED TRIGGER: ICD-10-CM

## 2023-09-06 DIAGNOSIS — E66.9 CLASS 1 OBESITY WITH SERIOUS COMORBIDITY AND BODY MASS INDEX (BMI) OF 30.0 TO 30.9 IN ADULT, UNSPECIFIED OBESITY TYPE: ICD-10-CM

## 2023-09-06 DIAGNOSIS — R20.2 PARESTHESIAS: ICD-10-CM

## 2023-09-06 DIAGNOSIS — F41.9 ANXIETY: ICD-10-CM

## 2023-09-06 DIAGNOSIS — Z79.899 HIGH RISK MEDICATION USE: ICD-10-CM

## 2023-09-06 PROCEDURE — 99214 OFFICE O/P EST MOD 30 MIN: CPT | Performed by: NURSE PRACTITIONER

## 2023-09-06 RX ORDER — FLUTICASONE PROPIONATE 50 MCG
2 SPRAY, SUSPENSION (ML) NASAL DAILY
Qty: 48 G | Refills: 1 | Status: SHIPPED | OUTPATIENT
Start: 2023-09-06

## 2023-09-06 RX ORDER — MONTELUKAST SODIUM 10 MG/1
10 TABLET ORAL NIGHTLY
Qty: 90 TABLET | Refills: 1 | Status: SHIPPED | OUTPATIENT
Start: 2023-09-06

## 2023-09-06 RX ORDER — ALBUTEROL SULFATE 90 UG/1
2 AEROSOL, METERED RESPIRATORY (INHALATION) EVERY 6 HOURS PRN
Qty: 8.5 G | Refills: 3 | Status: SHIPPED | OUTPATIENT
Start: 2023-09-06

## 2023-09-06 RX ORDER — OMEPRAZOLE 40 MG/1
40 CAPSULE, DELAYED RELEASE ORAL DAILY
Qty: 90 CAPSULE | Refills: 1 | Status: SHIPPED | OUTPATIENT
Start: 2023-09-06

## 2023-09-06 RX ORDER — TRAZODONE HYDROCHLORIDE 100 MG/1
100 TABLET ORAL NIGHTLY
Qty: 90 TABLET | Refills: 1 | Status: SHIPPED | OUTPATIENT
Start: 2023-09-06

## 2023-09-06 RX ORDER — METFORMIN HYDROCHLORIDE 500 MG/1
500 TABLET, EXTENDED RELEASE ORAL
Qty: 90 TABLET | Refills: 1 | Status: SHIPPED | OUTPATIENT
Start: 2023-09-06

## 2023-09-06 RX ORDER — CYANOCOBALAMIN 1000 UG/ML
1000 INJECTION, SOLUTION INTRAMUSCULAR; SUBCUTANEOUS
Qty: 3 ML | Refills: 3 | Status: SHIPPED | OUTPATIENT
Start: 2023-09-06

## 2023-09-06 RX ORDER — DULOXETIN HYDROCHLORIDE 20 MG/1
20 CAPSULE, DELAYED RELEASE ORAL DAILY
Qty: 90 CAPSULE | Refills: 1 | Status: CANCELLED | OUTPATIENT
Start: 2023-09-06

## 2023-09-06 NOTE — PROGRESS NOTES
Answers submitted by the patient for this visit:  Other (Submitted on 8/30/2023)  Please describe your symptoms.: 3 month medication check up  Have you had these symptoms before?: Yes  How long have you been having these symptoms?: Greater than 2 weeks  Primary Reason for Visit (Submitted on 8/30/2023)  What is the primary reason for your visit?: Other  Chief Complaint  Depression (Medication refills)    Subjective            Sahara Delarosa presents to [facility]  History of Present Illness  Patient is here today for her medication refills on the chronic comorbid conditions managed from the primary care standpoint and she will also be due for her fasting labs    -- Metabolic syndrome: Already metformin very well with no side effects no issues and overall stable on the medicine except for the fact that she is slowly regaining weight    --Asthma: This week stable but last week she was having little asthma flare with weather changes and air quality changes and she was having to use her rescue inhaler    -- Allergies: Tolerating medication well with no side effects no issues reported overall stable    -- Anxiety disorder:since starting the Cymbalta lowest dose--20 mg--pt reports noticing searches for words and then memory issues and paresthesias and brain fog--no SI/HI--and we will hold this for now--prior to that tried zoloft 25 mg then zoloft 50 mg then wellbutrin  mg and then back to the zoloft 50 mg--and those caused sexual dysfunction     -- Insomnia: tolerates this well no SE no issues no SI/HI and overall stable     --GERD: Tolerating medication well with no side effects no issues reported overall stable    -- Fibromyalgia: Tolerating the Lyrica very well with no side effects no issues reported overall stable no SI/HI and improves her quality of life overall and Rick report is appropriate and she does not need a refill today as she just got it filled last week but she  will need it filled first of next month and is due for her yearly urine tox screen and shows no aberrant behaviors no signs and symptoms of misuse nor abuse and patient always follows up faithfully        PHQ-2 Total Score: 0  PHQ-9 Total Score: 0    Past Medical History:   Diagnosis Date    Allergic     Allergic rhinitis     Anxiety and depression     Asthma     exercise induced    Back pain     Bowel incontinence     Condyloma acuminata     Diverticulitis of colon     Endometrial polyp     Family history of breast cancer     Fatty liver     Fibroids     Fibromyalgia     Fibromyalgia, primary     GERD (gastroesophageal reflux disease)     Hernia     Irritable bowel syndrome     Low back pain     Metabolic syndrome     Osteoarthritis     In addition to fibromyalgia    Sleep apnea     CPAP    Urinary frequency     Uterine prolapse     Vitamin B 12 deficiency        Allergies   Allergen Reactions    Bactrim [Sulfamethoxazole-Trimethoprim] Rash    Codeine Rash        Past Surgical History:   Procedure Laterality Date    BREAST BIOPSY Left 2013    Hyper-pigmented lesion, left nipple.= Benign    COLONOSCOPY      CYST REMOVAL      on chest and groin area     ENDOM[place]    [facility] [place] [facility] [place]    ENDOSCOPY  2017    EGD: Stomach inflammation, Neg for H Pylori.    HIP ARTHROPLASTY Right 09/2021    HIP SURGERY Right 2019    LABRUM REPAIR    JOINT REPLACEMENT  September 2021    Rt Hip    TOTAL LAPAROSCOPIC HYSTERECTOMY SALPINGO OOPHORECTOMY Bilateral 03/30/2022    Procedure: TOTAL LAPAROSCOPIC HYSTERECTOMY, BILATERAL SALPINGECTOMY, AND CYSTOSCOPY;  Surgeon: Rosalba Chiu MD;  Location: [facility];  Service: Obstetrics/Gynecology;  Laterality: Bilateral;    UPPER GASTROINTESTINAL ENDOSCOPY          Social History     Tobacco Use    Smoking status: Never     Passive exposure: Yes    Smokeless tobacco: Never   Vaping Use    Vaping Use: Never used   Substance Use Topics    Alcohol  use: Never    Drug use: Never       Family History   Problem Relation Age of Onset    Other Mother         Multiple lipomas.    Arthritis Mother     Breast cancer Mother 70        Double mastectomy     Cancer Mother         breast cancer    Diabetes type II Father     Diabetes Father     Fibromyalgia Sister     No Known Problems Daughter     No Known Problems Son     Breast cancer Maternal Aunt         4 different mat aunts with breast cancer.    Cancer Maternal Aunt         stomach    Breast cancer Paternal Aunt 60        dx ~ age 60    Osteoporosis Maternal Grandmother         ?    Parkinsonism Maternal Grandmother     Stomach cancer Maternal Grandfather         dx >50    No Known Problems Paternal Grandmother     Brain cancer Paternal Grandfather         dx >50    BRCA 1/2 Neg Hx     Colon cancer Neg Hx     Endometrial cancer Neg Hx     Ovarian cancer Neg Hx     Malig Hyperthermia Neg Hx         Health Maintenance Due   Topic Date Due    URINE MICROALBUMIN  Never done    ANNUAL PHYSICAL  Never done    COVID-19 Vaccine (6 - Moderna series) 12/31/2021    HEMOGLOBIN A1C  06/01/2023        Current Outpatient Medications on File Prior to Visit   Medication Sig    albuterol (PROVENTIL) (2.5 MG/3ML) 0.083% nebulizer solution Take 2.5 mg by nebulization Every 4 (Four) Hours As Needed for Wheezing or Shortness of Air.    Linzess 145 MCG capsule capsule TAKE 1 CAPSULE BY MOUTH EVERY MORNING BEFORE BREAKFAST    oxyCODONE-acetaminophen (PERCOCET) 5-325 MG per tablet oxycodone-acetaminophen 5-325 mg oral tablet 1 every 8 hrs as needed.   Active    prednisoLONE acetate (PRED FORTE) 1 % ophthalmic suspension     pregabalin (LYRICA) 100 MG capsule Take 1 cap PO Q am and 2 caps PO Q HS    [DISCONTINUED] albuterol sulfate  (90 Base) MCG/ACT inhaler INHALE 2 PUFFS BY MOUTH EVERY 6 HOURS AS NEEDED FOR WHEEZING    [DISCONTINUED] cyanocobalamin 1000 MCG/ML injection Inject 1 mL into the appropriate muscle as directed by  prescriber Every 30 (Thirty) Days.    [DISCONTINUED] fluticasone (FLONASE) 50 MCG/ACT nasal spray 2 sprays by Each Nare route Daily.    [DISCONTINUED] metFORMIN ER (GLUCOPHAGE-XR) 500 MG 24 hr tablet Take 1 tablet by mouth Daily With Breakfast.    [DISCONTINUED] montelukast (SINGULAIR) 10 MG tablet Take 1 tablet by mouth Every Night.    [DISCONTINUED] omeprazole (priLOSEC) 40 MG capsule Take 1 capsule by mouth Daily.    [DISCONTINUED] traZODone (DESYREL) 100 MG tablet Take 1 tablet by mouth Every Night.    [DISCONTINUED] benzonatate (Tessalon Perles) 100 MG capsule Take 1 capsule by mouth 3 (Three) Times a Day As Needed for Cough.    [DISCONTINUED] doxycycline (VIBRAMYCIN) 100 MG capsule Take 1 capsule by mouth 2 (Two) Times a Day.    [DISCONTINUED] DULoxetine (CYMBALTA) 20 MG capsule Take 1 capsule by mouth Daily.    [DISCONTINUED] methylPREDNISolone (MEDROL) 4 MG dose pack Take as directed on package instructions.     No current facility-administered medications on file prior to visit.       Immunization History   Administered Date(s) Administered    COVID-19 (MODERNA) 1st,2nd,3rd Dose Monovalent 02/03/2021, 03/03/2021, 11/05/2021    COVID-19 (UNSPECIFIED) 02/03/2021, 03/03/2021, 11/05/2021    Flu Vaccine Quad PF >36MO 10/19/2016, 09/20/2017, 10/14/2018    Flu Vaccine Split Quad 10/19/2016, 09/20/2017, 10/14/2018    Fluzone >6mos 11/08/2022    Hepatitis A 12/06/2018    Influenza Injectable Mdck Pf Quad 11/08/2022    Influenza, Unspecified 10/14/2018, 10/01/2019    Pneumococcal Polysaccharide (PPSV23) 02/23/2022    Tdap 10/14/2018       Review of Systems   Constitutional:  Positive for fatigue.   HENT:  Positive for trouble swallowing.         At times and then prior EGD 2020 with Dr Ghotra    Eyes:  Positive for blurred vision. Negative for double vision and visual disturbance.   Respiratory:  Negative for choking and shortness of breath.         Only if asthma flared SOB    Cardiovascular:  Negative for chest  "pain.   Gastrointestinal:  Negative for abdominal pain and blood in stool.   Endocrine: Negative for polydipsia, polyphagia and polyuria.   Genitourinary:  Positive for frequency. Negative for dysuria.   Musculoskeletal:  Positive for myalgias.        Muscle pain and spasms    Allergic/Immunologic: Positive for environmental allergies.   Neurological:  Positive for dizziness, numbness and memory problem. Negative for syncope.        Tingling of the BUE --also some dizziness and memory issues and brain fog and searches for words    Psychiatric/Behavioral:  Positive for decreased concentration and sleep disturbance. Negative for self-injury and suicidal ideas. The patient is nervous/anxious.         Irritable --and sleep well controlled on med       Objective     /72 (BP Location: Right arm, Patient Position: Sitting, Cuff Size: Adult)   Pulse 78   Temp 96.7 °F (35.9 °C) (Temporal)   Ht 172.1 cm (67.75\")   Wt 91.2 kg (201 lb)   SpO2 97%   BMI 30.79 kg/m²       Physical Exam  Vitals and nursing note reviewed.   Constitutional:       Appearance: Normal appearance. She is obese.   HENT:      Head: Normocephalic.      Right Ear: External ear normal.      Left Ear: External ear normal.      Nose: Nose normal.      Mouth/Throat:      Mouth: Mucous membranes are moist.   Eyes:      Pupils: Pupils are equal, round, and reactive to light.   Cardiovascular:      Rate and Rhythm: Normal rate and regular rhythm.      Heart sounds: Normal heart sounds.   Pulmonary:      Effort: Pulmonary effort is normal.      Breath sounds: Normal breath sounds.   Abdominal:      Palpations: Abdomen is soft.      Tenderness: There is no abdominal tenderness.   Musculoskeletal:         General: Normal range of motion.      Cervical back: Normal range of motion and neck supple.      Comments: (+) PTTP of the bilat upper back mid and lower back and upper chest wall and BUE and BLE    Skin:     General: Skin is warm and dry. "   Neurological:      Mental Status: She is alert and oriented to person, place, and time.   Psychiatric:         Mood and Affect: Mood normal.         Behavior: Behavior normal.         Thought Content: Thought content normal.         Judgment: Judgment normal.       Result Review :                           Assessment and Plan      Diagnoses and all orders for this visit:    1. Mild persistent asthma without complication (Primary)  -     montelukast (SINGULAIR) 10 MG tablet; Take 1 tablet by mouth Every Night.  Dispense: 90 tablet; Refill: 1  -     albuterol sulfate  (90 Base) MCG/ACT inhaler; Inhale 2 puffs Every 6 (Six) Hours As Needed for Wheezing.  Dispense: 8.5 g; Refill: 3  -     CBC & Differential  -     Comprehensive Metabolic Panel    2. Anxiety  -     CBC & Differential  -     Comprehensive Metabolic Panel  -     TSH Rfx On Abnormal To Free T4  -     [facility] - Swab,    3. Insomnia, unspecified type  -     traZODone (DESYREL) 100 MG tablet; Take 1 tablet by mouth Every Night.  Dispense: 90 tablet; Refill: 1  -     CBC & Differential  -     Comprehensive Metabolic Panel  -     TSH Rfx On Abnormal To Free T4  -     [facility] - Swab,    4. Metabolic syndrome  -     metFORMIN ER (GLUCOPHAGE-XR) 500 MG 24 hr tablet; Take 1 tablet by mouth Daily With Breakfast.  Dispense: 90 tablet; Refill: 1  -     CBC & Differential  -     Comprehensive Metabolic Panel  -     Hemoglobin A1c  -     Urinalysis With Culture If Indicated -  -     Lipid Panel  -     TSH Rfx On Abnormal To Free T4    5. Gastroesophageal reflux disease without esophagitis  -     omeprazole (priLOSEC) 40 MG capsule; Take 1 capsule by mouth Daily.  Dispense: 90 capsule; Refill: 1  -     CBC & Differential  -     Comprehensive Metabolic Panel    6. Vitamin B 12 deficiency  -     cyanocobalamin 1000 MCG/ML injection; Inject 1 mL into the appropriate muscle as directed by prescriber Every 30 (Thirty) Days.  Dispense: 3 mL; Refill: 3  -      Vitamin B12 & Folate    7. Seasonal allergic rhinitis, unspecified trigger  -     fluticasone (FLONASE) 50 MCG/ACT nasal spray; 2 sprays by Each Nare route Daily.  Dispense: 48 g; Refill: 1  -     montelukast (SINGULAIR) 10 MG tablet; Take 1 tablet by mouth Every Night.  Dispense: 90 tablet; Refill: 1  -     CBC & Differential  -     Comprehensive Metabolic Panel    8. Fibromyalgia  -     CBC & Differential  -     Comprehensive Metabolic Panel  -     Urine Drug Screen - Urine, Clean Catch    9. High risk medication use  -     Urine Drug Screen - Urine, Clean Catch    10. Vitamin D deficiency  -     Vitamin D,25-Hydroxy    11. Paresthesias  -     MRI Brain With & Without Contrast; Future    12. Brain fog  -     MRI Brain With & Without Contrast; Future    13. Word finding difficulty  -     MRI Brain With & Without Contrast; Future    14. Memory changes  -     MRI Brain With & Without Contrast; Future    15. Nocturnal foot cramps  -     Magnesium    16. Dizziness  -     MRI Brain With & Without Contrast; Future    17. Class 1 obesity with serious comorbidity and body mass index (BMI) of 30.0 to 30.9 in adult, unspecified obesity type  Comments:  counseled diet and exercise    For now will hold the cymbalta and then get the gensight swab today     Then pt will stop by fasting for her labs     Will refill the lyrica when due         Follow Up     Return in about 4 months (around 1/2/2024), or if symptoms worsen or fail to improve, for Recheck.    Patient was given instructions and counseling regarding her condition or for health maintenance advice. Please see specific information pulled into the AVS if appropriate.     BMI is >= 30 and <35. (Class 1 Obesity). The following options were offered after discussion;: exercise counseling/recommendations and nutrition counseling/recommendations      Sahara Delarosa  reports that she has never smoked. She has been exposed to tobacco smoke. She has never used smokeless  tobacco.

## 2023-09-08 ENCOUNTER — CLINICAL SUPPORT (OUTPATIENT)
Dept: FAMILY MEDICINE CLINIC | Facility: CLINIC | Age: 48
End: 2023-09-08
Payer: COMMERCIAL

## 2023-09-08 DIAGNOSIS — E53.8 LOW SERUM VITAMIN B12: ICD-10-CM

## 2023-09-08 DIAGNOSIS — E66.09 CLASS 1 OBESITY DUE TO EXCESS CALORIES WITHOUT SERIOUS COMORBIDITY WITH BODY MASS INDEX (BMI) OF 31.0 TO 31.9 IN ADULT: Primary | ICD-10-CM

## 2023-09-08 DIAGNOSIS — E55.9 VITAMIN D DEFICIENCY: ICD-10-CM

## 2023-09-08 LAB
25(OH)D3 SERPL-MCNC: 31.6 NG/ML (ref 30–100)
ALBUMIN SERPL-MCNC: 4.3 G/DL (ref 3.5–5.2)
ALBUMIN/GLOB SERPL: 1.7 G/DL
ALP SERPL-CCNC: 95 U/L (ref 39–117)
ALT SERPL W P-5'-P-CCNC: 14 U/L (ref 1–33)
AMPHET+METHAMPHET UR QL: NEGATIVE
ANION GAP SERPL CALCULATED.3IONS-SCNC: 9 MMOL/L (ref 5–15)
AST SERPL-CCNC: 11 U/L (ref 1–32)
BARBITURATES UR QL SCN: NEGATIVE
BENZODIAZ UR QL SCN: NEGATIVE
BILIRUB SERPL-MCNC: 0.4 MG/DL (ref 0–1.2)
BILIRUB UR QL STRIP: NEGATIVE
BUN SERPL-MCNC: 11 MG/DL (ref 6–20)
BUN/CREAT SERPL: 14.5 (ref 7–25)
CALCIUM SPEC-SCNC: 9.8 MG/DL (ref 8.6–10.5)
CANNABINOIDS SERPL QL: NEGATIVE
CHLORIDE SERPL-SCNC: 106 MMOL/L (ref 98–107)
CHOLEST SERPL-MCNC: 182 MG/DL (ref 0–200)
CLARITY UR: CLEAR
CO2 SERPL-SCNC: 28 MMOL/L (ref 22–29)
COCAINE UR QL: NEGATIVE
COLOR UR: YELLOW
CREAT SERPL-MCNC: 0.76 MG/DL (ref 0.57–1)
EGFRCR SERPLBLD CKD-EPI 2021: 95.6 ML/MIN/1.73
FENTANYL UR-MCNC: NEGATIVE NG/ML
FOLATE SERPL-MCNC: 7.74 NG/ML (ref 4.78–24.2)
GLOBULIN UR ELPH-MCNC: 2.5 GM/DL
GLUCOSE SERPL-MCNC: 85 MG/DL (ref 65–99)
GLUCOSE UR STRIP-MCNC: NEGATIVE MG/DL
HBA1C MFR BLD: 5.3 % (ref 4.8–5.6)
HDLC SERPL-MCNC: 47 MG/DL (ref 40–60)
HGB UR QL STRIP.AUTO: NEGATIVE
KETONES UR QL STRIP: NEGATIVE
LDLC SERPL CALC-MCNC: 117 MG/DL (ref 0–100)
LDLC/HDLC SERPL: 2.46 {RATIO}
LEUKOCYTE ESTERASE UR QL STRIP.AUTO: NEGATIVE
MAGNESIUM SERPL-MCNC: 1.9 MG/DL (ref 1.6–2.6)
METHADONE UR QL SCN: NEGATIVE
NITRITE UR QL STRIP: NEGATIVE
OPIATES UR QL: NEGATIVE
OXYCODONE UR QL SCN: POSITIVE
PH UR STRIP.AUTO: 6.5 [PH] (ref 5–8)
POTASSIUM SERPL-SCNC: 4 MMOL/L (ref 3.5–5.2)
PROT SERPL-MCNC: 6.8 G/DL (ref 6–8.5)
PROT UR QL STRIP: NEGATIVE
SODIUM SERPL-SCNC: 143 MMOL/L (ref 136–145)
SP GR UR STRIP: 1.02 (ref 1–1.03)
TRIGL SERPL-MCNC: 97 MG/DL (ref 0–150)
TSH SERPL DL<=0.05 MIU/L-ACNC: 1.47 UIU/ML (ref 0.27–4.2)
UROBILINOGEN UR QL STRIP: NORMAL
VIT B12 BLD-MCNC: 403 PG/ML (ref 211–946)
VLDLC SERPL-MCNC: 18 MG/DL (ref 5–40)

## 2023-09-08 PROCEDURE — 80053 COMPREHEN METABOLIC PANEL: CPT | Performed by: NURSE PRACTITIONER

## 2023-09-08 PROCEDURE — 80061 LIPID PANEL: CPT | Performed by: NURSE PRACTITIONER

## 2023-09-08 PROCEDURE — 80307 DRUG TEST PRSMV CHEM ANLYZR: CPT | Performed by: NURSE PRACTITIONER

## 2023-09-08 PROCEDURE — 82746 ASSAY OF FOLIC ACID SERUM: CPT | Performed by: NURSE PRACTITIONER

## 2023-09-08 PROCEDURE — 82607 VITAMIN B-12: CPT | Performed by: NURSE PRACTITIONER

## 2023-09-08 PROCEDURE — 84443 ASSAY THYROID STIM HORMONE: CPT | Performed by: NURSE PRACTITIONER

## 2023-09-08 PROCEDURE — 83735 ASSAY OF MAGNESIUM: CPT | Performed by: NURSE PRACTITIONER

## 2023-09-08 PROCEDURE — 82306 VITAMIN D 25 HYDROXY: CPT | Performed by: NURSE PRACTITIONER

## 2023-09-08 PROCEDURE — 83036 HEMOGLOBIN GLYCOSYLATED A1C: CPT | Performed by: NURSE PRACTITIONER

## 2023-09-08 PROCEDURE — 36415 COLL VENOUS BLD VENIPUNCTURE: CPT | Performed by: NURSE PRACTITIONER

## 2023-09-08 PROCEDURE — 81003 URINALYSIS AUTO W/O SCOPE: CPT | Performed by: NURSE PRACTITIONER

## 2023-09-08 NOTE — PROGRESS NOTES
..  Venipuncture Blood Specimen Collection  Venipuncture performed in LT arm by Armida Leal MA with good hemostasis. Patient tolerated the procedure well without complications.   09/08/23   Armida Leal MA

## 2023-09-09 NOTE — PROGRESS NOTES
Please mail letter to patient stating    Sahara, the cholesterol panel shows everything normal with the exception of the LDL slightly elevated at 117 and should be less than 100 when fasting; the urinalysis was normal range and the magnesium levels were normal range and the comprehensive panel shows normal glucose and normal kidney and liver function tests and normal electrolytes; and the hemoglobin A1c was also normal range;     Yearly urine tox screen was as expected with you being under the care of pain management; thyroid levels normal range; vitamin B12 and folic acid normal range; vitamin D level was in normal range and I would recommend you are taking vitamin B12 and vitamin D supplements over-the-counter    We are still awaiting the [facility] swab and the blood counts

## 2023-09-13 ENCOUNTER — PATIENT MESSAGE (OUTPATIENT)
Dept: FAMILY MEDICINE CLINIC | Facility: CLINIC | Age: 48
End: 2023-09-13
Payer: COMMERCIAL

## 2023-09-13 DIAGNOSIS — F41.9 ANXIETY: Primary | ICD-10-CM

## 2023-09-13 RX ORDER — BUSPIRONE HYDROCHLORIDE 5 MG/1
5 TABLET ORAL 2 TIMES DAILY PRN
Qty: 60 TABLET | Refills: 2 | Status: SHIPPED | OUTPATIENT
Start: 2023-09-13

## 2023-09-13 NOTE — TELEPHONE ENCOUNTER
From: Sahara Delarosa  To: Maira Gama  Sent: 9/13/2023 4:04 PM EDT  Subject: Question regarding [facility]ES[facility]    Could I please go ahead and try the buspar for anxiety?

## 2023-10-09 ENCOUNTER — PATIENT MESSAGE (OUTPATIENT)
Dept: FAMILY MEDICINE CLINIC | Facility: CLINIC | Age: 48
End: 2023-10-09
Payer: COMMERCIAL

## 2023-10-09 DIAGNOSIS — F41.9 ANXIETY: ICD-10-CM

## 2023-10-09 RX ORDER — BUSPIRONE HYDROCHLORIDE 10 MG/1
10 TABLET ORAL 2 TIMES DAILY PRN
Qty: 60 TABLET | Refills: 2 | Status: SHIPPED | OUTPATIENT
Start: 2023-10-09

## 2023-10-09 NOTE — TELEPHONE ENCOUNTER
From: Sahara Delarosa  To: Maira Gama  Sent: 10/9/2023 9:52 AM EDT  Subject: Anxiety Medication    I have currenlty been taking Busprione 5 mg for 5 weeks. I have noticed alittle change. Was wondering if it would be to soon to ask for a increase of dosage?

## 2023-10-10 ENCOUNTER — HOSPITAL ENCOUNTER (OUTPATIENT)
Dept: MRI IMAGING | Facility: HOSPITAL | Age: 48
Discharge: HOME | End: 2023-10-10
Admitting: NURSE PRACTITIONER
Payer: COMMERCIAL

## 2023-10-10 DIAGNOSIS — R41.3 MEMORY CHANGES: ICD-10-CM

## 2023-10-10 DIAGNOSIS — R47.89 WORD FINDING DIFFICULTY: ICD-10-CM

## 2023-10-10 DIAGNOSIS — R20.2 PARESTHESIAS: ICD-10-CM

## 2023-10-10 DIAGNOSIS — R42 DIZZINESS: ICD-10-CM

## 2023-10-10 DIAGNOSIS — R41.89 BRAIN FOG: ICD-10-CM

## 2023-10-10 PROCEDURE — 70551 MRI BRAIN STEM W/O DYE: CPT

## 2023-10-10 NOTE — PROGRESS NOTES
Please mail letter to patient stating    Yenny the MRI of the brain shows ventricles normal and no abnormality in the sulci; no gray or white matter abnormality no abnormality of the pituitary or the orbits and the paranasal sinuses are also well aerated and no abnormal mastoid signal and everything overall was completely normal

## 2023-10-24 ENCOUNTER — PATIENT MESSAGE (OUTPATIENT)
Dept: FAMILY MEDICINE CLINIC | Facility: CLINIC | Age: 48
End: 2023-10-24
Payer: COMMERCIAL

## 2023-10-24 DIAGNOSIS — F33.9 RECURRENT MAJOR DEPRESSIVE DISORDER, REMISSION STATUS UNSPECIFIED: Primary | ICD-10-CM

## 2023-10-24 NOTE — TELEPHONE ENCOUNTER
From: Sahara Delarosa  To: Maira Gama  Sent: 10/24/2023 11:48 AM EDT  Subject: Medication     I am asking for your advise on if you think I need to take a depression medication?     I find myself distancing from others. My mind is constantly going. I find it hard to focus and finish tasks that I start. I am also still having headaches. Maybe they are stressed induced, since nothing was found on my MRI.

## 2023-10-25 PROBLEM — F33.9 RECURRENT MAJOR DEPRESSIVE DISORDER: Status: ACTIVE | Noted: 2023-10-25

## 2023-10-25 RX ORDER — DESVENLAFAXINE SUCCINATE 50 MG/1
50 TABLET, EXTENDED RELEASE ORAL DAILY
Qty: 30 TABLET | Refills: 0 | Status: SHIPPED | OUTPATIENT
Start: 2023-10-25

## 2023-11-02 DIAGNOSIS — F41.9 ANXIETY: ICD-10-CM

## 2023-11-02 RX ORDER — DULOXETIN HYDROCHLORIDE 20 MG/1
20 CAPSULE, DELAYED RELEASE ORAL DAILY
Qty: 90 CAPSULE | Refills: 0 | OUTPATIENT
Start: 2023-11-02

## 2023-11-14 DIAGNOSIS — F33.9 RECURRENT MAJOR DEPRESSIVE DISORDER, REMISSION STATUS UNSPECIFIED: ICD-10-CM

## 2023-11-15 RX ORDER — DESVENLAFAXINE SUCCINATE 50 MG/1
50 TABLET, EXTENDED RELEASE ORAL DAILY
Qty: 30 TABLET | Refills: 0 | OUTPATIENT
Start: 2023-11-15

## 2023-11-16 ENCOUNTER — OFFICE VISIT (OUTPATIENT)
Dept: FAMILY MEDICINE CLINIC | Facility: CLINIC | Age: 48
End: 2023-11-16
Payer: COMMERCIAL

## 2023-11-16 VITALS
SYSTOLIC BLOOD PRESSURE: 132 MMHG | BODY MASS INDEX: 30.92 KG/M2 | HEIGHT: 68 IN | HEART RATE: 72 BPM | WEIGHT: 204 LBS | DIASTOLIC BLOOD PRESSURE: 72 MMHG | TEMPERATURE: 97.7 F

## 2023-11-16 DIAGNOSIS — J01.00 ACUTE NON-RECURRENT MAXILLARY SINUSITIS: ICD-10-CM

## 2023-11-16 DIAGNOSIS — F33.9 RECURRENT MAJOR DEPRESSIVE DISORDER, REMISSION STATUS UNSPECIFIED: Primary | ICD-10-CM

## 2023-11-16 DIAGNOSIS — F41.9 ANXIETY: ICD-10-CM

## 2023-11-16 DIAGNOSIS — M79.7 FIBROMYALGIA: ICD-10-CM

## 2023-11-16 PROCEDURE — 99214 OFFICE O/P EST MOD 30 MIN: CPT | Performed by: NURSE PRACTITIONER

## 2023-11-16 RX ORDER — DESVENLAFAXINE SUCCINATE 50 MG/1
50 TABLET, EXTENDED RELEASE ORAL DAILY
Qty: 30 TABLET | Refills: 2 | Status: SHIPPED | OUTPATIENT
Start: 2023-11-16

## 2023-11-16 RX ORDER — AZITHROMYCIN 250 MG/1
TABLET, FILM COATED ORAL
Qty: 6 TABLET | Refills: 0 | Status: SHIPPED | OUTPATIENT
Start: 2023-11-16

## 2023-11-16 RX ORDER — BUSPIRONE HYDROCHLORIDE 15 MG/1
15 TABLET ORAL 2 TIMES DAILY PRN
Qty: 60 TABLET | Refills: 2 | Status: SHIPPED | OUTPATIENT
Start: 2023-11-16

## 2023-11-16 RX ORDER — PREGABALIN 100 MG/1
CAPSULE ORAL
Qty: 90 CAPSULE | Refills: 2 | Status: SHIPPED | OUTPATIENT
Start: 2023-11-16

## 2023-11-16 NOTE — PROGRESS NOTES
Answers submitted by the patient for this visit:  Other (Submitted on 11/9/2023)  Please describe your symptoms.: Medication check up  Have you had these symptoms before?: Yes  How long have you been having these symptoms?: Greater than 2 weeks  Primary Reason for Visit (Submitted on 11/9/2023)  What is the primary reason for your visit?: Other  Chief Complaint  Follow-up (Follow up on medicine. )    Subjective            Sahara Delarosa presents to [facility]  History of Present Illness  Patient is here today for the follow-up with regards to the new start on the Pristiq for the depression and she continues on the buspirone for the anxiety and this was based off of the GeneSight swab that we did and patient reports that she is receiving improvements overall on the Pristiq with no side effects no issues reported no SI/HI but she does feel as though she could benefit from the buspirone possibly being increased with regards to the stress and anxiety and thus far she has tolerated the medications very well with no side effects    Also patient reports that she is having a fibromyalgia flare and is hurting far worse and she is due for her Lyrica to be refilled and patient takes medication only as directed and this does overall for the most part improve her quality of life and decreases the pain overall and makes it to where she is able to do her ADLs and work and patient shows no aberrant behaviors no signs and symptoms of misuse nor abuse and Rick report is appropriate and patient only takes medications as directed    And then also she feels as though she is just kind of felt bad this week not really sure what she feels like it may just be postnasal drainage some allergy some sinus pressure but no fever no chills no shortness of breath chest pain nausea vomiting or diarrhea-and only a mild headache and mild cough especially when she lays down at night from the postnasal drainage but no  myalgias like body aches like flulike symptoms      PHQ-2 Total Score: 8  PHQ-9 Total Score: 8    Past Medical History:   Diagnosis Date    Allergic     Allergic rhinitis     Anxiety and depression     Asthma     exercise induced    Back pain     Bowel incontinence     Condyloma acuminata     Diverticulitis of colon     Endometrial polyp     Family history of breast cancer     Fatty liver     Fibroids     Fibromyalgia     Fibromyalgia, primary     GERD (gastroesophageal reflux disease)     Hernia     Irritable bowel syndrome     Low back pain     Metabolic syndrome     Osteoarthritis     In addition to fibromyalgia    Sleep apnea     CPAP    Urinary frequency     Uterine prolapse     Vitamin B 12 deficiency        Allergies   Allergen Reactions    Bactrim [Sulfamethoxazole-Trimethoprim] Rash    Codeine Rash        Past Surgical History:   Procedure Laterality Date    BREAST BIOPSY Left 2013    Hyper-pigmented lesion, left nipple.= Benign    COLONOSCOPY      CYST REMOVAL      on chest and groin area     ENDOMETRIAL ABLATION  2015    HSC, DNC, Novasure Endometrial Ablation    ENDOSCOPY  2017    EGD: Stomach inflammation, Neg for H Pylori.    HIP ARTHROPLASTY Right 09/2021    HIP SURGERY Right 2019    LABRUM REPAIR    JOINT REPLACEMENT  September 2021    Rt Hip    TOTAL LAPAROSCOPIC HYSTERECTOMY SALPINGO OOPHORECTOMY Bilateral 03/30/2022    Procedure: TOTAL LAPAROSCOPIC HYSTERECTOMY, BILATERAL SALPINGECTOMY, AND CYSTOSCOPY;  Surgeon: Rosalba Chiu MD;  Location: [facility];  Service: Obstetrics/Gynecology;  Laterality: Bilateral;    UPPER GASTROINTESTINAL ENDOSCOPY          Social History     Tobacco Use    Smoking status: Never     Passive exposure: Yes    Smokeless tobacco: Never   Vaping Use    Vaping Use: Never used   Substance Use Topics    Alcohol use: Never    Drug use: Never       Family History   Problem Relation Age of Onset    Other Mother         Multiple lipomas.    Arthritis Mother     Breast  cancer Mother 70        Double mastectomy     Cancer Mother         breast cancer    Diabetes type II Father     Diabetes Father     Fibromyalgia Sister     No Known Problems Daughter     No Known Problems Son     Breast cancer Maternal Aunt         4 different mat aunts with breast cancer.    Cancer Maternal Aunt         stomach    Breast cancer Paternal Aunt 60        dx ~ age 60    Osteoporosis Maternal Grandmother         ?    Parkinsonism Maternal Grandmother     Stomach cancer Maternal Grandfather         dx >50    No Known Problems Paternal Grandmother     Brain cancer Paternal Grandfather         dx >50    BRCA 1/2 Neg Hx     Colon cancer Neg Hx     Endometrial cancer Neg Hx     Ovarian cancer Neg Hx     Malig Hyperthermia Neg Hx         Health Maintenance Due   Topic Date Due    ANNUAL PHYSICAL  Never done    COVID-19 Vaccine (7 - 2023-24 season) 09/01/2023        Current Outpatient Medications on File Prior to Visit   Medication Sig    albuterol (PROVENTIL) (2.5 MG/3ML) 0.083% nebulizer solution Take 2.5 mg by nebulization Every 4 (Four) Hours As Needed for Wheezing or Shortness of Air.    albuterol sulfate  (90 Base) MCG/ACT inhaler Inhale 2 puffs Every 6 (Six) Hours As Needed for Wheezing.    cyanocobalamin 1000 MCG/ML injection Inject 1 mL into the appropriate muscle as directed by prescriber Every 30 (Thirty) Days.    fluticasone (FLONASE) 50 MCG/ACT nasal spray 2 sprays by Each Nare route Daily.    Linzess 145 MCG capsule capsule TAKE 1 CAPSULE BY MOUTH EVERY MORNING BEFORE BREAKFAST    metFORMIN ER (GLUCOPHAGE-XR) 500 MG 24 hr tablet Take 1 tablet by mouth Daily With Breakfast.    montelukast (SINGULAIR) 10 MG tablet Take 1 tablet by mouth Every Night.    omeprazole (priLOSEC) 40 MG capsule Take 1 capsule by mouth Daily.    oxyCODONE-acetaminophen (PERCOCET) 5-325 MG per tablet oxycodone-acetaminophen 5-325 mg oral tablet 1 every 8 hrs as needed.   Active    prednisoLONE acetate (PRED FORTE) 1  % ophthalmic suspension     traZODone (DESYREL) 100 MG tablet Take 1 tablet by mouth Every Night.    [DISCONTINUED] busPIRone (BUSPAR) 10 MG tablet Take 1 tablet by mouth 2 (Two) Times a Day As Needed (anxiety).    [DISCONTINUED] desvenlafaxine (Pristiq) 50 MG 24 hr tablet Take 1 tablet by mouth Daily.    [DISCONTINUED] pregabalin (LYRICA) 100 MG capsule Take 1 cap PO Q am and 2 caps PO Q HS     No current facility-administered medications on file prior to visit.       Immunization History   Administered Date(s) Administered    COVID-19 (MODERNA) 1st,2nd,3rd Dose Monovalent 02/03/2021, 03/03/2021, 11/05/2021    COVID-19 (UNSPECIFIED) 02/03/2021, 03/03/2021, 11/05/2021    Flu Vaccine Quad PF >36MO 10/19/2016, 09/20/2017, 10/14/2018    Flu Vaccine Split Quad 10/19/2016, 09/20/2017, 10/14/2018    Fluzone (or Fluarix & Flulaval for VFC) >6mos 11/08/2022    Hepatitis A 12/06/2018    Influenza Injectable Mdck Pf Quad 11/08/2022    Influenza, Unspecified 10/14/2018, 10/01/2019    Pneumococcal Polysaccharide (PPSV23) 02/23/2022    Tdap 10/14/2018       Review of Systems   Constitutional:  Positive for fatigue. Negative for chills and fever.   HENT:  Positive for congestion, postnasal drip and sinus pressure. Negative for sore throat.         Started earlier this week    Respiratory:  Positive for cough. Negative for shortness of breath and wheezing.         At night at times    Cardiovascular:  Negative for chest pain.   Gastrointestinal:  Negative for diarrhea, nausea and vomiting.   Endocrine: Negative for polydipsia, polyphagia and polyuria.   Genitourinary:  Negative for dysuria.   Musculoskeletal:  Positive for arthralgias and myalgias.        Pt reports having a huge fibro flare up --worked 1/2 day yesterday and left early Tuesday and then today still just not up to nayan    Allergic/Immunologic: Positive for environmental allergies.   Neurological:  Positive for headache. Negative for dizziness, syncope and  "light-headedness.        Started with week with HA and thought R/T stress    Psychiatric/Behavioral:  Positive for sleep disturbance and depressed mood. Negative for self-injury and suicidal ideas. The patient is nervous/anxious.         Objective     /72 (BP Location: Right arm, Patient Position: Sitting, Cuff Size: Adult)   Pulse 72   Temp 97.7 °F (36.5 °C) (Temporal)   Ht 172.1 cm (67.75\")   Wt 92.5 kg (204 lb)   BMI 31.25 kg/m²       Physical Exam  Vitals and nursing note reviewed.   Constitutional:       Appearance: Normal appearance.   HENT:      Head: Normocephalic.      Right Ear: Tympanic membrane, ear canal and external ear normal.      Left Ear: Tympanic membrane, ear canal and external ear normal.      Nose: Nose normal.      Right Sinus: Maxillary sinus tenderness present.      Left Sinus: Maxillary sinus tenderness present.      Comments: Bilat cheek flushing as well      Mouth/Throat:      Mouth: Mucous membranes are moist.   Eyes:      Pupils: Pupils are equal, round, and reactive to light.   Cardiovascular:      Rate and Rhythm: Normal rate and regular rhythm.      Heart sounds: Normal heart sounds.   Pulmonary:      Effort: Pulmonary effort is normal.      Breath sounds: Normal breath sounds.   Abdominal:      Palpations: Abdomen is soft.   Musculoskeletal:         General: Tenderness present.      Cervical back: Normal range of motion and neck supple.      Comments: (+) PTTP over the bilateral upper extremity muscles and lower extremity muscles and throughout the back and the upper chest wall like normal with the fibromyalgia   Skin:     General: Skin is warm and dry.   Neurological:      Mental Status: She is alert and oriented to person, place, and time.   Psychiatric:         Mood and Affect: Mood normal.         Behavior: Behavior normal.         Thought Content: Thought content normal.         Judgment: Judgment normal.         Result Review :                           Assessment " and Plan      Diagnoses and all orders for this visit:    1. Recurrent major depressive disorder, remission status unspecified (Primary)  -     desvenlafaxine (Pristiq) 50 MG 24 hr tablet; Take 1 tablet by mouth Daily.  Dispense: 30 tablet; Refill: 2    2. Anxiety  -     busPIRone (BUSPAR) 15 MG tablet; Take 1 tablet by mouth 2 (Two) Times a Day As Needed (anxiety).  Dispense: 60 tablet; Refill: 2    3. Fibromyalgia  -     pregabalin (LYRICA) 100 MG capsule; Take 1 cap PO Q am and 2 caps PO Q HS  Dispense: 90 capsule; Refill: 2    4. Acute non-recurrent maxillary sinusitis  -     azithromycin (Zithromax Z-Wally) 250 MG tablet; Take 2 tablets by mouth on day 1, then 1 tablet daily on days 2-5  Dispense: 6 tablet; Refill: 0    She was quite tender in the bilateral maxillary area and we will proceed and send in the Zithromax for this    And then medication refills on the Lyrica and the Pristiq were done as noted above    And then with regards to the buspirone increased from 10 mg twice daily to 15 mg twice daily        Follow Up     Return in about 3 months (around 2/19/2024), or if symptoms worsen or fail to improve, for Recheck, fasting labs and refills.    Patient was given instructions and counseling regarding her condition or for health maintenance advice. Please see specific information pulled into the AVS if appropriate.            Sahara ELVA Delarosa  reports that she has never smoked. She has been exposed to tobacco smoke. She has never used smokeless tobacco.. I have educated her on the risk of diseases from using tobacco products .

## 2023-11-20 DIAGNOSIS — G47.00 INSOMNIA, UNSPECIFIED TYPE: ICD-10-CM

## 2023-11-20 DIAGNOSIS — J45.30 MILD PERSISTENT ASTHMA WITHOUT COMPLICATION: ICD-10-CM

## 2023-11-20 DIAGNOSIS — J30.2 SEASONAL ALLERGIC RHINITIS, UNSPECIFIED TRIGGER: ICD-10-CM

## 2023-11-20 RX ORDER — MONTELUKAST SODIUM 10 MG/1
10 TABLET ORAL NIGHTLY
Qty: 90 TABLET | Refills: 1 | OUTPATIENT
Start: 2023-11-20

## 2023-11-20 RX ORDER — TRAZODONE HYDROCHLORIDE 50 MG/1
50 TABLET ORAL NIGHTLY
Qty: 90 TABLET | Refills: 1 | OUTPATIENT
Start: 2023-11-20

## 2023-11-23 DIAGNOSIS — K21.9 GASTROESOPHAGEAL REFLUX DISEASE WITHOUT ESOPHAGITIS: ICD-10-CM

## 2023-11-24 DIAGNOSIS — E53.8 VITAMIN B 12 DEFICIENCY: ICD-10-CM

## 2023-11-25 RX ORDER — CYANOCOBALAMIN 1000 UG/ML
INJECTION, SOLUTION INTRAMUSCULAR; SUBCUTANEOUS
Qty: 3 ML | Refills: 3 | OUTPATIENT
Start: 2023-11-25

## 2023-11-25 RX ORDER — OMEPRAZOLE 40 MG/1
40 CAPSULE, DELAYED RELEASE ORAL DAILY
Qty: 90 CAPSULE | Refills: 1 | OUTPATIENT
Start: 2023-11-25

## 2023-12-04 ENCOUNTER — TRANSCRIBE ORDERS (OUTPATIENT)
Dept: ADMINISTRATIVE | Facility: HOSPITAL | Age: 48
End: 2023-12-04
Payer: COMMERCIAL

## 2023-12-04 ENCOUNTER — LAB (OUTPATIENT)
Dept: LAB | Facility: HOSPITAL | Age: 48
End: 2023-12-04
Payer: COMMERCIAL

## 2023-12-04 DIAGNOSIS — M51.36 OTHER INTERVERTEBRAL DISC DEGENERATION, LUMBAR REGION: Primary | ICD-10-CM

## 2023-12-04 DIAGNOSIS — M51.36 OTHER INTERVERTEBRAL DISC DEGENERATION, LUMBAR REGION: ICD-10-CM

## 2023-12-04 PROCEDURE — 87081 CULTURE SCREEN ONLY: CPT

## 2023-12-05 LAB — MRSA SPEC QL CULT: NORMAL

## 2023-12-13 ENCOUNTER — TELEPHONE (OUTPATIENT)
Dept: FAMILY MEDICINE CLINIC | Facility: CLINIC | Age: 48
End: 2023-12-13
Payer: COMMERCIAL

## 2023-12-13 DIAGNOSIS — Z77.21 EXPOSURE TO BLOOD OR BODY FLUID: Primary | ICD-10-CM
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
## 2023-12-14 ENCOUNTER — OFFICE VISIT (OUTPATIENT)
Dept: FAMILY MEDICINE CLINIC | Facility: CLINIC | Age: 48
End: 2023-12-14
Payer: OTHER MISCELLANEOUS

## 2023-12-14 VITALS
SYSTOLIC BLOOD PRESSURE: 136 MMHG | WEIGHT: 210 LBS | OXYGEN SATURATION: 98 % | DIASTOLIC BLOOD PRESSURE: 69 MMHG | HEART RATE: 85 BPM | BODY MASS INDEX: 32.17 KG/M2 | TEMPERATURE: 97 F

## 2023-12-14 DIAGNOSIS — Z77.21 EXPOSURE TO BLOOD: Primary | ICD-10-CM

## 2023-12-14 LAB
BASOPHILS # BLD AUTO: 0.02 10*3/MM3 (ref 0–0.2)
BASOPHILS NFR BLD AUTO: 0.4 % (ref 0–1.5)
DEPRECATED RDW RBC AUTO: 42.1 FL (ref 37–54)
EOSINOPHIL # BLD AUTO: 0.09 10*3/MM3 (ref 0–0.4)
EOSINOPHIL NFR BLD AUTO: 1.8 % (ref 0.3–6.2)
ERYTHROCYTE [DISTWIDTH] IN BLOOD BY AUTOMATED COUNT: 12.4 % (ref 12.3–15.4)
HAV IGM SERPL QL IA: NORMAL
HBV CORE IGM SERPL QL IA: NORMAL
HBV SURFACE AB SER RIA-ACNC: NORMAL
HBV SURFACE AG SERPL QL IA: NORMAL
HCT VFR BLD AUTO: 39.4 % (ref 34–46.6)
HCV AB SER DONR QL: NORMAL
HGB BLD-MCNC: 13.4 G/DL (ref 12–15.9)
HIV 1+2 AB+HIV1 P24 AG SERPL QL IA: NORMAL
IMM GRANULOCYTES # BLD AUTO: 0.05 10*3/MM3 (ref 0–0.05)
IMM GRANULOCYTES NFR BLD AUTO: 1 % (ref 0–0.5)
LYMPHOCYTES # BLD AUTO: 1.42 10*3/MM3 (ref 0.7–3.1)
LYMPHOCYTES NFR BLD AUTO: 28.3 % (ref 19.6–45.3)
MCH RBC QN AUTO: 31.3 PG (ref 26.6–33)
MCHC RBC AUTO-ENTMCNC: 34 G/DL (ref 31.5–35.7)
MCV RBC AUTO: 92.1 FL (ref 79–97)
MONOCYTES # BLD AUTO: 0.36 10*3/MM3 (ref 0.1–0.9)
MONOCYTES NFR BLD AUTO: 7.2 % (ref 5–12)
NEUTROPHILS NFR BLD AUTO: 3.07 10*3/MM3 (ref 1.7–7)
NEUTROPHILS NFR BLD AUTO: 61.3 % (ref 42.7–76)
NRBC BLD AUTO-RTO: 0 /100 WBC (ref 0–0.2)
PLATELET # BLD AUTO: 255 10*3/MM3 (ref 140–450)
PMV BLD AUTO: 9.7 FL (ref 6–12)
RBC # BLD AUTO: 4.28 10*6/MM3 (ref 3.77–5.28)
WBC NRBC COR # BLD AUTO: 5.01 10*3/MM3 (ref 3.4–10.8)

## 2023-12-14 PROCEDURE — 85025 COMPLETE CBC W/AUTO DIFF WBC: CPT | Performed by: NURSE PRACTITIONER

## 2023-12-14 NOTE — PROGRESS NOTES
Labs came back to me that I had ordered prior to her having to have her required visit today I had already put these in for her the day before that the hepatitis panel was completely negative and the HIV was negative--please let the patient know
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
## 2023-12-14 NOTE — PROGRESS NOTES
Venipuncture Blood Specimen Collection  Venipuncture performed in left arm by rigo berger with good hemostasis. Patient tolerated the procedure well without complications.   12/14/23   Penelope Rashid

## 2023-12-15 LAB — HBV CORE AB SERPL QL IA: NEGATIVE

## 2023-12-16 LAB — HAV AB SER QL IA: POSITIVE

## 2024-01-08 ENCOUNTER — OFFICE VISIT (OUTPATIENT)
Dept: FAMILY MEDICINE CLINIC | Facility: CLINIC | Age: 49
End: 2024-01-08
Payer: COMMERCIAL

## 2024-01-08 VITALS
SYSTOLIC BLOOD PRESSURE: 120 MMHG | DIASTOLIC BLOOD PRESSURE: 80 MMHG | TEMPERATURE: 98.4 F | HEIGHT: 67 IN | HEART RATE: 79 BPM | WEIGHT: 214.4 LBS | OXYGEN SATURATION: 96 % | BODY MASS INDEX: 33.65 KG/M2

## 2024-01-08 DIAGNOSIS — F33.9 RECURRENT MAJOR DEPRESSIVE DISORDER, REMISSION STATUS UNSPECIFIED: ICD-10-CM

## 2024-01-08 DIAGNOSIS — E88.810 METABOLIC SYNDROME: ICD-10-CM

## 2024-01-08 DIAGNOSIS — Z00.00 WELL ADULT EXAM: Primary | ICD-10-CM

## 2024-01-08 DIAGNOSIS — G47.00 INSOMNIA, UNSPECIFIED TYPE: ICD-10-CM

## 2024-01-08 DIAGNOSIS — F41.9 ANXIETY: ICD-10-CM

## 2024-01-08 DIAGNOSIS — E66.9 CLASS 1 OBESITY WITH SERIOUS COMORBIDITY AND BODY MASS INDEX (BMI) OF 33.0 TO 33.9 IN ADULT, UNSPECIFIED OBESITY TYPE: ICD-10-CM

## 2024-01-08 DIAGNOSIS — J45.30 MILD PERSISTENT ASTHMA WITHOUT COMPLICATION: ICD-10-CM

## 2024-01-08 DIAGNOSIS — M79.7 FIBROMYALGIA: ICD-10-CM

## 2024-01-08 DIAGNOSIS — J30.2 SEASONAL ALLERGIC RHINITIS, UNSPECIFIED TRIGGER: ICD-10-CM

## 2024-01-08 DIAGNOSIS — K21.9 GASTROESOPHAGEAL REFLUX DISEASE WITHOUT ESOPHAGITIS: ICD-10-CM

## 2024-01-08 PROCEDURE — 99396 PREV VISIT EST AGE 40-64: CPT | Performed by: NURSE PRACTITIONER

## 2024-01-08 RX ORDER — MONTELUKAST SODIUM 10 MG/1
10 TABLET ORAL NIGHTLY
Qty: 90 TABLET | Refills: 1 | Status: SHIPPED | OUTPATIENT
Start: 2024-01-08

## 2024-01-08 RX ORDER — DESVENLAFAXINE SUCCINATE 50 MG/1
50 TABLET, EXTENDED RELEASE ORAL DAILY
Qty: 30 TABLET | Refills: 5 | Status: SHIPPED | OUTPATIENT
Start: 2024-01-08

## 2024-01-08 RX ORDER — PREGABALIN 100 MG/1
CAPSULE ORAL
Qty: 90 CAPSULE | Refills: 2 | Status: SHIPPED | OUTPATIENT
Start: 2024-01-08

## 2024-01-08 RX ORDER — TRAZODONE HYDROCHLORIDE 100 MG/1
100 TABLET ORAL NIGHTLY
Qty: 90 TABLET | Refills: 1 | Status: SHIPPED | OUTPATIENT
Start: 2024-01-08

## 2024-01-08 RX ORDER — METFORMIN HYDROCHLORIDE 500 MG/1
500 TABLET, EXTENDED RELEASE ORAL
Qty: 90 TABLET | Refills: 1 | Status: SHIPPED | OUTPATIENT
Start: 2024-01-08

## 2024-01-08 RX ORDER — BUSPIRONE HYDROCHLORIDE 15 MG/1
15 TABLET ORAL 2 TIMES DAILY PRN
Qty: 60 TABLET | Refills: 5 | Status: SHIPPED | OUTPATIENT
Start: 2024-01-08

## 2024-01-08 RX ORDER — OMEPRAZOLE 40 MG/1
40 CAPSULE, DELAYED RELEASE ORAL DAILY
Qty: 90 CAPSULE | Refills: 1 | Status: SHIPPED | OUTPATIENT
Start: 2024-01-08

## 2024-01-08 NOTE — PROGRESS NOTES
Answers submitted by the patient for this visit:  Other (Submitted on 1/1/2024)  Please describe your symptoms.: Medication  Have you had these symptoms before?: Yes  How long have you been having these symptoms?: Greater than 2 weeks  Primary Reason for Visit (Submitted on 1/1/2024)  What is the primary reason for your visit?: Other  Chief Complaint  Annual Exam    Subjective            Sahara Delarosa presents to [facility]  History of Present Illness  ANNUAL EXAM--but sees GYN for the F/U since her ROEL and then also for her mammograms--    THEN ALSO here for the med refills on the chronic co-morbid conditions managed from the primary care standpoint --and just had all her labs recently--    Also reports she is gaining weight--and she has noticed this gradual increase in weight since having had her ROEL--and pt was getting concerned with regards to this--and denies having any personal or family history of medullary thyroid cancer or personal or family history of multiple endocrine neoplasia syndrome and would be interested in trying to start the once weekly injectables for weight loss if possible    -- Fibromyalgia: Chronic tenderness all across her muscles and responds well to the medication-Lyrica-with no side effects no issues reported no aberrant behaviors no signs or symptoms of misuse nor abuse and only takes medication as directed and Rick report was appropriate and patient yearly urine tox screen was appropriate and improves her overall quality of life regarding the chronic pain syndrome-and still continues seeing pain management for her other medications regarding her chronic pain    --Insomnia: Tolerating medication well with no side effects no issues reported no SI/HI overall stable    --Anxiety with depression: Denies all SI/HI; tolerating medication well with no side effects no issues reported overall stable    --GERD: Tolerating medication well with no side effects  no issues reported no breakthrough symptoms reported overall stable    --Asthma: Overall stable on the medications no side effects no issues reported no SI/HI overall stable    --Allergies: Tolerating medication well with no side effects no issues reported overall stable no SI/HI    --Metabolic syndrome: Tolerating medication well with no side effects no issues reported overall stable              PHQ-2 Total Score:    PHQ-9 Total Score:      Past Medical History:   Diagnosis Date    Allergic     Allergic rhinitis     Anxiety and depression     Asthma     exercise induced    Back pain     Bowel incontinence     Condyloma acuminata     Diverticulitis of colon     Endometrial polyp     Family history of breast cancer     Fatty liver     Fibroids     Fibromyalgia     Fibromyalgia, primary     GERD (gastroesophageal reflux disease)     Hernia     Irritable bowel syndrome     Low back pain     Metabolic syndrome     Osteoarthritis     In addition to fibromyalgia    Sleep apnea     CPAP    Urinary frequency     Uterine prolapse     Vitamin B 12 deficiency        Allergies   Allergen Reactions    Bactrim [Sulfamethoxazole-Trimethoprim] Rash    Codeine Rash        Past Surgical History:   Procedure Laterality Date    BREAST BIOPSY Left 2013    Hyper-pigmented lesion, left nipple.= Benign    COLONOSCOPY      CYST REMOVAL      on chest and groin area     ENDOMETRIAL ABLATION  2015    HSC, DNC, Novasure Endometrial Ablation    ENDOSCOPY  2017    EGD: Stomach inflammation, Neg for H Pylori.    HIP ARTHROPLASTY Right 09/2021    HIP SURGERY Right 2019    LABRUM REPAIR    JOINT REPLACEMENT  September 2021    Rt Hip    TOTAL LAPAROSCOPIC HYSTERECTOMY SALPINGO OOPHORECTOMY Bilateral 03/30/2022    Procedure: TOTAL LAPAROSCOPIC HYSTERECTOMY, BILATERAL SALPINGECTOMY, AND CYSTOSCOPY;  Surgeon: Rosalba Chiu MD;  Location: [facility];  Service: Obstetrics/Gynecology;  Laterality: Bilateral;    UPPER GASTROINTESTINAL ENDOSCOPY           Social History     Tobacco Use    Smoking status: Never     Passive exposure: Yes    Smokeless tobacco: Never   Vaping Use    Vaping Use: Never used   Substance Use Topics    Alcohol use: Never    Drug use: Never       Family History   Problem Relation Age of Onset    Other Mother         Multiple lipomas.    Arthritis Mother     Breast cancer Mother 70        Double mastectomy     Cancer Mother         breast cancer    Diabetes type II Father     Diabetes Father     Fibromyalgia Sister     No Known Problems Daughter     No Known Problems Son     Breast cancer Maternal Aunt         4 different mat aunts with breast cancer.    Cancer Maternal Aunt         stomach    Breast cancer Paternal Aunt 60        dx ~ age 60    Osteoporosis Maternal Grandmother         ?    Parkinsonism Maternal Grandmother     Stomach cancer Maternal Grandfather         dx >50    No Known Problems Paternal Grandmother     Brain cancer Paternal Grandfather         dx >50    Cancer Maternal Aunt     BRCA 1/2 Neg Hx     Colon cancer Neg Hx     Endometrial cancer Neg Hx     Ovarian cancer Neg Hx     Malig Hyperthermia Neg Hx         Health Maintenance Due   Topic Date Due    COVID-19 Vaccine (7 - 2023-24 season) 09/01/2023        Current Outpatient Medications on File Prior to Visit   Medication Sig    albuterol (PROVENTIL) (2.5 MG/3ML) 0.083% nebulizer solution Take 2.5 mg by nebulization Every 4 (Four) Hours As Needed for Wheezing or Shortness of Air.    albuterol sulfate  (90 Base) MCG/ACT inhaler Inhale 2 puffs Every 6 (Six) Hours As Needed for Wheezing.    cyanocobalamin 1000 MCG/ML injection Inject 1 mL into the appropriate muscle as directed by prescriber Every 30 (Thirty) Days.    fluticasone (FLONASE) 50 MCG/ACT nasal spray 2 sprays by Each Nare route Daily.    Linzess 145 MCG capsule capsule TAKE 1 CAPSULE BY MOUTH EVERY MORNING BEFORE BREAKFAST    oxyCODONE-acetaminophen (PERCOCET) 5-325 MG per tablet  oxycodone-acetaminophen 5-325 mg oral tablet 1 every 8 hrs as needed.   Active    [DISCONTINUED] busPIRone (BUSPAR) 15 MG tablet Take 1 tablet by mouth 2 (Two) Times a Day As Needed (anxiety).    [DISCONTINUED] desvenlafaxine (Pristiq) 50 MG 24 hr tablet Take 1 tablet by mouth Daily.    [DISCONTINUED] metFORMIN ER (GLUCOPHAGE-XR) 500 MG 24 hr tablet Take 1 tablet by mouth Daily With Breakfast.    [DISCONTINUED] montelukast (SINGULAIR) 10 MG tablet Take 1 tablet by mouth Every Night.    [DISCONTINUED] omeprazole (priLOSEC) 40 MG capsule Take 1 capsule by mouth Daily.    [DISCONTINUED] pregabalin (LYRICA) 100 MG capsule Take 1 cap PO Q am and 2 caps PO Q HS    [DISCONTINUED] traZODone (DESYREL) 100 MG tablet Take 1 tablet by mouth Every Night.    [DISCONTINUED] prednisoLONE acetate (PRED FORTE) 1 % ophthalmic suspension      No current facility-administered medications on file prior to visit.       Immunization History   Administered Date(s) Administered    COVID-19 (MODERNA) 1st,2nd,3rd Dose Monovalent 02/03/2021, 03/03/2021, 11/05/2021    COVID-19 (UNSPECIFIED) 02/03/2021, 03/03/2021, 11/05/2021    Flu Vaccine Quad PF >36MO 10/19/2016, 09/20/2017, 10/14/2018    Flu Vaccine Split Quad 10/19/2016, 09/20/2017, 10/14/2018    Fluzone (or Fluarix & Flulaval for VFC) >6mos 11/08/2022    Hepatitis A 12/06/2018    Influenza Injectable Mdck Pf Quad 11/08/2022, 11/07/2023    Influenza, Unspecified 10/14/2018, 10/01/2019    Pneumococcal Polysaccharide (PPSV23) 02/23/2022    Tdap 10/14/2018       Review of Systems   Constitutional:  Positive for fatigue and unexpected weight gain. Negative for unexpected weight loss.   HENT:  Negative for trouble swallowing.    Eyes:  Positive for blurred vision. Negative for double vision.        Sometimes on occasion   Respiratory:  Negative for shortness of breath.    Cardiovascular:  Negative for chest pain.   Gastrointestinal:  Positive for constipation and GERD. Negative for abdominal pain  "and blood in stool.        Well controlled on the meds    Endocrine: Positive for polydipsia. Negative for polyphagia and polyuria.   Genitourinary:  Negative for dysuria and vaginal bleeding.        Had a ROEL    Musculoskeletal:  Positive for myalgias.        Fibro pain    Neurological:  Negative for dizziness, seizures, syncope and light-headedness.   Psychiatric/Behavioral:  Positive for depressed mood. Negative for self-injury and suicidal ideas. The patient is nervous/anxious.         Well controlled on meds         Objective     /80   Pulse 79   Temp 98.4 °F (36.9 °C)   Ht 170.2 cm (67\")   Wt 97.3 kg (214 lb 6.4 oz)   SpO2 96%   BMI 33.58 kg/m²       Physical Exam  Vitals and nursing note reviewed.   Constitutional:       Appearance: Normal appearance.   HENT:      Head: Normocephalic.      Right Ear: Tympanic membrane, ear canal and external ear normal.      Left Ear: Tympanic membrane, ear canal and external ear normal.      Nose: Nose normal.      Mouth/Throat:      Mouth: Mucous membranes are moist.   Eyes:      Pupils: Pupils are equal, round, and reactive to light.   Cardiovascular:      Rate and Rhythm: Normal rate and regular rhythm.      Heart sounds: Normal heart sounds.   Pulmonary:      Effort: Pulmonary effort is normal.      Breath sounds: Normal breath sounds.   Abdominal:      Palpations: Abdomen is soft.   Musculoskeletal:         General: Tenderness present. No signs of injury.      Cervical back: Normal range of motion.      Comments: Positive point tenderness to palpation of the bilateral upper back lower back mid back upper chest wall bilateral upper extremities bilateral lower extremities in the muscle areas   Skin:     General: Skin is warm and dry.   Neurological:      Mental Status: She is alert and oriented to person, place, and time.   Psychiatric:         Mood and Affect: Mood normal.         Behavior: Behavior normal.         Thought Content: Thought content normal.    "      Judgment: Judgment normal.         Result Review :                      Hepatitis A antibody, total (12/14/2023 09:21)  Hepatitis B core antibody, total (12/14/2023 09:21)  Hepatitis B surface antibody (12/14/2023 09:21)  HIV-1/O/2 ANTIGEN/ANTIBODY, 4TH GENERATION (12/14/2023 09:21)  Hepatitis panel, acute (12/14/2023 09:21)  CBC & Differential (12/14/2023 09:21)  MRSA Screen Culture (Outpatient) - Swab, Nares (12/04/2023 11:33)  Magnesium (09/08/2023 08:49)  Vitamin D,25-Hydroxy (09/08/2023 08:49)  Vitamin B12 & Folate (09/08/2023 08:49)  Urine Drug Screen - Urine, Clean Catch (09/08/2023 08:49)  TSH Rfx On Abnormal To Free T4 (09/08/2023 08:49)  Lipid Panel (09/08/2023 08:49)  Urinalysis With Culture If Indicated - Urine, Clean Catch (09/08/2023 08:49)  Hemoglobin A1c (09/08/2023 08:49)  Comprehensive Metabolic Panel (09/08/2023 08:49)  GeneSight - Swab, Oral Cavity (09/06/2023)      Assessment and Plan      Diagnoses and all orders for this visit:    1. Well adult exam (Primary)    2. Fibromyalgia  -     pregabalin (LYRICA) 100 MG capsule; Take 1 cap PO Q am and 2 caps PO Q HS  Dispense: 90 capsule; Refill: 2    3. Mild persistent asthma without complication  -     montelukast (SINGULAIR) 10 MG tablet; Take 1 tablet by mouth Every Night.  Dispense: 90 tablet; Refill: 1    4. Metabolic syndrome  -     metFORMIN ER (GLUCOPHAGE-XR) 500 MG 24 hr tablet; Take 1 tablet by mouth Daily With Breakfast.  Dispense: 90 tablet; Refill: 1    5. Recurrent major depressive disorder, remission status unspecified  -     desvenlafaxine (Pristiq) 50 MG 24 hr tablet; Take 1 tablet by mouth Daily.  Dispense: 30 tablet; Refill: 5    6. Anxiety  -     busPIRone (BUSPAR) 15 MG tablet; Take 1 tablet by mouth 2 (Two) Times a Day As Needed (anxiety).  Dispense: 60 tablet; Refill: 5    7. Insomnia, unspecified type  -     traZODone (DESYREL) 100 MG tablet; Take 1 tablet by mouth Every Night.  Dispense: 90 tablet; Refill: 1    8.  Gastroesophageal reflux disease without esophagitis  -     omeprazole (priLOSEC) 40 MG capsule; Take 1 capsule by mouth Daily.  Dispense: 90 capsule; Refill: 1    9. Seasonal allergic rhinitis, unspecified trigger  -     montelukast (SINGULAIR) 10 MG tablet; Take 1 tablet by mouth Every Night.  Dispense: 90 tablet; Refill: 1    10. Class 1 obesity with serious comorbidity and body mass index (BMI) of 33.0 to 33.9 in adult, unspecified obesity type  -     Tirzepatide-Weight Management (ZEPBOUND) 2.5 MG/0.5ML solution auto-injector; Inject 0.5 mL under the skin into the appropriate area as directed 1 (One) Time Per Week.  Dispense: 2 mL; Refill: 0    Medication refills as noted above and reviewed all her recent labs    Then trial of the Zepbound for wt loss-and explained to the pt there are other ones like wegovy and saxenda--but if not covered by insurance can be quite costly--if one is covered she will F/U at 1 month to re-evaluate--and if not covered she will F/U at 3 months like normal--        Follow Up     Return in about 4 weeks (around 2/5/2024), or if symptoms worsen or fail to improve, for Recheck.    Patient was given instructions and counseling regarding her condition or for health maintenance advice. Please see specific information pulled into the AVS if appropriate.            Sahara STEVENSON [place]  reports that she has never smoked. She has been exposed to tobacco smoke. She has never used smokeless tobacco.. I have educated her on the risk of diseases from using tobacco products such as cancer, COPD, and heart disease.

## 2024-01-22 DIAGNOSIS — F41.9 ANXIETY: ICD-10-CM

## 2024-01-22 RX ORDER — BUSPIRONE HYDROCHLORIDE 15 MG/1
15 TABLET ORAL 2 TIMES DAILY PRN
Qty: 60 TABLET | Refills: 5 | OUTPATIENT
Start: 2024-01-22

## 2024-01-22 RX ORDER — BUSPIRONE HYDROCHLORIDE 5 MG/1
5 TABLET ORAL 2 TIMES DAILY PRN
Qty: 60 TABLET | Refills: 2 | OUTPATIENT
Start: 2024-01-22

## 2024-01-23 ENCOUNTER — OFFICE VISIT (OUTPATIENT)
Dept: FAMILY MEDICINE CLINIC | Facility: CLINIC | Age: 49
End: 2024-01-23
Payer: COMMERCIAL

## 2024-01-23 VITALS
BODY MASS INDEX: 34.15 KG/M2 | WEIGHT: 217.6 LBS | SYSTOLIC BLOOD PRESSURE: 134 MMHG | TEMPERATURE: 98.2 F | HEIGHT: 67 IN | DIASTOLIC BLOOD PRESSURE: 78 MMHG | OXYGEN SATURATION: 98 %

## 2024-01-23 DIAGNOSIS — R05.1 ACUTE COUGH: ICD-10-CM

## 2024-01-23 DIAGNOSIS — J02.9 SORE THROAT: Primary | ICD-10-CM

## 2024-01-23 DIAGNOSIS — J45.21 MILD INTERMITTENT ASTHMA WITH EXACERBATION: ICD-10-CM

## 2024-01-23 LAB
EXPIRATION DATE: NORMAL
INTERNAL CONTROL: NORMAL
Lab: NORMAL
S PYO AG THROAT QL: NEGATIVE

## 2024-01-23 PROCEDURE — 99213 OFFICE O/P EST LOW 20 MIN: CPT | Performed by: FAMILY MEDICINE

## 2024-01-23 PROCEDURE — 87880 STREP A ASSAY W/OPTIC: CPT | Performed by: FAMILY MEDICINE

## 2024-01-23 RX ORDER — BENZONATATE 100 MG/1
100 CAPSULE ORAL 3 TIMES DAILY PRN
Qty: 30 CAPSULE | Refills: 0 | Status: SHIPPED | OUTPATIENT
Start: 2024-01-23

## 2024-01-23 RX ORDER — PREDNISONE 20 MG/1
40 TABLET ORAL DAILY
Qty: 10 TABLET | Refills: 0 | Status: SHIPPED | OUTPATIENT
Start: 2024-01-23 | End: 2024-01-28

## 2024-01-23 NOTE — PROGRESS NOTES
"Chief Complaint  Sore Throat (Sore throat x three days ) and Asthma    Subjective      Sore Throat     Asthma  Associated symptoms include a sore throat. Her past medical history is significant for asthma.     Sahara Delarosa is a 48 y.o. female who presents to [facility] with a past medical history of  Past Medical History:   Diagnosis Date    Allergic     Allergic rhinitis     Anxiety and depression     Asthma     exercise induced    Back pain     Bowel incontinence     Condyloma acuminata     Diverticulitis of colon     Endometrial polyp     Family history of breast cancer     Fatty liver     Fibroids     Fibromyalgia     Fibromyalgia, primary     GERD (gastroesophageal reflux disease)     Hernia     Irritable bowel syndrome     Low back pain     Metabolic syndrome     Osteoarthritis     In addition to fibromyalgia    Sleep apnea     CPAP    Urinary frequency     Uterine prolapse     Vitamin B 12 deficiency      Sore throat for 3 days.  She has a history of asthma on albuterol inhaler and nebulizer, feels like her asthma is acting up. Coughing, dry barky cough. Maybe new neck pain but not any new body aches. No fever or chills. Nose feels clogged but not getting any snot out. Breathing through the mouth but otherwise breathing okay. Has required the inhaler/nebulizer more often.    Objective   Vital Signs:   Vitals:    01/23/24 1007   BP: 134/78   Temp: 98.2 °F (36.8 °C)   SpO2: 98%   Weight: 98.7 kg (217 lb 9.6 oz)   Height: 170.8 cm (67.25\")     Body mass index is 33.83 kg/m².    Wt Readings from Last 3 Encounters:   01/23/24 98.7 kg (217 lb 9.6 oz)   01/08/24 97.3 kg (214 lb 6.4 oz)   12/14/23 95.3 kg (210 lb)     BP Readings from Last 3 Encounters:   01/23/24 134/78   01/08/24 120/80   12/14/23 136/69       Health Maintenance   Topic Date Due    COVID-19 Vaccine (7 - 2023-24 season) 09/01/2023    Pneumococcal Vaccine 0-64 (2 of 2 - PCV) 09/06/2024 (Originally 2/23/2023)    " ZOSTER VACCINE (1 of 2) 09/06/2024 (Originally 7/6/2023)    BMI FOLLOWUP  09/06/2024    ANNUAL PHYSICAL  01/08/2025    PAP SMEAR  01/19/2025    MAMMOGRAM  01/26/2025    TDAP/TD VACCINES (2 - Td or Tdap) 10/14/2028    COLORECTAL CANCER SCREENING  06/30/2030    HEPATITIS C SCREENING  Completed    INFLUENZA VACCINE  Completed       Physical Exam  Vitals and nursing note reviewed.   Constitutional:       General: She is not in acute distress.     Appearance: Normal appearance.   HENT:      Head: Normocephalic and atraumatic.      Nose: Rhinorrhea present.      Mouth/Throat:      Pharynx: Posterior oropharyngeal erythema present. No oropharyngeal exudate.   Cardiovascular:      Rate and Rhythm: Normal rate and regular rhythm.      Heart sounds: No murmur heard.  Pulmonary:      Effort: Pulmonary effort is normal. No respiratory distress.      Breath sounds: Normal breath sounds. No wheezing.   Musculoskeletal:      Cervical back: No rigidity or tenderness.   Lymphadenopathy:      Cervical: No cervical adenopathy.   Skin:     General: Skin is warm and dry.   Neurological:      Mental Status: She is alert.   Psychiatric:         Mood and Affect: Mood normal.         Behavior: Behavior normal.            Result Review :  The following data was reviewed by: Ayden Zuluaga MD on 01/23/2024:                                                                Procedures        Assessment and Plan   Diagnoses and all orders for this visit:    1. Sore throat (Primary)  -     POCT rapid strep A    2. Mild intermittent asthma with exacerbation  -     predniSONE (DELTASONE) 20 MG tablet; Take 2 tablets by mouth Daily for 5 days.  Dispense: 10 tablet; Refill: 0    3. Acute cough  -     benzonatate (Tessalon Perles) 100 MG capsule; Take 1 capsule by mouth 3 (Three) Times a Day As Needed for Cough.  Dispense: 30 capsule; Refill: 0      Swabs negative in office. Advised supportive measures such as hydration and rest. Can do OTC  supportive medications such as an antihistamine or decongestant if needed. Honey can be soothing to a sore throat. Return if new or worsening symptoms.  Will treat for asthma exacerbation with course of steroids.  No antibiotics recommended at this time.              FOLLOW UP  Return if symptoms worsen or fail to improve.  Patient was given instructions and counseling regarding her condition or for health maintenance advice. Please see specific information pulled into the AVS if appropriate.       Ayden Zuluaga MD  01/23/24  11:00 EST    CURRENT & DISCONTINUED MEDICATIONS  Current Outpatient Medications   Medication Instructions    albuterol (PROVENTIL) 2.5 mg, Nebulization, Every 4 Hours PRN    albuterol sulfate  (90 Base) MCG/ACT inhaler 2 puffs, Inhalation, Every 6 Hours PRN    benzonatate (TESSALON PERLES) 100 mg, Oral, 3 Times Daily PRN    busPIRone (BUSPAR) 15 mg, Oral, 2 Times Daily PRN    cyanocobalamin 1,000 mcg, Intramuscular, Every 30 Days    desvenlafaxine (PRISTIQ) 50 mg, Oral, Daily    fluticasone (FLONASE) 50 MCG/ACT nasal spray 2 sprays, Each Nare, Daily    Linzess 145 MCG capsule capsule TAKE 1 CAPSULE BY MOUTH EVERY MORNING BEFORE BREAKFAST    metFORMIN ER (GLUCOPHAGE-XR) 500 mg, Oral, Daily With Breakfast    montelukast (SINGULAIR) 10 mg, Oral, Nightly    omeprazole (PRILOSEC) 40 mg, Oral, Daily    oxyCODONE-acetaminophen (PERCOCET) 5-325 MG per tablet oxycodone-acetaminophen 5-325 mg oral tablet 1 every 8 hrs as needed.   Active    predniSONE (DELTASONE) 40 mg, Oral, Daily    pregabalin (LYRICA) 100 MG capsule Take 1 cap PO Q am and 2 caps PO Q HS    Tirzepatide-Weight Management (ZEPBOUND) 2.5 mg, Subcutaneous, Weekly    traZODone (DESYREL) 100 mg, Oral, Nightly       There are no discontinued medications.

## 2024-02-07 ENCOUNTER — HOSPITAL ENCOUNTER (OUTPATIENT)
Facility: HOSPITAL | Age: 49
Discharge: HOME | End: 2024-02-07
Admitting: STUDENT IN AN ORGANIZED HEALTH CARE EDUCATION/TRAINING PROGRAM
Payer: COMMERCIAL

## 2024-02-07 ENCOUNTER — OFFICE VISIT (OUTPATIENT)
Dept: OBSTETRICS AND GYNECOLOGY | Age: 49
End: 2024-02-07
Payer: COMMERCIAL

## 2024-02-07 VITALS
BODY MASS INDEX: 33.81 KG/M2 | SYSTOLIC BLOOD PRESSURE: 122 MMHG | DIASTOLIC BLOOD PRESSURE: 70 MMHG | HEIGHT: 67 IN | WEIGHT: 215.4 LBS

## 2024-02-07 DIAGNOSIS — Z01.419 WELL WOMAN EXAM WITH ROUTINE GYNECOLOGICAL EXAM: Primary | ICD-10-CM

## 2024-02-07 DIAGNOSIS — Z12.31 SCREENING MAMMOGRAM FOR BREAST CANCER: ICD-10-CM

## 2024-02-07 DIAGNOSIS — Z12.31 VISIT FOR SCREENING MAMMOGRAM: ICD-10-CM

## 2024-02-07 PROBLEM — Z98.890 S/P HIP ARTHROSCOPY: Status: RESOLVED | Noted: 2019-06-10 | Resolved: 2024-02-07

## 2024-02-07 PROBLEM — N81.4 UTERINE PROLAPSE: Status: RESOLVED | Noted: 2022-02-17 | Resolved: 2024-02-07

## 2024-02-07 PROBLEM — M25.551 RIGHT HIP PAIN: Status: RESOLVED | Noted: 2019-04-16 | Resolved: 2024-02-07

## 2024-02-07 PROBLEM — R19.4 CHANGE IN BOWEL HABITS: Status: RESOLVED | Noted: 2022-04-14 | Resolved: 2024-02-07

## 2024-02-07 PROBLEM — K59.04 CHRONIC IDIOPATHIC CONSTIPATION: Status: RESOLVED | Noted: 2022-04-14 | Resolved: 2024-02-07

## 2024-02-07 PROBLEM — M70.61 TROCHANTERIC BURSITIS OF RIGHT HIP: Status: RESOLVED | Noted: 2019-10-15 | Resolved: 2024-02-07

## 2024-02-07 PROBLEM — S73.191A TEAR OF RIGHT ACETABULAR LABRUM: Status: RESOLVED | Noted: 2019-04-18 | Resolved: 2024-02-07

## 2024-02-07 PROBLEM — R19.4 ALTERED BOWEL HABITS: Status: RESOLVED | Noted: 2022-04-14 | Resolved: 2024-02-07

## 2024-02-07 PROBLEM — N32.9 BLADDER PROBLEM: Status: RESOLVED | Noted: 2021-07-06 | Resolved: 2024-02-07

## 2024-02-07 PROCEDURE — 77067 SCR MAMMO BI INCL CAD: CPT

## 2024-02-07 PROCEDURE — 77063 BREAST TOMOSYNTHESIS BI: CPT

## 2024-02-07 RX ORDER — CHLORHEXIDINE GLUCONATE 213 G/1000ML
SOLUTION TOPICAL
COMMUNITY
Start: 2024-01-16

## 2024-02-07 NOTE — PROGRESS NOTES
[facility]   Obstetrics and Gynecology   Routine Annual Visit    2024    Patient: Sahara Delarosa          MR#:[id number]    History of Present Illness    Chief Complaint   Patient presents with    Annual Exam     AE & MG today, Last AE 2023, Last pap 2022 nml, Colonoscopy 2020, No problems today       48 y.o. female  who presents for annual exam.  S/p TLH-BS for benign indications. Scheduled for back implant for pain in March.    Patient started treatment last year for mood changes thought to be related to perimenopause.  Started with Zoloft and mood improved but struggled with anorgasmia so was changed to Wellbutrin.  She was able to orgasm but felt edgy still so opted to try combination with Zoloft.  She has since completed GeneSight testing and changed to Pristiq and BuSpar managed by PCP ROSEANNA.  Feeling mentally stable.    Studies reviewed:  IGP, Apt HPV,rfx 16 / 18,45 (2022 13:55) NIL HPV neg  SCANNED PATHOLOGY (2020) colonoscopy normal, repeat 10 yrs  Mammo Screening Bilateral With CAD (2023) normal, mammo today, h/o biopsy that was benign    Obstetric History:  OB History          2    Para   2    Term   2            AB        Living   2         SAB        IAB        Ectopic        Molar        Multiple        Live Births   2               Menstrual History:     No LMP recorded (lmp unknown). Patient has had a hysterectomy.       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    ________________________________________  Patient Active Problem List   Diagnosis    Vitamin B 12 deficiency    Sacroiliitis    Metabolic syndrome    Irritable bowel syndrome    Fibromyalgia    Sciatica    GERD (gastroesophageal reflux disease)    Sleep apnea    Asthma    Arthritis    Seasonal allergic rhinitis    Gastroesophageal reflux disease with esophagitis without hemorrhage     Degeneration of lumbar intervertebral disc    Primary osteoarthritis of right hip    Vitamin D deficiency    Personal history of other diseases of the female genital tract    Menorrhagia    Class 1 obesity due to excess calories without serious comorbidity with body mass index (BMI) of 31.0 to 31.9 in adult    Low serum vitamin B12    Recurrent major depressive disorder     Past Medical History:   Diagnosis Date    Allergic     Allergic rhinitis     Anxiety and depression     Asthma     exercise induced    Back pain     Bowel incontinence     Condyloma acuminata     Diverticulitis of colon     Endometrial polyp     Family history of breast cancer     Fatty liver     Fibroids     Fibromyalgia     Fibromyalgia, primary     GERD (gastroesophageal reflux disease)     Hernia     Irritable bowel syndrome     Low back pain     Metabolic syndrome     Osteoarthritis     In addition to fibromyalgia    Sleep apnea     CPAP    Urinary frequency     Uterine prolapse     Vitamin B 12 deficiency      Past Surgical History:   Procedure Laterality Date    BREAST BIOPSY Left 2013    Hyper-pigmented lesion, left nipple.= Benign    COLONOSCOPY      CYST REMOVAL      on chest and groin area     ENDOMETRIAL ABLATION  2015    [facility], [facility], Novasure Endometrial Ablation    ENDOSCOPY  2017    EGD: Stomach inflammation, Neg for H Pylori.    HIP ARTHROPLASTY Right 09/2021    HIP SURGERY Right 2019    LABRUM REPAIR    JOINT REPLACEMENT  September 2021    Rt Hip    TOTAL LAPAROSCOPIC HYSTERECTOMY SALPINGO OOPHORECTOMY Bilateral 03/30/2022    Procedure: TOTAL LAPAROSCOPIC HYSTERECTOMY, BILATERAL SALPINGECTOMY, AND CYSTOSCOPY;  Surgeon: Rosalba Chiu MD;  Location: [facility];  Service: Obstetrics/Gynecology;  Laterality: Bilateral;    UPPER GASTROINTESTINAL ENDOSCOPY       Social History     Tobacco Use   Smoking Status Never    Passive exposure: Yes   Smokeless Tobacco Never     Family History   Problem Relation Age of Onset    Diabetes  type II Father     Diabetes Father     Other Mother         Multiple lipomas.    Arthritis Mother     Breast cancer Mother 70        Double mastectomy     Fibromyalgia Sister     No Known Problems Son     No Known Problems Daughter     Brain cancer Paternal Grandfather         dx >50    No Known Problems Paternal Grandmother     Osteoporosis Maternal Grandmother         ?    Parkinsonism Maternal Grandmother     Stomach cancer Maternal Grandfather         dx >50    Breast cancer Maternal Aunt         4 different mat aunts with breast cancer.    Breast cancer Maternal Aunt     Cancer Maternal Aunt         stomach    Breast cancer Maternal Aunt     Cancer Maternal Aunt     Breast cancer Maternal Aunt     Breast cancer Paternal Aunt 60        dx ~ age 60    BRCA 1/2 Neg Hx     Colon cancer Neg Hx     Endometrial cancer Neg Hx     Ovarian cancer Neg Hx     Malig Hyperthermia Neg Hx     Uterine cancer Neg Hx      Prior to Admission medications    Medication Sig Start Date End Date Taking? Authorizing Provider   albuterol (PROVENTIL) (2.5 MG/3ML) 0.083% nebulizer solution Take 2.5 mg by nebulization Every 4 (Four) Hours As Needed for Wheezing or Shortness of Air. 11/15/22   Maira Gama APRN   albuterol sulfate  (90 Base) MCG/ACT inhaler Inhale 2 puffs Every 6 (Six) Hours As Needed for Wheezing. 9/6/23   Maira Gama APRN   benzonatate (Tessalon Perles) 100 MG capsule Take 1 capsule by mouth 3 (Three) Times a Day As Needed for Cough. 1/23/24   Ayden Zuluaga MD   busPIRone (BUSPAR) 15 MG tablet Take 1 tablet by mouth 2 (Two) Times a Day As Needed (anxiety). 1/8/24   Maira Gama APRN   cyanocobalamin 1000 MCG/ML injection Inject 1 mL into the appropriate muscle as directed by prescriber Every 30 (Thirty) Days. 9/6/23   Maira Gama APRN   desvenlafaxine (Pristiq) 50 MG 24 hr tablet Take 1 tablet by mouth Daily. 1/8/24   Maira Gama APRN   fluticasone (FLONASE) 50  "MCG/ACT nasal spray 2 sprays by Each Nare route Daily. 9/6/23   Maira Gama APRN   Linzess 145 MCG capsule capsule TAKE 1 CAPSULE BY MOUTH EVERY MORNING BEFORE BREAKFAST 7/11/23   Sofia Barriga APRN   metFORMIN ER (GLUCOPHAGE-XR) 500 MG 24 hr tablet Take 1 tablet by mouth Daily With Breakfast. 1/8/24   Maira Gama APRN   montelukast (SINGULAIR) 10 MG tablet Take 1 tablet by mouth Every Night. 1/8/24   Maira Gama APRN   omeprazole (priLOSEC) 40 MG capsule Take 1 capsule by mouth Daily. 1/8/24   Maira Gama APRN   oxyCODONE-acetaminophen (PERCOCET) 5-325 MG per tablet oxycodone-acetaminophen 5-325 mg oral tablet 1 every 8 hrs as needed.   Active    Provider, MD Umm   pregabalin (LYRICA) 100 MG capsule Take 1 cap PO Q am and 2 caps PO Q HS 1/8/24   Maira Gama APRN   Tirzepatide-Weight Management (ZEPBOUND) 2.5 MG/0.5ML solution auto-injector Inject 0.5 mL under the skin into the appropriate area as directed 1 (One) Time Per Week. 1/8/24   Maira Gama APRN   traZODone (DESYREL) 100 MG tablet Take 1 tablet by mouth Every Night. 1/8/24   Maira Gama APRN     ________________________________________    Current contraception: status post hysterectomy  History of abnormal Pap smear: no  Family history of uterine or ovarian cancer: no  Family History of colon cancer/colon polyps: no  History of abnormal mammogram: yes - remote    The following portions of the patient's history were reviewed and updated as appropriate: allergies, current medications, past family history, past medical history, past social history, past surgical history, and problem list.    Review of Systems   All other systems reviewed and are negative.           Objective     /70   Ht 170.8 cm (67.25\")   Wt 97.7 kg (215 lb 6.4 oz)   LMP  (LMP Unknown)   Breastfeeding No   BMI 33.49 kg/m²    BP Readings from Last 3 Encounters:   02/07/24 122/70   01/23/24 134/78 " "  01/08/24 120/80      Wt Readings from Last 3 Encounters:   02/07/24 97.7 kg (215 lb 6.4 oz)   01/23/24 98.7 kg (217 lb 9.6 oz)   01/08/24 97.3 kg (214 lb 6.4 oz)        BMI: Estimated body mass index is 33.49 kg/m² as calculated from the following:    Height as of this encounter: 170.8 cm (67.25\").    Weight as of this encounter: 97.7 kg (215 lb 6.4 oz).    Physical Exam  Vitals and nursing note reviewed.   Constitutional:       General: She is not in acute distress.     Appearance: Normal appearance.   HENT:      Head: Normocephalic and atraumatic.   Eyes:      Extraocular Movements: Extraocular movements intact.   Cardiovascular:      Rate and Rhythm: Normal rate and regular rhythm.      Pulses: Normal pulses.      Heart sounds: No murmur heard.  Pulmonary:      Effort: Pulmonary effort is normal. No respiratory distress.      Breath sounds: Normal breath sounds.   Chest:   Breasts:     Right: Normal. No mass, nipple discharge, skin change or tenderness.      Left: Normal. No mass, nipple discharge, skin change or tenderness.   Abdominal:      General: There is no distension.      Palpations: Abdomen is soft. There is no mass.      Tenderness: There is no abdominal tenderness.   Genitourinary:     General: Normal vulva.      Labia:         Right: No rash or lesion.         Left: No rash or lesion.       Urethra: No prolapse, urethral swelling or urethral lesion.      Vagina: Normal.      Uterus: Absent.       Adnexa: Right adnexa normal and left adnexa normal.      Comments: Bladder: no masses or tenderness  Perineum/Anus: no masses, lesions, or skin changes  Musculoskeletal:         General: No swelling. Normal range of motion.      Cervical back: Normal range of motion.   Lymphadenopathy:      Upper Body:      Right upper body: No axillary adenopathy.      Left upper body: No axillary adenopathy.   Skin:     General: Skin is warm and dry.   Neurological:      General: No focal deficit present.      Mental " Status: She is alert and oriented to person, place, and time.   Psychiatric:         Mood and Affect: Mood normal.         Behavior: Behavior normal.         As part of wellness and prevention, the following topics were discussed with the patient:  Encouraged self breast exam  Physical activity and regular exercised encouraged.   Injury prevention discussed.  Healthy weight discussed.  Nutrition discussed.  Substance abuse/misuse discussed.  Sexual behavior/safe practices discussed.   Sexual transmitted disease prevention   Mental health discussed.           Assessment:  Diagnoses and all orders for this visit:    1. Well woman exam with routine gynecological exam (Primary)  -     Mammo Screening Digital Tomosynthesis Bilateral With CAD; Future    2. Screening mammogram for breast cancer  -     Mammo Screening Digital Tomosynthesis Bilateral With CAD; Future    -breast and pelvic exam normal  -no need for paps  -mammo today  -colonoscopy up-to-date  -Patient's mood has improved greatly with Pristiq and buspirone and she wants to continue.  Full support.  Discussed that estrogen is an option if she develops vasomotor symptoms but I recommend continuing with mental health approach for now.      Plan:  Return in about 1 year (around 2/7/2025) for Annual w/ mammo.      Rosalba Chiu MD  2/7/2024 09:29 EST

## 2024-02-12 ENCOUNTER — PATIENT MESSAGE (OUTPATIENT)
Dept: FAMILY MEDICINE CLINIC | Facility: CLINIC | Age: 49
End: 2024-02-12
Payer: COMMERCIAL

## 2024-02-12 DIAGNOSIS — E66.9 CLASS 1 OBESITY WITH SERIOUS COMORBIDITY AND BODY MASS INDEX (BMI) OF 33.0 TO 33.9 IN ADULT, UNSPECIFIED OBESITY TYPE: ICD-10-CM

## 2024-02-12 RX ORDER — SEMAGLUTIDE 0.25 MG/.5ML
0.25 INJECTION, SOLUTION SUBCUTANEOUS WEEKLY
Qty: 2 ML | Refills: 0 | Status: SHIPPED | OUTPATIENT
Start: 2024-02-12

## 2024-02-12 NOTE — PROGRESS NOTES
Please call patient to let her know her mammogram is normal and should be repeated in 1 year.  Thank you,  Rosalba Chiu MD  02/12/24 10:25 EST

## 2024-02-16 ENCOUNTER — OFFICE VISIT (OUTPATIENT)
Dept: FAMILY MEDICINE CLINIC | Facility: CLINIC | Age: 49
End: 2024-02-16
Payer: COMMERCIAL

## 2024-02-16 VITALS
BODY MASS INDEX: 34.11 KG/M2 | WEIGHT: 219.4 LBS | HEART RATE: 90 BPM | OXYGEN SATURATION: 98 % | DIASTOLIC BLOOD PRESSURE: 90 MMHG | TEMPERATURE: 97.5 F | SYSTOLIC BLOOD PRESSURE: 128 MMHG

## 2024-02-16 DIAGNOSIS — J02.9 SORE THROAT: Primary | ICD-10-CM

## 2024-02-16 DIAGNOSIS — J02.9 PHARYNGITIS, UNSPECIFIED ETIOLOGY: ICD-10-CM

## 2024-02-16 LAB
EXPIRATION DATE: NORMAL
INTERNAL CONTROL: NORMAL
Lab: NORMAL
S PYO AG THROAT QL: NEGATIVE

## 2024-02-16 PROCEDURE — 99213 OFFICE O/P EST LOW 20 MIN: CPT | Performed by: FAMILY MEDICINE

## 2024-02-16 PROCEDURE — 87880 STREP A ASSAY W/OPTIC: CPT | Performed by: FAMILY MEDICINE

## 2024-02-16 RX ORDER — SACCHAROMYCES BOULARDII 250 MG
250 CAPSULE ORAL 2 TIMES DAILY
Qty: 20 CAPSULE | Refills: 0 | Status: SHIPPED | OUTPATIENT
Start: 2024-02-16 | End: 2024-02-26

## 2024-02-16 RX ORDER — PREDNISONE 20 MG/1
40 TABLET ORAL DAILY
Qty: 10 TABLET | Refills: 0 | Status: SHIPPED | OUTPATIENT
Start: 2024-02-16 | End: 2024-02-21

## 2024-02-16 RX ORDER — CEFDINIR 300 MG/1
300 CAPSULE ORAL 2 TIMES DAILY
Qty: 14 CAPSULE | Refills: 0 | Status: SHIPPED | OUTPATIENT
Start: 2024-02-16 | End: 2024-02-23

## 2024-02-21 DIAGNOSIS — F41.9 ANXIETY: ICD-10-CM

## 2024-02-21 DIAGNOSIS — G47.00 INSOMNIA, UNSPECIFIED TYPE: ICD-10-CM

## 2024-02-21 DIAGNOSIS — J30.2 SEASONAL ALLERGIC RHINITIS, UNSPECIFIED TRIGGER: ICD-10-CM

## 2024-02-21 RX ORDER — TRAZODONE HYDROCHLORIDE 100 MG/1
100 TABLET ORAL NIGHTLY
Qty: 90 TABLET | Refills: 1 | Status: SHIPPED | OUTPATIENT
Start: 2024-02-21

## 2024-02-21 RX ORDER — BUSPIRONE HYDROCHLORIDE 15 MG/1
15 TABLET ORAL 2 TIMES DAILY PRN
Qty: 60 TABLET | Refills: 5 | OUTPATIENT
Start: 2024-02-21

## 2024-02-21 RX ORDER — FLUTICASONE PROPIONATE 50 MCG
2 SPRAY, SUSPENSION (ML) NASAL DAILY
Qty: 48 G | Refills: 1 | Status: SHIPPED | OUTPATIENT
Start: 2024-02-21

## 2024-02-23 DIAGNOSIS — F41.9 ANXIETY: ICD-10-CM

## 2024-02-23 RX ORDER — BUSPIRONE HYDROCHLORIDE 15 MG/1
15 TABLET ORAL 2 TIMES DAILY PRN
Qty: 60 TABLET | Refills: 5 | OUTPATIENT
Start: 2024-02-23

## 2024-02-23 RX ORDER — BUSPIRONE HYDROCHLORIDE 15 MG/1
15 TABLET ORAL 2 TIMES DAILY PRN
Qty: 60 TABLET | Refills: 3 | Status: SHIPPED | OUTPATIENT
Start: 2024-02-23

## 2024-03-19 ENCOUNTER — TELEPHONE (OUTPATIENT)
Dept: OBSTETRICS AND GYNECOLOGY | Age: 49
End: 2024-03-19

## 2024-03-19 NOTE — TELEPHONE ENCOUNTER
"THIS TE WAS SENT BACK TO HUB TO SCHEDULE ANNUAL - HUB AGENTS ARE NOT ALLOWED TO MAKE OUTGOING CALLS / IN ARE INSTRUCTED TO SEND TE TO OFFICE FOR SCHEDULING ANNUAL APPTS W/MAMMOGRAMS / SUPERVISOR INSTRUCTED AGENT TO RETURN TE TO OFFICE FOR SCHEDULING      Caller: Sahara Delarosa \"AYO\"    Relationship to patient: Self    Best call back number: [phone]    Chief complaint: ANNUAL & MAMMOGRAM    Type of visit: ANNUAL & MAMMOGRAM    Requested date: AFTER 2/7/25     If rescheduling, when is the original appointment: NA     Additional notes:PT IS CALLING TO SCHEDULE HER ANNUAL AND MAMMOGRAM FOR NEXT YEAR W/DR. DE LUNA    PLEASE CALL PT TO SCHEDULE       "

## 2024-04-17 ENCOUNTER — OFFICE VISIT (OUTPATIENT)
Dept: FAMILY MEDICINE CLINIC | Facility: CLINIC | Age: 49
End: 2024-04-17
Payer: COMMERCIAL

## 2024-04-17 VITALS
HEART RATE: 85 BPM | DIASTOLIC BLOOD PRESSURE: 68 MMHG | BODY MASS INDEX: 33.43 KG/M2 | OXYGEN SATURATION: 96 % | WEIGHT: 213 LBS | HEIGHT: 67 IN | SYSTOLIC BLOOD PRESSURE: 118 MMHG | TEMPERATURE: 98.6 F

## 2024-04-17 DIAGNOSIS — E55.9 VITAMIN D DEFICIENCY: ICD-10-CM

## 2024-04-17 DIAGNOSIS — M25.50 POLYARTHRALGIA: ICD-10-CM

## 2024-04-17 DIAGNOSIS — R53.83 FATIGUE, UNSPECIFIED TYPE: Primary | ICD-10-CM

## 2024-04-17 DIAGNOSIS — E53.8 VITAMIN B 12 DEFICIENCY: ICD-10-CM

## 2024-04-17 LAB
25(OH)D3 SERPL-MCNC: 33.5 NG/ML (ref 30–100)
ALBUMIN SERPL-MCNC: 4.5 G/DL (ref 3.5–5.2)
ALBUMIN/GLOB SERPL: 1.8 G/DL
ALP SERPL-CCNC: 86 U/L (ref 39–117)
ALT SERPL W P-5'-P-CCNC: 13 U/L (ref 1–33)
ANION GAP SERPL CALCULATED.3IONS-SCNC: 11 MMOL/L (ref 5–15)
AST SERPL-CCNC: 13 U/L (ref 1–32)
BASOPHILS # BLD AUTO: 0.02 10*3/MM3 (ref 0–0.2)
BASOPHILS NFR BLD AUTO: 0.5 % (ref 0–1.5)
BILIRUB SERPL-MCNC: 0.6 MG/DL (ref 0–1.2)
BUN SERPL-MCNC: 8 MG/DL (ref 6–20)
BUN/CREAT SERPL: 10.7 (ref 7–25)
CALCIUM SPEC-SCNC: 9.7 MG/DL (ref 8.6–10.5)
CHLORIDE SERPL-SCNC: 104 MMOL/L (ref 98–107)
CHROMATIN AB SERPL-ACNC: <10 IU/ML (ref 0–14)
CO2 SERPL-SCNC: 26 MMOL/L (ref 22–29)
CREAT SERPL-MCNC: 0.75 MG/DL (ref 0.57–1)
CRP SERPL-MCNC: <0.3 MG/DL (ref 0–0.5)
DEPRECATED RDW RBC AUTO: 42.6 FL (ref 37–54)
EGFRCR SERPLBLD CKD-EPI 2021: 97.1 ML/MIN/1.73
EOSINOPHIL # BLD AUTO: 0.08 10*3/MM3 (ref 0–0.4)
EOSINOPHIL NFR BLD AUTO: 2 % (ref 0.3–6.2)
ERYTHROCYTE [DISTWIDTH] IN BLOOD BY AUTOMATED COUNT: 12.5 % (ref 12.3–15.4)
FOLATE SERPL-MCNC: 10.8 NG/ML (ref 4.78–24.2)
GLOBULIN UR ELPH-MCNC: 2.5 GM/DL
GLUCOSE SERPL-MCNC: 77 MG/DL (ref 65–99)
HCT VFR BLD AUTO: 39.6 % (ref 34–46.6)
HGB BLD-MCNC: 13.1 G/DL (ref 12–15.9)
IMM GRANULOCYTES # BLD AUTO: 0.01 10*3/MM3 (ref 0–0.05)
IMM GRANULOCYTES NFR BLD AUTO: 0.2 % (ref 0–0.5)
LYMPHOCYTES # BLD AUTO: 1.15 10*3/MM3 (ref 0.7–3.1)
LYMPHOCYTES NFR BLD AUTO: 28.7 % (ref 19.6–45.3)
MCH RBC QN AUTO: 30.8 PG (ref 26.6–33)
MCHC RBC AUTO-ENTMCNC: 33.1 G/DL (ref 31.5–35.7)
MCV RBC AUTO: 93 FL (ref 79–97)
MONOCYTES # BLD AUTO: 0.26 10*3/MM3 (ref 0.1–0.9)
MONOCYTES NFR BLD AUTO: 6.5 % (ref 5–12)
NEUTROPHILS NFR BLD AUTO: 2.49 10*3/MM3 (ref 1.7–7)
NEUTROPHILS NFR BLD AUTO: 62.1 % (ref 42.7–76)
NRBC BLD AUTO-RTO: 0 /100 WBC (ref 0–0.2)
PLATELET # BLD AUTO: 267 10*3/MM3 (ref 140–450)
PMV BLD AUTO: 9.8 FL (ref 6–12)
POTASSIUM SERPL-SCNC: 3.9 MMOL/L (ref 3.5–5.2)
PROT SERPL-MCNC: 7 G/DL (ref 6–8.5)
RBC # BLD AUTO: 4.26 10*6/MM3 (ref 3.77–5.28)
SODIUM SERPL-SCNC: 141 MMOL/L (ref 136–145)
T4 FREE SERPL-MCNC: 1.12 NG/DL (ref 0.93–1.7)
TSH SERPL DL<=0.05 MIU/L-ACNC: 1.61 UIU/ML (ref 0.27–4.2)
URATE SERPL-MCNC: 4.4 MG/DL (ref 2.4–5.7)
VIT B12 BLD-MCNC: 522 PG/ML (ref 211–946)
WBC NRBC COR # BLD AUTO: 4.01 10*3/MM3 (ref 3.4–10.8)

## 2024-04-17 PROCEDURE — 86664 EPSTEIN-BARR NUCLEAR ANTIGEN: CPT | Performed by: NURSE PRACTITIONER

## 2024-04-17 PROCEDURE — 82607 VITAMIN B-12: CPT | Performed by: NURSE PRACTITIONER

## 2024-04-17 PROCEDURE — 81374 HLA I TYPING 1 ANTIGEN LR: CPT | Performed by: NURSE PRACTITIONER

## 2024-04-17 PROCEDURE — 80050 GENERAL HEALTH PANEL: CPT | Performed by: NURSE PRACTITIONER

## 2024-04-17 PROCEDURE — 86225 DNA ANTIBODY NATIVE: CPT | Performed by: NURSE PRACTITIONER

## 2024-04-17 PROCEDURE — 86665 EPSTEIN-BARR CAPSID VCA: CPT | Performed by: NURSE PRACTITIONER

## 2024-04-17 PROCEDURE — 86038 ANTINUCLEAR ANTIBODIES: CPT | Performed by: NURSE PRACTITIONER

## 2024-04-17 PROCEDURE — 84550 ASSAY OF BLOOD/URIC ACID: CPT | Performed by: NURSE PRACTITIONER

## 2024-04-17 PROCEDURE — 86431 RHEUMATOID FACTOR QUANT: CPT | Performed by: NURSE PRACTITIONER

## 2024-04-17 PROCEDURE — 86663 EPSTEIN-BARR ANTIBODY: CPT | Performed by: NURSE PRACTITIONER

## 2024-04-17 PROCEDURE — 86140 C-REACTIVE PROTEIN: CPT | Performed by: NURSE PRACTITIONER

## 2024-04-17 PROCEDURE — 84439 ASSAY OF FREE THYROXINE: CPT | Performed by: NURSE PRACTITIONER

## 2024-04-17 PROCEDURE — 86063 ANTISTREPTOLYSIN O SCREEN: CPT | Performed by: NURSE PRACTITIONER

## 2024-04-17 PROCEDURE — 82306 VITAMIN D 25 HYDROXY: CPT | Performed by: NURSE PRACTITIONER

## 2024-04-17 PROCEDURE — 82746 ASSAY OF FOLIC ACID SERUM: CPT | Performed by: NURSE PRACTITIONER

## 2024-04-17 NOTE — PROGRESS NOTES
Chief Complaint  Fatigue (Extreme fatigue for past couple weeks)    Subjective            Sahara Delarosa presents to [facility]  History of Present Illness  Pt had recent surgery 3 weeks ago and 2 days ago-back surgery and is in a lower back brace    And then pt reports 2-3 weeks of fatigue that has been quite severe and she does have obstructive sleep apnea and uses her CPAP nightly    And then also pt reports did develop a small infection and they placed her on keflex 500 mg BID x 1 week--and that that is now resolved    And then still with persistent fatigue and the neurosurgeon office recommended F/U with PCP     Also additionally the other symptoms are multiple joints with pain as well      PHQ-2 Total Score:    PHQ-9 Total Score:      Past Medical History:   Diagnosis Date    Allergic     Allergic rhinitis     Anxiety and depression     Asthma     exercise induced    Back pain     Bowel incontinence     Condyloma acuminata     Diverticulitis of colon     Endometrial polyp     Family history of breast cancer     Fatty liver     Fibroids     Fibromyalgia     Fibromyalgia, primary     GERD (gastroesophageal reflux disease)     Hernia     Irritable bowel syndrome     Low back pain     Metabolic syndrome     Osteoarthritis     In addition to fibromyalgia    Sleep apnea     CPAP    Urinary frequency     Uterine prolapse     Vitamin B 12 deficiency        Allergies   Allergen Reactions    Bactrim [Sulfamethoxazole-Trimethoprim] Rash    Codeine Rash        Past Surgical History:   Procedure Laterality Date    BREAST BIOPSY Left 2013    Hyper-pigmented lesion, left nipple.= Benign    COLONOSCOPY      CYST REMOVAL      on chest and groin area     [place]    [facility] [place] Novasure [place]    ENDOSCOPY  2017    EGD: Stomach inflammation, Neg for H Pylori.    HIP ARTHROPLASTY Right 09/2021    HIP SURGERY Right 2019    LABRUM REPAIR    JOINT REPLACEMENT   September 2021    Rt Hip    TOTAL LAPAROSCOPIC HYSTERECTOMY SALPINGO OOPHORECTOMY Bilateral 03/30/2022    Procedure: TOTAL LAPAROSCOPIC HYSTERECTOMY, BILATERAL SALPINGECTOMY, AND CYSTOSCOPY;  Surgeon: Rosalba Chiu MD;  Location: [facility];  Service: Obstetrics/Gynecology;  Laterality: Bilateral;    UPPER GASTROINTESTINAL ENDOSCOPY          Social History     Tobacco Use    Smoking status: Never     Passive exposure: Yes    Smokeless tobacco: Never   Vaping Use    Vaping status: Never Used   Substance Use Topics    Alcohol use: Never    Drug use: Never       Family History   Problem Relation Age of Onset    Diabetes type II Father     Diabetes Father     Other Mother         Multiple lipomas.    Arthritis Mother     Breast cancer Mother 70        Double mastectomy     Fibromyalgia Sister     No Known Problems Son     No Known Problems Daughter     Brain cancer Paternal Grandfather         dx >50    No Known Problems Paternal Grandmother     Osteoporosis Maternal Grandmother         ?    Parkinsonism Maternal Grandmother     Stomach cancer Maternal Grandfather         dx >50    Breast cancer Maternal Aunt         4 different mat aunts with breast cancer.    Breast cancer Maternal Aunt     Cancer Maternal Aunt         stomach    Breast cancer Maternal Aunt     Cancer Maternal Aunt     Breast cancer Maternal Aunt     Breast cancer Paternal Aunt 60        dx ~ age 60    BRCA 1/2 Neg Hx     Colon cancer Neg Hx     Endometrial cancer Neg Hx     Ovarian cancer Neg Hx     Malig Hyperthermia Neg Hx     Uterine cancer Neg Hx         Health Maintenance Due   Topic Date Due    COVID-19 Vaccine (7 - 2023-24 season) 09/01/2023        Current Outpatient Medications on File Prior to Visit   Medication Sig    albuterol (PROVENTIL) (2.5 MG/3ML) 0.083% nebulizer solution Take 2.5 mg by nebulization Every 4 (Four) Hours As Needed for Wheezing or Shortness of Air.    albuterol sulfate  (90 Base) MCG/ACT inhaler  Inhale 2 puffs Every 6 (Six) Hours As Needed for Wheezing.    busPIRone (BUSPAR) 15 MG tablet Take 1 tablet by mouth 2 (Two) Times a Day As Needed (anxiety).    cyanocobalamin 1000 MCG/ML injection Inject 1 mL into the appropriate muscle as directed by prescriber Every 30 (Thirty) Days.    desvenlafaxine (Pristiq) 50 MG 24 hr tablet Take 1 tablet by mouth Daily.    fluticasone (FLONASE) 50 MCG/ACT nasal spray SHAKE LIQUID AND USE 2 SPRAYS IN EACH NOSTRIL DAILY    Linzess 145 MCG capsule capsule TAKE 1 CAPSULE BY MOUTH EVERY MORNING BEFORE BREAKFAST    metFORMIN ER (GLUCOPHAGE-XR) 500 MG 24 hr tablet Take 1 tablet by mouth Daily With Breakfast.    montelukast (SINGULAIR) 10 MG tablet Take 1 tablet by mouth Every Night.    omeprazole (priLOSEC) 40 MG capsule Take 1 capsule by mouth Daily.    oxyCODONE-acetaminophen (PERCOCET) 5-325 MG per tablet oxycodone-acetaminophen 5-325 mg oral tablet 1 every 8 hrs as needed.   Active    pregabalin (LYRICA) 100 MG capsule Take 1 cap PO Q am and 2 caps PO Q HS    Semaglutide-Weight Management (Wegovy) 0.25 MG/0.5ML solution auto-injector Inject 0.5 mL under the skin into the appropriate area as directed 1 (One) Time Per Week.    traZODone (DESYREL) 100 MG tablet TAKE 1 TABLET BY MOUTH EVERY NIGHT    [DISCONTINUED] Hibiclens 4 % external liquid WASH BACK AND GLUTEAL REGION THE NIGHT BEFORE AND MORNING OF SURGERY     No current facility-administered medications on file prior to visit.       Immunization History   Administered Date(s) Administered    COVID-19 (MODERNA) 1st,2nd,3rd Dose Monovalent 02/03/2021, 03/03/2021, 11/05/2021    COVID-19 (UNSPECIFIED) 02/03/2021, 03/03/2021, 11/05/2021    Flu Vaccine Quad PF >36MO 10/19/2016, 09/20/2017, 10/14/2018    Flu Vaccine Split Quad 10/19/2016, 09/20/2017, 10/14/2018    Fluzone (or Fluarix & Flulaval for VFC) >6mos 11/08/2022    Hepatitis A 12/06/2018    Influenza Injectable Mdck Pf Quad 11/08/2022, 11/07/2023    Influenza, Unspecified  "10/14/2018, 10/01/2019    Pneumococcal Polysaccharide (PPSV23) 02/23/2022    Tdap 10/14/2018       Review of Systems   Constitutional:  Positive for fatigue.   HENT:  Negative for trouble swallowing.    Respiratory:  Negative for shortness of breath.    Cardiovascular:  Negative for chest pain.   Gastrointestinal:  Negative for abdominal pain.        At time under the right ribs    Genitourinary:  Negative for dysuria.   Musculoskeletal:  Positive for arthralgias and back pain.        Pt with recent back surgery    Neurological:  Negative for dizziness, syncope and light-headedness.        Recent back surgery    Psychiatric/Behavioral: Negative.          Objective     /68 (BP Location: Left arm, Patient Position: Sitting, Cuff Size: Adult)   Pulse 85   Temp 98.6 °F (37 °C) (Temporal)   Ht 170.8 cm (67.25\")   Wt 96.6 kg (213 lb)   SpO2 96%   BMI 33.11 kg/m²       Physical Exam  Vitals and nursing note reviewed.   Constitutional:       Appearance: Normal appearance.   HENT:      Head: Normocephalic.      Right Ear: External ear normal.      Left Ear: External ear normal.      Nose: Nose normal.      Mouth/Throat:      Mouth: Mucous membranes are moist.   Eyes:      Pupils: Pupils are equal, round, and reactive to light.   Cardiovascular:      Rate and Rhythm: Normal rate and regular rhythm.      Heart sounds: Normal heart sounds.   Pulmonary:      Effort: Pulmonary effort is normal.      Breath sounds: Normal breath sounds.   Abdominal:      Tenderness: There is no abdominal tenderness.   Musculoskeletal:      Cervical back: Normal range of motion.      Comments: Pt wearing a lower back brace    Skin:     General: Skin is warm and dry.   Neurological:      Mental Status: She is alert and oriented to person, place, and time.   Psychiatric:         Mood and Affect: Mood normal.         Behavior: Behavior normal.         Thought Content: Thought content normal.         Judgment: Judgment normal. "         Result Review :                           Assessment and Plan      Diagnoses and all orders for this visit:    1. Fatigue, unspecified type (Primary)  -     CBC & Differential  -     Comprehensive Metabolic Panel  -     Vitamin B12 & Folate  -     Vitamin D,25-Hydroxy  -     TSH+Free T4  -     HLA-B27 Antigen  -     Maurice-Barr Virus Early Antigen Antibody, IgG  -     Maurice-Barr Virus Nuclear Antigen Antibody, IgG  -     Maurice-Barr Virus VCA, IgM  -     Maurice-Barr Virus VCA, IgA    2. Vitamin B 12 deficiency  -     Vitamin B12 & Folate    3. Vitamin D deficiency  -     Vitamin D,25-Hydroxy    4. Polyarthralgia  -     HLA-B27 Antigen  -     Uric acid  -     Antistreptolysin O screen  -     Rheumatoid Factor  -     C-reactive protein  -     LUIZA    We will proceed with the labs as noted above        Follow Up     Return if symptoms worsen or fail to improve.    Patient was given instructions and counseling regarding her condition or for health maintenance advice. Please see specific information pulled into the AVS if appropriate.            Sahara Delarosa  reports that she has never smoked. She has been exposed to tobacco smoke. She has never used smokeless tobacco. I have educated her on the risk of diseases from using tobacco products such as cancer, COPD, and heart disease.                Answers submitted by the patient for this visit:  Other (Submitted on 4/17/2024)  Please describe your symptoms.: Fatigue  Have you had these symptoms before?: Yes  How long have you been having these symptoms?: Greater than 2 weeks  Primary Reason for Visit (Submitted on 4/17/2024)  What is the primary reason for your visit?: Other

## 2024-04-17 NOTE — PROGRESS NOTES
Venipuncture Blood Specimen Collection  Venipuncture performed in left arm by Nevin Sierra with good hemostasis. Patient tolerated the procedure well without complications.   04/17/24   Kenyetta Orlando

## 2024-04-18 LAB
ASO AB SERPL-ACNC: NEGATIVE [IU]/ML
EBV EA-D IGG SER-ACNC: 33.8 U/ML (ref 0–8.9)
EBV NA IGG SER IA-ACNC: <18 U/ML (ref 0–17.9)
EBV VCA IGM SER IA-ACNC: <36 U/ML (ref 0–35.9)

## 2024-04-18 NOTE — PROGRESS NOTES
Please mail letter to patient stating    Sahara, thus far the Maurice-Barr virus panel is only showing one of the IgG's modestly elevated and the IgM which is for immediate current infection was actually negative so you may have had a recent infection and with the IgM levels negative I really need the IgA levels to let me know if this is new recent infection--and that one is still pending--and honestly normally if it is an acute mono its rest and hydration and no sports for 6 weeks

## 2024-04-18 NOTE — PROGRESS NOTES
Please mail letter to patient stating    Sahara, the rheumatoid panel was all completely negative/normal thus far with the uric acid in normal range C-reactive protein in normal range ASO was negative and the rheumatoid factor was negative and the LUIZA and the HLA-B27 antigen is still pending and the Maurice-Newton panel is pending as well    And then the thyroid levels were normal and the blood counts were normal and comprehensive panel shows normal kidney and liver function test and normal electrolytes and normal glucose and vitamin B12 and folic acid and vitamin D normal range

## 2024-04-19 LAB
ANA SER QL: POSITIVE
DSDNA AB SER-ACNC: 1 IU/ML (ref 0–9)
Lab: NORMAL

## 2024-04-20 NOTE — PROGRESS NOTES
Please mail letter to patient stating    Xin the LUIZA direct was positive but the reflex DNA/DS on the LUIZA was negative and then I am still awaiting the HLA-B27 antigen and the Maurice-Barr VCA IgA

## 2024-04-22 ENCOUNTER — PATIENT MESSAGE (OUTPATIENT)
Dept: FAMILY MEDICINE CLINIC | Facility: CLINIC | Age: 49
End: 2024-04-22
Payer: COMMERCIAL

## 2024-04-22 DIAGNOSIS — M25.50 POLYARTHRALGIA: Primary | ICD-10-CM
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
## 2024-04-23 ENCOUNTER — TELEPHONE (OUTPATIENT)
Dept: FAMILY MEDICINE CLINIC | Facility: CLINIC | Age: 49
End: 2024-04-23

## 2024-04-23 LAB — EBV AB TO VIRAL CAPSID AG, IGA: 1 U

## 2024-04-23 NOTE — TELEPHONE ENCOUNTER
"    Caller: Sahara Delarosa \"AYO\"    Relationship to patient: Self    Best call back number: [phone]    Patient is needing: PATIENT CALLED IN AND IS REQUESTING A CALL BACK WITH GUIDANCE. PATIENT SAID SHE STILL IS NOT FEELING WELL AND SHE IS DUE TO GO BACK TO WORK TOMORROW. PATIENT IS REQUESTING A CALL BACK WITH GUIDANCE. PATIENT SAID IT IS OKAY TO LEAVE MESSAGE ON PHONE. PATIENT SAID NOT ALL OF HER BLOOD WORK IS BACK.        "

## 2024-04-24 ENCOUNTER — OFFICE VISIT (OUTPATIENT)
Dept: FAMILY MEDICINE CLINIC | Facility: CLINIC | Age: 49
End: 2024-04-24
Payer: COMMERCIAL

## 2024-04-24 VITALS
WEIGHT: 212 LBS | OXYGEN SATURATION: 99 % | TEMPERATURE: 98.2 F | HEART RATE: 84 BPM | BODY MASS INDEX: 32.96 KG/M2 | SYSTOLIC BLOOD PRESSURE: 120 MMHG | DIASTOLIC BLOOD PRESSURE: 86 MMHG

## 2024-04-24 DIAGNOSIS — J01.90 ACUTE RHINOSINUSITIS: Primary | ICD-10-CM

## 2024-04-24 DIAGNOSIS — R05.1 ACUTE COUGH: ICD-10-CM

## 2024-04-24 DIAGNOSIS — R09.81 NASAL SINUS CONGESTION: ICD-10-CM

## 2024-04-24 DIAGNOSIS — J45.21 MILD INTERMITTENT ASTHMA WITH EXACERBATION: ICD-10-CM

## 2024-04-24 DIAGNOSIS — J02.9 SORE THROAT: ICD-10-CM

## 2024-04-24 LAB
EXPIRATION DATE: NORMAL
EXPIRATION DATE: NORMAL
FLUAV AG UPPER RESP QL IA.RAPID: NOT DETECTED
FLUBV AG UPPER RESP QL IA.RAPID: NOT DETECTED
INTERNAL CONTROL: NORMAL
INTERNAL CONTROL: NORMAL
Lab: NORMAL
Lab: NORMAL
S PYO AG THROAT QL: NEGATIVE
SARS-COV-2 AG UPPER RESP QL IA.RAPID: NOT DETECTED

## 2024-04-24 PROCEDURE — 99214 OFFICE O/P EST MOD 30 MIN: CPT | Performed by: NURSE PRACTITIONER

## 2024-04-24 PROCEDURE — 87880 STREP A ASSAY W/OPTIC: CPT | Performed by: NURSE PRACTITIONER

## 2024-04-24 PROCEDURE — 87428 SARSCOV & INF VIR A&B AG IA: CPT | Performed by: NURSE PRACTITIONER

## 2024-04-24 RX ORDER — AMOXICILLIN AND CLAVULANATE POTASSIUM 875; 125 MG/1; MG/1
1 TABLET, FILM COATED ORAL 2 TIMES DAILY
Qty: 20 TABLET | Refills: 0 | Status: SHIPPED | OUTPATIENT
Start: 2024-04-24

## 2024-04-24 RX ORDER — METHYLPREDNISOLONE 4 MG/1
TABLET ORAL
Qty: 1 EACH | Refills: 0 | Status: SHIPPED | OUTPATIENT
Start: 2024-04-24

## 2024-04-24 NOTE — TELEPHONE ENCOUNTER
From: Sahara Delarosa  To: Maira Gama  Sent: 4/22/2024 1:37 PM EDT  Subject: Bloodwork    When should the remaining bloodwork be in? I am not feeling any better. I am suppose to return to work on Wednesday, from having surgery.

## 2024-04-24 NOTE — PROGRESS NOTES
Chief Complaint  Asthma, Headache, and Cough (Barking )    History of Present Illness  Sahara Delarosa is a 48 y.o. female who presents to [facility] with a past medical history of  Past Medical History:   Diagnosis Date    Allergic     Allergic rhinitis     Anxiety and depression     Asthma     exercise induced    Back pain     Bowel incontinence     Condyloma acuminata     Diverticulitis of colon     Endometrial polyp     Family history of breast cancer     Fatty liver     Fibroids     Fibromyalgia     Fibromyalgia, primary     GERD (gastroesophageal reflux disease)     Hernia     Irritable bowel syndrome     Low back pain     Metabolic syndrome     Osteoarthritis     In addition to fibromyalgia    Sleep apnea     CPAP    Urinary frequency     Uterine prolapse     Vitamin B 12 deficiency      Yenny presents to the office today secondary to acute illness.  She states that she really has not felt well for the past few weeks, but since the beginning of this week she has felt progressively worse.  She endorses fatigue.  She has had low-grade fever, noting 99.2 °F.  She endorses nasal sinus congestion and sinus pressure.  She feels pressure in her cheekbones and forehead.  She endorses sore throat such that her throat is not really painful to swallow, but she finds it difficult to swallow.  She has cough with chest congestion.  Notes history of asthma.  Asthma is currently managed with albuterol and Singulair.  She has had diarrhea recently, but also notes history of IBS and states that the diarrhea could be related to something she has eaten.  Denies nausea or vomiting.    Objective   Vital Signs:   Vitals:    04/24/24 0955   BP: 120/86   BP Location: Left arm   Patient Position: Sitting   Pulse: 84   Temp: 98.2 °F (36.8 °C)   TempSrc: Infrared   SpO2: 99%   Weight: 96.2 kg (212 lb)     Body mass index is 32.96 kg/m².    Wt Readings from Last 3 Encounters:   04/24/24 96.2 kg (212 lb)    04/17/24 96.6 kg (213 lb)   02/16/24 99.5 kg (219 lb 6.4 oz)     BP Readings from Last 3 Encounters:   04/24/24 120/86   04/17/24 118/68   02/16/24 128/90       Health Maintenance   Topic Date Due    COVID-19 Vaccine (7 - 2023-24 season) 09/01/2023    Pneumococcal Vaccine 0-64 (2 of 2 - PCV) 09/06/2024 (Originally 2/23/2023)    ZOSTER VACCINE (1 of 2) 09/06/2024 (Originally 7/6/2023)    INFLUENZA VACCINE  08/01/2024    ANNUAL PHYSICAL  01/08/2025    PAP SMEAR  01/19/2025    BMI FOLLOWUP  02/12/2025    MAMMOGRAM  02/07/2026    TDAP/TD VACCINES (2 - Td or Tdap) 10/14/2028    COLORECTAL CANCER SCREENING  06/30/2030    HEPATITIS C SCREENING  Completed       Physical Exam  Vitals reviewed.   Constitutional:       General: She is not in acute distress.     Appearance: She is well-developed. She is obese.   HENT:      Head: Normocephalic and atraumatic.      Right Ear: Tympanic membrane, ear canal and external ear normal.      Left Ear: Tympanic membrane, ear canal and external ear normal.      Nose: Congestion present.      Right Sinus: Maxillary sinus tenderness and frontal sinus tenderness present.      Left Sinus: Maxillary sinus tenderness and frontal sinus tenderness present.      Mouth/Throat:      Mouth: Mucous membranes are moist.      Pharynx: Oropharynx is clear. No oropharyngeal exudate or posterior oropharyngeal erythema.   Eyes:      General: No scleral icterus.        Right eye: No discharge.         Left eye: No discharge.      Extraocular Movements: Extraocular movements intact.      Conjunctiva/sclera: Conjunctivae normal.   Neck:      Trachea: Trachea normal.   Cardiovascular:      Rate and Rhythm: Normal rate and regular rhythm.      Pulses: Normal pulses.   Pulmonary:      Effort: Pulmonary effort is normal.   Musculoskeletal:         General: Normal range of motion.      Cervical back: Normal range of motion and neck supple. No tenderness.   Lymphadenopathy:      Cervical: No cervical adenopathy.    Skin:     General: Skin is warm and dry.   Neurological:      Mental Status: She is alert and oriented to person, place, and time.   Psychiatric:         Mood and Affect: Mood and affect normal.         Behavior: Behavior normal.         Thought Content: Thought content normal.         Judgment: Judgment normal.           Result Review :  The following data was reviewed by: ROSEANNA Farias on 04/24/2024:    Office Visit on 04/24/2024   Component Date Value    SARS Antigen 04/24/2024 Not Detected     Influenza A Antigen HILLARY 04/24/2024 Not Detected     Influenza B Antigen [place]ARY 04/24/2024 Not Detected     Internal Control 04/24/2024 Passed     Lot Number 04/24/2024 [id number]     Expiration Date 04/24/2024 09/20/2024     Rapid Strep A Screen 04/24/2024 Negative     Internal Control 04/24/2024 Passed     Lot Number 04/24/2024 [id number]     Expiration Date 04/24/2024 7-    Office Visit on 04/17/2024   Component Date Value    Glucose 04/17/2024 77     BUN 04/17/2024 8     Creatinine 04/17/2024 0.75     Sodium 04/17/2024 141     Potassium 04/17/2024 3.9     Chloride 04/17/2024 104     CO2 04/17/2024 26.0     Calcium 04/17/2024 9.7     Total Protein 04/17/2024 7.0     Albumin 04/17/2024 4.5     ALT (SGPT) 04/17/2024 13     AST (SGOT) 04/17/2024 13     Alkaline Phosphatase 04/17/2024 86     Total Bilirubin 04/17/2024 0.6     Globulin 04/17/2024 2.5     A/G Ratio 04/17/2024 1.8     BUN/Creatinine Ratio 04/17/2024 10.7     Anion Gap 04/17/2024 11.0     eGFR 04/17/2024 97.1     Folate 04/17/2024 10.80     Vitamin B-12 04/17/2024 522     25 Hydroxy, Vitamin D 04/17/2024 33.5     TSH 04/17/2024 1.610     Free T4 04/17/2024 1.12     Uric Acid 04/17/2024 4.4     ASO 04/17/2024 Negative     Rheumatoid Factor Quanti* 04/17/2024 <10.0     C-Reactive Protein 04/17/2024 <0.30     LUIZA Direct 04/17/2024 Positive (A)     EBV Early Antigen Ab, IgG 04/17/2024 33.8 (H)     EBV Nuclear Antigen Ab, * 04/17/2024 <18.0     EBV VCA  IgM 04/17/2024 <36.0     EBV Ab to Viral Capsid A* 04/17/2024 1     WBC 04/17/2024 4.01     RBC 04/17/2024 4.26     Hemoglobin 04/17/2024 13.1     Hematocrit 04/17/2024 39.6     MCV 04/17/2024 93.0     MCH 04/17/2024 30.8     MCHC 04/17/2024 33.1     RDW 04/17/2024 12.5     RDW-SD 04/17/2024 42.6     MPV 04/17/2024 9.8     Platelets 04/17/2024 267     Neutrophil % 04/17/2024 62.1     Lymphocyte % 04/17/2024 28.7     Monocyte % 04/17/2024 6.5     Eosinophil % 04/17/2024 2.0     Basophil % 04/17/2024 0.5     Immature Grans % 04/17/2024 0.2     Neutrophils, Absolute 04/17/2024 2.49     Lymphocytes, Absolute 04/17/2024 1.15     Monocytes, Absolute 04/17/2024 0.26     Eosinophils, Absolute 04/17/2024 0.08     Basophils, Absolute 04/17/2024 0.02     Immature Grans, Absolute 04/17/2024 0.01     nRBC 04/17/2024 0.0     Anti-DNA (DS) Ab Qn 04/17/2024 1     See below: 04/17/2024 Comment      Lab Results   Lab 04/24/24  1024   SARS ANTIGEN Not Detected      Lab Results   Lab 04/24/24  1024   INFLUENZA A ANTIGEN [place] Not Detected                Lab Results   Lab 04/24/24  1024   INFLUENZA B ANTIGEN [place] Not Detected      Lab Results   Lab 04/24/24  1033   RAPID STREP A SCREEN Negative        Procedures        Assessment and Plan   Diagnoses and all orders for this visit:    1. Acute rhinosinusitis (Primary)  -     amoxicillin-clavulanate (AUGMENTIN) 875-125 MG per tablet; Take 1 tablet by mouth 2 (Two) Times a Day.  Dispense: 20 tablet; Refill: 0    2. Mild intermittent asthma with exacerbation  -     methylPREDNISolone (MEDROL) 4 MG dose pack; Take as directed on package instructions.  Dispense: 1 each; Refill: 0    3. Acute cough  -     POCT SARS-CoV-2 Antigen [place]ARY + Flu    4. Nasal sinus congestion  -     POCT SARS-CoV-2 Antigen HILLARY + Flu    5. Sore throat  -     POCT SARS-CoV-2 Antigen HILLARY + Flu  -     POCT rapid strep A                  FOLLOW UP  Return if symptoms worsen or fail to improve in 7-10 days.    POCT for strep,  flu, and COVID-19 are negative. She has had progressive symptoms over the past few weeks. Recent labs with PCP unrevealing - hx of mono, but none current. LUIZA (+), but Anti-DNA negative. Will treat for acute sinusitis and asthma exacerbation. If symptoms not resolved, or not improving in the next 7-10 days recommend follow up with PCP to further discuss.     Patient was given instructions and counseling regarding her condition or for health maintenance advice. Please see specific information pulled into the AVS if appropriate.       Noemi Goldberg, APRN  04/24/24  10:39 EDT    CURRENT & DISCONTINUED MEDICATIONS  Current Outpatient Medications   Medication Instructions    albuterol (PROVENTIL) 2.5 mg, Nebulization, Every 4 Hours PRN    albuterol sulfate  (90 Base) MCG/ACT inhaler 2 puffs, Inhalation, Every 6 Hours PRN    amoxicillin-clavulanate (AUGMENTIN) 875-125 MG per tablet 1 tablet, Oral, 2 Times Daily    busPIRone (BUSPAR) 15 mg, Oral, 2 Times Daily PRN    cyanocobalamin 1,000 mcg, Intramuscular, Every 30 Days    desvenlafaxine (PRISTIQ) 50 mg, Oral, Daily    fluticasone (FLONASE) 50 MCG/ACT nasal spray 2 sprays, Each Nare, Daily, Shake well before using.    Linzess 145 MCG capsule capsule TAKE 1 CAPSULE BY MOUTH EVERY MORNING BEFORE BREAKFAST    metFORMIN ER (GLUCOPHAGE-XR) 500 mg, Oral, Daily With Breakfast    methylPREDNISolone (MEDROL) 4 MG dose pack Take as directed on package instructions.    montelukast (SINGULAIR) 10 mg, Oral, Nightly    omeprazole (PRILOSEC) 40 mg, Oral, Daily    oxyCODONE-acetaminophen (PERCOCET) 5-325 MG per tablet oxycodone-acetaminophen 5-325 mg oral tablet 1 every 8 hrs as needed.   Active    pregabalin (LYRICA) 100 MG capsule Take 1 cap PO Q am and 2 caps PO Q HS    traZODone (DESYREL) 100 mg, Oral, Nightly    Wegovy 0.25 mg, Subcutaneous, Weekly       There are no discontinued medications.

## 2024-04-26 LAB — HLA-B27 QL NAA+PROBE: NEGATIVE

## 2024-05-01 ENCOUNTER — CLINICAL SUPPORT (OUTPATIENT)
Dept: FAMILY MEDICINE CLINIC | Facility: CLINIC | Age: 49
End: 2024-05-01
Payer: COMMERCIAL

## 2024-05-01 DIAGNOSIS — M25.50 POLYARTHRALGIA: ICD-10-CM
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
## 2024-05-01 PROCEDURE — 86757 RICKETTSIA ANTIBODY: CPT | Performed by: NURSE PRACTITIONER

## 2024-05-01 PROCEDURE — 86618 LYME DISEASE ANTIBODY: CPT | Performed by: NURSE PRACTITIONER

## 2024-05-01 PROCEDURE — 36415 COLL VENOUS BLD VENIPUNCTURE: CPT | Performed by: NURSE PRACTITIONER

## 2024-05-01 PROCEDURE — 86666 EHRLICHIA ANTIBODY: CPT | Performed by: NURSE PRACTITIONER

## 2024-05-01 NOTE — PROGRESS NOTES
..  Venipuncture Blood Specimen Collection  Venipuncture performed in left arm by Esperanza Sierra with good hemostasis. Patient tolerated the procedure well without complications.   05/01/24   Esperanza Sierra

## 2024-05-02 LAB — B BURGDOR IGG+IGM SER QL IA: NEGATIVE

## 2024-05-03 LAB
A PHAGOCYTOPH IGG TITR SER IF: NEGATIVE {TITER}
A PHAGOCYTOPH IGM TITR SER IF: NEGATIVE {TITER}
E CHAFFEENSIS IGG TITR SER IF: NEGATIVE {TITER}
E CHAFFEENSIS IGM TITR SER IF: NEGATIVE {TITER}
RESULT COMMENT:: NORMAL

## 2024-05-03 NOTE — PROGRESS NOTES
Please mail letter to patient stating    Yenny the Lyme disease antibody panel was completely negative and the 2 other tickborne disease panels are still pending--and I did do the rheumatology referral for you as discussed

## 2024-05-04 NOTE — PROGRESS NOTES
Please mail letter to patient stating    Yenny the ear [place] antibody panel was completely negative and we are still waiting 1 other tickborne disease panel

## 2024-05-05 LAB
RESULT COMMENT:: NORMAL
RICK SF IGG TITR SER: NORMAL {TITER}
RICK SF IGM TITR SER: NORMAL {TITER}

## 2024-05-06 NOTE — PROGRESS NOTES
Please mail letter to patient stating    Yenny the [place] spotted fever titers were all negative

## 2024-05-28 ENCOUNTER — OFFICE VISIT (OUTPATIENT)
Dept: FAMILY MEDICINE CLINIC | Facility: CLINIC | Age: 49
End: 2024-05-28
Payer: COMMERCIAL

## 2024-05-28 VITALS
WEIGHT: 211 LBS | TEMPERATURE: 97.3 F | SYSTOLIC BLOOD PRESSURE: 124 MMHG | HEIGHT: 67 IN | BODY MASS INDEX: 33.12 KG/M2 | HEART RATE: 80 BPM | DIASTOLIC BLOOD PRESSURE: 74 MMHG | OXYGEN SATURATION: 97 %

## 2024-05-28 DIAGNOSIS — E66.9 CLASS 1 OBESITY WITH SERIOUS COMORBIDITY AND BODY MASS INDEX (BMI) OF 32.0 TO 32.9 IN ADULT, UNSPECIFIED OBESITY TYPE: ICD-10-CM

## 2024-05-28 PROCEDURE — 99213 OFFICE O/P EST LOW 20 MIN: CPT | Performed by: NURSE PRACTITIONER

## 2024-05-28 NOTE — PROGRESS NOTES
Chief Complaint  Weight Loss (Refill on her shot.)    Subjective            Sahara Delarosa presents to [facility]  History of Present Illness  Pt is here for the F/U on the wegovy wt loss shot--(not due for all her regular medication refills until the July visit) but possibly due for the lyrica refill today --pt's highest weight we recorded was in Feb 2024 at 219# and today she us down to 211#--is being coached as well with her insurance and then also good tolerability with the med no SE no issues and ready to move to the next dose increase    Answers submitted by the patient for this visit:  Other (Submitted on 5/21/2024)  Please describe your symptoms.: Refill on new Medication  Have you had these symptoms before?: No  How long have you been having these symptoms?: 1-4 days  Primary Reason for Visit (Submitted on 5/21/2024)  What is the primary reason for your visit?: Other      PHQ-2 Total Score: 0  PHQ-9 Total Score: 0    Past Medical History:   Diagnosis Date    Allergic     Allergic rhinitis     Anxiety and depression     Asthma     exercise induced    Back pain     Bowel incontinence     Condyloma acuminata     Diverticulitis of colon     Endometrial polyp     Family history of breast cancer     Fatty liver     Fibroids     Fibromyalgia     Fibromyalgia, primary     GERD (gastroesophageal reflux disease)     Hernia     Irritable bowel syndrome     Low back pain     Metabolic syndrome     Osteoarthritis     In addition to fibromyalgia    Sleep apnea     CPAP    Urinary frequency     Uterine prolapse     Vitamin B 12 deficiency        Allergies   Allergen Reactions    Bactrim [Sulfamethoxazole-Trimethoprim] Rash    Codeine Rash        Past Surgical History:   Procedure Laterality Date    BREAST BIOPSY Left 2013    Hyper-pigmented lesion, left nipple.= Benign    COLONOSCOPY      CYST REMOVAL      on chest and groin area     ENDOMETRIAL ABLATION  2015    [facility] [place], [place]  Endometrial Ablation    ENDOSCOPY  2017    EGD: Stomach inflammation, Neg for H Pylori.    HIP ARTHROPLASTY Right 09/2021    HIP SURGERY Right 2019    LABRUM REPAIR    JOINT REPLACEMENT  September 2021    Rt Hip    TOTAL LAPAROSCOPIC HYSTERECTOMY SALPINGO OOPHORECTOMY Bilateral 03/30/2022    Procedure: TOTAL LAPAROSCOPIC HYSTERECTOMY, BILATERAL SALPINGECTOMY, AND CYSTOSCOPY;  Surgeon: Rosalba Chiu MD;  Location: [facility] OR;  Service: Obstetrics/Gynecology;  Laterality: Bilateral;    UPPER GASTROINTESTINAL ENDOSCOPY          Social History     Tobacco Use    Smoking status: Never     Passive exposure: Yes    Smokeless tobacco: Never   Vaping Use    Vaping status: Never Used   Substance Use Topics    Alcohol use: Never    Drug use: Never       Family History   Problem Relation Age of Onset    Diabetes type II Father     Diabetes Father     Other Mother         Multiple lipomas.    Arthritis Mother     Breast cancer Mother 70        Double mastectomy     Fibromyalgia Sister     No Known Problems Son     No Known Problems Daughter     Brain cancer Paternal Grandfather         dx >50    No Known Problems Paternal Grandmother     Osteoporosis Maternal Grandmother         ?    Parkinsonism Maternal Grandmother     Stomach cancer Maternal Grandfather         dx >50    Breast cancer Maternal Aunt         4 different mat aunts with breast cancer.    Breast cancer Maternal Aunt     Cancer Maternal Aunt         stomach    Breast cancer Maternal Aunt     Cancer Maternal Aunt     Breast cancer Maternal Aunt     Breast cancer Paternal Aunt 60        dx ~ age 60    BRCA 1/2 Neg Hx     Colon cancer Neg Hx     Endometrial cancer Neg Hx     Ovarian cancer Neg Hx     Malig Hyperthermia Neg Hx     Uterine cancer Neg Hx         Health Maintenance Due   Topic Date Due    COVID-19 Vaccine (7 - 2023-24 season) 09/01/2023        Current Outpatient Medications on File Prior to Visit   Medication Sig    albuterol (PROVENTIL) (2.5  MG/3ML) 0.083% nebulizer solution Take 2.5 mg by nebulization Every 4 (Four) Hours As Needed for Wheezing or Shortness of Air.    albuterol sulfate  (90 Base) MCG/ACT inhaler Inhale 2 puffs Every 6 (Six) Hours As Needed for Wheezing.    busPIRone (BUSPAR) 15 MG tablet Take 1 tablet by mouth 2 (Two) Times a Day As Needed (anxiety).    CeleBREX 100 MG capsule Take 1 capsule twice a day by oral route as needed for 30 days.    cyanocobalamin 1000 MCG/ML injection Inject 1 mL into the appropriate muscle as directed by prescriber Every 30 (Thirty) Days.    desvenlafaxine (Pristiq) 50 MG 24 hr tablet Take 1 tablet by mouth Daily.    fluticasone (FLONASE) 50 MCG/ACT nasal spray SHAKE LIQUID AND USE 2 SPRAYS IN EACH NOSTRIL DAILY    Linzess 145 MCG capsule capsule TAKE 1 CAPSULE BY MOUTH EVERY MORNING BEFORE BREAKFAST    metFORMIN ER (GLUCOPHAGE-XR) 500 MG 24 hr tablet Take 1 tablet by mouth Daily With Breakfast.    montelukast (SINGULAIR) 10 MG tablet Take 1 tablet by mouth Every Night.    omeprazole (priLOSEC) 40 MG capsule Take 1 capsule by mouth Daily.    oxyCODONE-acetaminophen (PERCOCET) 5-325 MG per tablet oxycodone-acetaminophen 5-325 mg oral tablet 1 every 8 hrs as needed.   Active    pregabalin (LYRICA) 100 MG capsule Take 1 cap PO Q am and 2 caps PO Q HS    traZODone (DESYREL) 100 MG tablet TAKE 1 TABLET BY MOUTH EVERY NIGHT    [DISCONTINUED] Semaglutide-Weight Management (Wegovy) 0.25 MG/0.5ML solution auto-injector Inject 0.5 mL under the skin into the appropriate area as directed 1 (One) Time Per Week.    [DISCONTINUED] amoxicillin-clavulanate (AUGMENTIN) 875-125 MG per tablet Take 1 tablet by mouth 2 (Two) Times a Day.    [DISCONTINUED] methylPREDNISolone (MEDROL) 4 MG dose pack Take as directed on package instructions.     No current facility-administered medications on file prior to visit.       Immunization History   Administered Date(s) Administered    COVID-19 (MODERNA) 1st,2nd,3rd Dose Monovalent  "02/03/2021, 03/03/2021, 11/05/2021    COVID-19 (UNSPECIFIED) 02/03/2021, 03/03/2021, 11/05/2021    Flu Vaccine Quad PF >36MO 10/19/2016, 09/20/2017, 10/14/2018    Flu Vaccine Split Quad 10/19/2016, 09/20/2017, 10/14/2018    Fluzone (or Fluarix & Flulaval for VFC) >6mos 11/08/2022    Hepatitis A 12/06/2018    Influenza Injectable Mdck Pf Quad 11/08/2022, 11/07/2023    Influenza, Unspecified 10/14/2018, 10/01/2019    Pneumococcal Polysaccharide (PPSV23) 02/23/2022    Tdap 10/14/2018       Review of Systems   HENT:  Negative for trouble swallowing.    Respiratory:  Negative for cough and shortness of breath.    Cardiovascular:  Negative for chest pain.   Gastrointestinal:  Negative for abdominal pain, diarrhea, nausea and vomiting.   Musculoskeletal:  Positive for arthralgias.   Hematological:  Negative for adenopathy.   Psychiatric/Behavioral:  Negative for self-injury and suicidal ideas.         Objective     /74   Pulse 80   Temp 97.3 °F (36.3 °C) (Temporal)   Ht 170.8 cm (67.25\")   Wt 95.7 kg (211 lb)   SpO2 97%   BMI 32.80 kg/m²       Physical Exam  Vitals and nursing note reviewed.   Constitutional:       Appearance: Normal appearance. She is obese.      Comments: Down 8#    HENT:      Head: Normocephalic.      Right Ear: External ear normal.      Left Ear: External ear normal.      Nose: Nose normal.      Mouth/Throat:      Mouth: Mucous membranes are moist.   Eyes:      Pupils: Pupils are equal, round, and reactive to light.   Cardiovascular:      Rate and Rhythm: Normal rate and regular rhythm.      Heart sounds: Normal heart sounds.   Pulmonary:      Effort: Pulmonary effort is normal.      Breath sounds: Normal breath sounds.   Abdominal:      Palpations: Abdomen is soft.   Musculoskeletal:         General: Swelling and tenderness present.      Cervical back: Normal range of motion and neck supple.      Comments: Of the knuckles    Skin:     General: Skin is warm and dry.   Neurological:      " Mental Status: She is alert and oriented to person, place, and time.   Psychiatric:         Mood and Affect: Mood normal.         Behavior: Behavior normal.         Thought Content: Thought content normal.         Judgment: Judgment normal.         Result Review :                      Urine Drug Screen - Urine, Clean Catch (09/08/2023 08:49)      Assessment and Plan      Diagnoses and all orders for this visit:    1. Class 1 obesity with serious comorbidity and body mass index (BMI) of 32.0 to 32.9 in adult, unspecified obesity type  -     Semaglutide-Weight Management 0.5 MG/0.5ML solution auto-injector; Inject 0.5 mL under the skin into the appropriate area as directed 1 (One) Time Per Week.  Dispense: 2 mL; Refill: 0    We will increase the dose to the 0.5 mg and then F/U in 4 weeks then re-eval and then see if well tolerated then increase again--for best efficacy         Follow Up     Return in about 4 weeks (around 6/25/2024), or if symptoms worsen or fail to improve.    Patient was given instructions and counseling regarding her condition or for health maintenance advice. Please see specific information pulled into the AVS if appropriate.            Sahara ELVA Delarosa  reports that she has never smoked. She has been exposed to tobacco smoke. She has never used smokeless tobacco. I have educated her on the risk of diseases from using tobacco products such as cancer, COPD, and heart disease.

## 2024-05-30 ENCOUNTER — OFFICE VISIT (OUTPATIENT)
Dept: SLEEP MEDICINE | Facility: HOSPITAL | Age: 49
End: 2024-05-30
Payer: COMMERCIAL

## 2024-05-30 VITALS
HEART RATE: 74 BPM | OXYGEN SATURATION: 99 % | BODY MASS INDEX: 31.8 KG/M2 | HEIGHT: 68 IN | SYSTOLIC BLOOD PRESSURE: 124 MMHG | DIASTOLIC BLOOD PRESSURE: 66 MMHG | WEIGHT: 209.8 LBS

## 2024-05-30 DIAGNOSIS — G47.33 OSA (OBSTRUCTIVE SLEEP APNEA): Primary | ICD-10-CM

## 2024-05-30 PROCEDURE — G0463 HOSPITAL OUTPT CLINIC VISIT: HCPCS

## 2024-05-30 NOTE — PROGRESS NOTES
"[facility]         Dr Renea Whaley  [unfilled]  Patient Care Team:  Maira Gama APRN as PCP - General (Family Medicine)  Rosalba Chiu MD as Gynecologist (Gynecology)    Chief Complaint:Initial sleep study AHI is 10.5 events per    hour.     Interval History: Patient and her compliance visit.  Currently set up auto CPAP 5 to 15 cm.  Compliance 88% with average daily use 5 hours 36 minutes.  AHI leak within normal limits.  Goes to bed 930 gets up variable time but mostly between 4 AM to 6 AM.  Feels rested with the CPAP.  No tobacco no alcohol no caffeine abuse.  Did have lumbar surgery with pain device placed.  Currently has a nasal pillow that fits well.  Supplies been adequate.  Allergies are better controlled with nasal steroids antihistamine.  Review of system positive for nasal congestion anxiety.  Ute 6 out of 24 within normal limits    REVIEW OF SYSTEMS:   CARDIOVASCULAR: No chest pain, chest pressure or chest discomfort. No palpitations or edema.   RESPIRATORY: No shortness of breath, cough or sputum.   GASTROINTESTINAL: No anorexia, nausea, vomiting or diarrhea. No abdominal pain or blood.   HEMATOLOGIC: No bleeding or bruising.     Ventilator/Non-Invasive Ventilation Settings (From admission, onward)      None              Vital Signs  Heart Rate:  [74] 74  BP: (124)/(66) 124/66  [unfilled]  Flowsheet Rows      Flowsheet Row First Filed Value   Admission Height 172.7 cm (67.99\") Documented at 05/30/2024 0700   Admission Weight 95.2 kg (209 lb 12.8 oz) Documented at 05/30/2024 0700            Physical Exam:  Patient is examined using the personal protective equipment as per guidelines from infection control for this particular patient as enacted.  Hand hygiene was performed before and after patient interaction.   General Appearance:    Alert, cooperative, in no acute distress.  Following simple commands  ENT Mallampati between 3 and 4 no nasal congestion  Neck " midline trachea, no thyromegaly   Lungs:     Clear to auscultation, respirations regular, even and                  unlabored    Heart:    Regular rhythm and normal rate, normal S1 and S2, no            murmur, no gallop, no rub, no click   Chest Wall:    No abnormalities observed   Abdomen:     Normal bowel sounds, no masses, no organomegaly, soft        nontender, nondistended, no guarding, no rebound                tenderness   Extremities:   Moves all extremities well, no edema, no cyanosis, no             redness  CNS no focal neurological deficits normal sensory exam  Skin no rashes no nodules  Musculoskeletal no cyanosis no clubbing normal range of motion     Results Review:                                          I reviewed the patient's new clinical results.  I personally viewed and interpreted the patient's chest x-ray.        Medication Review:       No current facility-administered medications for this visit.      ASSESSMENT:   1.  Obstructive sleep apnea.    2.  Fibromyalgia.    3.  Diabetes mellitus.  Allergic rhinitis    PLAN:  Reviewed compliance download with the patient  Compliance AHI leak look pretty decent  Continue current auto CPAP 5 to 15 cm  Supplies renewed  Treatment of allergies with nasal steroids and antihistamine  Treatment of underlying comorbidities  Sleep hygiene measures  Follow-up in 1 year      Renea Whaley MD  05/30/24  08:49 EDT

## 2024-05-31 RX ORDER — SEMAGLUTIDE 0.25 MG/.5ML
INJECTION, SOLUTION SUBCUTANEOUS
Qty: 2 ML | OUTPATIENT
Start: 2024-05-31

## 2024-06-17 DIAGNOSIS — M79.7 FIBROMYALGIA: ICD-10-CM

## 2024-06-17 RX ORDER — PREGABALIN 100 MG/1
CAPSULE ORAL
Qty: 30 CAPSULE | Refills: 0 | Status: SHIPPED | OUTPATIENT
Start: 2024-06-17

## 2024-06-18 DIAGNOSIS — E88.810 METABOLIC SYNDROME: ICD-10-CM

## 2024-06-18 DIAGNOSIS — F41.9 ANXIETY: ICD-10-CM

## 2024-06-18 DIAGNOSIS — K21.9 GASTROESOPHAGEAL REFLUX DISEASE WITHOUT ESOPHAGITIS: ICD-10-CM

## 2024-06-18 RX ORDER — OMEPRAZOLE 40 MG/1
40 CAPSULE, DELAYED RELEASE ORAL DAILY
Qty: 90 CAPSULE | Refills: 1 | OUTPATIENT
Start: 2024-06-18

## 2024-06-18 RX ORDER — BUSPIRONE HYDROCHLORIDE 15 MG/1
15 TABLET ORAL 2 TIMES DAILY PRN
Qty: 60 TABLET | Refills: 0 | Status: SHIPPED | OUTPATIENT
Start: 2024-06-18

## 2024-06-18 NOTE — TELEPHONE ENCOUNTER
Ekat Simon MA    6/18/24  9:24 AM  Should only run out of buspirone prior to appointment, others should have enough

## 2024-06-19 RX ORDER — METFORMIN HYDROCHLORIDE 500 MG/1
500 TABLET, EXTENDED RELEASE ORAL
Qty: 90 TABLET | Refills: 1 | OUTPATIENT
Start: 2024-06-19

## 2024-06-27 ENCOUNTER — OFFICE VISIT (OUTPATIENT)
Dept: FAMILY MEDICINE CLINIC | Facility: CLINIC | Age: 49
End: 2024-06-27
Payer: COMMERCIAL

## 2024-06-27 VITALS
BODY MASS INDEX: 32.33 KG/M2 | TEMPERATURE: 97.7 F | HEIGHT: 67 IN | DIASTOLIC BLOOD PRESSURE: 72 MMHG | WEIGHT: 206 LBS | SYSTOLIC BLOOD PRESSURE: 126 MMHG | OXYGEN SATURATION: 96 % | HEART RATE: 102 BPM

## 2024-06-27 DIAGNOSIS — J45.41 MODERATE PERSISTENT ASTHMA WITH EXACERBATION: ICD-10-CM

## 2024-06-27 DIAGNOSIS — R42 LIGHTHEADED: ICD-10-CM

## 2024-06-27 DIAGNOSIS — E66.9 CLASS 1 OBESITY WITH SERIOUS COMORBIDITY AND BODY MASS INDEX (BMI) OF 32.0 TO 32.9 IN ADULT, UNSPECIFIED OBESITY TYPE: Primary | ICD-10-CM

## 2024-06-27 DIAGNOSIS — R19.7 DIARRHEA, UNSPECIFIED TYPE: ICD-10-CM

## 2024-06-27 DIAGNOSIS — R53.83 FATIGUE, UNSPECIFIED TYPE: ICD-10-CM

## 2024-06-27 LAB
ALBUMIN SERPL-MCNC: 4.3 G/DL (ref 3.5–5.2)
ALBUMIN/GLOB SERPL: 1.7 G/DL
ALP SERPL-CCNC: 87 U/L (ref 39–117)
ALT SERPL W P-5'-P-CCNC: 12 U/L (ref 1–33)
ANION GAP SERPL CALCULATED.3IONS-SCNC: 11.1 MMOL/L (ref 5–15)
AST SERPL-CCNC: 10 U/L (ref 1–32)
BILIRUB SERPL-MCNC: 0.6 MG/DL (ref 0–1.2)
BUN SERPL-MCNC: 10 MG/DL (ref 6–20)
BUN/CREAT SERPL: 14.3 (ref 7–25)
CALCIUM SPEC-SCNC: 9.7 MG/DL (ref 8.6–10.5)
CHLORIDE SERPL-SCNC: 107 MMOL/L (ref 98–107)
CO2 SERPL-SCNC: 22.9 MMOL/L (ref 22–29)
CREAT SERPL-MCNC: 0.7 MG/DL (ref 0.57–1)
EGFRCR SERPLBLD CKD-EPI 2021: 105.5 ML/MIN/1.73
GLOBULIN UR ELPH-MCNC: 2.6 GM/DL
GLUCOSE SERPL-MCNC: 81 MG/DL (ref 65–99)
MAGNESIUM SERPL-MCNC: 2.1 MG/DL (ref 1.6–2.6)
POTASSIUM SERPL-SCNC: 4 MMOL/L (ref 3.5–5.2)
PROT SERPL-MCNC: 6.9 G/DL (ref 6–8.5)
SODIUM SERPL-SCNC: 141 MMOL/L (ref 136–145)

## 2024-06-27 PROCEDURE — 36415 COLL VENOUS BLD VENIPUNCTURE: CPT | Performed by: NURSE PRACTITIONER

## 2024-06-27 PROCEDURE — 80053 COMPREHEN METABOLIC PANEL: CPT | Performed by: NURSE PRACTITIONER

## 2024-06-27 PROCEDURE — 83735 ASSAY OF MAGNESIUM: CPT | Performed by: NURSE PRACTITIONER

## 2024-06-27 PROCEDURE — 99214 OFFICE O/P EST MOD 30 MIN: CPT | Performed by: NURSE PRACTITIONER

## 2024-06-27 RX ORDER — AZITHROMYCIN 250 MG/1
TABLET, FILM COATED ORAL
Qty: 6 TABLET | Refills: 0 | Status: SHIPPED | OUTPATIENT
Start: 2024-06-27

## 2024-06-27 RX ORDER — METHYLPREDNISOLONE 4 MG/1
TABLET ORAL
Qty: 21 EACH | Refills: 0 | Status: SHIPPED | OUTPATIENT
Start: 2024-06-27

## 2024-06-27 NOTE — PROGRESS NOTES
Venipuncture Blood Specimen Collection  Venipuncture performed in left arm by Shukri Sellers with good hemostasis. Patient tolerated the procedure well without complications.   06/27/24   Shukri Sellers

## 2024-06-27 NOTE — PROGRESS NOTES
Answers submitted by the patient for this visit:  Other (Submitted on 6/25/2024)  Please describe your symptoms.: Renewal of medication  Have you had these symptoms before?: Yes  How long have you been having these symptoms?: Greater than 2 weeks  Primary Reason for Visit (Submitted on 6/25/2024)  What is the primary reason for your visit?: Other  Chief Complaint  Obesity (Medication refills. ) and Irregular Heart Beat (The last few days heart beat have been racing and then drops low. )    Subjective            Sahara Delarosa presents to [facility]  History of Present Illness  Pt is here today for the F/U on the wegovy--for weight loss--and patient was on the second dose titration up to 0.5 mg once weekly and up until just this weekend when she had the asthma exacerbation and started working outside with [employer] she had had no side effects no issues no nausea vomiting diarrhea no dizziness no lightheadedness no palpitations and no lumps in the throat and was ready to increase the dose up until this weekend as follows:    Up until last few days was experiencing no SE no issues--then reports they were working [employer] outside and just started over the weekend and is this week as well--and more fatigued and HR up slightly to 110 and then nausea--and then sweating HA--and slight dizzy --then also started with a slight asthma flare and then barking cough--and then since the asthma flare up the other s/s started compounded with being outside in the heat --pt wondered if needed an  steroid for the asthma --did the COVID home test last night was negative--and she almost feels as though it is more of accommodation of working outside in the heat sweating more as well as an asthma exacerbation that has contributed to all of this      PHQ-2 Total Score:    PHQ-9 Total Score:      Past Medical History:   Diagnosis Date    Allergic     Allergic rhinitis     Anxiety and depression     Asthma     exercise  induced    Back pain     Bowel incontinence     Condyloma acuminata     Diverticulitis of colon     Endometrial polyp     Family history of breast cancer     Fatty liver     Fibroids     Fibromyalgia     Fibromyalgia, primary     GERD (gastroesophageal reflux disease)     Hernia     Irritable bowel syndrome     Low back pain     Metabolic syndrome     Osteoarthritis     In addition to fibromyalgia    Sleep apnea     CPAP    Urinary frequency     Uterine prolapse     Vitamin B 12 deficiency        Allergies   Allergen Reactions    Bactrim [Sulfamethoxazole-Trimethoprim] Rash    Codeine Rash        Past Surgical History:   Procedure Laterality Date    BREAST BIOPSY Left 2013    Hyper-pigmented lesion, left nipple.= Benign    COLONOSCOPY      CYST REMOVAL      on chest and groin area     ENDOMETRIAL ABLATION  2015    [facility], [facility], Novasure Endometrial Ablation    ENDOSCOPY  2017    EGD: Stomach inflammation, Neg for H Pylori.    HIP ARTHROPLASTY Right 09/2021    HIP SURGERY Right 2019    LABRUM REPAIR    JOINT REPLACEMENT  September 2021    Rt Hip    LUMBAR SPINE SURGERY  02/2024    Pain Device    TOTAL LAPAROSCOPIC HYSTERECTOMY SALPINGO OOPHORECTOMY Bilateral 03/30/2022    Procedure: TOTAL LAPAROSCOPIC HYSTERECTOMY, BILATERAL SALPINGECTOMY, AND CYSTOSCOPY;  Surgeon: Rosalba Chiu MD;  Location: [facility];  Service: Obstetrics/Gynecology;  Laterality: Bilateral;    UPPER GASTROINTESTINAL ENDOSCOPY          Social History     Tobacco Use    Smoking status: Never     Passive exposure: Yes    Smokeless tobacco: Never   Vaping Use    Vaping status: Never Used   Substance Use Topics    Alcohol use: Never    Drug use: Never       Family History   Problem Relation Age of Onset    Diabetes type II Father     Diabetes Father     Other Mother         Multiple lipomas.    Arthritis Mother     Breast cancer Mother 70        Double mastectomy     Fibromyalgia Sister     No Known Problems Son     No Known Problems Daughter      Brain cancer Paternal Grandfather         dx >50    No Known Problems Paternal Grandmother     Osteoporosis Maternal Grandmother         ?    Parkinsonism Maternal Grandmother     Stomach cancer Maternal Grandfather         dx >50    Breast cancer Maternal Aunt         4 different mat aunts with breast cancer.    Breast cancer Maternal Aunt     Cancer Maternal Aunt         stomach    Breast cancer Maternal Aunt     Cancer Maternal Aunt     Breast cancer Maternal Aunt     Breast cancer Paternal Aunt 60        dx ~ age 60    BRCA 1/2 Neg Hx     Colon cancer Neg Hx     Endometrial cancer Neg Hx     Ovarian cancer Neg Hx     Malig Hyperthermia Neg Hx     Uterine cancer Neg Hx         Health Maintenance Due   Topic Date Due    COVID-19 Vaccine (7 - 2023-24 season) 09/01/2023        Current Outpatient Medications on File Prior to Visit   Medication Sig    albuterol (PROVENTIL) (2.5 MG/3ML) 0.083% nebulizer solution Take 2.5 mg by nebulization Every 4 (Four) Hours As Needed for Wheezing or Shortness of Air.    albuterol sulfate  (90 Base) MCG/ACT inhaler Inhale 2 puffs Every 6 (Six) Hours As Needed for Wheezing.    busPIRone (BUSPAR) 15 MG tablet TAKE 1 TABLET BY MOUTH TWICE DAILY AS NEEDED FOR ANXIETY    CeleBREX 100 MG capsule Take 1 capsule twice a day by oral route as needed for 30 days.    cyanocobalamin 1000 MCG/ML injection Inject 1 mL into the appropriate muscle as directed by prescriber Every 30 (Thirty) Days.    desvenlafaxine (Pristiq) 50 MG 24 hr tablet Take 1 tablet by mouth Daily.    fluticasone (FLONASE) 50 MCG/ACT nasal spray SHAKE LIQUID AND USE 2 SPRAYS IN EACH NOSTRIL DAILY    Linzess 145 MCG capsule capsule TAKE 1 CAPSULE BY MOUTH EVERY MORNING BEFORE BREAKFAST    metFORMIN ER (GLUCOPHAGE-XR) 500 MG 24 hr tablet Take 1 tablet by mouth Daily With Breakfast.    montelukast (SINGULAIR) 10 MG tablet Take 1 tablet by mouth Every Night.    omeprazole (priLOSEC) 40 MG capsule Take 1 capsule by mouth  Daily.    oxyCODONE-acetaminophen (PERCOCET) 5-325 MG per tablet oxycodone-acetaminophen 5-325 mg oral tablet 1 every 8 hrs as needed.   Active    pregabalin (LYRICA) 100 MG capsule TAKE 1 CAPSULE BY MOUTH EVERY MORNING AND 2 CAPSULES BY MOUTH AT BEDTIME    traZODone (DESYREL) 100 MG tablet TAKE 1 TABLET BY MOUTH EVERY NIGHT    [DISCONTINUED] Semaglutide-Weight Management 0.5 MG/0.5ML solution auto-injector Inject 0.5 mL under the skin into the appropriate area as directed 1 (One) Time Per Week.     No current facility-administered medications on file prior to visit.       Immunization History   Administered Date(s) Administered    COVID-19 (MODERNA) 1st,2nd,3rd Dose Monovalent 02/03/2021, 03/03/2021, 11/05/2021    COVID-19 (UNSPECIFIED) 02/03/2021, 03/03/2021, 11/05/2021    Flu Vaccine Quad PF >36MO 10/19/2016, 09/20/2017, 10/14/2018    Flu Vaccine Split Quad 10/19/2016, 09/20/2017, 10/14/2018    Fluzone (or Fluarix & Flulaval for VFC) >6mos 11/08/2022    Hepatitis A 12/06/2018    Influenza Injectable Mdck Pf Quad 11/08/2022, 11/07/2023    Influenza, Unspecified 10/14/2018, 10/01/2019    Pneumococcal Polysaccharide (PPSV23) 02/23/2022    Tdap 10/14/2018       Review of Systems   Constitutional:  Negative for chills and fever.        Mild chills last night and resolved and feels better today    HENT:  Positive for sinus pressure. Negative for congestion, sore throat and trouble swallowing.    Respiratory:  Positive for cough, shortness of breath and wheezing. Negative for choking.         Croupy seal barking deep cough nonproductive and tight with the asthma    Gastrointestinal:  Positive for diarrhea. Negative for abdominal pain, nausea and vomiting.        When was up doing a but of things a little nausea today --shot does cause some diarrhea    Genitourinary:  Negative for dysuria.   Musculoskeletal:  Negative for arthralgias.   Neurological:  Positive for headache.   Hematological:  Negative for adenopathy.     "    Objective     /72   Pulse 102   Temp 97.7 °F (36.5 °C) (Temporal)   Ht 170.8 cm (67.25\")   Wt 93.4 kg (206 lb)   SpO2 96%   BMI 32.02 kg/m²       Physical Exam  Vitals and nursing note reviewed.   Constitutional:       Appearance: Normal appearance. She is obese.      Comments: Lost additional 3#    HENT:      Head: Normocephalic.      Right Ear: External ear normal.      Left Ear: External ear normal.      Nose: Nose normal.      Mouth/Throat:      Mouth: Mucous membranes are moist.   Eyes:      Pupils: Pupils are equal, round, and reactive to light.   Cardiovascular:      Rate and Rhythm: Normal rate and regular rhythm.      Heart sounds: Normal heart sounds.   Pulmonary:      Effort: Pulmonary effort is normal.      Breath sounds: Normal breath sounds.   Abdominal:      Palpations: Abdomen is soft.      Tenderness: There is no abdominal tenderness.   Musculoskeletal:         General: Normal range of motion.      Cervical back: Normal range of motion and neck supple.   Skin:     General: Skin is warm and dry.   Neurological:      Mental Status: She is alert and oriented to person, place, and time.   Psychiatric:         Mood and Affect: Mood normal.         Behavior: Behavior normal.         Thought Content: Thought content normal.         Judgment: Judgment normal.         Result Review :                           Assessment and Plan      Diagnoses and all orders for this visit:    1. Class 1 obesity with serious comorbidity and body mass index (BMI) of 32.0 to 32.9 in adult, unspecified obesity type (Primary)  -     Semaglutide-Weight Management 0.5 MG/0.5ML solution auto-injector; Inject 0.5 mL under the skin into the appropriate area as directed 1 (One) Time Per Week.  Dispense: 2 mL; Refill: 0    2. Moderate persistent asthma with exacerbation  -     methylPREDNISolone (MEDROL) 4 MG dose pack; Take as directed on package instructions.  Dispense: 21 each; Refill: 0  -     azithromycin " (Zithromax Z-Wally) 250 MG tablet; Take 2 tablets by mouth on day 1, then 1 tablet daily on days 2-5  Dispense: 6 tablet; Refill: 0    3. Diarrhea, unspecified type  -     Magnesium  -     Comprehensive Metabolic Panel    4. Fatigue, unspecified type  -     Magnesium  -     Comprehensive Metabolic Panel    5. Lightheaded  -     Magnesium  -     Comprehensive Metabolic Panel    Pt will reach out to me should she need refill 1 week prior to F/U appoint and the if wants to increase     We will obtain labs checking electrolytes today and make sure she is not dehydrated and today heart rhythm heart rate was completely normal regular no abnormal beats lungs were completely clear although the patient had had to use the albuterol earlier today    We will do empiric treatment of the asthma exacerbation and cough with the Zithromax and the steroid pack as they both together in combination will reduce the inflammation with an asthmatic    Should anything persist or worsen patient will reach out to me or follow-up urgently and did encourage the patient to stay well-hydrated as well especially if she is working out in the heat with [employer]      Follow Up     Return in about 1 month (around 7/29/2024), or if symptoms worsen or fail to improve, for Recheck.    Patient was given instructions and counseling regarding her condition or for health maintenance advice. Please see specific information pulled into the AVS if appropriate.            Sahara Delarosa  reports that she has never smoked. She has been exposed to tobacco smoke. She has never used smokeless tobacco. I have educated her on the risk of diseases from using tobacco products such as cancer, COPD, and heart disease.

## 2024-06-28 NOTE — PROGRESS NOTES
Please mail letter to patient stating    Sahara, magnesium was normal and the comprehensive panel shows normal electrolytes normal glucose and normal kidney and liver function tests

## 2024-07-02 DIAGNOSIS — M79.7 FIBROMYALGIA: ICD-10-CM

## 2024-07-03 RX ORDER — PREGABALIN 100 MG/1
CAPSULE ORAL
Qty: 90 CAPSULE | Refills: 0 | Status: SHIPPED | OUTPATIENT
Start: 2024-07-03

## 2024-07-04 DIAGNOSIS — J30.2 SEASONAL ALLERGIC RHINITIS, UNSPECIFIED TRIGGER: ICD-10-CM

## 2024-07-04 DIAGNOSIS — J45.30 MILD PERSISTENT ASTHMA WITHOUT COMPLICATION: ICD-10-CM

## 2024-07-05 RX ORDER — MONTELUKAST SODIUM 10 MG/1
10 TABLET ORAL NIGHTLY
Qty: 90 TABLET | Refills: 0 | Status: SHIPPED | OUTPATIENT
Start: 2024-07-05

## 2024-07-11 ENCOUNTER — TELEPHONE (OUTPATIENT)
Dept: FAMILY MEDICINE CLINIC | Facility: CLINIC | Age: 49
End: 2024-07-11

## 2024-07-11 ENCOUNTER — OFFICE VISIT (OUTPATIENT)
Dept: FAMILY MEDICINE CLINIC | Facility: CLINIC | Age: 49
End: 2024-07-11
Payer: COMMERCIAL

## 2024-07-11 VITALS
SYSTOLIC BLOOD PRESSURE: 118 MMHG | DIASTOLIC BLOOD PRESSURE: 82 MMHG | HEART RATE: 125 BPM | WEIGHT: 206 LBS | TEMPERATURE: 97.7 F | OXYGEN SATURATION: 96 % | BODY MASS INDEX: 32.02 KG/M2

## 2024-07-11 DIAGNOSIS — U07.1 COVID-19: ICD-10-CM

## 2024-07-11 DIAGNOSIS — J06.9 UPPER RESPIRATORY TRACT INFECTION, UNSPECIFIED TYPE: ICD-10-CM

## 2024-07-11 DIAGNOSIS — U07.1 POSITIVE SELF-ADMINISTERED ANTIGEN TEST FOR COVID-19: Primary | ICD-10-CM

## 2024-07-11 LAB
EXPIRATION DATE: ABNORMAL
FLUAV AG UPPER RESP QL IA.RAPID: NOT DETECTED
FLUBV AG UPPER RESP QL IA.RAPID: NOT DETECTED
INTERNAL CONTROL: ABNORMAL
Lab: ABNORMAL
SARS-COV-2 AG UPPER RESP QL IA.RAPID: DETECTED

## 2024-07-11 PROCEDURE — 87428 SARSCOV & INF VIR A&B AG IA: CPT | Performed by: NURSE PRACTITIONER

## 2024-07-11 PROCEDURE — 99213 OFFICE O/P EST LOW 20 MIN: CPT | Performed by: NURSE PRACTITIONER

## 2024-07-11 RX ORDER — DEXTROMETHORPHAN HYDROBROMIDE AND PROMETHAZINE HYDROCHLORIDE 15; 6.25 MG/5ML; MG/5ML
5 SYRUP ORAL 4 TIMES DAILY PRN
Qty: 120 ML | Refills: 0 | Status: SHIPPED | OUTPATIENT
Start: 2024-07-11

## 2024-07-11 RX ORDER — METHYLPREDNISOLONE 4 MG/1
TABLET ORAL
Qty: 21 EACH | Refills: 0 | Status: SHIPPED | OUTPATIENT
Start: 2024-07-11

## 2024-07-11 RX ORDER — NABUMETONE 500 MG/1
500 TABLET, FILM COATED ORAL
COMMUNITY

## 2024-07-11 NOTE — PROGRESS NOTES
Chief Complaint  Headache, Cough, Nasal Congestion, and Fatigue    Subjective            Sahara Delarosa presents to [facility]  History of Present Illness  Patient is here today for the sudden onset headache cough nasal congestion fatigue started within the last 24 to 48 hours no fevers yet no nausea or vomiting yet and did a home COVID test this morning and was positive and she does have asthma and is starting to feel like the asthma is flaring up and she would like to something to treat that as well and something to shorten the COVID symptoms if possible      PHQ-2 Total Score:    PHQ-9 Total Score:      Past Medical History:   Diagnosis Date    Allergic     Allergic rhinitis     Anxiety and depression     Asthma     exercise induced    Back pain     Bowel incontinence     Condyloma acuminata     Diverticulitis of colon     Endometrial polyp     Family history of breast cancer     Fatty liver     Fibroids     Fibromyalgia     Fibromyalgia, primary     GERD (gastroesophageal reflux disease)     Hernia     Irritable bowel syndrome     Low back pain     Metabolic syndrome     Osteoarthritis     In addition to fibromyalgia    Sleep apnea     CPAP    Urinary frequency     Uterine prolapse     Vitamin B 12 deficiency        Allergies   Allergen Reactions    Bactrim [Sulfamethoxazole-Trimethoprim] Rash    Codeine Rash        Past Surgical History:   Procedure Laterality Date    BREAST BIOPSY Left 2013    Hyper-pigmented lesion, left nipple.= Benign    COLONOSCOPY      CYST REMOVAL      on chest and groin area     ENDOMETRIAL ABLATION  2015    [facility] [place], Novasure Endometrial Ablation    ENDOSCOPY  2017    EGD: Stomach inflammation, Neg for H Pylori.    HIP ARTHROPLASTY Right 09/2021    HIP SURGERY Right 2019    LABRUM REPAIR    JOINT REPLACEMENT  September 2021    Rt Hip    LUMBAR SPINE SURGERY  02/2024    Pain Device    TOTAL LAPAROSCOPIC HYSTERECTOMY SALPINGO OOPHORECTOMY Bilateral  03/30/2022    Procedure: TOTAL LAPAROSCOPIC HYSTERECTOMY, BILATERAL SALPINGECTOMY, AND CYSTOSCOPY;  Surgeon: Rosalba Chiu MD;  Location: [facility];  Service: Obstetrics/Gynecology;  Laterality: Bilateral;    UPPER GASTROINTESTINAL ENDOSCOPY          Social History     Tobacco Use    Smoking status: Never     Passive exposure: Yes    Smokeless tobacco: Never   Vaping Use    Vaping status: Never Used   Substance Use Topics    Alcohol use: Never    Drug use: Never       Family History   Problem Relation Age of Onset    Diabetes type II Father     Diabetes Father     Other Mother         Multiple lipomas.    Arthritis Mother     Breast cancer Mother 70        Double mastectomy     Fibromyalgia Sister     No Known Problems Son     No Known Problems Daughter     Brain cancer Paternal Grandfather         dx >50    No Known Problems Paternal Grandmother     Osteoporosis Maternal Grandmother         ?    Parkinsonism Maternal Grandmother     Stomach cancer Maternal Grandfather         dx >50    Breast cancer Maternal Aunt         4 different mat aunts with breast cancer.    Breast cancer Maternal Aunt     Cancer Maternal Aunt         stomach    Breast cancer Maternal Aunt     Cancer Maternal Aunt     Breast cancer Maternal Aunt     Breast cancer Paternal Aunt 60        dx ~ age 60    BRCA 1/2 Neg Hx     Colon cancer Neg Hx     Endometrial cancer Neg Hx     Ovarian cancer Neg Hx     Malig Hyperthermia Neg Hx     Uterine cancer Neg Hx         Health Maintenance Due   Topic Date Due    COVID-19 Vaccine (7 - 2023-24 season) 09/01/2023        Current Outpatient Medications on File Prior to Visit   Medication Sig    albuterol (PROVENTIL) (2.5 MG/3ML) 0.083% nebulizer solution Take 2.5 mg by nebulization Every 4 (Four) Hours As Needed for Wheezing or Shortness of Air.    albuterol sulfate  (90 Base) MCG/ACT inhaler Inhale 2 puffs Every 6 (Six) Hours As Needed for Wheezing.    busPIRone (BUSPAR) 15 MG tablet  TAKE 1 TABLET BY MOUTH TWICE DAILY AS NEEDED FOR ANXIETY    cyanocobalamin 1000 MCG/ML injection Inject 1 mL into the appropriate muscle as directed by prescriber Every 30 (Thirty) Days.    desvenlafaxine (Pristiq) 50 MG 24 hr tablet Take 1 tablet by mouth Daily.    fluticasone (FLONASE) 50 MCG/ACT nasal spray SHAKE LIQUID AND USE 2 SPRAYS IN EACH NOSTRIL DAILY    Linzess 145 MCG capsule capsule TAKE 1 CAPSULE BY MOUTH EVERY MORNING BEFORE BREAKFAST    metFORMIN ER (GLUCOPHAGE-XR) 500 MG 24 hr tablet Take 1 tablet by mouth Daily With Breakfast.    montelukast (SINGULAIR) 10 MG tablet TAKE 1 TABLET BY MOUTH EVERY NIGHT    nabumetone (RELAFEN) 500 MG tablet Take 1 tablet by mouth.    omeprazole (priLOSEC) 40 MG capsule Take 1 capsule by mouth Daily.    oxyCODONE-acetaminophen (PERCOCET) 5-325 MG per tablet oxycodone-acetaminophen 5-325 mg oral tablet 1 every 8 hrs as needed.   Active    pregabalin (LYRICA) 100 MG capsule TAKE 1 CAPSULE BY MOUTH EVERY MORNING AND 2 CAPSULES BY MOUTH AT BEDTIME    Semaglutide-Weight Management 0.5 MG/0.5ML solution auto-injector Inject 0.5 mL under the skin into the appropriate area as directed 1 (One) Time Per Week.    traZODone (DESYREL) 100 MG tablet TAKE 1 TABLET BY MOUTH EVERY NIGHT    [DISCONTINUED] azithromycin (Zithromax Z-Wally) 250 MG tablet Take 2 tablets by mouth on day 1, then 1 tablet daily on days 2-5    [DISCONTINUED] CeleBREX 100 MG capsule Take 1 capsule twice a day by oral route as needed for 30 days.    [DISCONTINUED] methylPREDNISolone (MEDROL) 4 MG dose pack Take as directed on package instructions.     No current facility-administered medications on file prior to visit.       Immunization History   Administered Date(s) Administered    COVID-19 (MODERNA) 1st,2nd,3rd Dose Monovalent 02/03/2021, 03/03/2021, 11/05/2021    COVID-19 (UNSPECIFIED) 02/03/2021, 03/03/2021, 11/05/2021    Flu Vaccine Quad PF >36MO 10/19/2016, 09/20/2017, 10/14/2018    Flu Vaccine Split Quad  10/19/2016, 09/20/2017, 10/14/2018    Fluzone (or Fluarix & Flulaval for VFC) >6mos 11/08/2022    Hepatitis A 12/06/2018    Influenza Injectable Mdck Pf Quad 11/08/2022, 11/07/2023    Influenza, Unspecified 10/14/2018, 10/01/2019    Pneumococcal Polysaccharide (PPSV23) 02/23/2022    Tdap 10/14/2018       Review of Systems   Constitutional:  Positive for fatigue. Negative for fever.   HENT:  Positive for congestion, postnasal drip and rhinorrhea.    Respiratory:  Positive for cough. Negative for shortness of breath.    Cardiovascular:  Negative for chest pain.   Gastrointestinal:  Negative for nausea and vomiting.   Musculoskeletal:  Positive for arthralgias.   Neurological:  Positive for headache.        Objective     /82 (BP Location: Left arm, Patient Position: Sitting)   Pulse (!) 125   Temp 97.7 °F (36.5 °C) (Temporal)   Wt 93.4 kg (206 lb)   SpO2 96%   BMI 32.02 kg/m²       Physical Exam  Vitals and nursing note reviewed.   Constitutional:       Appearance: Normal appearance.      Comments: Looks sick   HENT:      Head: Normocephalic.      Right Ear: External ear normal.      Left Ear: External ear normal.      Nose: Nose normal. Congestion and rhinorrhea present.      Mouth/Throat:      Mouth: Mucous membranes are moist.      Pharynx: No oropharyngeal exudate or posterior oropharyngeal erythema.   Eyes:      Pupils: Pupils are equal, round, and reactive to light.   Cardiovascular:      Rate and Rhythm: Normal rate and regular rhythm.      Heart sounds: Normal heart sounds.   Pulmonary:      Effort: Pulmonary effort is normal.      Breath sounds: Normal breath sounds.   Abdominal:      Palpations: Abdomen is soft.   Musculoskeletal:         General: Normal range of motion.      Cervical back: Normal range of motion.   Skin:     General: Skin is warm and dry.   Neurological:      Mental Status: She is alert and oriented to person, place, and time.   Psychiatric:         Mood and Affect: Mood normal.          Behavior: Behavior normal.         Thought Content: Thought content normal.         Judgment: Judgment normal.         Result Review :     The following data was reviewed by: ROSEANNA Jameson on 07/11/2024:                 POCT SARS-CoV-2 Antigen [facility] + Flu (07/11/2024 12:06)   Comprehensive Metabolic Panel (06/27/2024 13:51)      Assessment and Plan      Diagnoses and all orders for this visit:    1. Positive self-administered antigen test for COVID-19 (Primary)  -     POCT SARS-CoV-2 Antigen [facility] + Flu  -     promethazine-dextromethorphan (PROMETHAZINE-DM) 6.25-15 MG/5ML syrup; Take 5 mL by mouth 4 (Four) Times a Day As Needed for Cough.  Dispense: 120 mL; Refill: 0  -     methylPREDNISolone (MEDROL) 4 MG dose pack; Take as directed on package instructions.  Dispense: 21 each; Refill: 0    2. Upper respiratory tract infection, unspecified type  -     POCT SARS-CoV-2 Antigen [facility] + Flu  -     promethazine-dextromethorphan (PROMETHAZINE-DM) 6.25-15 MG/5ML syrup; Take 5 mL by mouth 4 (Four) Times a Day As Needed for Cough.  Dispense: 120 mL; Refill: 0  -     methylPREDNISolone (MEDROL) 4 MG dose pack; Take as directed on package instructions.  Dispense: 21 each; Refill: 0    3. COVID-19  -     POCT SARS-CoV-2 Antigen [facility] + Flu  -     promethazine-dextromethorphan (PROMETHAZINE-DM) 6.25-15 MG/5ML syrup; Take 5 mL by mouth 4 (Four) Times a Day As Needed for Cough.  Dispense: 120 mL; Refill: 0  -     methylPREDNISolone (MEDROL) 4 MG dose pack; Take as directed on package instructions.  Dispense: 21 each; Refill: 0    Sending in the supportive care for the upper respiratory infection and the asthma exacerbation with the Medrol Dosepak and the promethazine dextromethorphan advised patient to stay well-hydrated and to rest and it should anything worsen or persist follow-up urgently in the office for further evaluation    And then if in-house COVID test positive we will see about sending in the  Paxlovid as  well--patient is not on any blood thinners and most recent kidney function test were in normal range--    and was going to attempt to send in Paxlovid but patient is on oxycodone and it is contraindicated with that med as it increases the serum levels of the oxycodone aspect of the pain medication and it is completely contraindicated so just supportive care is all we can do today        Follow Up     Return if symptoms worsen or fail to improve.    Patient was given instructions and counseling regarding her condition or for health maintenance advice. Please see specific information pulled into the AVS if appropriate.            Sahara ELVA Delarosa  reports that she has never smoked. She has been exposed to tobacco smoke. She has never used smokeless tobacco. I have educated her on the risk of diseases from using tobacco products such as cancer, COPD, and heart disease.

## 2024-07-11 NOTE — TELEPHONE ENCOUNTER
"    Caller: Sahara Delarosa \"AYO\"    Relationship to patient: Self    Best call back number:     [phone] (Mobile)       Patient is needing: PATIENT CALLED IN AND IS COVID POSITIVE ON AN AT HOME TEST AS OF 7/10/2024. PATIENT WOULD LIKE A CALL BACK WITH CLINICAL GUIDANCE AND TO SEE IF THERE IS ANYTHING THAT SHE COULD BE PRESCRIBED THAT MIGHT LESSEN SYMPTOMS. PATIENT SAID IT IS OKAY TO LEAVE MESSAGE ON PHONE. PATIENT IS CONCERNED BECAUSE SHE HAS ASTHMA, ALSO.      [id number] - [place], [place] - [address] AT [facility] [place] - [phone] [facility] [phone]  [phone]   "

## 2024-07-18 DIAGNOSIS — F33.9 RECURRENT MAJOR DEPRESSIVE DISORDER, REMISSION STATUS UNSPECIFIED: ICD-10-CM

## 2024-07-18 RX ORDER — DESVENLAFAXINE SUCCINATE 50 MG/1
50 TABLET, EXTENDED RELEASE ORAL DAILY
Qty: 30 TABLET | Refills: 0 | Status: SHIPPED | OUTPATIENT
Start: 2024-07-18

## 2024-07-22 ENCOUNTER — PATIENT MESSAGE (OUTPATIENT)
Dept: FAMILY MEDICINE CLINIC | Facility: CLINIC | Age: 49
End: 2024-07-22
Payer: COMMERCIAL

## 2024-07-22 DIAGNOSIS — E66.9 CLASS 1 OBESITY WITH SERIOUS COMORBIDITY AND BODY MASS INDEX (BMI) OF 32.0 TO 32.9 IN ADULT, UNSPECIFIED OBESITY TYPE: ICD-10-CM

## 2024-07-23 NOTE — TELEPHONE ENCOUNTER
From: Sahara Delarosa  To: Maira Gama  Sent: 7/22/2024 9:44 AM EDT  Subject: Wegovy    Is there anyway you could please refill my wegovy shots before my next visit on July 31st? I am taking my last shot of this set of shots today. The pharmacy also never has it in stock when you send the prescription in so it takes them a couple days to get it in.    I pray your ’s surgery went well.

## 2024-07-31 ENCOUNTER — OFFICE VISIT (OUTPATIENT)
Dept: FAMILY MEDICINE CLINIC | Facility: CLINIC | Age: 49
End: 2024-07-31
Payer: COMMERCIAL

## 2024-07-31 VITALS
TEMPERATURE: 97.3 F | DIASTOLIC BLOOD PRESSURE: 72 MMHG | SYSTOLIC BLOOD PRESSURE: 122 MMHG | HEIGHT: 67 IN | BODY MASS INDEX: 32.49 KG/M2 | OXYGEN SATURATION: 96 % | WEIGHT: 207 LBS | HEART RATE: 110 BPM

## 2024-07-31 DIAGNOSIS — K21.9 GASTROESOPHAGEAL REFLUX DISEASE WITHOUT ESOPHAGITIS: ICD-10-CM

## 2024-07-31 DIAGNOSIS — E88.810 METABOLIC SYNDROME: Primary | ICD-10-CM

## 2024-07-31 DIAGNOSIS — F41.9 ANXIETY: ICD-10-CM

## 2024-07-31 DIAGNOSIS — K59.04 CHRONIC IDIOPATHIC CONSTIPATION: ICD-10-CM

## 2024-07-31 DIAGNOSIS — F33.9 RECURRENT MAJOR DEPRESSIVE DISORDER, REMISSION STATUS UNSPECIFIED: ICD-10-CM

## 2024-07-31 DIAGNOSIS — E53.8 VITAMIN B 12 DEFICIENCY: ICD-10-CM

## 2024-07-31 DIAGNOSIS — M79.7 FIBROMYALGIA: ICD-10-CM

## 2024-07-31 DIAGNOSIS — J45.30 MILD PERSISTENT ASTHMA WITHOUT COMPLICATION: ICD-10-CM

## 2024-07-31 DIAGNOSIS — H65.01 NON-RECURRENT ACUTE SEROUS OTITIS MEDIA OF RIGHT EAR: ICD-10-CM

## 2024-07-31 DIAGNOSIS — J30.2 SEASONAL ALLERGIC RHINITIS, UNSPECIFIED TRIGGER: ICD-10-CM

## 2024-07-31 DIAGNOSIS — G47.00 INSOMNIA, UNSPECIFIED TYPE: ICD-10-CM

## 2024-07-31 PROCEDURE — 99214 OFFICE O/P EST MOD 30 MIN: CPT | Performed by: NURSE PRACTITIONER

## 2024-07-31 RX ORDER — TRAZODONE HYDROCHLORIDE 150 MG/1
150 TABLET ORAL NIGHTLY
Qty: 90 TABLET | Refills: 1 | Status: SHIPPED | OUTPATIENT
Start: 2024-07-31

## 2024-07-31 RX ORDER — MONTELUKAST SODIUM 10 MG/1
10 TABLET ORAL NIGHTLY
Qty: 90 TABLET | Refills: 0 | Status: SHIPPED | OUTPATIENT
Start: 2024-07-31

## 2024-07-31 RX ORDER — PREGABALIN 100 MG/1
CAPSULE ORAL
Qty: 90 CAPSULE | Refills: 2 | Status: SHIPPED | OUTPATIENT
Start: 2024-07-31

## 2024-07-31 RX ORDER — OMEPRAZOLE 40 MG/1
40 CAPSULE, DELAYED RELEASE ORAL DAILY
Qty: 90 CAPSULE | Refills: 1 | Status: SHIPPED | OUTPATIENT
Start: 2024-07-31

## 2024-07-31 RX ORDER — METFORMIN HYDROCHLORIDE 500 MG/1
500 TABLET, EXTENDED RELEASE ORAL
Qty: 90 TABLET | Refills: 1 | Status: SHIPPED | OUTPATIENT
Start: 2024-07-31

## 2024-07-31 RX ORDER — AZITHROMYCIN 250 MG/1
TABLET, FILM COATED ORAL
Qty: 6 TABLET | Refills: 0 | Status: SHIPPED | OUTPATIENT
Start: 2024-07-31

## 2024-07-31 RX ORDER — DESVENLAFAXINE SUCCINATE 50 MG/1
50 TABLET, EXTENDED RELEASE ORAL DAILY
Qty: 90 TABLET | Refills: 1 | Status: SHIPPED | OUTPATIENT
Start: 2024-07-31

## 2024-07-31 RX ORDER — CYANOCOBALAMIN 1000 UG/ML
1000 INJECTION, SOLUTION INTRAMUSCULAR; SUBCUTANEOUS
Qty: 3 ML | Refills: 3 | Status: SHIPPED | OUTPATIENT
Start: 2024-07-31

## 2024-07-31 RX ORDER — BUSPIRONE HYDROCHLORIDE 15 MG/1
15 TABLET ORAL 2 TIMES DAILY PRN
Qty: 180 TABLET | Refills: 1 | Status: SHIPPED | OUTPATIENT
Start: 2024-07-31

## 2024-07-31 NOTE — PROGRESS NOTES
Chief Complaint  1 month F/U and F/U since COVID     Subjective            Sahara Delarosa presents to [facility]  History of Present Illness  Patient is here today for her medication refills on the chronic comorbid conditions managed with primary care standpoint and just had labs done for the last few months and will be due for labs again in approximately September or October    Also following up since having had COVID recently    -- Metabolic syndrome: Tolerating medication well with no side effects no issues reported overall stable no nausea vomiting diarrhea with medication    -- Asthma: During the COVID illness patient did report having some asthma flareup and was given steroids but also towards the last few days of the COVID infection had to do the nebulizer as her oxygen level patient reports with drop to 88% and then after posttreatment with the backup 95% but now overall only residual effect is just some dry cough and regarding the Singulair no SI/HI good tolerability    -- Depression with anxiety: Tolerating medication well with no side effects no issues reported no SI/HI overall receiving good efficacy and stable    -- Insomnia: Denies all SI/HI tolerating medication well with no side effects no issues but since having had this acute illness that here lately patient reports having issues with insomnia more so and wondered if she could increase a little bit on the medication as normally she had been receiving good efficacy    -- Fibromyalgia: Tolerating medication well with no side effects no issues reported only takes medication as directed shows no signs and symptoms of misuse nor abuse follows up every 3 months regarding this medicine and only takes it as directed Rick report was appropriate and her yearly urine tox screen was appropriate and is not due for repeat as yet and her annual updated safe use of controlled substance agreement is not due as yet and will be due  when she follows up in 3 months and overall improves her quality of life and she receives good efficacy although during the COVID illness recently she did have more body aches and pain    -- GERD: Tolerating medication well with no side effects no issues reported no exacerbations overall stable    -- Idiopathic constipation: Tolerating medication well with no side effects no issues other than sometimes still does suffer intermittently with some constipation and then at other times will get a little bit of diarrhea but overall good efficacy and tolerability    -- Vitamin B12 deficiency: Tolerating medication well with no side effects no issues reported does still have fatigue and overall stable    -- Allergies: Tolerating medication well with no side effects no issues reported overall stable no SI/HI and receives good efficacy    ____________________________________     --obesity: Patient reports she had been unable to get the 1 full milligram of the Wegovy as yet and she is to call every few days to the pharmacy and so really she feels as though she has lost ground from the 2 first doses that she did not tolerated well and so now it has been 2 months on the same dose and she is going to call again today to see if it is available if it is not she may reach back out to me and we may try [facility] pharmacy in [place] overall tolerating well no abdominal pain no lumps in the throat no overt nausea or vomiting     ____________________________________    --And then also the patient reports that her ears feel full and it is more so on the right than the left and around the ear and the lymph node area a little discomfort          PHQ-2 Total Score: 0  PHQ-9 Total Score: 0    Past Medical History:   Diagnosis Date    Allergic     Allergic rhinitis     Anxiety and depression     Asthma     exercise induced    Back pain     Bowel incontinence     Condyloma acuminata     Diverticulitis of colon     Endometrial polyp     Family  history of breast cancer     Fatty liver     Fibroids     Fibromyalgia     Fibromyalgia, primary     GERD (gastroesophageal reflux disease)     Hernia     Irritable bowel syndrome     Low back pain     Metabolic syndrome     Osteoarthritis     In addition to fibromyalgia    Sleep apnea     CPAP    Urinary frequency     Uterine prolapse     Vitamin B 12 deficiency        Allergies   Allergen Reactions    Bactrim [Sulfamethoxazole-Trimethoprim] Rash    Codeine Rash        Past Surgical History:   Procedure Laterality Date    BREAST BIOPSY Left 2013    Hyper-pigmented lesion, left nipple.= Benign    COLONOSCOPY      CYST REMOVAL      on chest and groin area     ENDOMETRIAL ABLATION  2015    HSC, DNC, Novasure Endometrial Ablation    ENDOSCOPY  2017    EGD: Stomach inflammation, Neg for H Pylori.    HIP ARTHROPLASTY Right 09/2021    HIP SURGERY Right 2019    LABRUM REPAIR    JOINT REPLACEMENT  September 2021    Rt Hip    LUMBAR SPINE SURGERY  02/2024    Pain Device    TOTAL LAPAROSCOPIC HYSTERECTOMY SALPINGO OOPHORECTOMY Bilateral 03/30/2022    Procedure: TOTAL LAPAROSCOPIC HYSTERECTOMY, BILATERAL SALPINGECTOMY, AND CYSTOSCOPY;  Surgeon: Rosalba Chiu MD;  Location: [facility];  Service: Obstetrics/Gynecology;  Laterality: Bilateral;    UPPER GASTROINTESTINAL ENDOSCOPY          Social History     Tobacco Use    Smoking status: Never     Passive exposure: Yes    Smokeless tobacco: Never   Vaping Use    Vaping status: Never Used   Substance Use Topics    Alcohol use: Never    Drug use: Never       Family History   Problem Relation Age of Onset    Diabetes type II Father     Diabetes Father     Arthritis Mother     Breast cancer Mother 70        Double mastectomy     Fibromyalgia Sister     No Known Problems Son     No Known Problems Daughter     Brain cancer Paternal Grandfather         dx >50    No Known Problems Paternal Grandmother     Osteoporosis Maternal Grandmother         ?    Parkinsonism Maternal  Grandmother     Stomach cancer Maternal Grandfather         dx >50    Breast cancer Maternal Aunt         4 different mat aunts with breast cancer.    Breast cancer Maternal Aunt     Cancer Maternal Aunt         stomach    Breast cancer Maternal Aunt     Cancer Maternal Aunt     Breast cancer Maternal Aunt     Breast cancer Paternal Aunt 60        dx ~ age 60    BRCA 1/2 Neg Hx     Colon cancer Neg Hx     Endometrial cancer Neg Hx     Ovarian cancer Neg Hx     Malig Hyperthermia Neg Hx     Uterine cancer Neg Hx         Health Maintenance Due   Topic Date Due    COVID-19 Vaccine (7 - 2023-24 season) 09/01/2023        Current Outpatient Medications on File Prior to Visit   Medication Sig    albuterol (PROVENTIL) (2.5 MG/3ML) 0.083% nebulizer solution Take 2.5 mg by nebulization Every 4 (Four) Hours As Needed for Wheezing or Shortness of Air.    albuterol sulfate  (90 Base) MCG/ACT inhaler Inhale 2 puffs Every 6 (Six) Hours As Needed for Wheezing.    fluticasone (FLONASE) 50 MCG/ACT nasal spray SHAKE LIQUID AND USE 2 SPRAYS IN EACH NOSTRIL DAILY    nabumetone (RELAFEN) 500 MG tablet Take 1 tablet by mouth.    oxyCODONE-acetaminophen (PERCOCET) 5-325 MG per tablet oxycodone-acetaminophen 5-325 mg oral tablet 1 every 8 hrs as needed.   Active    Semaglutide-Weight Management 1 MG/0.5ML solution auto-injector Inject 0.5 mL under the skin into the appropriate area as directed 1 (One) Time Per Week.    [DISCONTINUED] busPIRone (BUSPAR) 15 MG tablet TAKE 1 TABLET BY MOUTH TWICE DAILY AS NEEDED FOR ANXIETY    [DISCONTINUED] cyanocobalamin 1000 MCG/ML injection Inject 1 mL into the appropriate muscle as directed by prescriber Every 30 (Thirty) Days.    [DISCONTINUED] desvenlafaxine (PRISTIQ) 50 MG 24 hr tablet TAKE 1 TABLET BY MOUTH DAILY    [DISCONTINUED] Linzess 145 MCG capsule capsule TAKE 1 CAPSULE BY MOUTH EVERY MORNING BEFORE BREAKFAST    [DISCONTINUED] metFORMIN ER (GLUCOPHAGE-XR) 500 MG 24 hr tablet Take 1  tablet by mouth Daily With Breakfast.    [DISCONTINUED] montelukast (SINGULAIR) 10 MG tablet TAKE 1 TABLET BY MOUTH EVERY NIGHT    [DISCONTINUED] omeprazole (priLOSEC) 40 MG capsule Take 1 capsule by mouth Daily.    [DISCONTINUED] pregabalin (LYRICA) 100 MG capsule TAKE 1 CAPSULE BY MOUTH EVERY MORNING AND 2 CAPSULES BY MOUTH AT BEDTIME    [DISCONTINUED] traZODone (DESYREL) 100 MG tablet TAKE 1 TABLET BY MOUTH EVERY NIGHT    [DISCONTINUED] methylPREDNISolone (MEDROL) 4 MG dose pack Take as directed on package instructions.    [DISCONTINUED] promethazine-dextromethorphan (PROMETHAZINE-DM) 6.25-15 MG/5ML syrup Take 5 mL by mouth 4 (Four) Times a Day As Needed for Cough.     No current facility-administered medications on file prior to visit.       Immunization History   Administered Date(s) Administered    COVID-19 (MODERNA) 1st,2nd,3rd Dose Monovalent 02/03/2021, 03/03/2021, 11/05/2021    COVID-19 (UNSPECIFIED) 02/03/2021, 03/03/2021, 11/05/2021    Flu Vaccine Quad PF >36MO 10/19/2016, 09/20/2017, 10/14/2018    Flu Vaccine Split Quad 10/19/2016, 09/20/2017, 10/14/2018    Fluzone (or Fluarix & Flulaval for VFC) >6mos 11/08/2022    Hepatitis A 12/06/2018    Influenza Injectable Mdck Pf Quad 11/08/2022, 11/07/2023    Influenza, Unspecified 10/14/2018, 10/01/2019    Pneumococcal Polysaccharide (PPSV23) 02/23/2022    Tdap 10/14/2018       Review of Systems   Constitutional:  Positive for fatigue. Negative for unexpected weight gain and unexpected weight loss.        Fatigue from the COVID    HENT:  Negative for ear pain and sore throat.         Ears clogged up    Eyes: Negative.    Respiratory:  Positive for cough. Negative for shortness of breath and wheezing.         Pt reports that with the recent COVID infection did have to use nebulizer last few days of it--and reports her O2 sat at home was hovering 88% --and then after the Tx would be in the 90's --still some dry cough left    Cardiovascular:  Negative for chest  "pain.   Gastrointestinal:  Positive for constipation. Negative for abdominal pain, nausea and vomiting.        A little bit of constipation --and then takes linzess    Endocrine: Negative.    Genitourinary:  Negative for dysuria and vaginal bleeding.   Musculoskeletal:  Positive for arthralgias and myalgias.        Fibro flared up since the COVID    Allergic/Immunologic: Positive for environmental allergies.   Neurological:  Positive for headache. Negative for dizziness, seizures, syncope and light-headedness.        Some with the COVID infection    Hematological:  Bruises/bleeds easily.   Psychiatric/Behavioral:  Negative for self-injury and suicidal ideas. The patient is nervous/anxious.    Answers submitted by the patient for this visit:  Other (Submitted on 7/30/2024)  Please describe your symptoms.: follow up on medication  Have you had these symptoms before?: Yes  How long have you been having these symptoms?: 1-4 days  Primary Reason for Visit (Submitted on 7/30/2024)  What is the primary reason for your visit?: Other       Objective     /72   Pulse 110   Temp 97.3 °F (36.3 °C) (Temporal)   Ht 170.8 cm (67.25\")   Wt 93.9 kg (207 lb)   SpO2 96%   BMI 32.18 kg/m²       Physical Exam  Vitals and nursing note reviewed.   Constitutional:       Appearance: Normal appearance.   HENT:      Head: Normocephalic.      Right Ear: External ear normal. Tympanic membrane is injected and erythematous.      Left Ear: Tympanic membrane, ear canal and external ear normal.      Nose: Nose normal.      Mouth/Throat:      Mouth: Mucous membranes are moist.   Eyes:      Pupils: Pupils are equal, round, and reactive to light.   Cardiovascular:      Rate and Rhythm: Normal rate and regular rhythm.      Heart sounds: Normal heart sounds.   Pulmonary:      Effort: Pulmonary effort is normal.      Breath sounds: Normal breath sounds.   Abdominal:      Palpations: Abdomen is soft.      Tenderness: There is no abdominal " tenderness.   Musculoskeletal:         General: Normal range of motion.      Cervical back: Normal range of motion and neck supple.   Skin:     General: Skin is warm and dry.   Neurological:      Mental Status: She is alert and oriented to person, place, and time.   Psychiatric:         Mood and Affect: Mood normal.         Behavior: Behavior normal.         Thought Content: Thought content normal.         Judgment: Judgment normal.         Result Review :                           Assessment and Plan      Diagnoses and all orders for this visit:    1. Metabolic syndrome (Primary)  -     metFORMIN ER (GLUCOPHAGE-XR) 500 MG 24 hr tablet; Take 1 tablet by mouth Daily With Breakfast.  Dispense: 90 tablet; Refill: 1    2. Mild persistent asthma without complication  -     montelukast (SINGULAIR) 10 MG tablet; Take 1 tablet by mouth Every Night.  Dispense: 90 tablet; Refill: 0    3. Recurrent major depressive disorder, remission status unspecified  -     desvenlafaxine (PRISTIQ) 50 MG 24 hr tablet; Take 1 tablet by mouth Daily.  Dispense: 90 tablet; Refill: 1    4. Anxiety  -     busPIRone (BUSPAR) 15 MG tablet; Take 1 tablet by mouth 2 (Two) Times a Day As Needed (anxiety). for anxiety  Dispense: 180 tablet; Refill: 1    5. Insomnia, unspecified type  -     traZODone (DESYREL) 150 MG tablet; Take 1 tablet by mouth Every Night.  Dispense: 90 tablet; Refill: 1    6. Fibromyalgia  -     pregabalin (LYRICA) 100 MG capsule; TAKE 1 CAPSULE BY MOUTH EVERY MORNING AND 2 CAPSULES BY MOUTH AT BEDTIME  Dispense: 90 capsule; Refill: 2    7. Chronic idiopathic constipation  -     linaclotide (Linzess) 145 MCG capsule capsule; Take 1 capsule by mouth Every Morning Before Breakfast.  Dispense: 90 capsule; Refill: 1    8. Gastroesophageal reflux disease without esophagitis  -     omeprazole (priLOSEC) 40 MG capsule; Take 1 capsule by mouth Daily.  Dispense: 90 capsule; Refill: 1    9. Vitamin B 12 deficiency  -     cyanocobalamin 1000  MCG/ML injection; Inject 1 mL into the appropriate muscle as directed by prescriber Every 30 (Thirty) Days.  Dispense: 3 mL; Refill: 3    10. Seasonal allergic rhinitis, unspecified trigger  -     montelukast (SINGULAIR) 10 MG tablet; Take 1 tablet by mouth Every Night.  Dispense: 90 tablet; Refill: 0    11. Non-recurrent acute serous otitis media of right ear  -     azithromycin (Zithromax Z-Wally) 250 MG tablet; Take 2 tablets by mouth on day 1, then 1 tablet daily on days 2-5  Dispense: 6 tablet; Refill: 0    Medication refills as noted above and we will await labs until they are due in September or October    Also empiric treatment for the serous otitis media right ear that was deeply red with a Z-Wally as patient tolerates that when better than other antibiotics as the others cause yeast infection    And then with regards to the Wegovy shot if it is still unavailable patient will reach back out to me and we will try to send it through [facility] pharmacy        Follow Up     Return in about 4 weeks (around 8/28/2024), or if symptoms worsen or fail to improve, for Recheck.    Patient was given instructions and counseling regarding her condition or for health maintenance advice. Please see specific information pulled into the AVS if appropriate.            Sahara Delarosa  reports that she has never smoked. She has been exposed to tobacco smoke. She has never used smokeless tobacco. I have educated her on the risk of diseases from using tobacco products such as cancer, COPD, and heart disease.

## 2024-08-14 DIAGNOSIS — G47.00 INSOMNIA, UNSPECIFIED TYPE: ICD-10-CM

## 2024-08-14 DIAGNOSIS — J30.2 SEASONAL ALLERGIC RHINITIS, UNSPECIFIED TRIGGER: ICD-10-CM

## 2024-08-14 RX ORDER — TRAZODONE HYDROCHLORIDE 100 MG/1
100 TABLET ORAL NIGHTLY
Qty: 90 TABLET | Refills: 1 | OUTPATIENT
Start: 2024-08-14

## 2024-08-14 RX ORDER — FLUTICASONE PROPIONATE 50 MCG
2 SPRAY, SUSPENSION (ML) NASAL DAILY
Qty: 48 G | Refills: 1 | Status: SHIPPED | OUTPATIENT
Start: 2024-08-14

## 2024-08-28 ENCOUNTER — TELEPHONE (OUTPATIENT)
Dept: FAMILY MEDICINE CLINIC | Facility: CLINIC | Age: 49
End: 2024-08-28
Payer: COMMERCIAL

## 2024-08-28 DIAGNOSIS — E66.9 CLASS 1 OBESITY WITH SERIOUS COMORBIDITY AND BODY MASS INDEX (BMI) OF 32.0 TO 32.9 IN ADULT, UNSPECIFIED OBESITY TYPE: ICD-10-CM

## 2024-08-28 NOTE — TELEPHONE ENCOUNTER
Patient is due for her shot. She had an appointment with you tomorrow. She wanted to know if you would up dosage and she will follow up. Thanks

## 2024-08-30 ENCOUNTER — OFFICE VISIT (OUTPATIENT)
Dept: FAMILY MEDICINE CLINIC | Facility: CLINIC | Age: 49
End: 2024-08-30
Payer: OTHER MISCELLANEOUS

## 2024-08-30 ENCOUNTER — TELEPHONE (OUTPATIENT)
Dept: FAMILY MEDICINE CLINIC | Facility: CLINIC | Age: 49
End: 2024-08-30

## 2024-08-30 VITALS
BODY MASS INDEX: 31.15 KG/M2 | SYSTOLIC BLOOD PRESSURE: 128 MMHG | WEIGHT: 200.4 LBS | TEMPERATURE: 98.3 F | OXYGEN SATURATION: 98 % | DIASTOLIC BLOOD PRESSURE: 84 MMHG | HEART RATE: 99 BPM

## 2024-08-30 DIAGNOSIS — S86.911A KNEE STRAIN, RIGHT, INITIAL ENCOUNTER: ICD-10-CM

## 2024-08-30 DIAGNOSIS — M25.561 ACUTE PAIN OF RIGHT KNEE: Primary | ICD-10-CM

## 2024-08-30 PROCEDURE — 99213 OFFICE O/P EST LOW 20 MIN: CPT | Performed by: NURSE PRACTITIONER

## 2024-08-30 RX ORDER — PREDNISONE 5 MG/1
TABLET ORAL
Qty: 1 EACH | Refills: 0 | Status: SHIPPED | OUTPATIENT
Start: 2024-08-30

## 2024-08-30 RX ORDER — METHOCARBAMOL 500 MG/1
500 TABLET, FILM COATED ORAL 3 TIMES DAILY
Qty: 30 TABLET | Refills: 0 | Status: SHIPPED | OUTPATIENT
Start: 2024-08-30

## 2024-08-30 NOTE — PROGRESS NOTES
Chief Complaint  Knee Pain (Went down the stairs at work and right knee twisted and is now swollen and hurting. )    History of Present Illness  Sahara Delarosa is a 49 y.o. female who presents to [facility] with a past medical history of  Past Medical History:   Diagnosis Date    Allergic     Allergic rhinitis     Anxiety and depression     Asthma     exercise induced    Back pain     Bowel incontinence     Condyloma acuminata     Diverticulitis of colon     Endometrial polyp     Family history of breast cancer     Fatty liver     Fibroids     Fibromyalgia     Fibromyalgia, primary     GERD (gastroesophageal reflux disease)     Hernia     Irritable bowel syndrome     Low back pain     Metabolic syndrome     Osteoarthritis     In addition to fibromyalgia    Sleep apnea     CPAP    Urinary frequency     Uterine prolapse     Vitamin B 12 deficiency        HPI  Patient presents to the office today for right knee, twisted at work and heard and felt popping. No bruising but endorses swelling. She has not used ice, tylenol, or ibuprofen.       Objective   Vital Signs:   Vitals:    08/30/24 1108   BP: 128/84   BP Location: Left arm   Patient Position: Sitting   Pulse: 99   Temp: 98.3 °F (36.8 °C)   TempSrc: Temporal   SpO2: 98%   Weight: 90.9 kg (200 lb 6.4 oz)   PainSc:   6   PainLoc: Knee     Body mass index is 31.15 kg/m².    Wt Readings from Last 3 Encounters:   08/30/24 90.9 kg (200 lb 6.4 oz)   07/31/24 93.9 kg (207 lb)   07/11/24 93.4 kg (206 lb)     BP Readings from Last 3 Encounters:   08/30/24 128/84   07/31/24 122/72   07/11/24 118/82       Health Maintenance   Topic Date Due    COVID-19 Vaccine (7 - 2023-24 season) 09/01/2023    INFLUENZA VACCINE  08/01/2024    Pneumococcal Vaccine 0-64 (2 of 2 - PCV) 09/06/2024 (Originally 2/23/2023)    ZOSTER VACCINE (1 of 2) 09/06/2024 (Originally 7/6/2023)    ANNUAL PHYSICAL  01/08/2025    PAP SMEAR  01/19/2025    BMI FOLLOWUP  08/28/2025     MAMMOGRAM  02/07/2026    TDAP/TD VACCINES (2 - Td or Tdap) 10/14/2028    COLORECTAL CANCER SCREENING  06/30/2030    HEPATITIS C SCREENING  Completed       Physical Exam  Vitals reviewed.   Constitutional:       General: She is not in acute distress.     Appearance: Normal appearance. She is well-developed.   Eyes:      Conjunctiva/sclera: Conjunctivae normal.      Pupils: Pupils are equal, round, and reactive to light.   Cardiovascular:      Rate and Rhythm: Normal rate and regular rhythm.      Heart sounds: Normal heart sounds.   Pulmonary:      Effort: Pulmonary effort is normal.      Breath sounds: Normal breath sounds.   Musculoskeletal:      Cervical back: Neck supple.      Right knee: Decreased range of motion.      Right lower leg: No edema.      Left lower leg: No edema.        Legs:       Comments: Pain with straightening knee   Skin:     General: Skin is warm and dry.   Neurological:      Mental Status: She is alert and oriented to person, place, and time.   Psychiatric:         Mood and Affect: Mood and affect normal.         Behavior: Behavior normal.         Thought Content: Thought content normal.         Judgment: Judgment normal.            Result Review :  The following data was reviewed by: ROSEANNA Brown on 08/30/2024:  XR Knee 1 or 2 View Right (In Office) (08/30/2024 11:20)     Procedures        Assessment and Plan   Diagnoses and all orders for this visit:    1. Acute pain of right knee (Primary)  -     XR Knee 1 or 2 View Right (In Office)  -     predniSONE 5 MG (21) tablet therapy pack dose pack; Take as directed on package instructions.  Dispense: 1 each; Refill: 0  -     methocarbamol (ROBAXIN) 500 MG tablet; Take 1 tablet by mouth 3 (Three) Times a Day.  Dispense: 30 tablet; Refill: 0    2. Knee strain, right, initial encounter  -     predniSONE 5 MG (21) tablet therapy pack dose pack; Take as directed on package instructions.  Dispense: 1 each; Refill: 0  -     methocarbamol  (ROBAXIN) 500 MG tablet; Take 1 tablet by mouth 3 (Three) Times a Day.  Dispense: 30 tablet; Refill: 0                  FOLLOW UP  Return in about 1 week (around 9/6/2024), or if symptoms worsen or fail to improve.    Patient was given instructions and counseling regarding her condition or for health maintenance advice. Please see specific information pulled into the AVS if appropriate.       Camelia GARCIAMarlon Cole, APRN  08/30/24  13:08 EDT    CURRENT & DISCONTINUED MEDICATIONS  Current Outpatient Medications   Medication Instructions    albuterol (PROVENTIL) 2.5 mg, Nebulization, Every 4 Hours PRN    albuterol sulfate  (90 Base) MCG/ACT inhaler 2 puffs, Inhalation, Every 6 Hours PRN    busPIRone (BUSPAR) 15 mg, Oral, 2 Times Daily PRN, for anxiety    cyanocobalamin 1,000 mcg, Intramuscular, Every 30 Days    desvenlafaxine (PRISTIQ) 50 mg, Oral, Daily    fluticasone (FLONASE) 50 MCG/ACT nasal spray 2 sprays, Each Nare, Daily, Shake well before using.    linaclotide (LINZESS) 145 mcg, Oral, Every Morning Before Breakfast    metFORMIN ER (GLUCOPHAGE-XR) 500 mg, Oral, Daily With Breakfast    methocarbamol (ROBAXIN) 500 mg, Oral, 3 Times Daily    montelukast (SINGULAIR) 10 mg, Oral, Nightly    nabumetone (RELAFEN) 500 mg, Oral    omeprazole (PRILOSEC) 40 mg, Oral, Daily    oxyCODONE-acetaminophen (PERCOCET) 5-325 MG per tablet oxycodone-acetaminophen 5-325 mg oral tablet 1 every 8 hrs as needed.   Active    predniSONE 5 MG (21) tablet therapy pack dose pack Take as directed on package instructions.    pregabalin (LYRICA) 100 MG capsule TAKE 1 CAPSULE BY MOUTH EVERY MORNING AND 2 CAPSULES BY MOUTH AT BEDTIME    Semaglutide-Weight Management 1.7 mg, Subcutaneous, Weekly    traZODone (DESYREL) 150 mg, Oral, Nightly       There are no discontinued medications.

## 2024-08-30 NOTE — TELEPHONE ENCOUNTER
Caller: nicolle    Relationship to patient: Other    Best call back number: [phone]    Patient is needing: EMPLOYER GAVE CLAIM # FOR TODAY'S VISIT AS CLAIM #[id number]

## 2024-09-06 ENCOUNTER — OFFICE VISIT (OUTPATIENT)
Dept: FAMILY MEDICINE CLINIC | Facility: CLINIC | Age: 49
End: 2024-09-06
Payer: OTHER MISCELLANEOUS

## 2024-09-06 VITALS
DIASTOLIC BLOOD PRESSURE: 78 MMHG | BODY MASS INDEX: 30.97 KG/M2 | WEIGHT: 199.2 LBS | SYSTOLIC BLOOD PRESSURE: 112 MMHG | OXYGEN SATURATION: 98 % | HEART RATE: 90 BPM | TEMPERATURE: 97.7 F

## 2024-09-06 DIAGNOSIS — S86.911D KNEE STRAIN, RIGHT, SUBSEQUENT ENCOUNTER: Primary | ICD-10-CM

## 2024-09-06 DIAGNOSIS — M25.561 ACUTE PAIN OF RIGHT KNEE: ICD-10-CM

## 2024-09-06 NOTE — PROGRESS NOTES
Chief Complaint  Knee Injury (Follow up, still having issues. )    History of Present Illness  Sahara Delarosa is a 49 y.o. female who presents to [facility] with a past medical history of  Past Medical History:   Diagnosis Date    Allergic     Allergic rhinitis     Anxiety and depression     Asthma     exercise induced    Back pain     Bowel incontinence     Condyloma acuminata     Diverticulitis of colon     Endometrial polyp     Family history of breast cancer     Fatty liver     Fibroids     Fibromyalgia     Fibromyalgia, primary     GERD (gastroesophageal reflux disease)     Hernia     Irritable bowel syndrome     Low back pain     Metabolic syndrome     Osteoarthritis     In addition to fibromyalgia    Sleep apnea     CPAP    Urinary frequency     Uterine prolapse     Vitamin B 12 deficiency        HPI  Patient presents to the office today for follow up on continued right knee pain. Pt reports she continues to have pain in the posterior knee and along the knee cap. Pain will radiate into the calf. She has mildly increased ROM. Burning type sensation along the knee cap area. She will also note popping of the knee with walking. Steroid dose pack and muscle relaxer did help some.     Patient initially hurt her knee while she was at work when she twisted and felt a popping and has experienced pain in the knee since.    Objective   Vital Signs:   Vitals:    09/06/24 1134   BP: 112/78   BP Location: Left arm   Patient Position: Sitting   Pulse: 90   Temp: 97.7 °F (36.5 °C)   TempSrc: Temporal   SpO2: 98%   Weight: 90.4 kg (199 lb 3.2 oz)   PainSc:   4   PainLoc: Knee     Body mass index is 30.97 kg/m².    Wt Readings from Last 3 Encounters:   09/06/24 90.4 kg (199 lb 3.2 oz)   08/30/24 90.9 kg (200 lb 6.4 oz)   07/31/24 93.9 kg (207 lb)     BP Readings from Last 3 Encounters:   09/06/24 112/78   08/30/24 128/84   07/31/24 122/72       Health Maintenance   Topic Date Due    COVID-19  Vaccine (7 - 2023-24 season) 09/01/2024    INFLUENZA VACCINE  08/01/2024    Pneumococcal Vaccine 0-64 (2 of 2 - PCV) 09/06/2024 (Originally 2/23/2023)    ZOSTER VACCINE (1 of 2) 09/06/2024 (Originally 7/6/2023)    ANNUAL PHYSICAL  01/08/2025    PAP SMEAR  01/19/2025    BMI FOLLOWUP  08/28/2025    MAMMOGRAM  02/07/2026    TDAP/TD VACCINES (2 - Td or Tdap) 10/14/2028    COLORECTAL CANCER SCREENING  06/30/2030    HEPATITIS C SCREENING  Completed       Physical Exam  Vitals reviewed.   Constitutional:       General: She is not in acute distress.     Appearance: Normal appearance. She is well-developed.   HENT:      Head: Normocephalic and atraumatic.   Eyes:      Conjunctiva/sclera: Conjunctivae normal.      Pupils: Pupils are equal, round, and reactive to light.   Cardiovascular:      Rate and Rhythm: Normal rate and regular rhythm.      Heart sounds: Normal heart sounds.   Pulmonary:      Effort: Pulmonary effort is normal.      Breath sounds: Normal breath sounds.   Musculoskeletal:      Cervical back: Neck supple.      Right knee: Decreased range of motion. Tenderness present over the medial joint line.      Right lower leg: No edema.      Left lower leg: No edema.   Skin:     General: Skin is warm and dry.   Neurological:      Mental Status: She is alert and oriented to person, place, and time.   Psychiatric:         Mood and Affect: Mood and affect normal.         Behavior: Behavior normal.         Thought Content: Thought content normal.         Judgment: Judgment normal.            Result Review :  The following data was reviewed by: ROSEANNA Brown on 09/06/2024:  XR Knee 1 or 2 View Right (In Office)    Result Date: 8/30/2024  Negative right knee. Electronically Signed: Seven Light MD  8/30/2024 10:57 PM EDT  Workstation ID: [id number]      Procedures        Assessment and Plan   Diagnoses and all orders for this visit:    1. Knee strain, right, subsequent encounter (Primary)  -     MRI Knee Right  Without Contrast; Future    2. Acute pain of right knee  -     MRI Knee Right Without Contrast; Future    Continue to use anti-inflammatories and muscle relaxants as needed.  Patient also continue to wear brace, use of ice, and prop the knee as needed.    Work restrictions form completed.  FOLLOW UP  Return if symptoms worsen or fail to improve.    Patient was given instructions and counseling regarding her condition or for health maintenance advice. Please see specific information pulled into the AVS if appropriate.       Cmaelia Cole, ROSEANNA  09/06/24  12:38 EDT    CURRENT & DISCONTINUED MEDICATIONS  Current Outpatient Medications   Medication Instructions    albuterol (PROVENTIL) 2.5 mg, Nebulization, Every 4 Hours PRN    albuterol sulfate  (90 Base) MCG/ACT inhaler 2 puffs, Inhalation, Every 6 Hours PRN    busPIRone (BUSPAR) 15 mg, Oral, 2 Times Daily PRN, for anxiety    cyanocobalamin 1,000 mcg, Intramuscular, Every 30 Days    desvenlafaxine (PRISTIQ) 50 mg, Oral, Daily    fluticasone (FLONASE) 50 MCG/ACT nasal spray 2 sprays, Each Nare, Daily, Shake well before using.    linaclotide (LINZESS) 145 mcg, Oral, Every Morning Before Breakfast    metFORMIN ER (GLUCOPHAGE-XR) 500 mg, Oral, Daily With Breakfast    methocarbamol (ROBAXIN) 500 mg, Oral, 3 Times Daily    montelukast (SINGULAIR) 10 mg, Oral, Nightly    nabumetone (RELAFEN) 500 mg, Oral    omeprazole (PRILOSEC) 40 mg, Oral, Daily    oxyCODONE-acetaminophen (PERCOCET) 5-325 MG per tablet oxycodone-acetaminophen 5-325 mg oral tablet 1 every 8 hrs as needed.   Active    pregabalin (LYRICA) 100 MG capsule TAKE 1 CAPSULE BY MOUTH EVERY MORNING AND 2 CAPSULES BY MOUTH AT BEDTIME    Semaglutide-Weight Management 1.7 mg, Subcutaneous, Weekly    traZODone (DESYREL) 150 mg, Oral, Nightly       Medications Discontinued During This Encounter   Medication Reason    predniSONE 5 MG (21) tablet therapy pack dose pack *Therapy completed          Answers submitted  by the patient for this visit:  Other (Submitted on 9/6/2024)  Please describe your symptoms.: follow up on knee  Have you had these symptoms before?: Yes  How long have you been having these symptoms?: 5-7 days  Primary Reason for Visit (Submitted on 9/6/2024)  What is the primary reason for your visit?: Other

## 2024-09-20 DIAGNOSIS — S86.911D KNEE STRAIN, RIGHT, SUBSEQUENT ENCOUNTER: Primary | ICD-10-CM

## 2024-09-26 ENCOUNTER — OFFICE VISIT (OUTPATIENT)
Dept: FAMILY MEDICINE CLINIC | Facility: CLINIC | Age: 49
End: 2024-09-26
Payer: COMMERCIAL

## 2024-09-26 VITALS
OXYGEN SATURATION: 97 % | SYSTOLIC BLOOD PRESSURE: 122 MMHG | TEMPERATURE: 97.7 F | BODY MASS INDEX: 30.45 KG/M2 | DIASTOLIC BLOOD PRESSURE: 72 MMHG | HEIGHT: 67 IN | HEART RATE: 101 BPM | WEIGHT: 194 LBS

## 2024-09-26 DIAGNOSIS — E88.810 METABOLIC SYNDROME: ICD-10-CM

## 2024-09-26 DIAGNOSIS — E66.9 CLASS 1 OBESITY WITH SERIOUS COMORBIDITY AND BODY MASS INDEX (BMI) OF 30.0 TO 30.9 IN ADULT, UNSPECIFIED OBESITY TYPE: Primary | ICD-10-CM

## 2024-09-26 DIAGNOSIS — M79.7 FIBROMYALGIA: ICD-10-CM

## 2024-09-26 DIAGNOSIS — E53.8 VITAMIN B 12 DEFICIENCY: ICD-10-CM

## 2024-09-26 DIAGNOSIS — Z79.899 HIGH RISK MEDICATION USE: ICD-10-CM

## 2024-09-26 DIAGNOSIS — E55.9 VITAMIN D DEFICIENCY: ICD-10-CM

## 2024-09-26 PROCEDURE — 99214 OFFICE O/P EST MOD 30 MIN: CPT | Performed by: NURSE PRACTITIONER

## 2024-09-26 RX ORDER — SEMAGLUTIDE 2.4 MG/.75ML
2.4 INJECTION, SOLUTION SUBCUTANEOUS WEEKLY
Qty: 3 ML | Refills: 2 | Status: SHIPPED | OUTPATIENT
Start: 2024-09-26

## 2024-10-02 ENCOUNTER — OFFICE VISIT (OUTPATIENT)
Dept: ORTHOPEDIC SURGERY | Facility: CLINIC | Age: 49
End: 2024-10-02
Payer: OTHER MISCELLANEOUS

## 2024-10-02 VITALS
HEART RATE: 81 BPM | HEIGHT: 67 IN | BODY MASS INDEX: 30.64 KG/M2 | OXYGEN SATURATION: 97 % | WEIGHT: 195.2 LBS | DIASTOLIC BLOOD PRESSURE: 75 MMHG | SYSTOLIC BLOOD PRESSURE: 117 MMHG

## 2024-10-02 DIAGNOSIS — S89.91XA RIGHT KNEE INJURY, INITIAL ENCOUNTER: ICD-10-CM

## 2024-10-02 DIAGNOSIS — M25.561 RIGHT KNEE PAIN, UNSPECIFIED CHRONICITY: Primary | ICD-10-CM

## 2024-10-02 RX ORDER — TRIAMCINOLONE ACETONIDE 40 MG/ML
40 INJECTION, SUSPENSION INTRA-ARTICULAR; INTRAMUSCULAR
Status: COMPLETED | OUTPATIENT
Start: 2024-10-02 | End: 2024-10-02

## 2024-10-02 RX ORDER — LIDOCAINE HYDROCHLORIDE 10 MG/ML
5 INJECTION, SOLUTION INFILTRATION; PERINEURAL
Status: COMPLETED | OUTPATIENT
Start: 2024-10-02 | End: 2024-10-02

## 2024-10-02 RX ADMIN — LIDOCAINE HYDROCHLORIDE 5 ML: 10 INJECTION, SOLUTION INFILTRATION; PERINEURAL at 09:47

## 2024-10-02 RX ADMIN — TRIAMCINOLONE ACETONIDE 40 MG: 40 INJECTION, SUSPENSION INTRA-ARTICULAR; INTRAMUSCULAR at 09:47

## 2024-10-02 NOTE — PROGRESS NOTES
"Chief Complaint  Initial Evaluation of the Right Knee     Subjective      Sahara Delarosa presents to [facility] for initial evaluation of the right knee. She had a fall end of August at work.  She was going down stairs and she heard a pop and had increase pain since then.  She has pain around the patella, posterior of the knee.  She has had a MRI and an X rays.  She is wearing a knee brace.  She says it is causing her right hip to hurt which she had replaced by Dr Cazares in [place].     Allergies   Allergen Reactions    Bactrim [Sulfamethoxazole-Trimethoprim] Rash    Codeine Rash        Social History     Socioeconomic History    Marital status:    Tobacco Use    Smoking status: Never     Passive exposure: Yes    Smokeless tobacco: Never   Vaping Use    Vaping status: Never Used   Substance and Sexual Activity    Alcohol use: Never    Drug use: Never    Sexual activity: Yes     Partners: Male     Birth control/protection: Vasectomy, Hysterectomy        I reviewed the patient's chief complaint, history of present illness, review of systems, past medical history, surgical history, family history, social history, medications, and allergy list.     Review of Systems     Constitutional: Denies fevers, chills, weight loss  Cardiovascular: Denies chest pain, shortness of breath  Skin: Denies rashes, acute skin changes  Neurologic: Denies headache, loss of consciousness        Vital Signs:   /75   Pulse 81   Ht 170.8 cm (67.25\")   Wt 88.5 kg (195 lb 3.2 oz)   SpO2 97%   BMI 30.35 kg/m²          Physical Exam  General: Alert. No acute distress    Ortho Exam        RIGHT KNEE Flexion 115. Extension -2. Stable to varus/valgus stress. Stable to anterior/posterior drawer. Neurovascularly intact. Mildly Positive Erlin. Negative Lachman. Positive EHL, FHL, HS and TA. Sensation intact to light touch all 5 nerves of the foot. Ambulates with Antalgic gait. Patella is well " tracking. Calf supple, non-tender. Positive tenderness to the medial joint line. Positive tenderness to the lateral joint line. Negative Crepitus. Good strength to hamstrings, quadriceps, dorsiflexors, and plantar flexors.  Knee Extensor Mechanism intact       Large Joint Arthrocentesis: R knee  Date/Time: 10/2/2024 9:47 AM  Consent given by: patient  Site marked: site marked  Timeout: Immediately prior to procedure a time out was called to verify the correct patient, procedure, equipment, support staff and site/side marked as required   Supporting Documentation  Indications: pain   Procedure Details  Location: knee - R knee  Needle gauge: 21 G.  Medications administered: 5 mL lidocaine 1 %; 40 mg triamcinolone acetonide 40 MG/ML  Patient tolerance: patient tolerated the procedure well with no immediate complications        This injection documentation was Scribed for German Lester MD by Trixie Conway.  10/02/24   09:48 EDT      Imaging Results (Most Recent)       None             Result Review :     9/19/24 [facility]    REASON FOR EXAM: ,M25.561 Pain in right knee,S86.911D Strain of unspecified muscle(s) and tendon(s) at lower leg level,  right leg, subsequent encounter work injury  COMPARISON: None  TECHNIQUE: Multiplanar unenhanced MR imaging of the right knee.  FINDINGS: Bone marrow: Within normal limits. Soft tissues: Within normal limits. Joint fluid/bursa: Moderate-sized joint  effusion. Cystic dilatation of the popliteus tendon sheath. Mild fluid distention of the proximal tibiofibular joint as well. No bursal  distention.  Medial compartment: Mild thinning of the posterior tibial plateau cartilage with some minimal bony spurring and reactive  subchondral marrow edema. Focal areas of cartilage loss with subchondral reactive marrow edema towards the midline of the  anterior and posterior femoral condyle. There is bony spurring. Subtle oblique tear suspected along the undersurface of the  posterior horn/body  junction of medial meniscus. Towards the midline at the posterior horn there may be a displaced meniscal  fragment at the free edge margin.  Lateral compartment: Cartilage is maintained. Lateral meniscus is intact. Exception made to some subtle fraying of the free edge of  the central body of lateral meniscus.  Patellofemoral interval: Anatomically aligned. Surface fissuring present on the median ridge and paramidline aspects of medial and  lateral patellar facet. Focal chondrosis over a 12 mm area on the inferior medial trochlear surface at an area measuring 10 mm.  Extensor mechanism: Intact. Cruciate ligaments: Intact. MCL: Intact. Surrounding grade 1 edema. Lateral collateral ligament  complex: Intact. Posterolateral corner structures: Intact.  IMPRESSION: 1. Subtle oblique tear suspected along the undersurface of the posterior horn/body junction of medial meniscus.  Additional free edge irregularity of the posterior horn close to the root attachment possibly indicating displaced radial tear. 2.  Question subtle fraying of a small focus of the central body of lateral meniscus. 3. Medial compartment and patellofemoral  chondrosis with moderate joint effusion.         Narrative & Impression   XR KNEE 1 OR 2 VW RIGHT     Date of Exam: 8/30/2024 11:20 AM EDT     Indication: right knee pain and swelling.     Comparison: 11/12/2018     Findings:  No fracture or dislocation. No bone erosion or destruction. No joint effusion.     IMPRESSION:  Negative right knee.             Assessment and Plan     Diagnoses and all orders for this visit:    1. Right knee pain, unspecified chronicity (Primary)  -     Large Joint Arthrocentesis: R knee    2. Right knee injury, initial encounter        Discussed the treatment plan with the patient. I reviewed the MRI results with the patient. I reviewed the X-rays that were obtained 8/30/24 with the patient.     Brace given and come out of brace for strengthening.      Discussed the risks  and benefits of conservative measures. The patient expressed understanding and wished to proceed with a right knee steroid injection.  She tolerated the injection well.     Brace given.  Prescribed physical therapy    Call or return if worsening symptoms.    Follow Up     4-6 weeks to assess pain and ROM of the knee.       Patient was given instructions and counseling regarding her condition or for health maintenance advice. Please see specific information pulled into the AVS if appropriate.     Scribed for German Lester MD by Ina Turner MA.  10/02/24   09:23 EDT      I have personally performed the services described in this document as scribed by the above individual and it is both accurate and complete. German Lestre MD 10/02/24

## 2024-10-04 ENCOUNTER — CLINICAL SUPPORT (OUTPATIENT)
Dept: FAMILY MEDICINE CLINIC | Facility: CLINIC | Age: 49
End: 2024-10-04
Payer: COMMERCIAL

## 2024-10-04 DIAGNOSIS — E55.9 VITAMIN D DEFICIENCY: ICD-10-CM

## 2024-10-04 DIAGNOSIS — M79.7 FIBROMYALGIA: ICD-10-CM

## 2024-10-04 DIAGNOSIS — E53.8 VITAMIN B 12 DEFICIENCY: Primary | ICD-10-CM

## 2024-10-04 LAB
25(OH)D3 SERPL-MCNC: 31.5 NG/ML (ref 30–100)
ALBUMIN SERPL-MCNC: 4.5 G/DL (ref 3.5–5.2)
ALBUMIN/GLOB SERPL: 1.9 G/DL
ALP SERPL-CCNC: 88 U/L (ref 39–117)
ALT SERPL W P-5'-P-CCNC: 10 U/L (ref 1–33)
AMPHET+METHAMPHET UR QL: NEGATIVE
AMPHETAMINES UR QL: NEGATIVE
ANION GAP SERPL CALCULATED.3IONS-SCNC: 11 MMOL/L (ref 5–15)
AST SERPL-CCNC: 10 U/L (ref 1–32)
BARBITURATES UR QL SCN: NEGATIVE
BASOPHILS # BLD AUTO: 0.02 10*3/MM3 (ref 0–0.2)
BASOPHILS NFR BLD AUTO: 0.4 % (ref 0–1.5)
BENZODIAZ UR QL SCN: NEGATIVE
BILIRUB SERPL-MCNC: 0.4 MG/DL (ref 0–1.2)
BUN SERPL-MCNC: 13 MG/DL (ref 6–20)
BUN/CREAT SERPL: 17.8 (ref 7–25)
BUPRENORPHINE SERPL-MCNC: NEGATIVE NG/ML
CALCIUM SPEC-SCNC: 10 MG/DL (ref 8.6–10.5)
CANNABINOIDS SERPL QL: NEGATIVE
CHLORIDE SERPL-SCNC: 106 MMOL/L (ref 98–107)
CHOLEST SERPL-MCNC: 176 MG/DL (ref 0–200)
CO2 SERPL-SCNC: 25 MMOL/L (ref 22–29)
COCAINE UR QL: NEGATIVE
CREAT SERPL-MCNC: 0.73 MG/DL (ref 0.57–1)
DEPRECATED RDW RBC AUTO: 40.6 FL (ref 37–54)
EGFRCR SERPLBLD CKD-EPI 2021: 99.7 ML/MIN/1.73
EOSINOPHIL # BLD AUTO: 0.03 10*3/MM3 (ref 0–0.4)
EOSINOPHIL NFR BLD AUTO: 0.7 % (ref 0.3–6.2)
ERYTHROCYTE [DISTWIDTH] IN BLOOD BY AUTOMATED COUNT: 12.3 % (ref 12.3–15.4)
FENTANYL UR-MCNC: NEGATIVE NG/ML
FOLATE SERPL-MCNC: 5.46 NG/ML (ref 4.78–24.2)
GLOBULIN UR ELPH-MCNC: 2.4 GM/DL
GLUCOSE SERPL-MCNC: 74 MG/DL (ref 65–99)
HBA1C MFR BLD: 4.9 % (ref 4.8–5.6)
HCT VFR BLD AUTO: 39.4 % (ref 34–46.6)
HDLC SERPL-MCNC: 39 MG/DL (ref 40–60)
HGB BLD-MCNC: 13.3 G/DL (ref 12–15.9)
IMM GRANULOCYTES # BLD AUTO: 0.02 10*3/MM3 (ref 0–0.05)
IMM GRANULOCYTES NFR BLD AUTO: 0.4 % (ref 0–0.5)
LDLC SERPL CALC-MCNC: 118 MG/DL (ref 0–100)
LDLC/HDLC SERPL: 2.97 {RATIO}
LYMPHOCYTES # BLD AUTO: 1.09 10*3/MM3 (ref 0.7–3.1)
LYMPHOCYTES NFR BLD AUTO: 24.3 % (ref 19.6–45.3)
MCH RBC QN AUTO: 30.7 PG (ref 26.6–33)
MCHC RBC AUTO-ENTMCNC: 33.8 G/DL (ref 31.5–35.7)
MCV RBC AUTO: 91 FL (ref 79–97)
METHADONE UR QL SCN: NEGATIVE
MONOCYTES # BLD AUTO: 0.28 10*3/MM3 (ref 0.1–0.9)
MONOCYTES NFR BLD AUTO: 6.2 % (ref 5–12)
NEUTROPHILS NFR BLD AUTO: 3.05 10*3/MM3 (ref 1.7–7)
NEUTROPHILS NFR BLD AUTO: 68 % (ref 42.7–76)
NRBC BLD AUTO-RTO: 0 /100 WBC (ref 0–0.2)
OPIATES UR QL: NEGATIVE
OXYCODONE UR QL SCN: NEGATIVE
PCP UR QL SCN: NEGATIVE
PLATELET # BLD AUTO: 248 10*3/MM3 (ref 140–450)
PMV BLD AUTO: 10 FL (ref 6–12)
POTASSIUM SERPL-SCNC: 4 MMOL/L (ref 3.5–5.2)
PROT SERPL-MCNC: 6.9 G/DL (ref 6–8.5)
RBC # BLD AUTO: 4.33 10*6/MM3 (ref 3.77–5.28)
SODIUM SERPL-SCNC: 142 MMOL/L (ref 136–145)
TRICYCLICS UR QL SCN: NEGATIVE
TRIGL SERPL-MCNC: 105 MG/DL (ref 0–150)
TSH SERPL DL<=0.05 MIU/L-ACNC: 0.92 UIU/ML (ref 0.27–4.2)
VIT B12 BLD-MCNC: 469 PG/ML (ref 211–946)
VLDLC SERPL-MCNC: 19 MG/DL (ref 5–40)
WBC NRBC COR # BLD AUTO: 4.49 10*3/MM3 (ref 3.4–10.8)

## 2024-10-04 PROCEDURE — 83036 HEMOGLOBIN GLYCOSYLATED A1C: CPT | Performed by: NURSE PRACTITIONER

## 2024-10-04 PROCEDURE — 80050 GENERAL HEALTH PANEL: CPT | Performed by: NURSE PRACTITIONER

## 2024-10-04 PROCEDURE — 82607 VITAMIN B-12: CPT | Performed by: NURSE PRACTITIONER

## 2024-10-04 PROCEDURE — 80307 DRUG TEST PRSMV CHEM ANLYZR: CPT | Performed by: NURSE PRACTITIONER

## 2024-10-04 PROCEDURE — 36415 COLL VENOUS BLD VENIPUNCTURE: CPT | Performed by: NURSE PRACTITIONER

## 2024-10-04 PROCEDURE — 82306 VITAMIN D 25 HYDROXY: CPT | Performed by: NURSE PRACTITIONER

## 2024-10-04 PROCEDURE — 82746 ASSAY OF FOLIC ACID SERUM: CPT | Performed by: NURSE PRACTITIONER

## 2024-10-04 PROCEDURE — 80061 LIPID PANEL: CPT | Performed by: NURSE PRACTITIONER

## 2024-10-04 NOTE — PROGRESS NOTES
..  Venipuncture Blood Specimen Collection  Venipuncture performed in Lt arm by Armida Leal MA with good hemostasis. Patient tolerated the procedure well without complications.   10/04/24   Armida Leal MA

## 2024-11-12 DIAGNOSIS — M79.7 FIBROMYALGIA: ICD-10-CM

## 2024-11-13 RX ORDER — PREGABALIN 100 MG/1
CAPSULE ORAL
Qty: 90 CAPSULE | Refills: 0 | Status: SHIPPED | OUTPATIENT
Start: 2024-11-13

## 2024-11-20 ENCOUNTER — OFFICE VISIT (OUTPATIENT)
Dept: ORTHOPEDIC SURGERY | Facility: CLINIC | Age: 49
End: 2024-11-20
Payer: OTHER MISCELLANEOUS

## 2024-11-20 VITALS
SYSTOLIC BLOOD PRESSURE: 122 MMHG | HEART RATE: 81 BPM | DIASTOLIC BLOOD PRESSURE: 85 MMHG | OXYGEN SATURATION: 98 % | BODY MASS INDEX: 28.25 KG/M2 | WEIGHT: 180 LBS | HEIGHT: 67 IN

## 2024-11-20 DIAGNOSIS — S83.241A ACUTE MEDIAL MENISCUS TEAR OF RIGHT KNEE, INITIAL ENCOUNTER: Primary | ICD-10-CM

## 2024-11-20 RX ORDER — DICLOFENAC SODIUM 75 MG/1
75 TABLET, DELAYED RELEASE ORAL 2 TIMES DAILY
Qty: 60 TABLET | Refills: 2 | Status: SHIPPED | OUTPATIENT
Start: 2024-11-20

## 2024-11-20 NOTE — PATIENT INSTRUCTIONS
Discussed treatment plan of care with patient.  Patient changed from nabumetone to diclofenac.  Order updated to continue physical therapy.  Discussed 3-month duration between steroid injections.  Discussed that with the inflammation and swelling improving that future injections may work better for her.  Discussed potential for arthroscopy in the future.    Follow-up in 6 weeks to evaluate progress with PT and pain.  Call for questions, concerns or worsening symptoms.

## 2024-11-20 NOTE — PROGRESS NOTES
"Chief Complaint  Follow-up of the Right Knee    Subjective      Sahara Delarosa presents to [facility] for follow-up of right knee meniscus tear.  Patient had a fall at work at the end of August that she is going downstairs and felt a pop and increased pain in the knee.  She had an MRI which revealed subtle oblique tear along the undersurface of the posterior horn/body junction of the medial meniscus, additional free edge irregularity of the posterior horn close to the root attachment possibly indicating a displaced radial tear, questionable subtle fraying of a small focus of the central body of the lateral meniscus as well as medial compartment and patellofemoral chondrosis with moderate effusion.  Patient has been wearing a knee brace and has been attending physical therapy with [facility] and notes improvement.  Reports that with physical therapy they tried to introduce some strength exercises but she began to have increased pain so they held off on this.  She received a steroid injection into the right knee on 10/2/2024 and did not notice significant improvement with that.  She reports that her swelling and range of motion is better.  Pain is about 25% better than her last appointment.  She is here today for follow-up.    Objective   Allergies   Allergen Reactions    Bactrim [Sulfamethoxazole-Trimethoprim] Rash    Codeine Rash       Vital Signs:   /85   Pulse 81   Ht 170.8 cm (67.24\")   Wt 81.6 kg (180 lb)   SpO2 98%   BMI 27.99 kg/m²       Physical Exam    Constitutional: Awake, alert. Well nourished appearance.    Integumentary: Warm, dry, intact. No obvious rashes.    HENT: Atraumatic, normocephalic.   Respiratory: Non labored respirations .   Cardiovascular: Intact peripheral pulses.    Psychiatric: Normal mood and affect. A&O X3    Ortho Exam  Right knee: Range of motion 0 to 120 degrees.  Stable to varus and valgus stress.  Stable to anterior and posterior drawer test.  " Neurovascular intact.  Sensation is intact.  Mildly positive Erlin's.  Negative Lachman's.  Tender to palpation on the medial joint line.  Nontender to palpation of the lateral joint line.    Imaging Results (Most Recent)       None                      Assessment and Plan   Problem List Items Addressed This Visit    None  Visit Diagnoses       Acute medial meniscus tear of right knee, initial encounter    -  Primary    Relevant Orders    Ambulatory Referral to Physical Therapy for Evaluation & Treatment            Follow Up   Return in about 6 weeks (around 1/1/2025).    Patient is a non-smoker, did not discuss options for smoking cessation.     Social History     Socioeconomic History    Marital status:    Tobacco Use    Smoking status: Never     Passive exposure: Yes    Smokeless tobacco: Never   Vaping Use    Vaping status: Never Used   Substance and Sexual Activity    Alcohol use: Never    Drug use: Never    Sexual activity: Yes     Partners: Male     Birth control/protection: Vasectomy, Hysterectomy       Patient Instructions   Discussed treatment plan of care with patient.  Patient changed from nabumetone to diclofenac.  Order updated to continue physical therapy.  Discussed 3-month duration between steroid injections.  Discussed that with the inflammation and swelling improving that future injections may work better for her.  Discussed potential for arthroscopy in the future.    Follow-up in 6 weeks to evaluate progress with PT and pain.  Call for questions, concerns or worsening symptoms.  Patient was given instructions and counseling regarding her condition or for health maintenance advice. Please see specific information pulled into the AVS if appropriate.

## 2024-12-09 ENCOUNTER — OFFICE VISIT (OUTPATIENT)
Dept: FAMILY MEDICINE CLINIC | Facility: CLINIC | Age: 49
End: 2024-12-09
Payer: COMMERCIAL

## 2024-12-09 VITALS
SYSTOLIC BLOOD PRESSURE: 132 MMHG | TEMPERATURE: 97.5 F | HEART RATE: 100 BPM | HEIGHT: 67 IN | BODY MASS INDEX: 28.25 KG/M2 | WEIGHT: 180 LBS | DIASTOLIC BLOOD PRESSURE: 84 MMHG | OXYGEN SATURATION: 97 %

## 2024-12-09 DIAGNOSIS — J01.10 ACUTE NON-RECURRENT FRONTAL SINUSITIS: Primary | ICD-10-CM

## 2024-12-09 DIAGNOSIS — Z86.19 HISTORY OF CANDIDIASIS: ICD-10-CM

## 2024-12-09 DIAGNOSIS — R06.2 WHEEZES: ICD-10-CM

## 2024-12-09 DIAGNOSIS — R68.83 CHILLS: ICD-10-CM

## 2024-12-09 DIAGNOSIS — J02.9 SORE THROAT: ICD-10-CM

## 2024-12-09 DIAGNOSIS — J45.30 MILD PERSISTENT ASTHMA WITHOUT COMPLICATION: ICD-10-CM

## 2024-12-09 DIAGNOSIS — R05.9 COUGH, UNSPECIFIED TYPE: ICD-10-CM

## 2024-12-09 PROCEDURE — 87880 STREP A ASSAY W/OPTIC: CPT

## 2024-12-09 PROCEDURE — 87428 SARSCOV & INF VIR A&B AG IA: CPT

## 2024-12-09 PROCEDURE — 99214 OFFICE O/P EST MOD 30 MIN: CPT

## 2024-12-09 RX ORDER — BROMPHENIRAMINE MALEATE, PSEUDOEPHEDRINE HYDROCHLORIDE, AND DEXTROMETHORPHAN HYDROBROMIDE 2; 30; 10 MG/5ML; MG/5ML; MG/5ML
5 SYRUP ORAL 4 TIMES DAILY PRN
Qty: 473 ML | Refills: 0 | Status: SHIPPED | OUTPATIENT
Start: 2024-12-09

## 2024-12-09 RX ORDER — FLUCONAZOLE 150 MG/1
150 TABLET ORAL ONCE
Qty: 1 TABLET | Refills: 0 | Status: SHIPPED | OUTPATIENT
Start: 2024-12-09 | End: 2024-12-09

## 2024-12-09 RX ORDER — ALBUTEROL SULFATE 0.83 MG/ML
2.5 SOLUTION RESPIRATORY (INHALATION) EVERY 4 HOURS PRN
Qty: 120 EACH | Refills: 2 | Status: SHIPPED | OUTPATIENT
Start: 2024-12-09

## 2024-12-09 RX ORDER — METHYLPREDNISOLONE 4 MG/1
TABLET ORAL
Qty: 1 EACH | Refills: 0 | Status: SHIPPED | OUTPATIENT
Start: 2024-12-09

## 2024-12-09 NOTE — PROGRESS NOTES
Chief Complaint  Asthma (Hard to breath with any activity), Cough, Headache, and Sore Throat    PHQ-2 Total Score:    PHQ-9 Total Score:      History of Present Illness:  Sahara Delarosa is a 49 y.o. female who presents to [facility] with a past medical history of  Past Medical History:   Diagnosis Date    Allergic     Allergic rhinitis     Anxiety and depression     Asthma     exercise induced    Back pain     Bowel incontinence     Condyloma acuminata     Diverticulitis of colon     Endometrial polyp     Family history of breast cancer     Fatty liver     Fibroids     Fibromyalgia     Fibromyalgia, primary     GERD (gastroesophageal reflux disease)     Hernia     Irritable bowel syndrome     Low back pain     Metabolic syndrome     Osteoarthritis     In addition to fibromyalgia    Scoliosis     Sleep apnea     CPAP    Urinary frequency     Uterine prolapse     Vitamin B 12 deficiency         History of Present Illness  The patient is a 49-year-old female who presents today with complaints of shortness of breath, congestion, cough, sinusitis, asthma.    She began experiencing symptoms on Friday, characterized by side pain, nausea, and headache, which led to her absence from work. The following day, she developed chills without a fever. Her asthma symptoms emerged yesterday. 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 She has been using Flonase and an albuterol inhaler, although her nebulizer solution has . She attempted to use Albuterol this morning but found it difficult to hold her breath long enough to inhale the medication, resulting in minimal relief. She reports no productive cough but notes significant drainage. Her usual treatment for asthma exacerbations includes a course of steroids and increased use of her inhaler. She also reports lightheadedness during physical activity, such as walking at  "work, which improves with rest and deep breathing. She does not have high blood pressure.    MEDICATIONS  Current: Flonase, albuterol, Singulair      Objective   Vital Signs:   Vitals:    12/09/24 1015   BP: 132/84   Pulse: 100   Temp: 97.5 °F (36.4 °C)   SpO2: 97%   Weight: 81.6 kg (180 lb)   Height: 170.8 cm (67.25\")     Body mass index is 27.98 kg/m².    Wt Readings from Last 3 Encounters:   12/09/24 81.6 kg (180 lb)   11/20/24 81.6 kg (180 lb)   10/02/24 88.5 kg (195 lb 3.2 oz)     BP Readings from Last 3 Encounters:   12/09/24 132/84   11/20/24 122/85   10/02/24 117/75       Health Maintenance   Topic Date Due    Pneumococcal Vaccine 0-64 (2 of 2 - PCV) 02/23/2023    ZOSTER VACCINE (1 of 2) Never done    COVID-19 Vaccine (7 - 2024-25 season) 09/01/2024    ANNUAL PHYSICAL  01/08/2025    PAP SMEAR  01/19/2025    BMI FOLLOWUP  11/20/2025    MAMMOGRAM  02/07/2026    TDAP/TD VACCINES (2 - Td or Tdap) 10/14/2028    COLORECTAL CANCER SCREENING  06/30/2030    HEPATITIS C SCREENING  Completed    INFLUENZA VACCINE  Completed    HEMOGLOBIN A1C  Discontinued       Review of Systems   Physical Exam  Vitals reviewed.   Constitutional:       Appearance: Normal appearance.   HENT:      Right Ear: Tympanic membrane normal.      Left Ear: Tympanic membrane normal.      Nose: Congestion present.      Right Sinus: Frontal sinus tenderness present.      Left Sinus: Frontal sinus tenderness present.      Mouth/Throat:      Pharynx: Posterior oropharyngeal erythema present.   Eyes:      Pupils: Pupils are equal, round, and reactive to light.   Cardiovascular:      Rate and Rhythm: Normal rate and regular rhythm.   Pulmonary:      Effort: Pulmonary effort is normal.      Breath sounds: Examination of the right-upper field reveals wheezing. Examination of the left-upper field reveals wheezing.   Lymphadenopathy:      Cervical:      Right cervical: Superficial cervical adenopathy present.      Left cervical: Superficial cervical " adenopathy present.   Skin:     General: Skin is warm and dry.   Neurological:      General: No focal deficit present.      Mental Status: She is alert and oriented to person, place, and time.   Psychiatric:         Mood and Affect: Mood normal.            Result Review :  The following data was reviewed by: ROSEANNA Jacobs on 12/09/2024:  Office Visit on 12/09/2024   Component Date Value    SARS Antigen 12/09/2024 Not Detected     Influenza A Antigen [place]ILLARY 12/09/2024 Not Detected     Influenza B Antigen H[place]ARY 12/09/2024 Not Detected     Internal Control 12/09/2024 Passed     Lot Number 12/09/2024 [id number]     Expiration Date 12/09/2024 [id number]     Rapid Strep A Screen 12/09/2024 Negative     Internal Control 12/09/2024 Passed     Lot Number 12/09/2024 [id number]     Expiration Date 12/09/2024 [id number]        Procedures        Assessment and Plan   Diagnoses and all orders for this visit:    1. Acute non-recurrent frontal sinusitis (Primary)  -     amoxicillin-clavulanate (AUGMENTIN) 875-125 MG per tablet; Take 1 tablet by mouth 2 (Two) Times a Day for 7 days.  Dispense: 14 tablet; Refill: 0  -     methylPREDNISolone (MEDROL) 4 MG dose pack; Take as directed on package instructions.  Dispense: 1 each; Refill: 0    2. Cough, unspecified type  -     POCT SARS-CoV-2 Antigen [place]ARY + Flu  -     brompheniramine-pseudoephedrine-DM 30-2-10 MG/5ML syrup; Take 5 mL by mouth 4 (Four) Times a Day As Needed for Congestion or Cough.  Dispense: 473 mL; Refill: 0    3. Sore throat  -     POCT rapid strep A    4. Chills    5. Wheezes  -     albuterol (PROVENTIL) (2.5 MG/3ML) 0.083% nebulizer solution; Take 2.5 mg by nebulization Every 4 (Four) Hours As Needed for Wheezing or Shortness of Air.  Dispense: 120 each; Refill: 2  -     methylPREDNISolone (MEDROL) 4 MG dose pack; Take as directed on package instructions.  Dispense: 1 each; Refill: 0    6. Mild persistent asthma without complication  -     mometasone-formoterol (DULERA 100)  100-5 MCG/ACT inhaler; Inhale 2 puffs 2 (Two) Times a Day.  Dispense: 13 g; Refill: 1    7. History of candidiasis  -     fluconazole (Diflucan) 150 MG tablet; Take 1 tablet by mouth 1 (One) Time for 1 dose.  Dispense: 1 tablet; Refill: 0        Assessment & Plan  1. Asthma.  Her asthma is currently unmanaged, with symptoms including shortness of breath and wheezing. She has been using her albuterol rescue inhaler but requires a nebulizer solution. A prescription for Dulera will be provided, which can be used regularly or as needed. She is advised to rinse her mouth after each use to prevent thrush. A Medrol Dosepak will also be prescribed to help with wheezing.    2. Sinusitis.  She has been experiencing symptoms for approximately 5 days, including congestion, headache, and drainage. Augmentin will be prescribed to treat the sinusitis. Additionally, Diflucan will be provided to prevent a potential yeast infection, a side effect she has experienced with Augmentin in the past.    3. Cough.  She reports a persistent cough without productive sputum. Bromfed will be prescribed to help manage the cough and congestion.      FOLLOW UP  Return if symptoms worsen or fail to improve.    Patient was given instructions and counseling regarding her condition or for health maintenance advice. Please see specific information pulled into the AVS if appropriate.       ROSEANNA Jacobs  12/09/24  11:05 EST    CURRENT & DISCONTINUED MEDICATIONS  Current Outpatient Medications   Medication Instructions    albuterol (PROVENTIL) 2.5 mg, Nebulization, Every 4 Hours PRN    albuterol sulfate  (90 Base) MCG/ACT inhaler 2 puffs, Inhalation, Every 6 Hours PRN    amoxicillin-clavulanate (AUGMENTIN) 875-125 MG per tablet 1 tablet, Oral, 2 Times Daily    brompheniramine-pseudoephedrine-DM 30-2-10 MG/5ML syrup 5 mL, Oral, 4 Times Daily PRN    busPIRone (BUSPAR) 15 mg, Oral, 2 Times Daily PRN, for anxiety    cyanocobalamin 1,000 mcg,  Intramuscular, Every 30 Days    desvenlafaxine (PRISTIQ) 50 mg, Oral, Daily    diclofenac (VOLTAREN) 75 mg, Oral, 2 Times Daily    fluconazole (DIFLUCAN) 150 mg, Oral, Once    fluticasone (FLONASE) 50 MCG/ACT nasal spray 2 sprays, Each Nare, Daily, Shake well before using.    linaclotide (LINZESS) 145 mcg, Oral, Every Morning Before Breakfast    metFORMIN ER (GLUCOPHAGE-XR) 500 mg, Oral, Daily With Breakfast    methocarbamol (ROBAXIN) 500 mg, Oral, 3 Times Daily    methylPREDNISolone (MEDROL) 4 MG dose pack Take as directed on package instructions.    mometasone-formoterol (DULERA 100) 100-5 MCG/ACT inhaler 2 puffs, Inhalation, 2 Times Daily - RT    montelukast (SINGULAIR) 10 mg, Oral, Nightly    nabumetone (RELAFEN) 500 mg    omeprazole (PRILOSEC) 40 mg, Oral, Daily    oxyCODONE-acetaminophen (PERCOCET) 5-325 MG per tablet oxycodone-acetaminophen 5-325 mg oral tablet 1 every 8 hrs as needed.   Active    pregabalin (LYRICA) 100 MG capsule TAKE 1 CAPSULE BY MOUTH EVERY MORNING AND 2 CAPSULES BY MOUTH AT BEDTIME    traZODone (DESYREL) 150 mg, Oral, Nightly    Wegovy 2.4 mg, Subcutaneous, Weekly       Medications Discontinued During This Encounter   Medication Reason    albuterol (PROVENTIL) (2.5 MG/3ML) 0.083% nebulizer solution Reorder        EMR Dragon/Transcription disclaimer:  Parts of this encounter note are electronic transcription/translation of spoken language to printed text.     Patient or patient representative verbalized consent for the use of Ambient Listening during the visit with  ROSEANNA Jacobs for chart documentation. 12/9/2024  11:05 EST

## 2024-12-17 ENCOUNTER — OFFICE VISIT (OUTPATIENT)
Dept: FAMILY MEDICINE CLINIC | Facility: CLINIC | Age: 49
End: 2024-12-17
Payer: COMMERCIAL

## 2024-12-17 VITALS
SYSTOLIC BLOOD PRESSURE: 124 MMHG | WEIGHT: 183.9 LBS | OXYGEN SATURATION: 98 % | DIASTOLIC BLOOD PRESSURE: 72 MMHG | HEART RATE: 99 BPM | TEMPERATURE: 98.3 F | BODY MASS INDEX: 28.59 KG/M2

## 2024-12-17 DIAGNOSIS — K21.9 GASTROESOPHAGEAL REFLUX DISEASE WITHOUT ESOPHAGITIS: ICD-10-CM

## 2024-12-17 DIAGNOSIS — F41.9 ANXIETY: ICD-10-CM

## 2024-12-17 DIAGNOSIS — B37.9 ANTIBIOTIC-INDUCED YEAST INFECTION: ICD-10-CM

## 2024-12-17 DIAGNOSIS — M79.7 FIBROMYALGIA: ICD-10-CM

## 2024-12-17 DIAGNOSIS — F33.9 RECURRENT MAJOR DEPRESSIVE DISORDER, REMISSION STATUS UNSPECIFIED: ICD-10-CM

## 2024-12-17 DIAGNOSIS — E88.810 METABOLIC SYNDROME: Primary | ICD-10-CM

## 2024-12-17 DIAGNOSIS — G47.00 INSOMNIA, UNSPECIFIED TYPE: ICD-10-CM

## 2024-12-17 DIAGNOSIS — E66.811 CLASS 1 OBESITY WITH SERIOUS COMORBIDITY AND BODY MASS INDEX (BMI) OF 30.0 TO 30.9 IN ADULT, UNSPECIFIED OBESITY TYPE: ICD-10-CM

## 2024-12-17 DIAGNOSIS — K59.04 CHRONIC IDIOPATHIC CONSTIPATION: ICD-10-CM

## 2024-12-17 DIAGNOSIS — J01.00 SUBACUTE MAXILLARY SINUSITIS: ICD-10-CM

## 2024-12-17 DIAGNOSIS — T36.95XA ANTIBIOTIC-INDUCED YEAST INFECTION: ICD-10-CM

## 2024-12-17 PROCEDURE — 99214 OFFICE O/P EST MOD 30 MIN: CPT | Performed by: NURSE PRACTITIONER

## 2024-12-17 RX ORDER — AZITHROMYCIN 250 MG/1
TABLET, FILM COATED ORAL
Qty: 6 TABLET | Refills: 0 | Status: SHIPPED | OUTPATIENT
Start: 2024-12-17

## 2024-12-17 RX ORDER — PREGABALIN 100 MG/1
CAPSULE ORAL
Qty: 90 CAPSULE | Refills: 2 | Status: SHIPPED | OUTPATIENT
Start: 2024-12-17

## 2024-12-17 RX ORDER — SEMAGLUTIDE 2.4 MG/.75ML
2.4 INJECTION, SOLUTION SUBCUTANEOUS WEEKLY
Qty: 3 ML | Refills: 2 | Status: SHIPPED | OUTPATIENT
Start: 2024-12-17

## 2024-12-17 RX ORDER — METFORMIN HYDROCHLORIDE 500 MG/1
500 TABLET, EXTENDED RELEASE ORAL
Qty: 90 TABLET | Refills: 1 | Status: SHIPPED | OUTPATIENT
Start: 2024-12-17

## 2024-12-17 RX ORDER — OMEPRAZOLE 40 MG/1
40 CAPSULE, DELAYED RELEASE ORAL DAILY
Qty: 90 CAPSULE | Refills: 1 | Status: SHIPPED | OUTPATIENT
Start: 2024-12-17

## 2024-12-17 RX ORDER — TRAZODONE HYDROCHLORIDE 150 MG/1
150 TABLET ORAL NIGHTLY
Qty: 90 TABLET | Refills: 1 | Status: SHIPPED | OUTPATIENT
Start: 2024-12-17

## 2024-12-17 RX ORDER — FLUCONAZOLE 150 MG/1
150 TABLET ORAL ONCE
Qty: 1 TABLET | Refills: 0 | Status: SHIPPED | OUTPATIENT
Start: 2024-12-17 | End: 2024-12-17

## 2024-12-17 RX ORDER — BUSPIRONE HYDROCHLORIDE 15 MG/1
15 TABLET ORAL 2 TIMES DAILY PRN
Qty: 180 TABLET | Refills: 1 | Status: SHIPPED | OUTPATIENT
Start: 2024-12-17

## 2024-12-17 RX ORDER — DESVENLAFAXINE 50 MG/1
50 TABLET, FILM COATED, EXTENDED RELEASE ORAL DAILY
Qty: 90 TABLET | Refills: 1 | Status: SHIPPED | OUTPATIENT
Start: 2024-12-17

## 2024-12-17 NOTE — PROGRESS NOTES
Chief Complaint  Insomnia, Weight Check, Anxiety, and Sore Throat (On the right side )    Subjective            Sahara Delarosa presents to [facility]  History of Present Illness    History of Present Illness  The patient presents for evaluation of fibromyalgia, chronic constipation, depression, anxiety, insomnia, weight management, and maxillary sinusitis.    She reports overall improvement in her fibromyalgia symptoms with Lyrica, although she experiences occasional flare-ups, particularly during inclement weather. She has completed the necessary urine drug screen for Lyrica. Her current regimen includes one capsule of Lyrica in the morning and two in the evening.  And she shows no aberrant behaviors no signs symptoms of misuse nor abuse in her yearly urine tox screen and the updated controlled substance for safe use with [facility] is on file and was updated earlier this year and was appropriate    She is currently on omeprazole for reflux, which she reports as effective.    She is also taking Linzess for chronic constipation, which was initially effective but has recently become less so. She speculates that this may be due to dietary changes. She is willing to continue with the current dose of Linzess.    She is on Pristiq for depression and buspirone for anxiety, both of which are managing her symptoms well.  Denies all SI/HI tolerating medication well with no side effects no issues and overall stable and receiving good efficacy    She is on trazodone for sleep, which is generally effective. However, she experiences insomnia during fibromyalgia flare-ups, even when taking the medication. She recalls a recent episode where she was unable to sleep until 3 AM for several consecutive nights.  Denies all SI/HI overall for the most part stable    She continues to tolerate Wegovy well. She weighed 180 pounds a week ago and has gained 4 pounds since then. She  attributes this weight gain to bloating and constipation. Her highest recorded weight was 219 pounds.    She was recently treated for sinusitis, cough, wheezing, and an asthma flare-up. She was prescribed Augmentin, which resulted in a yeast infection, and steroids for the asthma flare-up. The asthma flare-up has resolved, but she continues to experience headaches, sinus pressure, and a sore throat on the right side. She reports no fever or chills. She also reports postnasal drip on one side. She requests a prescription for Diflucan to prevent another yeast infection.    Supplemental Information  She reports no dizziness, lightheadedness, fainting, or abnormal vaginal bleeding.    MEDICATIONS  Lyrica, omeprazole, Linzess, Pristiq, buspirone, trazodone, Wegovy, Augmentin (past), steroids (past).      PHQ-2 Total Score:    PHQ-9 Total Score:      Past Medical History:   Diagnosis Date    Allergic     Allergic rhinitis     Anxiety and depression     Asthma     exercise induced    Back pain     Bowel incontinence     Condyloma acuminata     Diverticulitis of colon     Endometrial polyp     Family history of breast cancer     Fatty liver     Fibroids     Fibromyalgia     Fibromyalgia, primary     GERD (gastroesophageal reflux disease)     Hernia     Irritable bowel syndrome     Low back pain     Metabolic syndrome     Osteoarthritis     In addition to fibromyalgia    Scoliosis     Sleep apnea     CPAP    Urinary frequency     Uterine prolapse     Vitamin B 12 deficiency        Allergies   Allergen Reactions    Bactrim [Sulfamethoxazole-Trimethoprim] Rash    Codeine Rash        Past Surgical History:   Procedure Laterality Date    BREAST BIOPSY Left 2013    Hyper-pigmented lesion, left nipple.= Benign    COLONOSCOPY      CYST REMOVAL      on chest and groin area     [place]OMETRI[place]    [facility] [place] [facility] [place]    ENDOSCOPY  2017    EGD: Stomach inflammation, Neg for H Pylori.    HIP ARTHROPLASTY  Right 09/2021    HIP SURGERY Right 2019    LABRUM REPAIR    JOINT REPLACEMENT  September 2021    Rt Hip    LUMBAR SPINE SURGERY  02/2024    Pain Device    TOTAL LAPAROSCOPIC HYSTERECTOMY SALPINGO OOPHORECTOMY Bilateral 03/30/2022    Procedure: TOTAL LAPAROSCOPIC HYSTERECTOMY, BILATERAL SALPINGECTOMY, AND CYSTOSCOPY;  Surgeon: Rosalba Chiu MD;  Location: [facility] OR;  Service: Obstetrics/Gynecology;  Laterality: Bilateral;    UPPER GASTROINTESTINAL ENDOSCOPY          Social History     Tobacco Use    Smoking status: Never     Passive exposure: Yes    Smokeless tobacco: Never   Vaping Use    Vaping status: Never Used   Substance Use Topics    Alcohol use: Never    Drug use: Never       Family History   Problem Relation Age of Onset    Diabetes type II Father     Diabetes Father     Arthritis Mother     Breast cancer Mother 70        Double mastectomy     Cancer Mother         Breast    Fibromyalgia Sister     No Known Problems Son     No Known Problems Daughter     Brain cancer Paternal Grandfather         dx >50    No Known Problems Paternal Grandmother     Osteoporosis Maternal Grandmother         ?    Parkinsonism Maternal Grandmother     Stomach cancer Maternal Grandfather         dx >50    Breast cancer Maternal Aunt         4 different mat aunts with breast cancer.    Cancer Maternal Aunt         Breast    Breast cancer Maternal Aunt     Cancer Maternal Aunt         Breast    Breast cancer Maternal Aunt     Cancer Maternal Aunt         Breast    Breast cancer Maternal Aunt     Cancer Maternal Aunt         Breast    Breast cancer Paternal Aunt 60        dx ~ age 60    Diabetes Paternal Aunt     BRCA 1/2 Neg Hx     Colon cancer Neg Hx     Endometrial cancer Neg Hx     Ovarian cancer Neg Hx     Malig Hyperthermia Neg Hx     Uterine cancer Neg Hx         Health Maintenance Due   Topic Date Due    Pneumococcal Vaccine 0-64 (2 of 2 - PCV) 02/23/2023    ZOSTER VACCINE (1 of 2) Never done    COVID-19 Vaccine  (7 - 2024-25 season) 09/01/2024        Current Outpatient Medications on File Prior to Visit   Medication Sig    albuterol (PROVENTIL) (2.5 MG/3ML) 0.083% nebulizer solution Take 2.5 mg by nebulization Every 4 (Four) Hours As Needed for Wheezing or Shortness of Air.    albuterol sulfate  (90 Base) MCG/ACT inhaler Inhale 2 puffs Every 6 (Six) Hours As Needed for Wheezing.    cyanocobalamin 1000 MCG/ML injection Inject 1 mL into the appropriate muscle as directed by prescriber Every 30 (Thirty) Days.    diclofenac (VOLTAREN) 75 MG EC tablet Take 1 tablet by mouth 2 (Two) Times a Day.    fluticasone (FLONASE) 50 MCG/ACT nasal spray SHAKE LIQUID AND USE 2 SPRAYS IN EACH NOSTRIL DAILY    montelukast (SINGULAIR) 10 MG tablet Take 1 tablet by mouth Every Night.    nabumetone (RELAFEN) 500 MG tablet Take 1 tablet by mouth.    oxyCODONE-acetaminophen (PERCOCET) 5-325 MG per tablet oxycodone-acetaminophen 5-325 mg oral tablet 1 every 8 hrs as needed.   Active    [DISCONTINUED] busPIRone (BUSPAR) 15 MG tablet Take 1 tablet by mouth 2 (Two) Times a Day As Needed (anxiety). for anxiety    [DISCONTINUED] desvenlafaxine (PRISTIQ) 50 MG 24 hr tablet Take 1 tablet by mouth Daily.    [DISCONTINUED] linaclotide (Linzess) 145 MCG capsule capsule Take 1 capsule by mouth Every Morning Before Breakfast.    [DISCONTINUED] metFORMIN ER (GLUCOPHAGE-XR) 500 MG 24 hr tablet Take 1 tablet by mouth Daily With Breakfast.    [DISCONTINUED] omeprazole (priLOSEC) 40 MG capsule Take 1 capsule by mouth Daily.    [DISCONTINUED] pregabalin (LYRICA) 100 MG capsule TAKE 1 CAPSULE BY MOUTH EVERY MORNING AND 2 CAPSULES BY MOUTH AT BEDTIME    [DISCONTINUED] Semaglutide-Weight Management (Wegovy) 2.4 MG/0.75ML solution auto-injector Inject 2.4 mg under the skin into the appropriate area as directed 1 (One) Time Per Week.    [DISCONTINUED] traZODone (DESYREL) 150 MG tablet Take 1 tablet by mouth Every Night.    mometasone-formoterol (DULERA 100) 100-5  MCG/ACT inhaler Inhale 2 puffs 2 (Two) Times a Day. (Patient not taking: Reported on 12/17/2024)    [DISCONTINUED] amoxicillin-clavulanate (AUGMENTIN) 875-125 MG per tablet Take 1 tablet by mouth 2 (Two) Times a Day for 7 days.    [DISCONTINUED] brompheniramine-pseudoephedrine-DM 30-2-10 MG/5ML syrup Take 5 mL by mouth 4 (Four) Times a Day As Needed for Congestion or Cough.    [DISCONTINUED] methocarbamol (ROBAXIN) 500 MG tablet Take 1 tablet by mouth 3 (Three) Times a Day.    [DISCONTINUED] methylPREDNISolone (MEDROL) 4 MG dose pack Take as directed on package instructions.     No current facility-administered medications on file prior to visit.       Immunization History   Administered Date(s) Administered    COVID-19 (MODERNA) 1st,2nd,3rd Dose Monovalent 02/03/2021, 03/03/2021, 11/05/2021    COVID-19 (UNSPECIFIED) 02/03/2021, 03/03/2021, 11/05/2021    Flu Vaccine Quad PF >36MO 10/19/2016, 09/20/2017, 10/14/2018    Flu Vaccine Split Quad 10/19/2016, 09/20/2017, 10/14/2018    Fluzone  >6mos 10/03/2024    Fluzone (or Fluarix & Flulaval for VFC) >6mos 10/19/2016, 09/20/2017, 11/08/2022    Hepatitis A 12/06/2018    Influenza Injectable Mdck Pf Quad 11/08/2022, 11/07/2023    Influenza, Unspecified 10/14/2018, 10/01/2019    Pneumococcal Polysaccharide (PPSV23) 02/23/2022    Tdap 10/14/2018       Review of Systems   Constitutional:  Positive for fatigue. Negative for chills, diaphoresis and fever.   HENT:  Positive for sinus pressure and sore throat. Negative for swollen glands.    Respiratory:  Negative for cough.    Cardiovascular:  Negative for chest pain.   Gastrointestinal:  Positive for constipation. Negative for abdominal pain, nausea and vomiting.   Genitourinary:  Negative for dysuria and vaginal bleeding.   Musculoskeletal:  Positive for myalgias and neck pain.   Skin:  Negative for rash.   Neurological:  Negative for dizziness, syncope, weakness, light-headedness and numbness.   Psychiatric/Behavioral:   Negative for self-injury and suicidal ideas.         Objective     /72 (BP Location: Left arm, Patient Position: Sitting)   Pulse 99   Temp 98.3 °F (36.8 °C)   Wt 83.4 kg (183 lb 14.4 oz)   SpO2 98%   BMI 28.59 kg/m²       Physical Exam  Vitals and nursing note reviewed.   Constitutional:       Appearance: Normal appearance.   HENT:      Head: Normocephalic.      Right Ear: Tympanic membrane, ear canal and external ear normal.      Left Ear: Tympanic membrane, ear canal and external ear normal.      Nose: Nose normal.      Right Sinus: Maxillary sinus tenderness present.      Left Sinus: Maxillary sinus tenderness present.      Mouth/Throat:      Mouth: Mucous membranes are moist.      Pharynx: No oropharyngeal exudate or posterior oropharyngeal erythema.   Eyes:      Pupils: Pupils are equal, round, and reactive to light.   Cardiovascular:      Rate and Rhythm: Normal rate and regular rhythm.      Heart sounds: Normal heart sounds.   Pulmonary:      Effort: Pulmonary effort is normal.      Breath sounds: Normal breath sounds.   Abdominal:      Palpations: Abdomen is soft.   Musculoskeletal:         General: Normal range of motion.      Cervical back: Normal range of motion and neck supple.      Comments: Positive point tenderness to the bilateral: Upper back shoulders mid back lower back bilateral upper extremities bilateral lower extremities and upper chest wall   Skin:     General: Skin is warm and dry.   Neurological:      Mental Status: She is alert and oriented to person, place, and time.   Psychiatric:         Mood and Affect: Mood normal.         Behavior: Behavior normal.         Thought Content: Thought content normal.         Judgment: Judgment normal.         Result Review :     The following data was reviewed by: ROSEANNA Jameson on 12/17/2024:    CMP          4/17/2024    13:24 6/27/2024    13:51 10/4/2024    10:09   CMP   Glucose 77  81  74    BUN 8  10  13    Creatinine 0.75  0.70   0.73    EGFR 97.1  105.5  99.7    Sodium 141  141  142    Potassium 3.9  4.0  4.0    Chloride 104  107  106    Calcium 9.7  9.7  10.0    Total Protein 7.0  6.9  6.9    Albumin 4.5  4.3  4.5    Globulin 2.5  2.6  2.4    Total Bilirubin 0.6  0.6  0.4    Alkaline Phosphatase 86  87  88    AST (SGOT) 13  10  10    ALT (SGPT) 13  12  10    Albumin/Globulin Ratio 1.8  1.7  1.9    BUN/Creatinine Ratio 10.7  14.3  17.8    Anion Gap 11.0  11.1  11.0      CBC          4/17/2024    13:24 10/4/2024    10:09   CBC   WBC 4.01  4.49    RBC 4.26  4.33    Hemoglobin 13.1  13.3    Hematocrit 39.6  39.4    MCV 93.0  91.0    MCH 30.8  30.7    MCHC 33.1  33.8    RDW 12.5  12.3    Platelets 267  248      Lipid Panel          10/4/2024    10:09   Lipid Panel   Total Cholesterol 176    Triglycerides 105    HDL Cholesterol 39    VLDL Cholesterol 19    LDL Cholesterol  118    LDL/HDL Ratio 2.97      TSH          4/17/2024    13:24 10/4/2024    10:09   TSH   TSH 1.610  0.923      A1C Last 3 Results          10/4/2024    10:09   HGBA1C Last 3 Results   Hemoglobin A1C 4.90      POCT SARS-CoV-2 Antigen H[facility]ARY + Flu (12/09/2024 10:44)  POCT rapid strep A (12/09/2024 10:33)           Results                 Assessment and Plan      Diagnoses and all orders for this visit:    1. Metabolic syndrome (Primary)  -     metFORMIN ER (GLUCOPHAGE-XR) 500 MG 24 hr tablet; Take 1 tablet by mouth Daily With Breakfast.  Dispense: 90 tablet; Refill: 1    2. Recurrent major depressive disorder, remission status unspecified  -     desvenlafaxine (PRISTIQ) 50 MG 24 hr tablet; Take 1 tablet by mouth Daily.  Dispense: 90 tablet; Refill: 1    3. Insomnia, unspecified type  -     traZODone (DESYREL) 150 MG tablet; Take 1 tablet by mouth Every Night.  Dispense: 90 tablet; Refill: 1    4. Anxiety  -     busPIRone (BUSPAR) 15 MG tablet; Take 1 tablet by mouth 2 (Two) Times a Day As Needed (anxiety). for anxiety  Dispense: 180 tablet; Refill: 1    5. Class 1 obesity with  serious comorbidity and body mass index (BMI) of 30.0 to 30.9 in adult, unspecified obesity type  -     Semaglutide-Weight Management (Wegovy) 2.4 MG/0.75ML solution auto-injector; Inject 2.4 mg under the skin into the appropriate area as directed 1 (One) Time Per Week.  Dispense: 3 mL; Refill: 2    6. Fibromyalgia  -     pregabalin (LYRICA) 100 MG capsule; TAKE 1 CAPSULE BY MOUTH EVERY MORNING AND 2 CAPSULES BY MOUTH AT BEDTIME  Dispense: 90 capsule; Refill: 2    7. Gastroesophageal reflux disease without esophagitis  -     omeprazole (priLOSEC) 40 MG capsule; Take 1 capsule by mouth Daily.  Dispense: 90 capsule; Refill: 1    8. Chronic idiopathic constipation  -     linaclotide (Linzess) 145 MCG capsule capsule; Take 1 capsule by mouth Every Morning Before Breakfast.  Dispense: 90 capsule; Refill: 1    9. Subacute maxillary sinusitis  -     azithromycin (Zithromax Z-Wally) 250 MG tablet; Take 2 tablets by mouth on day 1, then 1 tablet daily on days 2-5  Dispense: 6 tablet; Refill: 0    10. Antibiotic-induced yeast infection  -     fluconazole (Diflucan) 150 MG tablet; Take 1 tablet by mouth 1 (One) Time for 1 dose.  Dispense: 1 tablet; Refill: 0    Medications refilled as noted above and reviewed all of her labs from October and the strep and COVID and flu testing they did last week    And empiric treatment for the residual subacute maxillary sinusitis we will send in the Z-Wally that her asthma flare has completely resolved now and the chest congestion has completely resolved now      Assessment & Plan  1. Fibromyalgia.  She reports that Lyrica is generally effective, though she still experiences flare-ups with weather changes. She has completed the necessary urine drug screen for Lyrica. She will continue her current dosage of Lyrica, taking one capsule in the morning and two in the evening. All her medications have been refilled and sent to [facility].    2. Chronic constipation.  She reports that Linzess has not  been as effective recently. She is advised to maintain adequate hydration and fiber intake. If symptoms persist, she may consider increasing the dosage to 290 mcg.    3. Depression.  She confirms that Pristiq is still effective. She will continue her current regimen.    4. Anxiety.  She confirms that buspirone is still effective. She will continue her current regimen.    5. Insomnia.  She reports occasional insomnia, especially during fibromyalgia flare-ups. She will continue taking trazodone for sleep.    6. Weight management.  She has gained 3 pounds since her last visit, likely due to constipation. She is tolerating Wegovy well. She will continue her current regimen.    7. Maxillary sinusitis.  She continues to experience sinus pressure and a sore throat on the right side. A prescription for Z-Wally has been provided.    8. Antibiotic-induced yeast infection.  She experienced a yeast infection after taking Augmentin. A prescription for Diflucan has been provided to manage any potential recurrence.    9. Health maintenance.  Her blood pressure is normal. She had her A1c, vitamin D, B12, folic acid, cholesterol, thyroid, and comprehensive blood counts checked in October 2024.    Follow-up  The patient will follow up in 3 months for Lyrica and reevaluation of Wegovy.          Follow Up     Return in about 3 months (around 3/17/2025), or if symptoms worsen or fail to improve, for Recheck.    Patient was given instructions and counseling regarding her condition or for health maintenance advice. Please see specific information pulled into the AVS if appropriate.            Sahara STEVENSON [place]  reports that she has never smoked. She has been exposed to tobacco smoke. She has never used smokeless tobacco. I have educated her on the risk of diseases from using tobacco products such as cancer, COPD, and heart disease.                Patient or patient representative verbalized consent for the use of Ambient Listening during the  visit with  ROSEANNA Jameson for chart documentation. 12/17/2024  14:50 EST

## 2024-12-26 ENCOUNTER — OFFICE VISIT (OUTPATIENT)
Dept: FAMILY MEDICINE CLINIC | Facility: CLINIC | Age: 49
End: 2024-12-26
Payer: COMMERCIAL

## 2024-12-26 VITALS
SYSTOLIC BLOOD PRESSURE: 118 MMHG | WEIGHT: 179 LBS | TEMPERATURE: 97.7 F | OXYGEN SATURATION: 97 % | DIASTOLIC BLOOD PRESSURE: 76 MMHG | HEIGHT: 67 IN | HEART RATE: 113 BPM | BODY MASS INDEX: 28.09 KG/M2

## 2024-12-26 DIAGNOSIS — R51.9 ACUTE NONINTRACTABLE HEADACHE, UNSPECIFIED HEADACHE TYPE: ICD-10-CM

## 2024-12-26 DIAGNOSIS — R11.0 NAUSEA: ICD-10-CM

## 2024-12-26 DIAGNOSIS — J45.30 MILD PERSISTENT ASTHMA WITHOUT COMPLICATION: ICD-10-CM

## 2024-12-26 DIAGNOSIS — R05.9 COUGH, UNSPECIFIED TYPE: ICD-10-CM

## 2024-12-26 DIAGNOSIS — B34.9 NONSPECIFIC SYNDROME SUGGESTIVE OF VIRAL ILLNESS: Primary | ICD-10-CM

## 2024-12-26 LAB
B PARAPERT DNA SPEC QL NAA+PROBE: NOT DETECTED
B PERT DNA SPEC QL NAA+PROBE: NOT DETECTED
C PNEUM DNA NPH QL NAA+NON-PROBE: NOT DETECTED
FLUAV SUBTYP SPEC NAA+PROBE: ABNORMAL
FLUBV RNA ISLT QL NAA+PROBE: NOT DETECTED
HADV DNA SPEC NAA+PROBE: NOT DETECTED
HCOV 229E RNA SPEC QL NAA+PROBE: NOT DETECTED
HCOV HKU1 RNA SPEC QL NAA+PROBE: NOT DETECTED
HCOV NL63 RNA SPEC QL NAA+PROBE: NOT DETECTED
HCOV OC43 RNA SPEC QL NAA+PROBE: NOT DETECTED
HMPV RNA NPH QL NAA+NON-PROBE: NOT DETECTED
HPIV1 RNA ISLT QL NAA+PROBE: NOT DETECTED
HPIV2 RNA SPEC QL NAA+PROBE: NOT DETECTED
HPIV3 RNA NPH QL NAA+PROBE: NOT DETECTED
HPIV4 P GENE NPH QL NAA+PROBE: NOT DETECTED
M PNEUMO IGG SER IA-ACNC: NOT DETECTED
RHINOVIRUS RNA SPEC NAA+PROBE: NOT DETECTED
RSV RNA NPH QL NAA+NON-PROBE: NOT DETECTED
SARS-COV-2 RNA RESP QL NAA+PROBE: NOT DETECTED

## 2024-12-26 PROCEDURE — 0202U NFCT DS 22 TRGT SARS-COV-2: CPT

## 2024-12-26 PROCEDURE — 96372 THER/PROPH/DIAG INJ SC/IM: CPT

## 2024-12-26 PROCEDURE — 99214 OFFICE O/P EST MOD 30 MIN: CPT

## 2024-12-26 RX ORDER — BROMPHENIRAMINE MALEATE, PSEUDOEPHEDRINE HYDROCHLORIDE, AND DEXTROMETHORPHAN HYDROBROMIDE 2; 30; 10 MG/5ML; MG/5ML; MG/5ML
5 SYRUP ORAL 4 TIMES DAILY PRN
Qty: 473 ML | Refills: 0 | Status: SHIPPED | OUTPATIENT
Start: 2024-12-26

## 2024-12-26 RX ORDER — ONDANSETRON 8 MG/1
8 TABLET, FILM COATED ORAL EVERY 8 HOURS PRN
Qty: 20 TABLET | Refills: 0 | Status: SHIPPED | OUTPATIENT
Start: 2024-12-26

## 2024-12-26 RX ORDER — BUDESONIDE AND FORMOTEROL FUMARATE DIHYDRATE 80; 4.5 UG/1; UG/1
2 AEROSOL RESPIRATORY (INHALATION)
Qty: 10.2 G | Refills: 12 | Status: SHIPPED | OUTPATIENT
Start: 2024-12-26

## 2024-12-26 RX ORDER — METHYLPREDNISOLONE SODIUM SUCCINATE 40 MG/ML
40 INJECTION INTRAMUSCULAR; INTRAVENOUS ONCE
Status: COMPLETED | OUTPATIENT
Start: 2024-12-26 | End: 2024-12-26

## 2024-12-26 RX ADMIN — METHYLPREDNISOLONE SODIUM SUCCINATE 40 MG: 40 INJECTION INTRAMUSCULAR; INTRAVENOUS at 14:47

## 2024-12-26 NOTE — PROGRESS NOTES
Chief Complaint  Shortness of Breath (X 2 days ), Cough, Headache, and Nausea    PHQ-2 Total Score:    PHQ-9 Total Score:      History of Present Illness:  Sahara Delarsoa is a 49 y.o. female who presents to [facility] with a past medical history of  Past Medical History:   Diagnosis Date    Allergic     Allergic rhinitis     Anxiety and depression     Asthma     exercise induced    Back pain     Bowel incontinence     Condyloma acuminata     Diverticulitis of colon     Endometrial polyp     Family history of breast cancer     Fatty liver     Fibroids     Fibromyalgia     Fibromyalgia, primary     GERD (gastroesophageal reflux disease)     Hernia     Irritable bowel syndrome     Low back pain     Metabolic syndrome     Osteoarthritis     In addition to fibromyalgia    Scoliosis     Sleep apnea     CPAP    Urinary frequency     Uterine prolapse     Vitamin B 12 deficiency         History of Present Illness  The patient is a 49-year-old female who presents today with complaints of congestion.    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 Despite testing negative for COVID-19 at home, she has been experiencing severe respiratory distress, necessitating the initiation of breathing treatments yesterday. She reports difficulty in ambulation due to her symptoms. She has been using Mucinex and cough drops for symptom relief. She also reports significant postnasal drainage, which she believes is exacerbated by Flonase nasal spray. She has previously found relief from steroids. She has no history of hypertension. Her insurance did not cover the prescribed inhaler, and an alternative inhaler proved ineffective. She has completed a course of Augmentin and is currently not on any medication.    Her current symptoms include headache, nausea, cough, and  "fatigue. She is uncertain if the nausea is a result of postnasal drainage or another underlying issue. She occasionally experiences severe coughing episodes that induce gagging and a sensation of impending vomiting.    MEDICATIONS  Current: Mucinex, Flonase  Past: Augmentin      Objective   Vital Signs:   Vitals:    12/26/24 1359   BP: 118/76   Pulse: 113   Temp: 97.7 °F (36.5 °C)   SpO2: 97%   Weight: 81.2 kg (179 lb)   Height: 170.8 cm (67.25\")     Body mass index is 27.83 kg/m².    Wt Readings from Last 3 Encounters:   12/26/24 81.2 kg (179 lb)   12/17/24 83.4 kg (183 lb 14.4 oz)   12/09/24 81.6 kg (180 lb)     BP Readings from Last 3 Encounters:   12/26/24 118/76   12/17/24 124/72   12/09/24 132/84       Health Maintenance   Topic Date Due    Pneumococcal Vaccine 0-64 (2 of 2 - PCV) 02/23/2023    ZOSTER VACCINE (1 of 2) Never done    COVID-19 Vaccine (7 - 2024-25 season) 12/26/2025 (Originally 9/1/2024)    ANNUAL PHYSICAL  01/08/2025    PAP SMEAR  01/19/2025    BMI FOLLOWUP  12/17/2025    MAMMOGRAM  02/07/2026    TDAP/TD VACCINES (2 - Td or Tdap) 10/14/2028    COLORECTAL CANCER SCREENING  06/30/2030    HEPATITIS C SCREENING  Completed    INFLUENZA VACCINE  Completed    HEMOGLOBIN A1C  Discontinued       Review of Systems   Physical Exam  Vitals reviewed.   Constitutional:       Appearance: Normal appearance.   HENT:      Right Ear: Tympanic membrane normal.      Left Ear: Tympanic membrane normal.      Nose: Congestion present.      Mouth/Throat:      Pharynx: Posterior oropharyngeal erythema and postnasal drip present.   Eyes:      Pupils: Pupils are equal, round, and reactive to light.   Cardiovascular:      Rate and Rhythm: Normal rate and regular rhythm.   Pulmonary:      Effort: Pulmonary effort is normal.      Breath sounds: Normal breath sounds.   Skin:     General: Skin is warm and dry.   Neurological:      General: No focal deficit present.      Mental Status: She is alert and oriented to person, place, " and time.   Psychiatric:         Mood and Affect: Mood normal.            Result Review :  The following data was reviewed by: ROSEANNA Jacobs on 12/26/2024:  Office Visit on 12/09/2024   Component Date Value    SARS Antigen 12/09/2024 Not Detected     Influenza A Antigen HILLARY 12/09/2024 Not Detected     Influenza B Antigen HILLARY 12/09/2024 Not Detected     Internal Control 12/09/2024 Passed     Lot Number 12/09/2024 [id number]     Expiration Date 12/09/2024 [id number]     Rapid Strep A Screen 12/09/2024 Negative     Internal Control 12/09/2024 Passed     Lot Number 12/09/2024 [id number]     Expiration Date 12/09/2024 [id number]        Results  Laboratory Studies  Home COVID-19 test was negative.      Procedures        Assessment and Plan   Diagnoses and all orders for this visit:    1. Nonspecific syndrome suggestive of viral illness (Primary)    2. Cough, unspecified type  -     Respiratory Panel PCR w/COVID-19(SARS-CoV-2) [facility]/LINDA/[facility] In-House, NP Swab in UTM/VTM, 2 HR TAT - Swab, Nasopharynx; Future  -     Respiratory Panel PCR w/COVID-19(SARS-CoV-2) K[facility]NY/MASOOD/LINDA/PAD/[facility] In-House, NP Swab in UTM/VTM, 2 HR TAT - Swab, Nasopharynx  -     brompheniramine-pseudoephedrine-DM 30-2-10 MG/5ML syrup; Take 5 mL by mouth 4 (Four) Times a Day As Needed for Cough or Congestion.  Dispense: 473 mL; Refill: 0    3. Acute nonintractable headache, unspecified headache type    4. Mild persistent asthma without complication  -     budesonide-formoterol (SYMBICORT) 80-4.5 MCG/ACT inhaler; Inhale 2 puffs 2 (Two) Times a Day.  Dispense: 10.2 g; Refill: 12  -     methylPREDNISolone sodium succinate (SOLU-Medrol) injection 40 mg    5. Nausea  -     ondansetron (ZOFRAN) 8 MG tablet; Take 1 tablet by mouth Every 8 (Eight) Hours As Needed for Nausea or Vomiting.  Dispense: 20 tablet; Refill: 0        Assessment & Plan  1. Congestion.  She reports significant exposure to RSV, pneumonia, and flu. Her home COVID-19 test was negative.  Symptoms include headache, nausea, cough, and fatigue. Physical examination reveals a slightly red throat likely due to coughing and drainage. Lungs sound clear. A prescription for Symbicort (budesonide/formoterol) will be sent to her pharmacy to manage her asthma and congestion. She is advised to rinse her mouth after use. A steroid injection will be administered today to help alleviate her symptoms. She is advised to maintain adequate hydration and rest. If symptoms persist or worsen, further evaluation will be necessary.    2. Nausea.  She reports nausea, which may be related to drainage or other causes. A prescription for Zofran (ondansetron) will be sent to her pharmacy to manage her nausea.    3. Cough.  She experiences a severe cough that sometimes leads to gagging. A prescription for Bromfed will be sent to her pharmacy to manage her cough and congestion. She is advised to continue using Mucinex with plenty of water.             FOLLOW UP  Return if symptoms worsen or fail to improve.    Patient was given instructions and counseling regarding her condition or for health maintenance advice. Please see specific information pulled into the AVS if appropriate.       Ekta Taveras, ROSEANNA  12/26/24  18:09 EST    CURRENT & DISCONTINUED MEDICATIONS  Current Outpatient Medications   Medication Instructions    albuterol (PROVENTIL) 2.5 mg, Nebulization, Every 4 Hours PRN    albuterol sulfate  (90 Base) MCG/ACT inhaler 2 puffs, Inhalation, Every 6 Hours PRN    brompheniramine-pseudoephedrine-DM 30-2-10 MG/5ML syrup 5 mL, Oral, 4 Times Daily PRN    budesonide-formoterol (SYMBICORT) 80-4.5 MCG/ACT inhaler 2 puffs, Inhalation, 2 Times Daily - RT    busPIRone (BUSPAR) 15 mg, Oral, 2 Times Daily PRN, for anxiety    cyanocobalamin 1,000 mcg, Intramuscular, Every 30 Days    desvenlafaxine (PRISTIQ) 50 mg, Oral, Daily    fluticasone (FLONASE) 50 MCG/ACT nasal spray 2 sprays, Each Nare, Daily, Shake well before using.     linaclotide (LINZESS) 145 mcg, Oral, Every Morning Before Breakfast    metFORMIN ER (GLUCOPHAGE-XR) 500 mg, Oral, Daily With Breakfast    montelukast (SINGULAIR) 10 mg, Oral, Nightly    nabumetone (RELAFEN) 500 mg    omeprazole (PRILOSEC) 40 mg, Oral, Daily    ondansetron (ZOFRAN) 8 mg, Oral, Every 8 Hours PRN    oxyCODONE-acetaminophen (PERCOCET) 5-325 MG per tablet oxycodone-acetaminophen 5-325 mg oral tablet 1 every 8 hrs as needed.   Active    pregabalin (LYRICA) 100 MG capsule TAKE 1 CAPSULE BY MOUTH EVERY MORNING AND 2 CAPSULES BY MOUTH AT BEDTIME    traZODone (DESYREL) 150 mg, Oral, Nightly    Wegovy 2.4 mg, Subcutaneous, Weekly       Medications Discontinued During This Encounter   Medication Reason    azithromycin (Zithromax Z-Wally) 250 MG tablet *Therapy completed    diclofenac (VOLTAREN) 75 MG EC tablet *Therapy completed    mometasone-formoterol (DULERA 100) 100-5 MCG/ACT inhaler Formulary change        EMR Dragon/Transcription disclaimer:  Parts of this encounter note are electronic transcription/translation of spoken language to printed text.     Patient or patient representative verbalized consent for the use of Ambient Listening during the visit with  ROSEANNA Jacobs for chart documentation. 12/26/2024  14:31 EST

## 2024-12-27 DIAGNOSIS — J45.30 MILD PERSISTENT ASTHMA WITHOUT COMPLICATION: ICD-10-CM

## 2024-12-30 RX ORDER — ALBUTEROL SULFATE 90 UG/1
2 INHALANT RESPIRATORY (INHALATION) EVERY 6 HOURS PRN
Qty: 8.5 G | Refills: 3 | Status: SHIPPED | OUTPATIENT
Start: 2024-12-30

## 2025-01-14 ENCOUNTER — OFFICE VISIT (OUTPATIENT)
Dept: FAMILY MEDICINE CLINIC | Facility: CLINIC | Age: 50
End: 2025-01-14
Payer: COMMERCIAL

## 2025-01-14 VITALS
HEART RATE: 83 BPM | HEIGHT: 67 IN | SYSTOLIC BLOOD PRESSURE: 126 MMHG | WEIGHT: 175 LBS | TEMPERATURE: 97.7 F | BODY MASS INDEX: 27.47 KG/M2 | DIASTOLIC BLOOD PRESSURE: 72 MMHG | OXYGEN SATURATION: 97 %

## 2025-01-14 DIAGNOSIS — J01.00 ACUTE NON-RECURRENT MAXILLARY SINUSITIS: ICD-10-CM

## 2025-01-14 DIAGNOSIS — R53.83 FATIGUE, UNSPECIFIED TYPE: Primary | ICD-10-CM

## 2025-01-14 DIAGNOSIS — E55.9 VITAMIN D DEFICIENCY: ICD-10-CM

## 2025-01-14 DIAGNOSIS — R51.9 SINUS HEADACHE: ICD-10-CM

## 2025-01-14 DIAGNOSIS — T36.95XA ANTIBIOTIC-INDUCED YEAST INFECTION: ICD-10-CM

## 2025-01-14 DIAGNOSIS — B37.9 ANTIBIOTIC-INDUCED YEAST INFECTION: ICD-10-CM

## 2025-01-14 DIAGNOSIS — H65.01 NON-RECURRENT ACUTE SEROUS OTITIS MEDIA OF RIGHT EAR: ICD-10-CM

## 2025-01-14 PROCEDURE — 84439 ASSAY OF FREE THYROXINE: CPT | Performed by: NURSE PRACTITIONER

## 2025-01-14 PROCEDURE — 82306 VITAMIN D 25 HYDROXY: CPT | Performed by: NURSE PRACTITIONER

## 2025-01-14 PROCEDURE — 82607 VITAMIN B-12: CPT | Performed by: NURSE PRACTITIONER

## 2025-01-14 PROCEDURE — 99213 OFFICE O/P EST LOW 20 MIN: CPT | Performed by: NURSE PRACTITIONER

## 2025-01-14 PROCEDURE — 84443 ASSAY THYROID STIM HORMONE: CPT | Performed by: NURSE PRACTITIONER

## 2025-01-14 PROCEDURE — 82746 ASSAY OF FOLIC ACID SERUM: CPT | Performed by: NURSE PRACTITIONER

## 2025-01-14 RX ORDER — FLUCONAZOLE 150 MG/1
150 TABLET ORAL ONCE
Qty: 1 TABLET | Refills: 0 | Status: SHIPPED | OUTPATIENT
Start: 2025-01-14 | End: 2025-01-14

## 2025-01-14 RX ORDER — SACCHAROMYCES BOULARDII 250 MG
250 CAPSULE ORAL 2 TIMES DAILY
Qty: 20 CAPSULE | Refills: 0 | Status: SHIPPED | OUTPATIENT
Start: 2025-01-14

## 2025-01-14 RX ORDER — CEFDINIR 300 MG/1
300 CAPSULE ORAL 2 TIMES DAILY
Qty: 20 CAPSULE | Refills: 0 | Status: SHIPPED | OUTPATIENT
Start: 2025-01-14

## 2025-01-14 NOTE — PROGRESS NOTES
Chief Complaint  Headache (Headache, ears stopped up, diarrhea, eyes are watery, drainage, nauseated especially when you eat.) and Fatigue (Very tired and has been since she had the Flu (day after [place]))    Subjective            Sahara Delarosa presents to [facility]  History of Present Illness  Pt here for the F/U on the persistent s/s    All symptoms have been persistent since having flu day after [place] so this is almost 3 weeks  Symptoms include: joint pain, change in stool, chills, nasal congestion, cough, fatigue, headaches, myalgias, nausea, neck pain and weakness.   Pertinent negative symptoms include no abdominal pain, no anorexia, no chest pain, no diaphoresis, no fever, no joint swelling, no numbness, no rash, no sore throat, no swollen glands, no dysuria, no vertigo, no visual change and no vomiting.   Headache  Fatigue  Symptoms include joint pain, change in stool, chills, congestion, cough, fatigue, headaches, myalgias, nausea, neck pain and weakness.    Pertinent negative symptoms include no abdominal pain, no anorexia, no chest pain, no diaphoresis, no fever, no joint swelling, no numbness, no rash, no sore throat, no swollen glands, no dysuria, no vertigo, no visual change and no vomiting.       History of Present Illness  The patient is a 49-year-old female who presents for evaluation of fatigue, headache, nausea, diarrhea, sinus pressure, ear pressure, postnasal drainage, and neck pain.    She reports persistent fatigue following a recent influenza infection, despite adequate sleep. She is uncertain if this is related to her vitamin D levels or another underlying issue. She has been experiencing ocular discharge, diminished auditory acuity, and persistent postnasal drainage. She also reports episodes of nausea and diarrhea over the past few days. She does not believe she requires re-evaluation for influenza or COVID-19 as there is no flulike persistent  symptoms if just the persistent fatigue and persistent ear and sinus issues. She was previously diagnosed with sinusitis on 12/09/2024 and was prescribed Augmentin and albuterol treatments. However, the antibiotics led to a yeast infection. A subsequent respiratory panel revealed influenza infection that was inconclusive at that time but she had all of the flu symptoms at [place].    She has been managing her daily headaches with Tylenol and reports occasional coughing, which is less severe than before. She continues to experience sinus pressure, ear pressure, and postnasal drainage.    She has been experiencing frequent asthma exacerbations. She is currently on Singulair, Flonase, and Symbicort.    She has a history of irritable bowel syndrome (IBS) and has noticed weight loss.    She reports neck pain but is unsure if it is related to lymph nodes, joints, or muscles.    Supplemental Information  She does not menstruate.    MEDICATIONS  Current: Tylenol, Singulair, Flonase, Symbicort  Past: Augmentin, albuterol, Zofran      PHQ-2 Total Score: 0  PHQ-9 Total Score:      Past Medical History:   Diagnosis Date    Allergic     Allergic rhinitis     Anxiety and depression     Asthma     exercise induced    Back pain     Bowel incontinence     Condyloma acuminata     Diverticulitis of colon     Endometrial polyp     Family history of breast cancer     Fatty liver     Fibroids     Fibromyalgia     Fibromyalgia, primary     GERD (gastroesophageal reflux disease)     Hernia     Irritable bowel syndrome     Low back pain     Metabolic syndrome     Osteoarthritis     In addition to fibromyalgia    Scoliosis     Sleep apnea     CPAP    Urinary frequency     Uterine prolapse     Vitamin B 12 deficiency        Allergies   Allergen Reactions    Bactrim [Sulfamethoxazole-Trimethoprim] Rash    Codeine Rash        Past Surgical History:   Procedure Laterality Date    BREAST BIOPSY Left 2013    Hyper-pigmented lesion, left nipple.=  Benign    COLONOSCOPY      CYST REMOVAL      on chest and groin area     ENDOMETRIAL ABLATION  2015    HSC, DNC, Novasure Endometrial Ablation    ENDOSCOPY  2017    EGD: Stomach inflammation, Neg for H Pylori.    HIP ARTHROPLASTY Right 09/2021    HIP SURGERY Right 2019    LABRUM REPAIR    JOINT REPLACEMENT  September 2021    Rt Hip    LUMBAR SPINE SURGERY  02/2024    Pain Device    TOTAL LAPAROSCOPIC HYSTERECTOMY SALPINGO OOPHORECTOMY Bilateral 03/30/2022    Procedure: TOTAL LAPAROSCOPIC HYSTERECTOMY, BILATERAL SALPINGECTOMY, AND CYSTOSCOPY;  Surgeon: Rosalba Chiu MD;  Location: [facility] MAIN OR;  Service: Obstetrics/Gynecology;  Laterality: Bilateral;    UPPER GASTROINTESTINAL ENDOSCOPY          Social History     Tobacco Use    Smoking status: Never     Passive exposure: Yes    Smokeless tobacco: Never   Vaping Use    Vaping status: Never Used   Substance Use Topics    Alcohol use: Never    Drug use: Never       Family History   Problem Relation Age of Onset    Diabetes type II Father     Diabetes Father     Arthritis Mother     Breast cancer Mother 70        Double mastectomy     Cancer Mother         Breast    Fibromyalgia Sister     No Known Problems Son     No Known Problems Daughter     Brain cancer Paternal Grandfather         dx >50    No Known Problems Paternal Grandmother     Osteoporosis Maternal Grandmother         ?    Parkinsonism Maternal Grandmother     Stomach cancer Maternal Grandfather         dx >50    Breast cancer Maternal Aunt         4 different mat aunts with breast cancer.    Cancer Maternal Aunt         Breast    Breast cancer Maternal Aunt     Cancer Maternal Aunt         Breast    Breast cancer Maternal Aunt     Cancer Maternal Aunt         Breast    Breast cancer Maternal Aunt     Cancer Maternal Aunt         Breast    Breast cancer Paternal Aunt 60        dx ~ age 60    Diabetes Paternal Aunt     BRCA 1/2 Neg Hx     Colon cancer Neg Hx     Endometrial cancer Neg Hx     Ovarian  cancer Neg Hx     Malig Hyperthermia Neg Hx     Uterine cancer Neg Hx         Health Maintenance Due   Topic Date Due    Pneumococcal Vaccine 0-64 (2 of 2 - PCV) 02/23/2023    ZOSTER VACCINE (1 of 2) Never done    ANNUAL PHYSICAL  01/08/2025        Current Outpatient Medications on File Prior to Visit   Medication Sig    albuterol (PROVENTIL) (2.5 MG/3ML) 0.083% nebulizer solution Take 2.5 mg by nebulization Every 4 (Four) Hours As Needed for Wheezing or Shortness of Air.    albuterol sulfate  (90 Base) MCG/ACT inhaler Inhale 2 puffs Every 6 (Six) Hours As Needed for Wheezing.    budesonide-formoterol (SYMBICORT) 80-4.5 MCG/ACT inhaler Inhale 2 puffs 2 (Two) Times a Day.    busPIRone (BUSPAR) 15 MG tablet Take 1 tablet by mouth 2 (Two) Times a Day As Needed (anxiety). for anxiety    cyanocobalamin 1000 MCG/ML injection Inject 1 mL into the appropriate muscle as directed by prescriber Every 30 (Thirty) Days.    desvenlafaxine (PRISTIQ) 50 MG 24 hr tablet Take 1 tablet by mouth Daily.    fluticasone (FLONASE) 50 MCG/ACT nasal spray SHAKE LIQUID AND USE 2 SPRAYS IN EACH NOSTRIL DAILY    linaclotide (Linzess) 145 MCG capsule capsule Take 1 capsule by mouth Every Morning Before Breakfast.    metFORMIN ER (GLUCOPHAGE-XR) 500 MG 24 hr tablet Take 1 tablet by mouth Daily With Breakfast.    montelukast (SINGULAIR) 10 MG tablet Take 1 tablet by mouth Every Night.    nabumetone (RELAFEN) 500 MG tablet Take 1 tablet by mouth.    omeprazole (priLOSEC) 40 MG capsule Take 1 capsule by mouth Daily.    ondansetron (ZOFRAN) 8 MG tablet Take 1 tablet by mouth Every 8 (Eight) Hours As Needed for Nausea or Vomiting.    oxyCODONE-acetaminophen (PERCOCET) 5-325 MG per tablet oxycodone-acetaminophen 5-325 mg oral tablet 1 every 8 hrs as needed.   Active    pregabalin (LYRICA) 100 MG capsule TAKE 1 CAPSULE BY MOUTH EVERY MORNING AND 2 CAPSULES BY MOUTH AT BEDTIME    Semaglutide-Weight Management (Wegovy) 2.4 MG/0.75ML solution  "auto-injector Inject 2.4 mg under the skin into the appropriate area as directed 1 (One) Time Per Week.    traZODone (DESYREL) 150 MG tablet Take 1 tablet by mouth Every Night.    [DISCONTINUED] brompheniramine-pseudoephedrine-DM 30-2-10 MG/5ML syrup Take 5 mL by mouth 4 (Four) Times a Day As Needed for Cough or Congestion.     No current facility-administered medications on file prior to visit.       Immunization History   Administered Date(s) Administered    COVID-19 (MODERNA) 1st,2nd,3rd Dose Monovalent 02/03/2021, 03/03/2021, 11/05/2021    COVID-19 (UNSPECIFIED) 02/03/2021, 03/03/2021, 11/05/2021    Flu Vaccine Quad PF >36MO 10/19/2016, 09/20/2017, 10/14/2018    Flu Vaccine Split Quad 10/19/2016, 09/20/2017, 10/14/2018    Fluzone  >6mos 10/03/2024    Fluzone (or Fluarix & Flulaval for VFC) >6mos 10/19/2016, 09/20/2017, 11/08/2022    Hepatitis A 12/06/2018    Influenza Injectable Mdck Pf Quad 11/08/2022, 11/07/2023    Influenza, Unspecified 10/14/2018, 10/01/2019    Pneumococcal Polysaccharide (PPSV23) 02/23/2022    Tdap 10/14/2018       Review of Systems   Constitutional:  Positive for chills and fatigue. Negative for diaphoresis and fever.   HENT:  Positive for congestion, ear pain, postnasal drip and sinus pressure. Negative for sore throat and swollen glands.    Respiratory:  Positive for cough. Negative for shortness of breath and wheezing.    Cardiovascular:  Negative for chest pain.   Gastrointestinal:  Positive for diarrhea and nausea. Negative for abdominal pain, anorexia, blood in stool and vomiting.   Genitourinary:  Negative for dysuria.   Musculoskeletal:  Positive for joint pain, myalgias and neck pain.   Skin:  Negative for rash.   Neurological:  Positive for weakness and headache. Negative for vertigo and numbness.        Objective     /72   Pulse 83   Temp 97.7 °F (36.5 °C) (Temporal)   Ht 170.8 cm (67.25\")   Wt 79.4 kg (175 lb)   SpO2 97%   BMI 27.21 kg/m²       Physical Exam  Vitals " and nursing note reviewed.   Constitutional:       Appearance: Normal appearance.   HENT:      Head: Normocephalic.      Right Ear: Ear canal and external ear normal. Tympanic membrane is injected and erythematous.      Left Ear: Tympanic membrane, ear canal and external ear normal.      Nose: Nose normal.      Right Sinus: Maxillary sinus tenderness present.      Left Sinus: Maxillary sinus tenderness present.      Mouth/Throat:      Mouth: Mucous membranes are moist.   Eyes:      Pupils: Pupils are equal, round, and reactive to light.   Cardiovascular:      Rate and Rhythm: Normal rate and regular rhythm.      Heart sounds: Normal heart sounds.   Pulmonary:      Effort: Pulmonary effort is normal.      Breath sounds: Normal breath sounds.   Abdominal:      Palpations: Abdomen is soft.   Musculoskeletal:         General: Normal range of motion.      Cervical back: Normal range of motion and neck supple.   Skin:     General: Skin is warm and dry.   Neurological:      Mental Status: She is alert and oriented to person, place, and time.   Psychiatric:         Mood and Affect: Mood normal.         Behavior: Behavior normal.         Thought Content: Thought content normal.         Judgment: Judgment normal.         Result Review :                Office Visit with Ekta Taveras APRN (12/26/2024)   Office Visit with Ekta Taveras APRN (12/09/2024)   Results  Laboratory Studies  Vitamin D level was 31 three months ago.  Respiratory Panel PCR w/COVID-19(SARS-CoV-2) [facility]NY/MASOOD/LINDA/[facility] In-House, NP Swab in UTM/VTM, 2 HR TAT - Swab, Nasopharynx (12/26/2024 14:09)  POCT SARS-CoV-2 Antigen HILLARY + Flu (12/09/2024 10:44)  POCT rapid strep A (12/09/2024 10:33)             Assessment and Plan      Diagnoses and all orders for this visit:    1. Fatigue, unspecified type (Primary)  -     Vitamin B12 & Folate  -     Vitamin D,25-Hydroxy  -     TSH+Free T4    2. Vitamin D deficiency  -     Vitamin D,25-Hydroxy    3. Acute  non-recurrent maxillary sinusitis  -     cefdinir (OMNICEF) 300 MG capsule; Take 1 capsule by mouth 2 (Two) Times a Day.  Dispense: 20 capsule; Refill: 0  -     saccharomyces boulardii (Florastor) 250 MG capsule; Take 1 capsule by mouth 2 (Two) Times a Day.  Dispense: 20 capsule; Refill: 0    4. Sinus headache  -     cefdinir (OMNICEF) 300 MG capsule; Take 1 capsule by mouth 2 (Two) Times a Day.  Dispense: 20 capsule; Refill: 0  -     saccharomyces boulardii (Florastor) 250 MG capsule; Take 1 capsule by mouth 2 (Two) Times a Day.  Dispense: 20 capsule; Refill: 0    5. Non-recurrent acute serous otitis media of right ear  -     cefdinir (OMNICEF) 300 MG capsule; Take 1 capsule by mouth 2 (Two) Times a Day.  Dispense: 20 capsule; Refill: 0  -     saccharomyces boulardii (Florastor) 250 MG capsule; Take 1 capsule by mouth 2 (Two) Times a Day.  Dispense: 20 capsule; Refill: 0    6. Antibiotic-induced yeast infection  -     fluconazole (Diflucan) 150 MG tablet; Take 1 tablet by mouth 1 (One) Time for 1 dose.  Dispense: 1 tablet; Refill: 0          Assessment & Plan  1. Fatigue.  She reports persistent fatigue despite adequate sleep. It has been 5 weeks since she had her last antibiotics. A comprehensive fatigue panel will be ordered to evaluate her B12, folic acid, vitamin D, and thyroid levels.    2. Headache.  She experiences daily headaches with sinus pressure. Omnicef (cefdinir) will be prescribed to address her sinus issues. A probiotic will also be recommended to mitigate potential gastrointestinal side effects from the antibiotic.    3. Nausea.  She reports ongoing nausea since December but all these different illnesses.    4. Diarrhea.  She has experienced diarrhea over the past couple of days, which may be related to her IBS.    5. Sinus Pressure with maxillary sinusitis.  She continues to experience sinus pressure and postnasal drainage. Omnicef (cefdinir) will be prescribed to address these symptoms.    6. Ear  Pressure with otitis media right-sided.  She reports ear pressure and fluid in her ears. Omnicef (cefdinir) will be prescribed to address these symptoms.    7. Medication Management.  Diflucan will be prescribed to prevent a potential yeast infection due to antibiotic use.          Follow Up     Return if symptoms worsen or fail to improve.    Patient was given instructions and counseling regarding her condition or for health maintenance advice. Please see specific information pulled into the AVS if appropriate.            Sahara Delarosa  reports that she has never smoked. She has been exposed to tobacco smoke. She has never used smokeless tobacco. I have educated her on the risk of diseases from using tobacco products such as cancer, COPD, and heart disease.            Patient or patient representative verbalized consent for the use of Ambient Listening during the visit with  ROSEANNA Jameson for chart documentation. 1/14/2025  17:47 EST

## 2025-01-14 NOTE — PROGRESS NOTES
Venipuncture Blood Specimen Collection  Venipuncture performed in left arm by Nevin Sierra with good hemostasis. Patient tolerated the procedure well without complications.   01/14/25   Kenyetta Orlando

## 2025-01-15 LAB
25(OH)D3 SERPL-MCNC: 28.6 NG/ML (ref 30–100)
FOLATE SERPL-MCNC: 6.34 NG/ML (ref 4.78–24.2)
T4 FREE SERPL-MCNC: 1.3 NG/DL (ref 0.92–1.68)
TSH SERPL DL<=0.05 MIU/L-ACNC: 1.18 UIU/ML (ref 0.27–4.2)
VIT B12 BLD-MCNC: 702 PG/ML (ref 211–946)

## 2025-01-16 DIAGNOSIS — E55.9 VITAMIN D DEFICIENCY: Primary | ICD-10-CM

## 2025-01-16 RX ORDER — ERGOCALCIFEROL 1.25 MG/1
50000 CAPSULE, LIQUID FILLED ORAL WEEKLY
Qty: 5 CAPSULE | Refills: 2 | Status: SHIPPED | OUTPATIENT
Start: 2025-01-16

## 2025-01-16 NOTE — PROGRESS NOTES
Please mail letter to patient stating     Sahara your vitamin B12 and folic acid and thyroid levels were all normal range and then there was a modest decrease of the vitamin D at 28.6 but since you are so symptomatic we can go ahead and send in the once weekly high-dose vitamin D2 50,000 IUs for 3 months and then recheck your levels at that time

## 2025-01-22 ENCOUNTER — PREP FOR SURGERY (OUTPATIENT)
Dept: OTHER | Facility: HOSPITAL | Age: 50
End: 2025-01-22
Payer: OTHER GOVERNMENT

## 2025-01-22 ENCOUNTER — OFFICE VISIT (OUTPATIENT)
Dept: ORTHOPEDIC SURGERY | Facility: CLINIC | Age: 50
End: 2025-01-22
Payer: OTHER MISCELLANEOUS

## 2025-01-22 VITALS
SYSTOLIC BLOOD PRESSURE: 102 MMHG | WEIGHT: 175 LBS | HEIGHT: 67 IN | BODY MASS INDEX: 27.47 KG/M2 | OXYGEN SATURATION: 96 % | HEART RATE: 67 BPM | DIASTOLIC BLOOD PRESSURE: 68 MMHG

## 2025-01-22 DIAGNOSIS — Z01.818 PREOP EXAMINATION: Primary | ICD-10-CM

## 2025-01-22 DIAGNOSIS — S83.241D ACUTE MEDIAL MENISCUS TEAR OF RIGHT KNEE, SUBSEQUENT ENCOUNTER: Primary | ICD-10-CM

## 2025-01-22 NOTE — PROGRESS NOTES
"Chief Complaint  Follow-up of the Right Knee    Subjective      Sahara Delarosa presents to [facility] for follow-up of right knee medial meniscus tear as the patient had a fall at work at the end of August.  Patient had an MRI which revealed a subtle oblique tear along the undersurface of the posterior horn/body junction of the medial meniscus, additional free edge irregularity of the posterior horn of the root attachment possibly indicating a displaced radial tear, mild chondral loss.  Patient has worn a brace, she has been attending physical therapy, she received a steroid injection on 10/2/2024 and did not notice significant improvement.  she has had multiple different anti-inflammatories.  Reports that the knee is doing \"about the same\".  She is here today to discuss further options.  This is a Workmen's Comp. claim.    Objective   Allergies   Allergen Reactions    Bactrim [Sulfamethoxazole-Trimethoprim] Rash    Codeine Rash       Vital Signs:   /68   Pulse 67   Ht 170.8 cm (67.24\")   Wt 79.4 kg (175 lb)   SpO2 96%   BMI 27.21 kg/m²       Physical Exam    Constitutional: Awake, alert. Well nourished appearance.    Integumentary: Warm, dry, intact. No obvious rashes.    HENT: Atraumatic, normocephalic.   Respiratory: Non labored respirations .   Cardiovascular: Intact peripheral pulses.    Psychiatric: Normal mood and affect. A&O X3    Ortho Exam  Right knee: Range of motion 0 to 120 degrees.  Stable to varus and valgus stress.  Stable to anterior and posterior drawer test.  Negative Lachman's.  Positive Erlin's.  No significant effusion noted.  Tender to palpation on the medial joint line.    Imaging Results (Most Recent)       None                      Assessment and Plan   Problem List Items Addressed This Visit    None  Visit Diagnoses       Acute medial meniscus tear of right knee, subsequent encounter    -  Primary            Follow Up   Return Postop.    Patient " is a non-smoker, did not discuss options for smoking cessation.     Social History     Socioeconomic History    Marital status:    Tobacco Use    Smoking status: Never     Passive exposure: Yes    Smokeless tobacco: Never   Vaping Use    Vaping status: Never Used   Substance and Sexual Activity    Alcohol use: Never    Drug use: Never    Sexual activity: Yes     Partners: Male     Birth control/protection: Vasectomy, Hysterectomy       Patient Instructions   Discussed with patient continued conservative measures versus operative.  Patient has failed all conservative measures so far to include home exercises, physical therapy, steroid injection, anti-inflammatories and bracing.    Discussed the risk, benefits and alternatives of right knee arthroscopy with partial medial meniscectomy and chondroplasty.  Discussed the expected recovery course and expectations after surgery.  Discussed potential complications of surgery.  She would like to proceed with scheduling as she has no further questions at this time.    Patient to follow-up postoperatively.  Call for questions, concerns or worsening symptoms.  Patient was given instructions and counseling regarding her condition or for health maintenance advice. Please see specific information pulled into the AVS if appropriate.

## 2025-01-22 NOTE — PATIENT INSTRUCTIONS
Discussed with patient continued conservative measures versus operative.  Patient has failed all conservative measures so far to include home exercises, physical therapy, steroid injection, anti-inflammatories and bracing.    Discussed the risk, benefits and alternatives of right knee arthroscopy with partial medial meniscectomy and chondroplasty.  Discussed the expected recovery course and expectations after surgery.  Discussed potential complications of surgery.  She would like to proceed with scheduling as she has no further questions at this time.    Patient to follow-up postoperatively.  Call for questions, concerns or worsening symptoms.

## 2025-02-04 ENCOUNTER — OFFICE VISIT (OUTPATIENT)
Dept: FAMILY MEDICINE CLINIC | Facility: CLINIC | Age: 50
End: 2025-02-04
Payer: COMMERCIAL

## 2025-02-04 VITALS
OXYGEN SATURATION: 95 % | WEIGHT: 174 LBS | HEART RATE: 93 BPM | HEIGHT: 67 IN | TEMPERATURE: 97.3 F | BODY MASS INDEX: 27.31 KG/M2

## 2025-02-04 DIAGNOSIS — J06.9 VIRAL URI: ICD-10-CM

## 2025-02-04 DIAGNOSIS — R10.9 FLANK PAIN: Primary | ICD-10-CM

## 2025-02-04 DIAGNOSIS — R10.9 LEFT FLANK PAIN: ICD-10-CM

## 2025-02-04 LAB
BILIRUB BLD-MCNC: NEGATIVE MG/DL
CLARITY, POC: CLEAR
COLOR UR: YELLOW
EXPIRATION DATE: ABNORMAL
EXPIRATION DATE: NORMAL
EXPIRATION DATE: NORMAL
FLUAV AG UPPER RESP QL IA.RAPID: NOT DETECTED
FLUBV AG UPPER RESP QL IA.RAPID: NOT DETECTED
GLUCOSE UR STRIP-MCNC: NEGATIVE MG/DL
INTERNAL CONTROL: NORMAL
INTERNAL CONTROL: NORMAL
KETONES UR QL: NEGATIVE
LEUKOCYTE EST, POC: ABNORMAL
Lab: ABNORMAL
Lab: NORMAL
Lab: NORMAL
NITRITE UR-MCNC: NEGATIVE MG/ML
PH UR: 5 [PH] (ref 5–8)
PROT UR STRIP-MCNC: NEGATIVE MG/DL
RBC # UR STRIP: NEGATIVE /UL
S PYO AG THROAT QL: NEGATIVE
SARS-COV-2 AG UPPER RESP QL IA.RAPID: NOT DETECTED
SP GR UR: 1 (ref 1–1.03)
UROBILINOGEN UR QL: ABNORMAL

## 2025-02-04 PROCEDURE — 99213 OFFICE O/P EST LOW 20 MIN: CPT | Performed by: FAMILY MEDICINE

## 2025-02-04 PROCEDURE — 81003 URINALYSIS AUTO W/O SCOPE: CPT | Performed by: FAMILY MEDICINE

## 2025-02-04 PROCEDURE — 87086 URINE CULTURE/COLONY COUNT: CPT | Performed by: FAMILY MEDICINE

## 2025-02-04 PROCEDURE — 87880 STREP A ASSAY W/OPTIC: CPT | Performed by: FAMILY MEDICINE

## 2025-02-04 PROCEDURE — 87428 SARSCOV & INF VIR A&B AG IA: CPT | Performed by: FAMILY MEDICINE

## 2025-02-04 RX ORDER — PREDNISONE 20 MG/1
40 TABLET ORAL DAILY
Qty: 10 TABLET | Refills: 0 | Status: SHIPPED | OUTPATIENT
Start: 2025-02-04 | End: 2025-02-09

## 2025-02-04 NOTE — PROGRESS NOTES
"Chief Complaint    Sore Throat (Sore throat, headache, nausea, nasal congestion/drainage x 3 days.  Taking OTC cold medication) and Flank Pain (RT flank pain x 3 days.  Frequent urination and nausea)    Subjective      Sahara Delarosa presents to [facility]    History of Present Illness    1.) SORE THROAT : Onset-3 days ago.  Patient presents with complaints of intermittent head pain.  Also reporting intermittent nausea.  Notes nasal congestion and drainage.  Utilizing over-the-counter cold medication with mild relief.  History of asthma.  Patient denies increased use of rescue inhaler.    2.) RT FLANK PAIN : Onset - 3 days ago.  Patient presents with complaint of frequent urination and nausea.  No fevers or chills.  No reports of migratory flank pain.    Objective     Vital Signs:     Pulse 93   Temp 97.3 °F (36.3 °C) (Temporal)   Ht 170.8 cm (67.25\")   Wt 78.9 kg (174 lb)   SpO2 95%   BMI 27.05 kg/m²       Physical Exam  Vitals reviewed.   Constitutional:       General: She is not in acute distress.     Appearance: Normal appearance. She is well-developed.   HENT:      Head: Normocephalic and atraumatic.      Right Ear: Hearing and external ear normal. Tympanic membrane is not erythematous or bulging.      Left Ear: Hearing and external ear normal. Tympanic membrane is not erythematous or bulging.      Nose: Nose normal. Congestion present.      Mouth/Throat:      Pharynx: No oropharyngeal exudate.   Eyes:      General: Lids are normal.         Right eye: No discharge.         Left eye: No discharge.      Conjunctiva/sclera: Conjunctivae normal.   Pulmonary:      Effort: Pulmonary effort is normal. No respiratory distress.      Breath sounds: No stridor. No wheezing, rhonchi or rales.   Abdominal:      General: There is no distension.   Musculoskeletal:         General: No swelling.      Cervical back: Neck supple.   Skin:     Coloration: Skin is not jaundiced.      Findings: " No erythema.   Neurological:      Mental Status: She is alert. Mental status is at baseline.   Psychiatric:         Mood and Affect: Mood and affect normal.         Thought Content: Thought content normal.     Assessment and Plan     Diagnoses and all orders for this visit:    1. Flank pain (Primary)  Comments:  UA reviewed. Doubt UTI. Will send for cx. Shared decision for KUB. Advised that will not show all types of stones. Additional recs per review of cx and report.  Orders:  -     POCT urinalysis dipstick, automated  -     Urine Culture - Urine, Urine, Clean Catch  -     XR Abdomen KUB (In Office)    2. Viral URI  Comments:  Neg rapid testing. Advised supportive care - adequate fluids, rest, sleep, nutrition. PO steroid course sent if onset of asthma exacerbation. Call ofc PRN.  Orders:  -     POCT SARS-CoV-2 Antigen HILLARY + Flu  -     POCT rapid strep A    3. Left flank pain    Other orders  -     predniSONE (DELTASONE) 20 MG tablet; Take 2 tablets by mouth Daily for 5 days.  Dispense: 10 tablet; Refill: 0    Follow Up     Return if symptoms worsen or fail to improve.    Patient was given instructions and counseling regarding her condition or for health maintenance advice. Please see specific information pulled into the AVS if appropriate.

## 2025-02-05 LAB — BACTERIA SPEC AEROBE CULT: NO GROWTH

## 2025-02-06 DIAGNOSIS — J30.2 SEASONAL ALLERGIC RHINITIS, UNSPECIFIED TRIGGER: ICD-10-CM

## 2025-02-06 RX ORDER — FLUTICASONE PROPIONATE 50 MCG
2 SPRAY, SUSPENSION (ML) NASAL DAILY
Qty: 48 G | Refills: 1 | Status: SHIPPED | OUTPATIENT
Start: 2025-02-06

## 2025-02-07 DIAGNOSIS — R10.9 LEFT FLANK PAIN: Primary | ICD-10-CM

## 2025-02-17 ENCOUNTER — HOSPITAL ENCOUNTER (OUTPATIENT)
Dept: CT IMAGING | Facility: HOSPITAL | Age: 50
Discharge: HOME | End: 2025-02-17
Admitting: FAMILY MEDICINE
Payer: OTHER GOVERNMENT

## 2025-02-17 DIAGNOSIS — R10.9 LEFT FLANK PAIN: ICD-10-CM

## 2025-02-17 PROCEDURE — 74176 CT ABD & PELVIS W/O CONTRAST: CPT

## 2025-03-05 PROBLEM — Z01.818 PREOP EXAMINATION: Status: ACTIVE | Noted: 2025-01-22

## 2025-03-06 ENCOUNTER — PATIENT MESSAGE (OUTPATIENT)
Dept: FAMILY MEDICINE CLINIC | Facility: CLINIC | Age: 50
End: 2025-03-06
Payer: OTHER GOVERNMENT

## 2025-03-06 DIAGNOSIS — K59.04 CHRONIC IDIOPATHIC CONSTIPATION: ICD-10-CM

## 2025-03-08 DIAGNOSIS — E66.811 CLASS 1 OBESITY WITH SERIOUS COMORBIDITY AND BODY MASS INDEX (BMI) OF 30.0 TO 30.9 IN ADULT, UNSPECIFIED OBESITY TYPE: ICD-10-CM

## 2025-03-08 RX ORDER — SEMAGLUTIDE 2.4 MG/.75ML
INJECTION, SOLUTION SUBCUTANEOUS
Qty: 3 ML | Refills: 0 | Status: SHIPPED | OUTPATIENT
Start: 2025-03-08

## 2025-03-11 NOTE — PRE-PROCEDURE INSTRUCTIONS
PATIENT INSTRUCTED TO BE:    - NOTHING TO EAT AFTER MIDNIGHT OR CHEW, EXCEPT CAN HAVE SIPS OF WATER WITH MEDICATIONS OR CLEAR LIQUIDS 2 HOURS PRIOR TO SURGERY ARRIVAL TIME , NO MORE THAN 8 OZ. (NOTHING RED)     - TO HOLD ALL VITAMINS, SUPPLEMENTS, NSAIDS FOR ONE WEEK PRIOR TO THEIR SURGICAL PROCEDURE    - DO NOT TAKE _____WEEGOVY LD _____________3/3/25____ 7 DAYS PRIOR TO PROCEDURE PER ANESTHESIA RECOMMENDATIONS/INSTRUCTIONS     - INSTRUCTED PT TO USE SURGICAL SOAP 1 TIME THE NIGHT PRIOR TO SURGERY ___________ OR THE AM OF SURGERY _____________   USE THE SOAP FROM NECK TO TOES, AVOID THEIR FACE, HAIR, AND PRIVATE PARTS. IF USE THE SOAP THE NIGHT PRIOR TO SURGERY, CHANGE BED LINENS AND NO PETS IN THE BED.     INSTRUCTED NO LOTIONS, JEWELRY, PIERCINGS,  NAIL POLISH, OR DEODORANT DAY OF SURGERY    - IF DIABETIC, CHECK BLOOD GLUCOSE IF LESS THAN 70 OR HAVING SYMPTOMS CALL THE PREOP AREA FOR INSTRUCTIONS ON AM OF SURGERY [phone])    -INSTRUCTED TO TAKE THE FOLLOWING MEDICATIONS THE DAY OF SURGERY WITH SIPS OF WATER:   ALBUTEROL AS NEEDED  DESVENLAFAXINE, FLONASE, OMEPRAZOLE, PREGABALIN    HOLD MED: WEEGOVY LAST DOSE 3/3/25, METFORMIN LAST DOSE 3/12/25        - DO NOT BRING ANY MEDICATIONS WITH YOU TO THE HOSPITAL THE DAY OF SURGERY, EXCEPT IF USE INHALERS. BRING INHALERS DAY OF SURGERY       - BRING CPAP OR BIPAP TO THE HOSPITAL ONLY IF YOU ARE SPENDING THE NIGHT    - DO NOT SMOKE OR VAPE 24 HOURS PRIOR TO PROCEDURE PER ANESTHESIA REQUEST     -MAKE SURE YOU HAVE A RIDE HOME OR SOMEONE TO STAY WITH YOU THE DAY OF THE PROCEDURE AFTER YOU GO HOME     - FOLLOW ANY OTHER INSTRUCTIONS GIVEN TO YOU BY YOUR SURGEON'S OFFICE.     - DAY OF SURGERY ________ AT [facility] ([facility]),  PARK IN THE OPEN LOT, COME TO [facility]/ [facility], FIRST FLOOR, CHECK IN AT THE DESK FOR REGISTRATION/ SURGERY      - YOU WILL RECEIVE A PHONE CALL THE DAY PRIOR TO SURGERY BETWEEN 1PM AND 4 PM WITH ARRIVAL TIME, IF YOUR  SURGERY IS ON A MONDAY YOU WILL RECEIVE A CALL THE FRIDAY PRIOR TO SURGERY DATE    - BRING CASH OR CREDIT CARD FOR COPAYMENT OF MEDICATIONS AFTER SURGERY IF YOU USE THE HOSPITAL PHARMACY (MEDS TO BED)    - PREADMISSION TESTING NURSE [phone] IF HAVE ANY QUESTIONS     -PATIENT PROVIDED THE NUMBER FOR PREOP SURGICAL DEPT IF HAD QUESTIONS AFTER HOURS PRIOR TO SURGERY ([phone]).  INFORMED PT IF NO ANSWER, LEAVE A MESSAGE AND SOMEONE WILL RETURN THEIR CALL       PATIENT VERBALIZED UNDERSTANDING

## 2025-03-12 DIAGNOSIS — J30.2 SEASONAL ALLERGIC RHINITIS, UNSPECIFIED TRIGGER: ICD-10-CM

## 2025-03-12 DIAGNOSIS — J45.30 MILD PERSISTENT ASTHMA WITHOUT COMPLICATION: ICD-10-CM

## 2025-03-12 RX ORDER — MONTELUKAST SODIUM 10 MG/1
10 TABLET ORAL NIGHTLY
Qty: 90 TABLET | Refills: 0 | Status: SHIPPED | OUTPATIENT
Start: 2025-03-12

## 2025-03-13 ENCOUNTER — ANESTHESIA (OUTPATIENT)
Dept: PERIOP | Facility: HOSPITAL | Age: 50
End: 2025-03-13
Payer: OTHER MISCELLANEOUS

## 2025-03-13 ENCOUNTER — ANESTHESIA EVENT (OUTPATIENT)
Dept: PERIOP | Facility: HOSPITAL | Age: 50
End: 2025-03-13
Payer: OTHER MISCELLANEOUS

## 2025-03-13 ENCOUNTER — HOSPITAL ENCOUNTER (OUTPATIENT)
Facility: HOSPITAL | Age: 50
Setting detail: HOSPITAL OUTPATIENT SURGERY
Discharge: HOME | End: 2025-03-13
Attending: ORTHOPAEDIC SURGERY | Admitting: ORTHOPAEDIC SURGERY
Payer: OTHER MISCELLANEOUS

## 2025-03-13 VITALS
HEART RATE: 62 BPM | RESPIRATION RATE: 18 BRPM | HEIGHT: 68 IN | TEMPERATURE: 97.6 F | OXYGEN SATURATION: 99 % | WEIGHT: 167.77 LBS | BODY MASS INDEX: 25.43 KG/M2 | DIASTOLIC BLOOD PRESSURE: 74 MMHG | SYSTOLIC BLOOD PRESSURE: 125 MMHG

## 2025-03-13 DIAGNOSIS — Z01.818 PREOP EXAMINATION: ICD-10-CM

## 2025-03-13 DIAGNOSIS — M23.204 OLD COMPLEX TEAR OF MEDIAL MENISCUS OF LEFT KNEE: Primary | ICD-10-CM

## 2025-03-13 PROCEDURE — 25010000002 CEFAZOLIN PER 500 MG

## 2025-03-13 PROCEDURE — S0260 H&P FOR SURGERY: HCPCS | Performed by: ORTHOPAEDIC SURGERY

## 2025-03-13 PROCEDURE — 25010000002 FENTANYL CITRATE (PF) 50 MCG/ML SOLUTION

## 2025-03-13 PROCEDURE — 25010000002 DEXAMETHASONE PER 1 MG

## 2025-03-13 PROCEDURE — 25810000003 LACTATED RINGERS PER 1000 ML: Performed by: ANESTHESIOLOGY

## 2025-03-13 PROCEDURE — 25010000002 MIDAZOLAM PER 1MG: Performed by: ANESTHESIOLOGY

## 2025-03-13 PROCEDURE — 25010000002 BUPIVACAINE (PF) 0.25 % SOLUTION: Performed by: ORTHOPAEDIC SURGERY

## 2025-03-13 PROCEDURE — 25010000002 ONDANSETRON PER 1 MG

## 2025-03-13 PROCEDURE — 29881 ARTHRS KNE SRG MNISECTMY M/L: CPT | Performed by: ORTHOPAEDIC SURGERY

## 2025-03-13 PROCEDURE — 25010000002 PROPOFOL 10 MG/ML EMULSION

## 2025-03-13 PROCEDURE — 25010000002 LIDOCAINE PF 2% 2 % SOLUTION

## 2025-03-13 RX ORDER — OXYCODONE HYDROCHLORIDE 5 MG/1
5 TABLET ORAL
Status: DISCONTINUED | OUTPATIENT
Start: 2025-03-13 | End: 2025-03-13 | Stop reason: HOSPADM

## 2025-03-13 RX ORDER — HYDROCODONE BITARTRATE AND ACETAMINOPHEN 7.5; 325 MG/1; MG/1
1 TABLET ORAL EVERY 6 HOURS PRN
Qty: 20 TABLET | Refills: 0 | Status: SHIPPED | OUTPATIENT
Start: 2025-03-13

## 2025-03-13 RX ORDER — SODIUM CHLORIDE, SODIUM LACTATE, POTASSIUM CHLORIDE, CALCIUM CHLORIDE 600; 310; 30; 20 MG/100ML; MG/100ML; MG/100ML; MG/100ML
9 INJECTION, SOLUTION INTRAVENOUS CONTINUOUS PRN
Status: DISCONTINUED | OUTPATIENT
Start: 2025-03-13 | End: 2025-03-13 | Stop reason: HOSPADM

## 2025-03-13 RX ORDER — PROPOFOL 10 MG/ML
VIAL (ML) INTRAVENOUS AS NEEDED
Status: DISCONTINUED | OUTPATIENT
Start: 2025-03-13 | End: 2025-03-13 | Stop reason: SURG

## 2025-03-13 RX ORDER — ACETAMINOPHEN 500 MG
1000 TABLET ORAL ONCE
Status: COMPLETED | OUTPATIENT
Start: 2025-03-13 | End: 2025-03-13

## 2025-03-13 RX ORDER — PROMETHAZINE HYDROCHLORIDE 25 MG/1
25 SUPPOSITORY RECTAL ONCE AS NEEDED
Status: DISCONTINUED | OUTPATIENT
Start: 2025-03-13 | End: 2025-03-13 | Stop reason: HOSPADM

## 2025-03-13 RX ORDER — MIDAZOLAM HYDROCHLORIDE 2 MG/2ML
2 INJECTION, SOLUTION INTRAMUSCULAR; INTRAVENOUS ONCE
Status: COMPLETED | OUTPATIENT
Start: 2025-03-13 | End: 2025-03-13

## 2025-03-13 RX ORDER — ONDANSETRON 2 MG/ML
INJECTION INTRAMUSCULAR; INTRAVENOUS AS NEEDED
Status: DISCONTINUED | OUTPATIENT
Start: 2025-03-13 | End: 2025-03-13 | Stop reason: SURG

## 2025-03-13 RX ORDER — BUPIVACAINE HYDROCHLORIDE 2.5 MG/ML
INJECTION, SOLUTION EPIDURAL; INFILTRATION; INTRACAUDAL AS NEEDED
Status: DISCONTINUED | OUTPATIENT
Start: 2025-03-13 | End: 2025-03-13 | Stop reason: HOSPADM

## 2025-03-13 RX ORDER — DEXAMETHASONE SODIUM PHOSPHATE 4 MG/ML
INJECTION, SOLUTION INTRA-ARTICULAR; INTRALESIONAL; INTRAMUSCULAR; INTRAVENOUS; SOFT TISSUE AS NEEDED
Status: DISCONTINUED | OUTPATIENT
Start: 2025-03-13 | End: 2025-03-13 | Stop reason: SURG

## 2025-03-13 RX ORDER — MAGNESIUM HYDROXIDE 1200 MG/15ML
LIQUID ORAL AS NEEDED
Status: DISCONTINUED | OUTPATIENT
Start: 2025-03-13 | End: 2025-03-13 | Stop reason: HOSPADM

## 2025-03-13 RX ORDER — SCOPOLAMINE 1 MG/3D
1 PATCH, EXTENDED RELEASE TRANSDERMAL ONCE
Status: DISCONTINUED | OUTPATIENT
Start: 2025-03-13 | End: 2025-03-13 | Stop reason: HOSPADM

## 2025-03-13 RX ORDER — PROMETHAZINE HYDROCHLORIDE 12.5 MG/1
25 TABLET ORAL ONCE AS NEEDED
Status: DISCONTINUED | OUTPATIENT
Start: 2025-03-13 | End: 2025-03-13 | Stop reason: HOSPADM

## 2025-03-13 RX ORDER — LIDOCAINE HYDROCHLORIDE 20 MG/ML
INJECTION, SOLUTION EPIDURAL; INFILTRATION; INTRACAUDAL; PERINEURAL AS NEEDED
Status: DISCONTINUED | OUTPATIENT
Start: 2025-03-13 | End: 2025-03-13 | Stop reason: SURG

## 2025-03-13 RX ORDER — ONDANSETRON 2 MG/ML
4 INJECTION INTRAMUSCULAR; INTRAVENOUS ONCE AS NEEDED
Status: DISCONTINUED | OUTPATIENT
Start: 2025-03-13 | End: 2025-03-13 | Stop reason: HOSPADM

## 2025-03-13 RX ORDER — FENTANYL CITRATE 50 UG/ML
INJECTION, SOLUTION INTRAMUSCULAR; INTRAVENOUS AS NEEDED
Status: DISCONTINUED | OUTPATIENT
Start: 2025-03-13 | End: 2025-03-13 | Stop reason: SURG

## 2025-03-13 RX ADMIN — LIDOCAINE HYDROCHLORIDE 80 MG: 20 INJECTION, SOLUTION EPIDURAL; INFILTRATION; INTRACAUDAL; PERINEURAL at 11:49

## 2025-03-13 RX ADMIN — ACETAMINOPHEN 1000 MG: 500 TABLET ORAL at 10:28

## 2025-03-13 RX ADMIN — MIDAZOLAM HYDROCHLORIDE 2 MG: 1 INJECTION, SOLUTION INTRAMUSCULAR; INTRAVENOUS at 11:28

## 2025-03-13 RX ADMIN — DEXAMETHASONE SODIUM PHOSPHATE 8 MG: 4 INJECTION, SOLUTION INTRAMUSCULAR; INTRAVENOUS at 11:54

## 2025-03-13 RX ADMIN — PROPOFOL 50 MG: 10 INJECTION, EMULSION INTRAVENOUS at 12:05

## 2025-03-13 RX ADMIN — PROPOFOL 150 MG: 10 INJECTION, EMULSION INTRAVENOUS at 11:49

## 2025-03-13 RX ADMIN — FENTANYL CITRATE 50 MCG: 50 INJECTION, SOLUTION INTRAMUSCULAR; INTRAVENOUS at 12:05

## 2025-03-13 RX ADMIN — FENTANYL CITRATE 50 MCG: 50 INJECTION, SOLUTION INTRAMUSCULAR; INTRAVENOUS at 11:49

## 2025-03-13 RX ADMIN — SODIUM CHLORIDE, SODIUM LACTATE, POTASSIUM CHLORIDE, AND CALCIUM CHLORIDE 9 ML/HR: .6; .31; .03; .02 INJECTION, SOLUTION INTRAVENOUS at 10:28

## 2025-03-13 RX ADMIN — ONDANSETRON 4 MG: 2 INJECTION INTRAMUSCULAR; INTRAVENOUS at 12:12

## 2025-03-13 RX ADMIN — SCOLOPAMINE TRANSDERMAL SYSTEM 1 PATCH: 1 PATCH, EXTENDED RELEASE TRANSDERMAL at 10:28

## 2025-03-13 RX ADMIN — SODIUM CHLORIDE 2 G: 9 INJECTION, SOLUTION INTRAVENOUS at 11:54

## 2025-03-13 NOTE — ANESTHESIA POSTPROCEDURE EVALUATION
Patient: Sahara Delarosa    Procedure Summary       Date: 03/13/25 Room / Location: [facility] OR  /  [facility]    Anesthesia Start: 1145 Anesthesia Stop: 1232    Procedure: RIGHT KNEE ARTHROSCOPY WITH PARTIAL MEDIAL MENISCECTOMY AND CHONDROPLASTY: SURGICAL PROCEDURE USING A JOINT CAMERA TO REMOVE TORN TISSUE OF RIGHT KNEE (Right) Diagnosis:       Preop examination      (Preop examination [Z01.818])    Surgeons: German Lester MD Provider: Noé Godoy MD    Anesthesia Type: general ASA Status: 2            Anesthesia Type: general    Vitals  Vitals Value Taken Time   /80 03/13/25 12:57   Temp 36.4 °C (97.6 °F) 03/13/25 12:31   Pulse 66 03/13/25 13:00   Resp     SpO2 100 % 03/13/25 13:00   Vitals shown include unfiled device data.        Post Anesthesia Care and Evaluation    Patient location during evaluation: bedside  Patient participation: complete - patient participated  Level of consciousness: awake  Pain management: adequate    Airway patency: patent  PONV Status: none  Cardiovascular status: acceptable and stable  Respiratory status: acceptable  Hydration status: acceptable

## 2025-03-13 NOTE — ANESTHESIA PREPROCEDURE EVALUATION
Anesthesia Evaluation     Patient summary reviewed and Nursing notes reviewed   no history of anesthetic complications:   NPO Solid Status: > 8 hours  NPO Liquid Status: > 2 hours           Airway   Mallampati: II  TM distance: >3 FB  Neck ROM: full  No difficulty expected  Dental      Pulmonary - normal exam    breath sounds clear to auscultation  (+) asthma,sleep apnea on CPAP  Cardiovascular - negative cardio ROS and normal exam  Exercise tolerance: good (4-7 METS)    Rhythm: regular  Rate: normal        Neuro/Psych  (+) psychiatric history Anxiety and Depression  GI/Hepatic/Renal/Endo    (+) liver disease fatty liver disease    Musculoskeletal     (+) back pain, chronic pain, myalgias  Abdominal    Substance History      OB/GYN          Other   arthritis,     ROS/Med Hx Other: PAT Nursing Notes unavailable.               Anesthesia Plan    ASA 2     general     (Patient understands anesthesia not responsible for dental damage.)  intravenous induction     Anesthetic plan, risks, benefits, and alternatives have been provided, discussed and informed consent has been obtained with: patient.    Plan discussed with CRNA.    CODE STATUS:

## 2025-03-13 NOTE — OP NOTE
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 Operative extremity was correctly identified in the operating room suite. Patient was administered antibiotics per the SCIP protocol. The knee was examined under anesthesia. Lachman test was negative. Anterior and posterior drawer signs were negative. There was no medial or lateral instability. Pivot shift sign was negative. The leg was prepped and draped and applied an arthroscopic leg padilla. The joint was examined in a systematic fashion. Arthroscopic portals were established. The patellofemoral joint was visualized. Findings in the patellofemoral joint included grade 2 changes of chondromalacia on the undersurface of the patella and a chondroplasty was completed.      The scope was then repositioned into the medial joint space. The intraoperative findings of the medial joint space included oblique tear of the posterior horn of the medial meniscus which was debrided the basket and shaver and saucerized anteriorly.  There was grade 2 grade 3 changes of chondromalacia on the medial femoral condyle and a chondroplasty was completed.  The ACL  and PCL were taut and intact with probing. The scope was then introduced into the lateral joint space. The intraoperative findings on the lateral joint space included pristine to probing.  The medial and lateral gutters were carefully inspected and some loose fragments of articular cartilage were identified and removed. Arthroscopic fluid was evacuated from the joint. The portals were carefully approximated. Intraarticular analgesic was injected for postoperative analgesia. Occlusive dressing was applied. Sponge, gauze and needle count was correct. No complications were encountered.  Patient was reversed from anesthetic and taken from the operating room to the recovery room in stable, satisfactory condition.       German Lester MD     Date: 3/13/2025  Time: 12:24 EDT

## 2025-03-13 NOTE — H&P
"Follow-up of the Right Knee     Subjective  Sahara Delarosa presents to [facility] for follow-up of right knee medial meniscus tear as the patient had a fall at work at the end of August.  Patient had an MRI which revealed a subtle oblique tear along the undersurface of the posterior horn/body junction of the medial meniscus, additional free edge irregularity of the posterior horn of the root attachment possibly indicating a displaced radial tear, mild chondral loss.  Patient has worn a brace, she has been attending physical therapy, she received a steroid injection on 10/2/2024 and did not notice significant improvement.  she has had multiple different anti-inflammatories.  Reports that the knee is doing \"about the same\".  She is here today to discuss further options.  This is a Workmen's Comp. claim.     Objective  Allergies        Allergies   Allergen Reactions    Bactrim [Sulfamethoxazole-Trimethoprim] Rash    Codeine Rash            Vital Signs:   /68   Pulse 67   Ht 170.8 cm (67.24\")   Wt 79.4 kg (175 lb)   SpO2 96%   BMI 27.21 kg/m²        Physical Exam                         Constitutional: Awake, alert. Well nourished appearance.               Integumentary: Warm, dry, intact. No obvious rashes.               HENT: Atraumatic, normocephalic.              Respiratory: Non labored respirations .              Cardiovascular: Intact peripheral pulses.               Psychiatric: Normal mood and affect. A&O X3     Ortho Exam  Right knee: Range of motion 0 to 120 degrees.  Stable to varus and valgus stress.  Stable to anterior and posterior drawer test.  Negative Lachman's.  Positive Erlin's.  No significant effusion noted.  Tender to palpation on the medial joint line.     Imaging Results (Most Recent)         None                       Assessment  Assessment and Plan   Problem List Items Addressed This Visit    None  Visit Diagnoses         Acute medial meniscus tear " of right knee, subsequent encounter    -  Primary                Follow Up   Return Postop.     Patient is a non-smoker, did not discuss options for smoking cessation.      Social History   Social History            Socioeconomic History    Marital status:    Tobacco Use    Smoking status: Never       Passive exposure: Yes    Smokeless tobacco: Never   Vaping Use    Vaping status: Never Used   Substance and Sexual Activity    Alcohol use: Never    Drug use: Never    Sexual activity: Yes       Partners: Male       Birth control/protection: Vasectomy, Hysterectomy            Patient Instructions   Discussed with patient continued conservative measures versus operative.  Patient has failed all conservative measures so far to include home exercises, physical therapy, steroid injection, anti-inflammatories and bracing.     Discussed the risk, benefits and alternatives of right knee arthroscopy with partial medial meniscectomy and chondroplasty.  Discussed the expected recovery course and expectations after surgery.  Discussed potential complications of surgery.  She would like to proceed with scheduling as she has no further questions at this time.     Patient to follow-up postoperatively.  Call for questions, concerns or worsening symptoms.  Patient was given instructions and counseling regarding her condition or for health maintenance advice. Please see specific information pulled into the AVS if appropriate.

## 2025-03-13 NOTE — DISCHARGE INSTRUCTIONS
DISCHARGE INSTRUCTIONS  POST-OPERATIVE  Knee Arthroscopic Surgery      For your surgery you had:  General anesthesia (you may have a sore throat for the first 24 hours)      Local anesthesia      You may experience dizziness, drowsiness, or light-headedness for several hours following surgery/procedure.  Do not stay alone today or tonight.  Limit your activity for 24 hours.  Resume your diet slowly.  Follow whatever special dietary instructions you may have been given by your doctor.  You should not drive or operate machinery or drink alcohol for 24 hours or while you are taking pain medication.  You should not sign legally binding documents.    Post-Operative Day #1  Post-Operative Day #1 is counted as the 1st day after surgery.  Leave ace wrap on day one to aid in the reduction of swelling.  If dressing is too tight it can be rewrapped.  Post-Operative Day #2 saturday  Ace wrap and dressing may be removed.  Put a 4x4 dressing to suture or staple site.  Change dressing once or twice daily until suture or staples are removed.  It is normal to have some redness or irritation around skin sutures or stapes, however, if you have any expanding areas of redness with a persistent fever and increasing pain notify the physician as soon as possible.  On Post-Operative Day #2, you may want to reapply the ace wrap.  Put ace wrap directly over the knee to add compression and aid in swelling reduction.  It is normal to have some swelling down into the calf and ankle after knee arthroscopy or Anterior Cruciate Ligament (ACL) reconstruction.  If you notice a significant amount of swelling or increasing pain in calf area, notify the physician immediately.   Post-Operative Day #4 monday  You may shower.  During shower, avoid too much water to incision site.  Let water run down leg and then pat dry.  Do not put any ointments on the incision unless directed by surgeon.  Reapply dry dressing to sutures or staple sites.    General  Information  Full weight bearing with crutches is allowed after a standard knee arthroscopy or ACL reconstruction unless instructed otherwise by surgeon.  You may put as much weight on knee as tolerable.    Knee range of motion exercises should be started as early as the day of surgery.  Try to achieve full extension and start working on range of motion and flexion of the knee.  Move the ankle up and down periodically to help reduce swelling as well as reduce blood pooling.  To allow full extension of the knee and prevent blood clots it is very important to put pillows at the level of the mid-calf to the foot.  DO NOT put pillows directly behind knee.  SPECIAL INSTRUCTIONS:                                                             DISCHARGE INSTRUCTIONS  POST-OPERATIVE   Knee Arthroscopic Surgery      General Instructions  You will receive a prescription for physical therapy, unless otherwise instructed by physician.  Physical therapy is imperative especially after ACL reconstruction and some knee arthroscopies for swelling reduction, knee range of motion and strengthening.    [x] Use ice pack on the knee for 20 minutes on and 20 minutes off.  Never place ice directly on the skin.  By following this, you will cool the knee sufficiently.  Avoid getting dressing wet.  To help reduce swelling and minimize throbbing pain, use pillows to keep the knee elevated above the level of the heart, even while sleeping.  Sit with the leg propped up on a footstool or chair with pillows.  Exercises toes for 10 minutes every hour while awake.  If you are given a prescription for pain medication, take it as prescribed.  If you are able to take over-the-counter anti-inflammatory medications such as Motrin or Aleve, you may take as per package instructions in between the pain medication to help enhance pain control and reduce swelling.  If you are taking the pain medication prescribed, do not take Tylenol too unless you consult with  the pharmacist. Generally, the pain medications prescribed have Tylenol in them and too much Tylenol can be harmful.  You should stop the anti-inflammatory medication if you experience any stomach/GI disturbances.  Consult with your physician or your pharmacist before taking over-the-counter pain medications/anti-inflammatories. It is not uncommon to have a fever post-operatively especially in the first 24-48 hours.  Deep breathing and coughing and early ambulation will help.  Anti-inflammatories can be used for fever reduction also.  If unable to urinate 6 to 8 hours after surgery or urinating frequently in small amounts, notify the physician or go to the nearest Emergency Room.  NOTIFY YOUR PHYSICIAN IF YOU EXPERIENCE:  Numbness of toes.  Inability to move toes.  Extreme coldness, paleness or blue dis-coloration of toes.  Any pain other than from the incision, such as swelling of the leg that blocks circulation of the toes.  Follow verbal instructions from your doctor.  Medications per physician instructions as indicated on Discharge Medication Information Sheet.  You should see  ______________________for follow-up care  on   .  Phone number: _________________________  Missing your appointment/follow-up could lead to serious complications.  If you have an emergency and cannot reach your doctor, go to an Emergency Room nearest your home.

## 2025-03-26 ENCOUNTER — OFFICE VISIT (OUTPATIENT)
Dept: ORTHOPEDIC SURGERY | Facility: CLINIC | Age: 50
End: 2025-03-26
Payer: OTHER MISCELLANEOUS

## 2025-03-26 VITALS
SYSTOLIC BLOOD PRESSURE: 112 MMHG | OXYGEN SATURATION: 98 % | WEIGHT: 167.77 LBS | HEIGHT: 68 IN | BODY MASS INDEX: 25.43 KG/M2 | DIASTOLIC BLOOD PRESSURE: 75 MMHG | HEART RATE: 88 BPM

## 2025-03-26 DIAGNOSIS — M25.561 RIGHT KNEE PAIN, UNSPECIFIED CHRONICITY: ICD-10-CM

## 2025-03-26 DIAGNOSIS — Z98.890 S/P RIGHT KNEE ARTHROSCOPY: Primary | ICD-10-CM

## 2025-03-26 PROCEDURE — 99024 POSTOP FOLLOW-UP VISIT: CPT | Performed by: PHYSICIAN ASSISTANT

## 2025-03-26 NOTE — PROGRESS NOTES
Chief Complaint  Pain and Follow-up of the Right Knee and Suture / Staple Removal    Subjective          History of Present Illness     Sahaar Delarosa is a 49 y.o. female presents today for a follow up of right knee. Patient is 2 weeks post op right knee arthroscopy with partial medial meniscectomy, medial femoral condyle and patella chondroplasty performed by Dr. Lester on 3/13/2025.  Patient presents today without use of assistive devices.  This is a Workmen's Comp. case.  Patient is attending therapy with PTA in [place].  She reports her range of motion is improving passively but she still stiff actively with movement motion.  She reports pain but it is different than preop.  It is still in the medial aspect of the knee.  She has returned to her normal pain medication that she takes for her back and the pain is tolerable with this.  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    Patient denies redness, drainage, or complications from incision. Denies fever or chills.     Allergies   Allergen Reactions    Bactrim [Sulfamethoxazole-Trimethoprim] Rash    Codeine Rash        Social History     Socioeconomic History    Marital status:    Tobacco Use    Smoking status: Never     Passive exposure: Yes    Smokeless tobacco: Never   Vaping Use    Vaping status: Never Used   Substance and Sexual Activity    Alcohol use: Never    Drug use: Never    Sexual activity: Yes     Partners: Male     Birth control/protection: Vasectomy, Hysterectomy        Tobacco Use: Medium Risk (3/26/2025)    Patient History     Smoking Tobacco Use: Never     Smokeless Tobacco Use: Never     Passive Exposure: Yes     Patient reports that they are a nonsmoker; cessation education not applicable.     I reviewed the patient's chief complaint, history of present illness, review of systems, past medical history,  "surgical history, family history, social history, medications, and allergy list.     REVIEW OF SYSTEMS    Constitutional: Denies fevers, chills, weight loss  Cardiovascular: Denies chest pain, shortness of breath  Skin: Denies rashes, acute skin changes  Neurologic: Denies headache, loss of consciousness  MSK: Right knee pain      Objective   Vital Signs:   /75   Pulse 88   Ht 172.7 cm (68\")   Wt 76.1 kg (167 lb 12.3 oz)   SpO2 98%   BMI 25.51 kg/m²     Body mass index is 25.51 kg/m².    Physical Exam    General: Alert, no acute distress  Right lower extremity: Sutures removed today without complications.  Well-healing arthroscopy portals about right knee. No redness or active drainage noted.  Mild to moderate knee effusion.  Mild tenderness to the medial joint line.  No tenderness to the lateral joint line.  125 degrees of passive flexion, actively 100.  0 degrees extension. Knee extensor mechanism intact. Knee stable to varus and valgus stress. Calf soft, nontender. Demonstrates active ankle plantarflexion and dorsiflexion. Sensation intact over the dorsal and plantar foot.  Palpable pedal pulses.            Assessment and Plan    Diagnoses and all orders for this visit:    1. S/P right knee arthroscopy with partial medial meniscectomy, medial femoral condyle and patella chondroplasty (Primary)    2. Right knee pain, unspecified chronicity      Work note given to remain off the next couple of weeks for continued PT and rest.    She will return to work on 4/7/2025 with restrictions.  See scanned in work note.  Call or return if symptoms worsen or patient has any concerns.       Follow Up   Return in about 4 weeks (around 4/23/2025).  Patient Instructions   Sutures removed in office. Arthroscopy portals healing well. Patient educated on incision care.  Please keep incision clean and dry.  Do not soak or submerge in water until incision is fully healed.  Do not apply creams or lotions over the incision.  "     Continue icing as needed to help with pain and swelling.  Ice knee up to 3 or 4 times daily for no longer than 15 to 20 minutes at a time.    We discussed ordering formal physical therapy versus home exercises to progress ROM, strength, and weightbearing status.  Patient understood and elected to proceed with continued physical therapy..  We discussed the importance of these exercises to improve range of motion and strength.      Patient will follow up in 4 weeks for reevaluation. If doing well at that time with no concerns or questions, patient may call and cancel. Repeat X-rays not needed.     Call or return if symptoms worsen or patient has any concerns.     Patient was given instructions and counseling regarding her condition or for health maintenance advice. Please see specific information pulled into the AVS if appropriate.     Jacinta Arias PA-C  03/26/25  11:50 EDT

## 2025-03-26 NOTE — PATIENT INSTRUCTIONS
Sutures removed in office. Arthroscopy portals healing well. Patient educated on incision care.  Please keep incision clean and dry.  Do not soak or submerge in water until incision is fully healed.  Do not apply creams or lotions over the incision.      Continue icing as needed to help with pain and swelling.  Ice knee up to 3 or 4 times daily for no longer than 15 to 20 minutes at a time.    We discussed ordering formal physical therapy versus home exercises to progress ROM, strength, and weightbearing status.  Patient understood and elected to proceed with continued physical therapy..  We discussed the importance of these exercises to improve range of motion and strength.      Patient will follow up in 4 weeks for reevaluation. If doing well at that time with no concerns or questions, patient may call and cancel. Repeat X-rays not needed.     Call or return if symptoms worsen or patient has any concerns.

## 2025-03-27 ENCOUNTER — TELEPHONE (OUTPATIENT)
Dept: ORTHOPEDIC SURGERY | Facility: CLINIC | Age: 50
End: 2025-03-27

## 2025-03-27 NOTE — TELEPHONE ENCOUNTER
Caller: [employer]    Relationship: EMPLOYER-WORK [employer]    Best call back number: [phone]    What form or medical record are you requesting:  WORK NOTE FROM VISIT ON 3/26/25    Who is requesting this form or medical record from you: EMPLOYER[employer]WORK [employer]    How would you like to receive the form or medical records (pick-up, mail, fax): FAX  If fax, what is the fax number: [phone]

## 2025-04-02 ENCOUNTER — OFFICE VISIT (OUTPATIENT)
Dept: FAMILY MEDICINE CLINIC | Facility: CLINIC | Age: 50
End: 2025-04-02
Payer: COMMERCIAL

## 2025-04-02 VITALS
HEIGHT: 68 IN | HEART RATE: 82 BPM | OXYGEN SATURATION: 98 % | BODY MASS INDEX: 25.16 KG/M2 | WEIGHT: 166 LBS | DIASTOLIC BLOOD PRESSURE: 62 MMHG | SYSTOLIC BLOOD PRESSURE: 118 MMHG | TEMPERATURE: 97.2 F

## 2025-04-02 DIAGNOSIS — M79.7 FIBROMYALGIA: ICD-10-CM

## 2025-04-02 DIAGNOSIS — E66.3 OVERWEIGHT (BMI 25.0-29.9): ICD-10-CM

## 2025-04-02 DIAGNOSIS — Z00.00 ANNUAL PHYSICAL EXAM: Primary | ICD-10-CM

## 2025-04-02 DIAGNOSIS — Z01.818 PREOP EXAMINATION: ICD-10-CM

## 2025-04-02 LAB
ALBUMIN SERPL-MCNC: 4.1 G/DL (ref 3.5–5.2)
ALBUMIN/GLOB SERPL: 1.8 G/DL
ALP SERPL-CCNC: 76 U/L (ref 39–117)
ALT SERPL W P-5'-P-CCNC: 12 U/L (ref 1–33)
ANION GAP SERPL CALCULATED.3IONS-SCNC: 8.9 MMOL/L (ref 5–15)
AST SERPL-CCNC: 13 U/L (ref 1–32)
BACTERIA UR QL AUTO: ABNORMAL /HPF
BASOPHILS # BLD AUTO: 0.02 10*3/MM3 (ref 0–0.2)
BASOPHILS NFR BLD AUTO: 0.5 % (ref 0–1.5)
BILIRUB SERPL-MCNC: 0.4 MG/DL (ref 0–1.2)
BILIRUB UR QL STRIP: NEGATIVE
BUN SERPL-MCNC: 12 MG/DL (ref 6–20)
BUN/CREAT SERPL: 21.8 (ref 7–25)
CALCIUM SPEC-SCNC: 9.3 MG/DL (ref 8.6–10.5)
CHLORIDE SERPL-SCNC: 106 MMOL/L (ref 98–107)
CHOLEST SERPL-MCNC: 193 MG/DL (ref 0–200)
CLARITY UR: CLEAR
CO2 SERPL-SCNC: 27.1 MMOL/L (ref 22–29)
COD CRY URNS QL: PRESENT /HPF
COLOR UR: ABNORMAL
CREAT SERPL-MCNC: 0.55 MG/DL (ref 0.57–1)
DEPRECATED RDW RBC AUTO: 41.3 FL (ref 37–54)
EGFRCR SERPLBLD CKD-EPI 2021: 111.1 ML/MIN/1.73
EOSINOPHIL # BLD AUTO: 0.07 10*3/MM3 (ref 0–0.4)
EOSINOPHIL NFR BLD AUTO: 1.8 % (ref 0.3–6.2)
ERYTHROCYTE [DISTWIDTH] IN BLOOD BY AUTOMATED COUNT: 12 % (ref 12.3–15.4)
GLOBULIN UR ELPH-MCNC: 2.3 GM/DL
GLUCOSE SERPL-MCNC: 87 MG/DL (ref 65–99)
GLUCOSE UR STRIP-MCNC: NEGATIVE MG/DL
HBA1C MFR BLD: 5 % (ref 4.8–5.6)
HCT VFR BLD AUTO: 39.9 % (ref 34–46.6)
HDLC SERPL-MCNC: 44 MG/DL (ref 40–60)
HGB BLD-MCNC: 13 G/DL (ref 12–15.9)
HGB UR QL STRIP.AUTO: NEGATIVE
HOLD SPECIMEN: NORMAL
HYALINE CASTS UR QL AUTO: ABNORMAL /LPF
IMM GRANULOCYTES # BLD AUTO: 0.01 10*3/MM3 (ref 0–0.05)
IMM GRANULOCYTES NFR BLD AUTO: 0.3 % (ref 0–0.5)
KETONES UR QL STRIP: ABNORMAL
LDLC SERPL CALC-MCNC: 135 MG/DL (ref 0–100)
LDLC/HDLC SERPL: 3.04 {RATIO}
LEUKOCYTE ESTERASE UR QL STRIP.AUTO: ABNORMAL
LYMPHOCYTES # BLD AUTO: 1.18 10*3/MM3 (ref 0.7–3.1)
LYMPHOCYTES NFR BLD AUTO: 30.1 % (ref 19.6–45.3)
MCH RBC QN AUTO: 30.7 PG (ref 26.6–33)
MCHC RBC AUTO-ENTMCNC: 32.6 G/DL (ref 31.5–35.7)
MCV RBC AUTO: 94.3 FL (ref 79–97)
MONOCYTES # BLD AUTO: 0.32 10*3/MM3 (ref 0.1–0.9)
MONOCYTES NFR BLD AUTO: 8.2 % (ref 5–12)
NEUTROPHILS NFR BLD AUTO: 2.32 10*3/MM3 (ref 1.7–7)
NEUTROPHILS NFR BLD AUTO: 59.1 % (ref 42.7–76)
NITRITE UR QL STRIP: NEGATIVE
NRBC BLD AUTO-RTO: 0 /100 WBC (ref 0–0.2)
PH UR STRIP.AUTO: 5.5 [PH] (ref 5–8)
PLATELET # BLD AUTO: 249 10*3/MM3 (ref 140–450)
PMV BLD AUTO: 9.8 FL (ref 6–12)
POTASSIUM SERPL-SCNC: 4.4 MMOL/L (ref 3.5–5.2)
PROT SERPL-MCNC: 6.4 G/DL (ref 6–8.5)
PROT UR QL STRIP: ABNORMAL
RBC # BLD AUTO: 4.23 10*6/MM3 (ref 3.77–5.28)
RBC # UR STRIP: ABNORMAL /HPF
REF LAB TEST METHOD: ABNORMAL
SODIUM SERPL-SCNC: 142 MMOL/L (ref 136–145)
SP GR UR STRIP: >1.03 (ref 1–1.03)
SQUAMOUS #/AREA URNS HPF: ABNORMAL /HPF
TRIGL SERPL-MCNC: 77 MG/DL (ref 0–150)
TSH SERPL DL<=0.05 MIU/L-ACNC: 0.65 UIU/ML (ref 0.27–4.2)
UROBILINOGEN UR QL STRIP: ABNORMAL
VLDLC SERPL-MCNC: 14 MG/DL (ref 5–40)
WBC # UR STRIP: ABNORMAL /HPF
WBC NRBC COR # BLD AUTO: 3.92 10*3/MM3 (ref 3.4–10.8)

## 2025-04-02 PROCEDURE — 81001 URINALYSIS AUTO W/SCOPE: CPT

## 2025-04-02 PROCEDURE — 80050 GENERAL HEALTH PANEL: CPT | Performed by: NURSE PRACTITIONER

## 2025-04-02 PROCEDURE — 80061 LIPID PANEL: CPT | Performed by: NURSE PRACTITIONER

## 2025-04-02 PROCEDURE — 83036 HEMOGLOBIN GLYCOSYLATED A1C: CPT | Performed by: NURSE PRACTITIONER

## 2025-04-02 RX ORDER — PREGABALIN 100 MG/1
CAPSULE ORAL
Qty: 90 CAPSULE | Refills: 2 | Status: SHIPPED | OUTPATIENT
Start: 2025-04-02

## 2025-04-02 RX ORDER — SEMAGLUTIDE 2.4 MG/.75ML
2.4 INJECTION, SOLUTION SUBCUTANEOUS WEEKLY
Qty: 3 ML | Refills: 2 | Status: SHIPPED | OUTPATIENT
Start: 2025-04-02

## 2025-04-02 NOTE — TELEPHONE ENCOUNTER
Name: Sahara Delarosa / PATIENT     Best Callback Number: [phone]     HUB PROVIDED THE RELAY MESSAGE BELOW     PATIENT VOICED UNDERSTANDING THAT WORK COMP PAPERWORK FAXED BY [employer] 3/27 @ 1059     PATIENT REQUESTED A PAPER COPY BE MAILED TO HER HOME ADDRESS     PATIENT STATED NO CALLBACK NEEDED     THANKS

## 2025-04-02 NOTE — PROGRESS NOTES
Chief Complaint  Annual Exam and Obesity (Medication refills. )    Subjective            Sahara Delarosa presents to [facility]  Obesity  Symptoms include myalgias.    Pertinent negative symptoms include no abdominal pain, no chest pain, no nausea, no dysuria and no vomiting.       History of Present Illness  The patient is a 49-year-old female who presents for evaluation of weight management, fibromyalgia, and right-sided pain in the muscle area just above the SI joint area--also due for her annual wellness from the primary care standpoint.    She reports a positive response to Wegovy, with no adverse effects such as severe nausea, vomiting, constipation, or diarrhea. She has experienced constipation, which was managed by increasing her medication dosage. She is not experiencing abdominal pain or blood in the stool. She has lost approximately 50 pounds and experiences periods of weight stability followed by further weight loss. She is not experiencing vision changes, excessive thirst or hunger, frequent urination, or painful urination. She is on Wegovy 2.4 mg once weekly and has lost a total of 53 pounds since starting this journey and she does check in with a /counselor with her insurance regularly regarding her diet intake water intake exercise etc. and they are also wanting fasting labs every 6-month    She continues to experience fibromyalgia pain and is under the care of a pain management specialist. She has a history of scoliosis and right hip replacement, which has resulted in possible mild leg length discrepancy and inward rotation of the hip. She also has a history of right knee surgery. She experiences increased back pain with walking or standing. She has received physical therapy for her hips but was discharged due to lack of progress. She has previously consulted with an orthopedic specialist at [facility], who referred her to physical therapy. She has a spinal  stimulator implanted for pain management. She receives injections in her SI joint every 3 to 5 months for pain relief.  And is on Lyrica tolerates this well no side effects no issues reported no aberrant behaviors no signs and symptoms of misuse nor abuse in the Rick report was appropriate in her yearly urine tox screen and the safe use controlled substance agreement are both up-to-date and appropriate in the chart and overall this improves her quality of life-and she does still continue seeing pain management as well    She reports persistent right-sided pain, although less frequent than before, and speculates it may be muscle-related. She has a history of nonobstructing 4 mm left renal calculi.    She is seeking refills for her Lyrica and Wegovy prescriptions. She underwent blood work six months ago and is uncertain if a repeat test is necessary. Her insurance policy allows for a one-month supply of Wegovy at a time. She is not experiencing chest pain, shortness of breath, difficulty swallowing, or sensation of a lump in the throat. She is not experiencing dizziness, lightheadedness, seizures, or fainting episodes. She is not experiencing unexpected weight loss.    MEDICATIONS  Lyrica, Wegovy    IMMUNIZATIONS  She had pneumonia vaccine in 2022.      PHQ-2 Total Score: 0    PHQ-9 Total Score:        Past Medical History:   Diagnosis Date    Allergic     Allergic rhinitis     Anxiety and depression     Arthritis of neck     Asthma     exercise induced    Back pain     PAIN MGMT, PT HAS NERVE STIMULATOR LEFT FLANK AREA    Bowel incontinence     Bursitis of hip     Condyloma acuminata     Diverticulitis of colon     Endometrial polyp     Family history of breast cancer     Fatty liver     Fibroid     Fibroids     Fibromyalgia, primary     GERD (gastroesophageal reflux disease)     Hernia     HIATAL HERNIA    Hip arthrosis     Irritable bowel syndrome     Knee swelling     Low back pain     Metabolic syndrome     PT  DENIES DM BUT IS ON METFORMIN    Osteoarthritis     In addition to fibromyalgia    Ovarian cyst     Scoliosis     Sleep apnea     CPAP    Tear of meniscus of knee     Urinary frequency     Uterine prolapse     Vitamin B 12 deficiency        Allergies   Allergen Reactions    Bactrim [Sulfamethoxazole-Trimethoprim] Rash    Codeine Rash        Past Surgical History:   Procedure Laterality Date    BREAST BIOPSY Left 2013    Hyper-pigmented lesion, left nipple.= Benign    COLONOSCOPY      CYST REMOVAL      on chest and groin area     ENDOMETRIAL ABLATION  2015    HSC, DNC, Novasure Endometrial Ablation    ENDOSCOPY  2017    EGD: Stomach inflammation, Neg for H Pylori.    HIP ARTHROPLASTY Right 09/2021    HIP SURGERY Right 2019    LABRUM REPAIR    HYSTEROSCOPY  2022    JOINT REPLACEMENT  September 2021    Rt Hip    KNEE ARTHROSCOPY W/ MENISCECTOMY Right 03/13/2025    Procedure: RIGHT KNEE ARTHROSCOPY WITH PARTIAL MEDIAL MENISCECTOMY AND CHONDROPLASTY: SURGICAL PROCEDURE USING A JOINT CAMERA TO REMOVE TORN TISSUE OF RIGHT KNEE;  Surgeon: German Lester MD;  Location: [facility];  Service: Orthopedics;  Laterality: Right;    LUMBAR SPINE SURGERY  02/2024    Pain Device    TOTAL LAPAROSCOPIC HYSTERECTOMY SALPINGO OOPHORECTOMY Bilateral 03/30/2022    Procedure: TOTAL LAPAROSCOPIC HYSTERECTOMY, BILATERAL SALPINGECTOMY, AND CYSTOSCOPY;  Surgeon: Rosalba Chiu MD;  Location: [facility] OR;  Service: Obstetrics/Gynecology;  Laterality: Bilateral;    UPPER GASTROINTESTINAL ENDOSCOPY      VAGINAL PROLAPSE REPAIR  2022    WISDOM TOOTH EXTRACTION          Social History     Tobacco Use    Smoking status: Never     Passive exposure: Yes    Smokeless tobacco: Never   Vaping Use    Vaping status: Never Used   Substance Use Topics    Alcohol use: Never    Drug use: Never       Family History   Problem Relation Age of Onset    Diabetes type II Father     Diabetes Father     Arthritis Mother     Breast cancer Mother 70         Double mastectomy     Cancer Mother         Breast    Fibromyalgia Sister     No Known Problems Son     No Known Problems Daughter     Brain cancer Paternal Grandfather         dx >50    No Known Problems Paternal Grandmother     Osteoporosis Maternal Grandmother         ?    Parkinsonism Maternal Grandmother     Stomach cancer Maternal Grandfather         dx >50    Breast cancer Maternal Aunt         4 different mat aunts with breast cancer.    Cancer Maternal Aunt         Breast    Breast cancer Maternal Aunt     Cancer Maternal Aunt         Breast    Breast cancer Maternal Aunt     Cancer Maternal Aunt         Breast    Breast cancer Maternal Aunt     Cancer Maternal Aunt         Breast    Breast cancer Paternal Aunt 60        dx ~ age 60    Diabetes Paternal Aunt     Cancer Paternal Aunt         breast cancer    Cancer Maternal Aunt         Breast Cancer    BRCA 1/2 Neg Hx     Colon cancer Neg Hx     Endometrial cancer Neg Hx     Ovarian cancer Neg Hx     Malig Hyperthermia Neg Hx     Uterine cancer Neg Hx         Health Maintenance Due   Topic Date Due    ANNUAL PHYSICAL  01/08/2025        Current Outpatient Medications on File Prior to Visit   Medication Sig    albuterol (PROVENTIL) (2.5 MG/3ML) 0.083% nebulizer solution Take 2.5 mg by nebulization Every 4 (Four) Hours As Needed for Wheezing or Shortness of Air.    albuterol sulfate  (90 Base) MCG/ACT inhaler Inhale 2 puffs Every 6 (Six) Hours As Needed for Wheezing.    budesonide-formoterol (SYMBICORT) 80-4.5 MCG/ACT inhaler Inhale 2 puffs 2 (Two) Times a Day.    busPIRone (BUSPAR) 15 MG tablet Take 1 tablet by mouth 2 (Two) Times a Day As Needed (anxiety). for anxiety    cyanocobalamin 1000 MCG/ML injection Inject 1 mL into the appropriate muscle as directed by prescriber Every 30 (Thirty) Days.    desvenlafaxine (PRISTIQ) 50 MG 24 hr tablet Take 1 tablet by mouth Daily.    fluticasone (FLONASE) 50 MCG/ACT nasal spray SHAKE LIQUID AND USE 2 SPRAYS IN  EACH NOSTRIL DAILY    linaclotide (Linzess) 290 MCG capsule capsule Take 1 capsule by mouth Every Morning Before Breakfast.    metFORMIN ER (GLUCOPHAGE-XR) 500 MG 24 hr tablet Take 1 tablet by mouth Daily With Breakfast.    montelukast (SINGULAIR) 10 MG tablet TAKE 1 TABLET BY MOUTH EVERY NIGHT    nabumetone (RELAFEN) 500 MG tablet Take 1 tablet by mouth.    omeprazole (priLOSEC) 40 MG capsule Take 1 capsule by mouth Daily.    ondansetron (ZOFRAN) 8 MG tablet Take 1 tablet by mouth Every 8 (Eight) Hours As Needed for Nausea or Vomiting.    oxyCODONE-acetaminophen (PERCOCET) 5-325 MG per tablet oxycodone-acetaminophen 5-325 mg oral tablet 1 every 8 hrs as needed.   Active    traZODone (DESYREL) 150 MG tablet Take 1 tablet by mouth Every Night.    vitamin D (ERGOCALCIFEROL) 1.25 MG (73749 UT) capsule capsule Take 1 capsule by mouth 1 (One) Time Per Week.    [DISCONTINUED] pregabalin (LYRICA) 100 MG capsule TAKE 1 CAPSULE BY MOUTH EVERY MORNING AND 2 CAPSULES BY MOUTH AT BEDTIME    [DISCONTINUED] Semaglutide-Weight Management (Wegovy) 2.4 MG/0.75ML solution auto-injector ADMINISTER 2.4 MG UNDER THE SKIN 1 TIME EVERY WEEK AS DIRECTED     No current facility-administered medications on file prior to visit.       Immunization History   Administered Date(s) Administered    COVID-19 (MODERNA) 1st,2nd,3rd Dose Monovalent 02/03/2021, 03/03/2021, 11/05/2021    COVID-19 (UNSPECIFIED) 02/03/2021, 03/03/2021, 11/05/2021    Flu Vaccine Quad PF >36MO 10/19/2016, 09/20/2017, 10/14/2018    Flu Vaccine Split Quad 10/19/2016, 09/20/2017, 10/14/2018    Fluzone  >6mos 10/03/2024    Fluzone (or Fluarix & Flulaval for VFC) >6mos 10/19/2016, 09/20/2017, 11/08/2022    Hepatitis A 12/06/2018    Influenza Injectable Mdck Pf Quad 11/08/2022, 11/07/2023    Influenza, Unspecified 10/14/2018, 10/01/2019    Pneumococcal Polysaccharide (PPSV23) 02/23/2022    Tdap 10/14/2018       Review of Systems   Constitutional:  Negative for unexpected weight  "loss.   HENT:  Negative for trouble swallowing.    Eyes:  Negative for blurred vision and double vision.   Respiratory:  Negative for shortness of breath.    Cardiovascular:  Negative for chest pain.   Gastrointestinal:  Positive for constipation. Negative for abdominal pain, blood in stool, diarrhea, nausea and vomiting.   Endocrine: Negative for polydipsia, polyphagia and polyuria.   Genitourinary:  Negative for dysuria.   Musculoskeletal:  Positive for myalgias.        Chronic fibro pain and tenderness    Neurological:  Negative for dizziness, seizures, syncope and light-headedness.   Hematological:  Negative for adenopathy.   Psychiatric/Behavioral: Negative.  Negative for self-injury and suicidal ideas.         Objective     /62   Pulse 82   Temp 97.2 °F (36.2 °C) (Temporal)   Ht 172.7 cm (68\")   Wt 75.3 kg (166 lb)   SpO2 98%   BMI 25.24 kg/m²       Physical Exam  Vitals and nursing note reviewed.   Constitutional:       Appearance: Normal appearance.      Comments: total WT LOSS 53#    HENT:      Head: Normocephalic.      Right Ear: External ear normal.      Left Ear: External ear normal.      Nose: Nose normal.      Mouth/Throat:      Mouth: Mucous membranes are moist.   Eyes:      Pupils: Pupils are equal, round, and reactive to light.   Cardiovascular:      Rate and Rhythm: Normal rate and regular rhythm.      Heart sounds: Normal heart sounds.   Pulmonary:      Effort: Pulmonary effort is normal.      Breath sounds: Normal breath sounds.   Abdominal:      Palpations: Abdomen is soft.      Tenderness: There is no abdominal tenderness.   Musculoskeletal:         General: Tenderness present. Normal range of motion.      Cervical back: Normal range of motion and neck supple.        Legs:    Skin:     General: Skin is warm and dry.   Neurological:      Mental Status: She is alert and oriented to person, place, and time.   Psychiatric:         Mood and Affect: Mood normal.         Behavior: Behavior " normal.         Thought Content: Thought content normal.         Judgment: Judgment normal.         Result Review :     The following data was reviewed by: ROSEANNA Jameson on 04/02/2025:           CT Guidance for Needle Placement S&I Injection; 2mL of 0.25% Marcaine and 40mg of DepoMedrol (03/10/2025 10:13)  CT Abdomen Pelvis Stone Protocol (02/17/2025 15:26)  XR Abdomen KUB (In Office) (02/04/2025 10:06)  CT Guidance for Needle Placement S&I Injection; 2mL of 0.5% Marcaine and 40mg Depo Medrol (10/23/2024 10:36)  XR Hip 2-3 Vws RT (10/01/2024 13:42)  MRI Knee Right Without Contrast (09/18/2024)  XR Knee 1 or 2 View Right (In Office) (08/30/2024 11:20)  CT Guidance for Needle Placement S&I Injection; 2mL 0.5% Bupivacaine and 40mg Depo-Medrol each (04/22/2024 14:32)  CT Guidance for Needle Placement S&I Injection; 2mL of 0.25% plain Marcaine and 1cc 40mg DepoMedrol (11/15/2023 15:21)  XR L Spine Ap & Lateral (09/25/2023 13:57)  XR Hip 2-3 Vws RT (09/08/2023 10:34)  Results  Imaging  Nonobstructing 4 mm left renal calculi identified. Degenerative changes in the left hip and spine observed. Right hip replaced. Mild degenerative changes and facet arthritis in the spine noted. No acute disease, foraminal stenosis, facet abnormality, or pars defect in the thoracic spine.               Assessment and Plan      Diagnoses and all orders for this visit:    1. Annual physical exam (Primary)  -     CBC & Differential  -     Comprehensive Metabolic Panel  -     Hemoglobin A1c  -     Lipid Panel  -     TSH Rfx On Abnormal To Free T4    2. Overweight (BMI 25.0-29.9)  -     CBC & Differential  -     Comprehensive Metabolic Panel  -     Hemoglobin A1c  -     Lipid Panel  -     TSH Rfx On Abnormal To Free T4  -     Semaglutide-Weight Management (Wegovy) 2.4 MG/0.75ML solution auto-injector; Inject 0.75 mL under the skin into the appropriate area as directed 1 (One) Time Per Week.  Dispense: 3 mL; Refill: 2    3.  Fibromyalgia  -     pregabalin (LYRICA) 100 MG capsule; TAKE 1 CAPSULE BY MOUTH EVERY MORNING AND 2 CAPSULES BY MOUTH AT BEDTIME  Dispense: 90 capsule; Refill: 2  -     Comprehensive Metabolic Panel    4. Preop examination  -     Urinalysis With Culture If Indicated - Urine, Clean Catch          Assessment & Plan  1. Weight management.  Her body mass index (BMI) has decreased from the obese category to just above the normal range, currently at 25.24. She has achieved a total weight loss of 53 pounds. Her goal weight is 164 pounds, which would place her BMI within the normal range. She is advised to maintain her current weight for a period of 3 to 6 months before considering a gradual reduction in Wegovy dosage. A prescription for Wegovy with two refills will be provided.    2. Fibromyalgia.  She continues to experience fibromyalgia pain and is under the care of a pain management specialist.    3. Right-sided pain.  She reports persistent right-sided pain, although less frequent than before, and speculates it may be muscle-related. She has a history of nonobstructing 4 mm left renal calculi.    4. Medication Management.  She is seeking refills for her Lyrica and Wegovy prescriptions. [facility]    5. Health Maintenance.  Laboratory tests including lipid panel, comprehensive metabolic panel, thyroid panel, hemoglobin A1c panel, and CBC will be ordered.    Follow-up  The patient will follow up in 3 months.          Follow Up     Return in about 3 months (around 7/2/2025), or if symptoms worsen or fail to improve, for Recheck.    Patient was given instructions and counseling regarding her condition or for health maintenance advice. Please see specific information pulled into the AVS if appropriate.            Sahara ELVA Delarosa  reports that she has never smoked. She has been exposed to tobacco smoke. She has never used smokeless tobacco. I have educated her on the risk of diseases  from using tobacco products such as cancer, COPD, and heart disease.           Patient or patient representative verbalized consent for the use of Ambient Listening during the visit with  ROSEANNA Jameson for chart documentation. 4/2/2025  11:18 EDT

## 2025-04-02 NOTE — PROGRESS NOTES
..  Venipuncture Blood Specimen Collection  Venipuncture performed in left arm by Isaura Brown with good hemostasis. Patient tolerated the procedure well without complications.   04/02/25   Isaura Brown

## 2025-04-06 ENCOUNTER — RESULTS FOLLOW-UP (OUTPATIENT)
Dept: FAMILY MEDICINE CLINIC | Facility: CLINIC | Age: 50
End: 2025-04-06
Payer: OTHER GOVERNMENT

## 2025-04-06 NOTE — PROGRESS NOTES
Please mail letter to patient stating    Sahara, comprehensive panel shows normal glucose and normal kidney and liver function test and normal electrolytes and the cholesterol panel was all normal with the exception of the LDL slightly elevated at 135 and it should be less than 100 when fasting at the blood counts were normal range and the thyroid levels normal range in the hemoglobin A1c normal range

## 2025-04-06 NOTE — LETTER
Sahara ELVA Delarosa  [address]  [place] [address]    April 7, 2025     Dear Ms. Delarosa:    Sahara, comprehensive panel shows normal glucose and normal kidney and liver function test and normal electrolytes and the cholesterol panel was all normal with the exception of the LDL slightly elevated at 135 and it should be less than 100 when fasting at the blood counts were normal range and the thyroid levels normal range in the hemoglobin A1c normal range       Resulted Orders   Comprehensive Metabolic Panel   Result Value Ref Range    Glucose 87 65 - 99 mg/dL    BUN 12 6 - 20 mg/dL    Creatinine 0.55 (L) 0.57 - 1.00 mg/dL    Sodium 142 136 - 145 mmol/L    Potassium 4.4 3.5 - 5.2 mmol/L    Chloride 106 98 - 107 mmol/L    CO2 27.1 22.0 - 29.0 mmol/L    Calcium 9.3 8.6 - 10.5 mg/dL    Total Protein 6.4 6.0 - 8.5 g/dL    Albumin 4.1 3.5 - 5.2 g/dL    ALT (SGPT) 12 1 - 33 U/L    AST (SGOT) 13 1 - 32 U/L    Alkaline Phosphatase 76 39 - 117 U/L    Total Bilirubin 0.4 0.0 - 1.2 mg/dL    Globulin 2.3 gm/dL    A/G Ratio 1.8 g/dL    BUN/Creatinine Ratio 21.8 7.0 - 25.0    Anion Gap 8.9 5.0 - 15.0 mmol/L    eGFR 111.1 >60.0 mL/min/1.73   Hemoglobin A1c   Result Value Ref Range    Hemoglobin A1C 5.00 4.80 - 5.60 %   Lipid Panel   Result Value Ref Range    Total Cholesterol 193 0 - 200 mg/dL    Triglycerides 77 0 - 150 mg/dL    HDL Cholesterol 44 40 - 60 mg/dL    LDL Cholesterol  135 (H) 0 - 100 mg/dL    VLDL Cholesterol 14 5 - 40 mg/dL    LDL/HDL Ratio 3.04    TSH Rfx On Abnormal To Free T4   Result Value Ref Range    TSH 0.652 0.270 - 4.200 uIU/mL   CBC Auto Differential   Result Value Ref Range    WBC 3.92 3.40 - 10.80 10*3/mm3    RBC 4.23 3.77 - 5.28 10*6/mm3    Hemoglobin 13.0 12.0 - 15.9 g/dL    Hematocrit 39.9 34.0 - 46.6 %    MCV 94.3 79.0 - 97.0 fL    MCH 30.7 26.6 - 33.0 pg    MCHC 32.6 31.5 - 35.7 g/dL    RDW 12.0 (L) 12.3 - 15.4 %    RDW-SD 41.3 37.0 - 54.0 fl    MPV 9.8 6.0 - 12.0 fL    Platelets 249 140 - 450 10*3/mm3     Neutrophil % 59.1 42.7 - 76.0 %    Lymphocyte % 30.1 19.6 - 45.3 %    Monocyte % 8.2 5.0 - 12.0 %    Eosinophil % 1.8 0.3 - 6.2 %    Basophil % 0.5 0.0 - 1.5 %    Immature Grans % 0.3 0.0 - 0.5 %    Neutrophils, Absolute 2.32 1.70 - 7.00 10*3/mm3    Lymphocytes, Absolute 1.18 0.70 - 3.10 10*3/mm3    Monocytes, Absolute 0.32 0.10 - 0.90 10*3/mm3    Eosinophils, Absolute 0.07 0.00 - 0.40 10*3/mm3    Basophils, Absolute 0.02 0.00 - 0.20 10*3/mm3    Immature Grans, Absolute 0.01 0.00 - 0.05 10*3/mm3    nRBC 0.0 0.0 - 0.2 /100 WBC         If you have any questions or concerns, please don't hesitate to call.         Sincerely,        ROSEANNA Jameson

## 2025-04-28 DIAGNOSIS — E55.9 VITAMIN D DEFICIENCY: ICD-10-CM

## 2025-04-28 RX ORDER — ERGOCALCIFEROL 1.25 MG/1
50000 CAPSULE, LIQUID FILLED ORAL WEEKLY
Qty: 5 CAPSULE | Refills: 0 | Status: SHIPPED | OUTPATIENT
Start: 2025-04-28

## 2025-04-30 ENCOUNTER — OFFICE VISIT (OUTPATIENT)
Dept: ORTHOPEDIC SURGERY | Facility: CLINIC | Age: 50
End: 2025-04-30
Payer: OTHER MISCELLANEOUS

## 2025-04-30 VITALS
OXYGEN SATURATION: 98 % | DIASTOLIC BLOOD PRESSURE: 78 MMHG | WEIGHT: 166.01 LBS | HEART RATE: 78 BPM | SYSTOLIC BLOOD PRESSURE: 113 MMHG | BODY MASS INDEX: 25.16 KG/M2 | HEIGHT: 68 IN

## 2025-04-30 DIAGNOSIS — Z98.890 S/P RIGHT KNEE ARTHROSCOPY: Primary | ICD-10-CM

## 2025-04-30 DIAGNOSIS — M25.561 RIGHT KNEE PAIN, UNSPECIFIED CHRONICITY: ICD-10-CM

## 2025-04-30 PROCEDURE — 99024 POSTOP FOLLOW-UP VISIT: CPT | Performed by: PHYSICIAN ASSISTANT

## 2025-04-30 RX ORDER — LINACLOTIDE 145 UG/1
145 CAPSULE, GELATIN COATED ORAL
COMMUNITY
Start: 2025-04-13

## 2025-04-30 RX ORDER — CLOBETASOL PROPIONATE 0.5 MG/ML
SOLUTION TOPICAL
COMMUNITY
Start: 2025-04-21

## 2025-04-30 NOTE — PROGRESS NOTES
Chief Complaint  Follow-up of the Right Knee     Subjective      History of Present Illness  The patient is a 49-year-old female who presents for a follow-up on her right knee. She is 6 weeks postoperative from right knee arthroscopy with partial medial meniscectomy, medial femoral condyle and patella chondroplasty performed by Dr. Lester on 03/13/2025. This is a workman's compensation case.    She has been attending physical therapy at [facility] in [place]. At the last visit, she reported that her range of motion was improving, but active movement remained stiff. Her pain felt different from preoperatively, and it was well managed with her usual back pain medication. She had planned to return to work on 04/07/2025 with restrictions.     Today patient reports that she returned to work way too soon.  Because she returned on the seventh they would not let her to go back off of work and she has pushed through.  She feels that that did back track her progress a little bit with PT which she has been compliant with although she did report taking a 1.5-week break from physical therapy after some weightbearing exercises exacerbated a lot of swelling and discomfort.  Overall she reports the pain is improving and the knee is getting there.  She plans to resume PT next week.  She is considering lifting work restrictions, but again does not want to backside her progress and would like to see how return to PT goes first.  Currently, pain is rated as mild (2-3/10), with an increase to moderate (5/10) during weight-bearing activities.  She has been compliant with HEP.  Less swelling is reported at home compared to during therapy sessions. Tenderness along the joint lines and upper lateral knee, with some associated puffiness, is also reported.        Allergies   Allergen Reactions    Bactrim [Sulfamethoxazole-Trimethoprim] Rash    Codeine Rash        Social History     Socioeconomic History    Marital status:    Tobacco Use     "Smoking status: Never     Passive exposure: Yes    Smokeless tobacco: Never   Vaping Use    Vaping status: Never Used   Substance and Sexual Activity    Alcohol use: Never    Drug use: Never    Sexual activity: Yes     Partners: Male     Birth control/protection: Vasectomy, Hysterectomy        Tobacco Use: Medium Risk (4/30/2025)    Patient History     Smoking Tobacco Use: Never     Smokeless Tobacco Use: Never     Passive Exposure: Yes     Patient reports that they are a nonsmoker; cessation education not applicable.     I reviewed the patient's chief complaint, history of present illness, review of systems, past medical history, surgical history, family history, social history, medications, and allergy list.     Review of Systems     Constitutional: Denies fevers, chills, weight loss  Cardiovascular: Denies chest pain, shortness of breath  Skin: Denies rashes, acute skin changes  Neurologic: Denies headache, loss of consciousness    Vital Signs:   /78   Pulse 78   Ht 172.7 cm (68\")   Wt 75.3 kg (166 lb 0.1 oz)   SpO2 98%   BMI 25.24 kg/m²          Physical Exam  General: Alert. No acute distress    Ortho Exam    General: Alert, no acute distress  Right lower extremity: Well-healing arthroscopy portals about right knee. No redness or active drainage noted.  Mild knee effusion.  Positive tenderness to the medial joint line.  No tenderness to the lateral joint line.  125 degrees of flexion.  0 degrees extension. Knee extensor mechanism intact. Knee stable to varus and valgus stress. Calf soft, nontender. Demonstrates active ankle plantarflexion and dorsiflexion. Sensation intact over the dorsal and plantar foot.  Palpable pedal pulses.         Physical Exam  Musculoskeletal:  Right Knee: The patient's right knee can extend to 120 degrees and passively about 125 degrees. There is tenderness in the upper and lateral knee. The medial joint line is slightly tender. Swelling is present but not severe.        "   Assessment and Plan     Diagnoses and all orders for this visit:    1. S/P right knee arthroscopy (Primary)  -     Ambulatory Referral to Physical Therapy for Evaluation & Treatment    2. Right knee pain, unspecified chronicity  -     Ambulatory Referral to Physical Therapy for Evaluation & Treatment        Assessment & Plan  1. Postoperative status following right knee arthroscopy:  She is currently 6 weeks postoperative from right knee arthroscopy with partial medial meniscectomy, medial femoral condyle and patella chondroplasty performed on 03/13/2025. Range of motion is improving, with AROM still a little stiff. Pain is well managed with her usual back pain medication. Physical therapy was paused for a week and a half due to swelling and increased pain with weight exercises but will resume next week.  PT note updated.  Current restrictions include no prolonged standing, avoiding stairs, and refraining from repetitive knee activities, we will continue these.     Continue with current restrictions until further progress is noted with physical therapy. If significant improvement is observed in 3 weeks, work restrictions may be reconsidered. Schedule flexibility for physical therapy visits, avoid lifting more than 10 pounds, and use elevation and ice as needed.    Follow-up  Follow up in 3 weeks to reassess progress and work restrictions.    Patient or patient representative verbalized consent for the use of Ambient Listening during the visit with  Jacinta Arias PA-C for chart documentation. 4/30/2025  15:54 EDT    Follow Up   There are no Patient Instructions on file for this visit.        Patient was given instructions and counseling regarding her condition or for health maintenance advice. Please see specific information pulled into the AVS if appropriate.     Jacinta Arias PA-C   04/30/2025  15:50 EDT        Dictated Utilizing Dragon Dictation. Please note that portions of this note were completed with  a voice recognition program. Part of this note may be an electronic transcription/translation of spoken language to printed text using the Dragon Dictation System.        Answers submitted by the patient for this visit:  Post Operative Visit (Submitted on 4/23/2025)  Chief Complaint: Follow-up  Pain Control: partially controlled  Fever: 99 - 100 F  Diet: adequate intake  Activity: moderately limited  Operative Site Issues: No

## 2025-05-22 ENCOUNTER — OFFICE VISIT (OUTPATIENT)
Dept: FAMILY MEDICINE CLINIC | Facility: CLINIC | Age: 50
End: 2025-05-22
Payer: OTHER GOVERNMENT

## 2025-05-22 ENCOUNTER — HOSPITAL ENCOUNTER (OUTPATIENT)
Dept: CT IMAGING | Facility: HOSPITAL | Age: 50
Discharge: HOME | End: 2025-05-22
Payer: OTHER GOVERNMENT

## 2025-05-22 VITALS
BODY MASS INDEX: 25.73 KG/M2 | HEART RATE: 75 BPM | DIASTOLIC BLOOD PRESSURE: 80 MMHG | OXYGEN SATURATION: 95 % | WEIGHT: 169.2 LBS | TEMPERATURE: 98.2 F | SYSTOLIC BLOOD PRESSURE: 110 MMHG

## 2025-05-22 DIAGNOSIS — S92.902D CLOSED FRACTURE OF LEFT FOOT WITH ROUTINE HEALING, SUBSEQUENT ENCOUNTER: ICD-10-CM
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
## 2025-05-22 DIAGNOSIS — E53.8 VITAMIN B 12 DEFICIENCY: ICD-10-CM

## 2025-05-22 DIAGNOSIS — R51.9 ACUTE NONINTRACTABLE HEADACHE, UNSPECIFIED HEADACHE TYPE: ICD-10-CM

## 2025-05-22 DIAGNOSIS — R40.20 LOSS OF CONSCIOUSNESS: ICD-10-CM

## 2025-05-22 LAB
ALBUMIN SERPL-MCNC: 4.2 G/DL (ref 3.5–5.2)
ALBUMIN/GLOB SERPL: 1.7 G/DL
ALP SERPL-CCNC: 78 U/L (ref 39–117)
ALT SERPL W P-5'-P-CCNC: 6 U/L (ref 1–33)
ANION GAP SERPL CALCULATED.3IONS-SCNC: 12 MMOL/L (ref 5–15)
AST SERPL-CCNC: 10 U/L (ref 1–32)
BILIRUB SERPL-MCNC: 0.4 MG/DL (ref 0–1.2)
BUN SERPL-MCNC: 10 MG/DL (ref 6–20)
BUN/CREAT SERPL: 14.1 (ref 7–25)
CALCIUM SPEC-SCNC: 9.5 MG/DL (ref 8.6–10.5)
CHLORIDE SERPL-SCNC: 105 MMOL/L (ref 98–107)
CO2 SERPL-SCNC: 26 MMOL/L (ref 22–29)
CREAT SERPL-MCNC: 0.71 MG/DL (ref 0.57–1)
DEPRECATED RDW RBC AUTO: 43.5 FL (ref 37–54)
EGFRCR SERPLBLD CKD-EPI 2021: 103.1 ML/MIN/1.73
ERYTHROCYTE [DISTWIDTH] IN BLOOD BY AUTOMATED COUNT: 12.3 % (ref 12.3–15.4)
FERRITIN SERPL-MCNC: 89.9 NG/ML (ref 13–150)
FOLATE SERPL-MCNC: 10.8 NG/ML (ref 4.78–24.2)
GLOBULIN UR ELPH-MCNC: 2.5 GM/DL
GLUCOSE SERPL-MCNC: 91 MG/DL (ref 65–99)
HCT VFR BLD AUTO: 38.7 % (ref 34–46.6)
HGB BLD-MCNC: 12.8 G/DL (ref 12–15.9)
IRON 24H UR-MRATE: 72 MCG/DL (ref 37–145)
IRON SATN MFR SERPL: 22 % (ref 20–50)
MAGNESIUM SERPL-MCNC: 2.1 MG/DL (ref 1.6–2.6)
MCH RBC QN AUTO: 31.6 PG (ref 26.6–33)
MCHC RBC AUTO-ENTMCNC: 33.1 G/DL (ref 31.5–35.7)
MCV RBC AUTO: 95.6 FL (ref 79–97)
PLATELET # BLD AUTO: 248 10*3/MM3 (ref 140–450)
PMV BLD AUTO: 9.7 FL (ref 6–12)
POTASSIUM SERPL-SCNC: 3.9 MMOL/L (ref 3.5–5.2)
PROT SERPL-MCNC: 6.7 G/DL (ref 6–8.5)
RBC # BLD AUTO: 4.05 10*6/MM3 (ref 3.77–5.28)
SODIUM SERPL-SCNC: 143 MMOL/L (ref 136–145)
TIBC SERPL-MCNC: 326 MCG/DL (ref 298–536)
TRANSFERRIN SERPL-MCNC: 219 MG/DL (ref 200–360)
VIT B12 BLD-MCNC: 515 PG/ML (ref 211–946)
WBC NRBC COR # BLD AUTO: 4.52 10*3/MM3 (ref 3.4–10.8)

## 2025-05-22 PROCEDURE — 85027 COMPLETE CBC AUTOMATED: CPT

## 2025-05-22 PROCEDURE — 82607 VITAMIN B-12: CPT

## 2025-05-22 PROCEDURE — 82746 ASSAY OF FOLIC ACID SERUM: CPT

## 2025-05-22 PROCEDURE — 83540 ASSAY OF IRON: CPT

## 2025-05-22 PROCEDURE — 84466 ASSAY OF TRANSFERRIN: CPT

## 2025-05-22 PROCEDURE — 82728 ASSAY OF FERRITIN: CPT

## 2025-05-22 PROCEDURE — 80053 COMPREHEN METABOLIC PANEL: CPT

## 2025-05-22 PROCEDURE — 70450 CT HEAD/BRAIN W/O DYE: CPT

## 2025-05-22 PROCEDURE — 83735 ASSAY OF MAGNESIUM: CPT

## 2025-05-22 NOTE — PROGRESS NOTES
..  Venipuncture Blood Specimen Collection  Venipuncture performed in Lt arm by Armida Leal MA with good hemostasis. Patient tolerated the procedure well without complications.   05/22/25   Armida Leal MA

## 2025-05-22 NOTE — PROGRESS NOTES
Chief Complaint  Follow-up (Was seen at [facility] urgent care 5/18/25), Loss of Consciousness, and Foot Pain (Left foot pain)    The PHQ has not been completed during this encounter.         History of Present Illness:  Sahara Delarosa is a 49 y.o. female who presents to [facility] with a past medical history of  Past Medical History:   Diagnosis Date    Allergic     Allergic rhinitis     Anxiety and depression     Arthritis of neck     Asthma     exercise induced    Back pain     PAIN MGMT, PT HAS NERVE STIMULATOR LEFT FLANK AREA    Bowel incontinence     Bursitis of hip     Condyloma acuminata     Diverticulitis of colon     Endometrial polyp     Family history of breast cancer     Fatty liver     Fibroid     Fibroids     Fibromyalgia, primary     GERD (gastroesophageal reflux disease)     Hernia     HIATAL HERNIA    Hip arthrosis     Irritable bowel syndrome     Knee swelling     Low back pain     Metabolic syndrome     PT DENIES DM BUT IS ON METFORMIN    Osteoarthritis     In addition to fibromyalgia    Ovarian cyst     Scoliosis     Sleep apnea     CPAP    Tear of meniscus of knee     Urinary frequency     Uterine prolapse     Vitamin B 12 deficiency         History of Present Illness  The patient is a 49-year-old female who presents today to follow up from urgent care.    She experienced a syncopal episode on Sunday morning at approximately 3:00 AM, during which she sustained fractures in her foot, contusions on her arm, and a head injury. She sought medical attention yesterday, where it was determined that surgical intervention for her foot fractures was not necessary. The cause of her syncope remains undetermined, as all blood work results were within normal limits. She has been advised to wear a boot and schedule a follow-up appointment with orthopedics in a month.     She reports no history of iron deficiency or anemia but has been diagnosed with fibromyalgia. She also has  deficiencies in vitamin D and B12, for which she is receiving supplementation and injections, respectively. Her  suspects that her syncope may have been due to hypoglycemia. She is currently on Wegovy, and her blood glucose levels, tested in 04/2025 and again recently, were within the normal range. She recalls consuming a burger for lunch on the day of the incident, followed by snacks at a wedding, including cake and chips, but no protein. She has not consumed any food today. She reports no urinary issues.    She was informed that she likely has a concussion, although this diagnosis could not be confirmed due to recent legislative changes. She is experiencing headaches but does not have a history of migraines. She reports no recent illness.    SOCIAL HISTORY  She works at school.      Objective   Vital Signs:   Vitals:    05/22/25 1145   BP: 110/80   Pulse: 75   Temp: 98.2 °F (36.8 °C)   SpO2: 95%   Weight: 76.7 kg (169 lb 3.2 oz)   PainSc: 6    PainLoc: Generalized     Body mass index is 25.73 kg/m².    Wt Readings from Last 3 Encounters:   05/22/25 76.7 kg (169 lb 3.2 oz)   04/30/25 75.3 kg (166 lb 0.1 oz)   04/02/25 75.3 kg (166 lb)     BP Readings from Last 3 Encounters:   05/22/25 110/80   04/30/25 113/78   04/02/25 118/62       Health Maintenance   Topic Date Due    Pneumococcal Vaccine 50+ (2 of 2 - PCV) 09/29/2025 (Originally 2/23/2023)    COVID-19 Vaccine (7 - 2024-25 season) 12/26/2025 (Originally 9/1/2024)    ZOSTER VACCINE (1 of 2) 02/03/2026 (Originally 7/6/2023)    INFLUENZA VACCINE  07/01/2025    MAMMOGRAM  02/07/2026    ANNUAL PHYSICAL  04/02/2026    TDAP/TD VACCINES (2 - Td or Tdap) 10/14/2028    COLORECTAL CANCER SCREENING  06/30/2030    HEPATITIS C SCREENING  Completed    HEMOGLOBIN A1C  Discontinued       Review of Systems   Physical Exam  Vitals reviewed.   Constitutional:       Appearance: Normal appearance.   Eyes:      Pupils: Pupils are equal, round, and reactive to light.    Cardiovascular:      Rate and Rhythm: Normal rate and regular rhythm.   Pulmonary:      Effort: Pulmonary effort is normal.      Breath sounds: Normal breath sounds.   Skin:     General: Skin is warm and dry.   Neurological:      General: No focal deficit present.      Mental Status: She is alert and oriented to person, place, and time.      Motor: Motor function is intact.      Coordination: Coordination is intact.      Gait: Gait is intact.   Psychiatric:         Mood and Affect: Mood normal.            Result Review :  The following data was reviewed by: ROSEANNA Jacobs on 05/22/2025:  No visits with results within 1 Month(s) from this visit.   Latest known visit with results is:   Office Visit on 04/02/2025   Component Date Value    Glucose 04/02/2025 87     BUN 04/02/2025 12     Creatinine 04/02/2025 0.55 (L)     Sodium 04/02/2025 142     Potassium 04/02/2025 4.4     Chloride 04/02/2025 106     CO2 04/02/2025 27.1     Calcium 04/02/2025 9.3     Total Protein 04/02/2025 6.4     Albumin 04/02/2025 4.1     ALT (SGPT) 04/02/2025 12     AST (SGOT) 04/02/2025 13     Alkaline Phosphatase 04/02/2025 76     Total Bilirubin 04/02/2025 0.4     Globulin 04/02/2025 2.3     A/G Ratio 04/02/2025 1.8     BUN/Creatinine Ratio 04/02/2025 21.8     Anion Gap 04/02/2025 8.9     eGFR 04/02/2025 111.1     Hemoglobin A1C 04/02/2025 5.00     Total Cholesterol 04/02/2025 193     Triglycerides 04/02/2025 77     HDL Cholesterol 04/02/2025 44     LDL Cholesterol  04/02/2025 135 (H)     VLDL Cholesterol 04/02/2025 14     LDL/HDL Ratio 04/02/2025 3.04     TSH 04/02/2025 0.652     Color, UA 04/02/2025 Dark Yellow (A)     Appearance, UA 04/02/2025 Clear     pH, UA 04/02/2025 5.5     Specific Gravity, UA 04/02/2025 >1.030 (H)     Glucose, UA 04/02/2025 Negative     Ketones, UA 04/02/2025 Trace (A)     Bilirubin, UA 04/02/2025 Negative     Blood, UA 04/02/2025 Negative     Protein, UA 04/02/2025 Trace (A)     Leuk Esterase, UA 04/02/2025  Trace (A)     Nitrite, UA 04/02/2025 Negative     Urobilinogen, UA 04/02/2025 1.0 E.U./dL     WBC 04/02/2025 3.92     RBC 04/02/2025 4.23     Hemoglobin 04/02/2025 13.0     Hematocrit 04/02/2025 39.9     MCV 04/02/2025 94.3     MCH 04/02/2025 30.7     MCHC 04/02/2025 32.6     RDW 04/02/2025 12.0 (L)     RDW-SD 04/02/2025 41.3     MPV 04/02/2025 9.8     Platelets 04/02/2025 249     Neutrophil % 04/02/2025 59.1     Lymphocyte % 04/02/2025 30.1     Monocyte % 04/02/2025 8.2     Eosinophil % 04/02/2025 1.8     Basophil % 04/02/2025 0.5     Immature Grans % 04/02/2025 0.3     Neutrophils, Absolute 04/02/2025 2.32     Lymphocytes, Absolute 04/02/2025 1.18     Monocytes, Absolute 04/02/2025 0.32     Eosinophils, Absolute 04/02/2025 0.07     Basophils, Absolute 04/02/2025 0.02     Immature Grans, Absolute 04/02/2025 0.01     nRBC 04/02/2025 0.0     Extra Tube 04/02/2025 Hold for add-ons.     RBC, UA 04/02/2025 0-2     WBC, UA 04/02/2025 0-2     Bacteria, UA 04/02/2025 None Seen     Squamous Epithelial Cell* 04/02/2025 3-6 (A)     Hyaline Casts, UA 04/02/2025 0-2     Calcium Oxalate Crystals* 04/02/2025 Present     Methodology 04/02/2025 Automated Microscopy        Results  Labs   - Blood work: Normal   - Hemoglobin A1c: 5      Procedures        Assessment and Plan   Diagnoses and all orders for this visit:    1. Fall, subsequent encounter (Primary)  -     CT Head Without Contrast    2. Loss of consciousness  -     CT Head Without Contrast  -     Iron Profile  -     Ferritin  -     CBC (No Diff)  -     Comprehensive Metabolic Panel  -     Magnesium    3. Acute nonintractable headache, unspecified headache type  -     CT Head Without Contrast    4. Vitamin B 12 deficiency  -     Vitamin B12 & Folate    5. Closed fracture of left foot with routine healing, subsequent encounter        Assessment & Plan  1. Syncope.  - The etiology of the syncope remains uncertain, with potential causes including hypoglycemia or iron  deficiency.  - She is currently on Wegovy, which can lower blood sugar levels. Her hemoglobin A1c was recorded at 5.  - A CT scan of the head will be ordered to rule out any intracranial abnormalities. Additionally, an iron workup will be conducted.  - She is advised to incorporate a minimum of 100 g of protein into her daily diet and consume meals every 2 to 3 hours. If the CT scan results are normal, medication will be prescribed to manage her headaches.    2. Foot fractures.  - She has fractures in her foot in two spots.  - She is currently using a boot as recommended by her orthopedic doctor.  - She will follow up with orthopedics in a month.  - No surgery is required at this time.    3. Headache.  - She reports headaches following the syncopal episode.  - A CT scan of the head will be ordered to rule out any intracranial abnormalities.  - If the CT scan results are normal, medication will be prescribed to manage her headaches.  - Monitoring for any changes in headache pattern or severity.    Follow-up  - The patient will follow up in 2 weeks.      FOLLOW UP  Return in about 2 weeks (around 6/5/2025) for Recheck.    Patient was given instructions and counseling regarding her condition or for health maintenance advice. Please see specific information pulled into the AVS if appropriate.       ROSEANNA Jacobs  05/22/25  12:46 EDT    CURRENT & DISCONTINUED MEDICATIONS  Current Outpatient Medications   Medication Instructions    albuterol (PROVENTIL) 2.5 mg, Nebulization, Every 4 Hours PRN    albuterol sulfate  (90 Base) MCG/ACT inhaler 2 puffs, Inhalation, Every 6 Hours PRN    budesonide-formoterol (SYMBICORT) 80-4.5 MCG/ACT inhaler 2 puffs, Inhalation, 2 Times Daily - RT    busPIRone (BUSPAR) 15 mg, Oral, 2 Times Daily PRN, for anxiety    clobetasol (TEMOVATE) 0.05 % external solution APPLY TOPICALLY TO THE SCALP TWICE DAILY FOR 2 WEEKS AS NEEDED FOR ITCHING    cyanocobalamin 1,000 mcg, Intramuscular,  Every 30 Days    desvenlafaxine (PRISTIQ) 50 mg, Oral, Daily    fluticasone (FLONASE) 50 MCG/ACT nasal spray 2 sprays, Each Nare, Daily, Shake well before using.    Linzess 145 mcg, Every Morning Before Breakfast    metFORMIN ER (GLUCOPHAGE-XR) 500 mg, Oral, Daily With Breakfast    montelukast (SINGULAIR) 10 mg, Oral, Nightly    nabumetone (RELAFEN) 500 mg    omeprazole (PRILOSEC) 40 mg, Oral, Daily    ondansetron (ZOFRAN) 8 mg, Oral, Every 8 Hours PRN    oxyCODONE-acetaminophen (PERCOCET) 5-325 MG per tablet oxycodone-acetaminophen 5-325 mg oral tablet 1 every 8 hrs as needed.   Active    pregabalin (LYRICA) 100 MG capsule TAKE 1 CAPSULE BY MOUTH EVERY MORNING AND 2 CAPSULES BY MOUTH AT BEDTIME    traZODone (DESYREL) 150 mg, Oral, Nightly    vitamin D (ERGOCALCIFEROL) 50,000 Units, Oral, Weekly    Wegovy 2.4 mg, Subcutaneous, Weekly       There are no discontinued medications.     EMR Dragon/Transcription disclaimer:  Parts of this encounter note are electronic transcription/translation of spoken language to printed text.     Patient or patient representative verbalized consent for the use of Ambient Listening during the visit with  ROSEANNA Jacobs for chart documentation. 5/22/2025  12:46 EDT

## 2025-05-29 ENCOUNTER — OFFICE VISIT (OUTPATIENT)
Dept: FAMILY MEDICINE CLINIC | Facility: CLINIC | Age: 50
End: 2025-05-29
Payer: OTHER GOVERNMENT

## 2025-05-29 VITALS
HEIGHT: 68 IN | DIASTOLIC BLOOD PRESSURE: 72 MMHG | SYSTOLIC BLOOD PRESSURE: 116 MMHG | TEMPERATURE: 97.9 F | BODY MASS INDEX: 25.16 KG/M2 | WEIGHT: 166 LBS | OXYGEN SATURATION: 97 % | HEART RATE: 84 BPM

## 2025-05-29 DIAGNOSIS — M25.50 POLYARTHRALGIA: ICD-10-CM

## 2025-05-29 DIAGNOSIS — S92.902D CLOSED FRACTURE OF LEFT FOOT WITH ROUTINE HEALING, SUBSEQUENT ENCOUNTER: ICD-10-CM

## 2025-05-29 DIAGNOSIS — R55 SYNCOPE, UNSPECIFIED SYNCOPE TYPE: Primary | ICD-10-CM

## 2025-05-29 DIAGNOSIS — Z13.820 SCREENING FOR OSTEOPOROSIS: ICD-10-CM

## 2025-05-29 DIAGNOSIS — R00.2 PALPITATIONS: ICD-10-CM

## 2025-05-29 DIAGNOSIS — R51.9 BILATERAL HEADACHES: ICD-10-CM

## 2025-05-29 DIAGNOSIS — Z78.0 POSTMENOPAUSAL: ICD-10-CM

## 2025-05-29 RX ORDER — RIZATRIPTAN BENZOATE 5 MG/1
TABLET, ORALLY DISINTEGRATING ORAL
Qty: 6 TABLET | Refills: 0 | Status: SHIPPED | OUTPATIENT
Start: 2025-05-29

## 2025-05-29 NOTE — PROGRESS NOTES
Chief Complaint  Foot Injury (Left foot broke in two places from a passing out.  ) and Headache (She thinks it may be sugar dropping. )    Subjective            Sahara Delarosa presents to [facility]    Follow up  Onset was in the past 7 days.   Symptoms include: joint pain, change in stool, chest pain, fatigue and headaches.   Pertinent negative symptoms include no abdominal pain, no cough, no dysuria and no vomiting.   Foot Injury     Headache    Associated symptoms: chest pain, fatigue and headaches      Associated symptoms: no abdominal pain, no cough, no dizziness, no dysuria, no shortness of breath, no vaginal bleeding and no vomiting        History of Present Illness  The patient is a 49-year-old female who presents for evaluation of syncope, bilateral headaches, and polyarthralgia.    She experienced a syncopal episode on 05/25/2025, characterized by a sudden feeling of heat and impending fainting while preparing for [employer]. She had not consumed any food that morning. After resting for approximately 15 to 20 minutes, her color returned, and she was able to attend [employer]. She also reported a similar episode at night when she felt unwell and attempted to get a snack and drink but ended up on the floor. She has not been monitoring her caloric intake. She has been consuming beef sticks and plans to resume protein drinks. She has never been diagnosed with diabetes, and her blood sugar levels have been within normal limits and hemoglobin A1c's have always been normal but the patient had been for many years treated with metformin for metabolic syndrome but she had also been on Wegovy shots for weight loss and has responded very well tolerated well and has lost weight from her highest weight 1 year ago was 219 pounds and then slowly over the past year she has lost weight and is now currently down to 166 pounds and we are discussing completely stopping the metformin completely since  now with the weight loss the risk for the metabolic syndrome have vastly improved.     She has not consulted a cardiologist ever in the past. She reports no shortness of breath, blurred vision, double vision, excessive thirst or hunger, frequent urination, painful urination, vaginal bleeding, abdominal pain, or blood in stool. She has been experiencing intermittent chest pain for the past 2 to 3 weeks, which predates the syncopal episode. She occasionally experiences palpitations, which resolve upon coughing. She has been experiencing increased constipation due to increased use of pain medication.    She has been under the care of a podiatrist for a left foot fracture, which did not necessitate surgical intervention. She is currently wearing an orthopedic walking boot and is scheduled for a follow-up visit in one month. She underwent menopause following a hysterectomy a few years ago. She has been taking vitamin D supplements.    She has been experiencing bilateral headaches, for which she was prescribed Maxalt during an urgent care visit. A CT scan of the head was performed due to a previous head injury. She experienced an episode of severe headache accompanied by nausea and vomiting on 05/24/2025.    She was previously referred to a rheumatologist but was unable to attend the appointment due to cancellation from the providers standpoint as they are no longer taking patients that have a history of fibromyalgia-and the patient also has polyarthralgia but they still are not taking any at this rheumatologist is actually retiring-and she would like to proceed with a different rheumatology referral and she has already seen one in [place] and reports that he was rude and did not have a good bedside manner and actually pretty much dismissed any diagnosis of fibromyalgia.    PAST SURGICAL HISTORY:  - Hysterectomy    FAMILY HISTORY  Her grandmother had osteoporosis.      PHQ-2 Total Score:      PHQ-9 Total Score:         Past Medical History:   Diagnosis Date    Allergic     Allergic rhinitis     Anxiety and depression     Arthritis of neck     Asthma     exercise induced    Back pain     PAIN MGMT, PT HAS NERVE STIMULATOR LEFT FLANK AREA    Bowel incontinence     Bursitis of hip     Condyloma acuminata     Diverticulitis of colon     Endometrial polyp     Family history of breast cancer     Fatty liver     Fibroid     Fibroids     Fibromyalgia, primary     GERD (gastroesophageal reflux disease)     Hernia     HIATAL HERNIA    Hip arthrosis     Irritable bowel syndrome     Knee swelling     Low back pain     Lumbosacral disc disease     Metabolic syndrome     PT DENIES DM BUT IS ON METFORMIN    Osteoarthritis     In addition to fibromyalgia    Ovarian cyst     Scoliosis     Sleep apnea     CPAP    Tear of meniscus of knee     Urinary frequency     Uterine prolapse     Vitamin B 12 deficiency        Allergies   Allergen Reactions    Bactrim [Sulfamethoxazole-Trimethoprim] Rash    Codeine Rash        Past Surgical History:   Procedure Laterality Date    BREAST BIOPSY Left 2013    Hyper-pigmented lesion, left nipple.= Benign    COLONOSCOPY      CYST REMOVAL      on chest and groin area     ENDOMETRIAL ABLATION  2015    [facility], Novasure Endometrial Ablation    ENDOSCOPY  2017    EGD: Stomach inflammation, Neg for H Pylori.    HIP ARTHROPLASTY Right 09/2021    HIP SURGERY Right 2019    LABRUM REPAIR    HYSTEROSCOPY  2022    JOINT REPLACEMENT  September 2021    Rt Hip    KNEE ARTHROSCOPY W/ MENISCECTOMY Right 03/13/2025    Procedure: RIGHT KNEE ARTHROSCOPY WITH PARTIAL MEDIAL MENISCECTOMY AND CHONDROPLASTY: SURGICAL PROCEDURE USING A JOINT CAMERA TO REMOVE TORN TISSUE OF RIGHT KNEE;  Surgeon: German Lester MD;  Location: [facility] OR [facility];  Service: Orthopedics;  Laterality: Right;    KNEE SURGERY  3/13/2025    LUMBAR SPINE SURGERY  02/2024    Pain Device    TOTAL LAPAROSCOPIC HYSTERECTOMY SALPINGO OOPHORECTOMY Bilateral 03/30/2022     Procedure: TOTAL LAPAROSCOPIC HYSTERECTOMY, BILATERAL SALPINGECTOMY, AND CYSTOSCOPY;  Surgeon: Rosalba Chiu MD;  Location: [facility] MAIN OR;  Service: Obstetrics/Gynecology;  Laterality: Bilateral;    UPPER GASTROINTESTINAL ENDOSCOPY      VAGINAL PROLAPSE REPAIR  2022    WISDOM TOOTH EXTRACTION          Social History     Tobacco Use    Smoking status: Never     Passive exposure: Yes    Smokeless tobacco: Never   Vaping Use    Vaping status: Never Used   Substance Use Topics    Alcohol use: Never    Drug use: Never       Family History   Problem Relation Age of Onset    Diabetes type II Father     Diabetes Father     Arthritis Mother     Breast cancer Mother 70        Double mastectomy     Cancer Mother         Breast    Fibromyalgia Sister     No Known Problems Son     No Known Problems Daughter     Brain cancer Paternal Grandfather         dx >50    No Known Problems Paternal Grandmother     Osteoporosis Maternal Grandmother         ?    Parkinsonism Maternal Grandmother     Stomach cancer Maternal Grandfather         dx >50    Breast cancer Maternal Aunt         4 different mat aunts with breast cancer.    Cancer Maternal Aunt         Breast    Breast cancer Maternal Aunt     Cancer Maternal Aunt         Breast    Breast cancer Maternal Aunt     Cancer Maternal Aunt         Breast    Breast cancer Maternal Aunt     Cancer Maternal Aunt         Breast    Breast cancer Paternal Aunt 60        dx ~ age 60    Diabetes Paternal Aunt     Cancer Paternal Aunt         breast cancer    Cancer Maternal Aunt         Breast Cancer    BRCA 1/2 Neg Hx     Colon cancer Neg Hx     Endometrial cancer Neg Hx     Ovarian cancer Neg Hx     Malig Hyperthermia Neg Hx     Uterine cancer Neg Hx         There are no preventive care reminders to display for this patient.     Current Outpatient Medications on File Prior to Visit   Medication Sig    albuterol (PROVENTIL) (2.5 MG/3ML) 0.083% nebulizer solution Take 2.5 mg by  nebulization Every 4 (Four) Hours As Needed for Wheezing or Shortness of Air.    albuterol sulfate  (90 Base) MCG/ACT inhaler Inhale 2 puffs Every 6 (Six) Hours As Needed for Wheezing.    budesonide-formoterol (SYMBICORT) 80-4.5 MCG/ACT inhaler Inhale 2 puffs 2 (Two) Times a Day.    busPIRone (BUSPAR) 15 MG tablet Take 1 tablet by mouth 2 (Two) Times a Day As Needed (anxiety). for anxiety    clobetasol (TEMOVATE) 0.05 % external solution APPLY TOPICALLY TO THE SCALP TWICE DAILY FOR 2 WEEKS AS NEEDED FOR ITCHING    cyanocobalamin 1000 MCG/ML injection Inject 1 mL into the appropriate muscle as directed by prescriber Every 30 (Thirty) Days.    desvenlafaxine (PRISTIQ) 50 MG 24 hr tablet Take 1 tablet by mouth Daily.    fluticasone (FLONASE) 50 MCG/ACT nasal spray SHAKE LIQUID AND USE 2 SPRAYS IN EACH NOSTRIL DAILY    Linzess 145 MCG capsule capsule Take 1 capsule by mouth Every Morning Before Breakfast.    montelukast (SINGULAIR) 10 MG tablet TAKE 1 TABLET BY MOUTH EVERY NIGHT    nabumetone (RELAFEN) 500 MG tablet Take 1 tablet by mouth.    omeprazole (priLOSEC) 40 MG capsule Take 1 capsule by mouth Daily.    ondansetron (ZOFRAN) 8 MG tablet Take 1 tablet by mouth Every 8 (Eight) Hours As Needed for Nausea or Vomiting.    oxyCODONE-acetaminophen (PERCOCET) 5-325 MG per tablet oxycodone-acetaminophen 5-325 mg oral tablet 1 every 8 hrs as needed.   Active    pregabalin (LYRICA) 100 MG capsule TAKE 1 CAPSULE BY MOUTH EVERY MORNING AND 2 CAPSULES BY MOUTH AT BEDTIME    Semaglutide-Weight Management (Wegovy) 2.4 MG/0.75ML solution auto-injector Inject 0.75 mL under the skin into the appropriate area as directed 1 (One) Time Per Week.    traZODone (DESYREL) 150 MG tablet Take 1 tablet by mouth Every Night.    vitamin D (ERGOCALCIFEROL) 1.25 MG (92359 UT) capsule capsule TAKE 1 CAPSULE BY MOUTH 1 TIME EVERY WEEK    [DISCONTINUED] metFORMIN ER (GLUCOPHAGE-XR) 500 MG 24 hr tablet Take 1 tablet by mouth Daily With  Breakfast.    [DISCONTINUED] rizatriptan MLT (MAXALT-MLT) 5 MG disintegrating tablet Take one at onset of headache May repeat in 2 hours if needed     No current facility-administered medications on file prior to visit.       Immunization History   Administered Date(s) Administered    COVID-19 (MODERNA) 1st,2nd,3rd Dose Monovalent 02/03/2021, 03/03/2021, 11/05/2021    COVID-19 (UNSPECIFIED) 02/03/2021, 03/03/2021, 11/05/2021    Flu Vaccine Quad PF >36MO 10/19/2016, 09/20/2017, 10/14/2018    Flu Vaccine Split Quad 10/19/2016, 09/20/2017, 10/14/2018    Fluzone  >6mos 10/03/2024    Fluzone (or Fluarix & Flulaval for VFC) >6mos 10/19/2016, 09/20/2017, 11/08/2022    Hepatitis A 12/06/2018    Influenza Injectable Mdck Pf Quad 11/08/2022, 11/07/2023    Influenza, Unspecified 10/14/2018, 10/01/2019    Pneumococcal Polysaccharide (PPSV23) 02/23/2022    Tdap 10/14/2018       Review of Systems   Constitutional:  Positive for fatigue.   HENT:  Negative for trouble swallowing.    Eyes:  Negative for blurred vision and double vision.   Respiratory:  Negative for cough and shortness of breath.    Cardiovascular:  Positive for chest pain and palpitations. Negative for leg swelling.        Comes and goes randomly and then started approx 2-3 weeks ago and then started prior to the fainting episodes    Gastrointestinal:  Positive for constipation. Negative for abdominal pain, blood in stool and vomiting.        Constipation been worse with taking more of her pain meds    Endocrine: Negative for polydipsia, polyphagia and polyuria.   Genitourinary:  Negative for dysuria and vaginal bleeding.   Musculoskeletal:  Positive for arthralgias and joint pain.   Neurological:  Positive for headache. Negative for dizziness, seizures, syncope and light-headedness.        Lightheaded episodes last Sunday and resolved--then vomited one day last Saturday recent and was R/T the HA         Objective     /72   Pulse 84   Temp 97.9 °F (36.6 °C)  "(Temporal)   Ht 172.7 cm (68\")   Wt 75.3 kg (166 lb)   SpO2 97%   BMI 25.24 kg/m²       Physical Exam  Vitals and nursing note reviewed.   Constitutional:       Appearance: Normal appearance.   HENT:      Head: Normocephalic.      Right Ear: External ear normal.      Left Ear: External ear normal.      Nose: Nose normal.      Mouth/Throat:      Mouth: Mucous membranes are moist.   Eyes:      Pupils: Pupils are equal, round, and reactive to light.   Cardiovascular:      Rate and Rhythm: Normal rate and regular rhythm.      Heart sounds: Normal heart sounds.   Pulmonary:      Effort: Pulmonary effort is normal.      Breath sounds: Normal breath sounds.   Abdominal:      Palpations: Abdomen is soft.      Tenderness: There is no abdominal tenderness.   Musculoskeletal:      Cervical back: Normal range of motion and neck supple.   Skin:     General: Skin is warm and dry.   Neurological:      Mental Status: She is alert and oriented to person, place, and time.   Psychiatric:         Mood and Affect: Mood normal.         Behavior: Behavior normal.         Thought Content: Thought content normal.         Judgment: Judgment normal.         Result Review :     The following data was reviewed by: ROSEANNA Jameson on 05/29/2025:    CMP          10/4/2024    10:09 4/2/2025    11:34 5/22/2025    12:15   CMP   Glucose 74  87  91    BUN 13  12  10    Creatinine 0.73  0.55  0.71    EGFR 99.7  111.1  103.1    Sodium 142  142  143    Potassium 4.0  4.4  3.9    Chloride 106  106  105    Calcium 10.0  9.3  9.5    Total Protein 6.9  6.4  6.7    Albumin 4.5  4.1  4.2    Globulin 2.4  2.3  2.5    Total Bilirubin 0.4  0.4  0.4    Alkaline Phosphatase 88  76  78    AST (SGOT) 10  13  10    ALT (SGPT) 10  12  6    Albumin/Globulin Ratio 1.9  1.8  1.7    BUN/Creatinine Ratio 17.8  21.8  14.1    Anion Gap 11.0  8.9  12.0      CBC w/diff          10/4/2024    10:09 4/2/2025    11:34 5/22/2025    12:15   CBC w/Diff   WBC 4.49  3.92  " 4.52    RBC 4.33  4.23  4.05    Hemoglobin 13.3  13.0  12.8    Hematocrit 39.4  39.9  38.7    MCV 91.0  94.3  95.6    MCH 30.7  30.7  31.6    MCHC 33.8  32.6  33.1    RDW 12.3  12.0  12.3    Platelets 248  249  248    Neutrophil Rel % 68.0  59.1     Immature Granulocyte Rel % 0.4  0.3     Lymphocyte Rel % 24.3  30.1     Monocyte Rel % 6.2  8.2     Eosinophil Rel % 0.7  1.8     Basophil Rel % 0.4  0.5       TSH          10/4/2024    10:09 1/14/2025    17:42 4/2/2025    11:34   TSH   TSH 0.923  1.180  0.652      A1C Last 3 Results          10/4/2024    10:09 4/2/2025    11:34   HGBA1C Last 3 Results   Hemoglobin A1C 4.90  5.00      UA          2/4/2025    09:39 4/2/2025    11:34   Urinalysis   Squamous Epithelial Cells, UA  3-6    Specific Gravity, UA  >1.030    Ketones, UA Negative  Trace    Blood, UA  Negative    Leukocytes, UA Trace  Trace    Nitrite, UA  Negative    RBC, UA  0-2    WBC, UA  0-2    Bacteria, UA  None Seen               Office Visit with Ekta Taveras APRN (05/22/2025)   CT Head Without Contrast (05/22/2025 15:23)   Results  EMERGENCY DEPART/[facility] - SCAN - URGENT CARE NOTE, FAST PACE , 05/18/2025 (05/18/2025)       Labs   - Blood count: Normal   - Magnesium: Normal   - Ferritin: Normal   - Iron: Normal   - B12: Normal   - Folic acid: Normal   - Comprehensive panel: Normal    Imaging   - CT scan of the head: Negative               Assessment and Plan      Diagnoses and all orders for this visit:    1. Syncope, unspecified syncope type (Primary)  -     Ambulatory Referral to Cardiology for Other; palpiations and syncope    2. Palpitations  -     Ambulatory Referral to Cardiology for Other; palpiations and syncope    3. Closed fracture of left foot with routine healing, subsequent encounter  Comments:  seeing podiatry and in ortho walking boot and F/U at 1month    4. Postmenopausal  -     DEXA Bone Density Axial; Future    5. Screening for osteoporosis  -     DEXA Bone Density Axial; Future    6.  Bilateral headaches  -     rizatriptan MLT (MAXALT-MLT) 5 MG disintegrating tablet; Take one at onset of headache May repeat in 2 hours if needed  Dispense: 6 tablet; Refill: 0    7. Polyarthralgia  -     Ambulatory Referral to Rheumatology      We will proceed for the bone density since she did have a foot fracture in 2 places and has been postmenopausal for quite some time and she and her family was concerned about her bone density    And then regarding the syncopal episode we will go ahead and refer to cardiology as she does admit to having some palpitations as well    She will continue seeing podiatry regarding the foot fracture left foot and she is in an Ortho boot and also using a scooter    Did refill as a courtesy the Maxalt for her so that she can have some on hand for the headaches as she reports good efficacy    Referral updated and now referred to a different rheumatologist in [place] regarding the polyarthralgia    Did review all of the labs and the imaging and the visit from the fast-paced and follow-up here with a different provider    For now we will completely stop the metformin as her metabolic syndrome looks as though it has vastly improved with her weight loss along with cholesterol and lab work improving vastly    Did advise the patient that although she does not have the diagnosis of hypoglycemia or diabetes that she could always buy a [employer] over-the-counter glucometer which is very affordable and she could periodically check her glucoses    Also advised the patient with regards to a good diet at least 1200 to 1500 guillermina/day and good protein intake with healthy eating habits and staying well-hydrated and not skipping meals    Already has a follow-up planned for 6/30/2025 here with PCP    Assessment & Plan  1. Syncope.  - The etiology of the syncope remains uncertain, with potential causes including cardiac, glycemic, electrolyte, or neurological factors.  - Given her current regimen of  Wegovy 2.4 mg, it is plausible that her glucose levels may be decreasing. Comprehensive panel results, including magnesium, ferritin, iron, B12, folic acid, and vitamin D, were all within normal limits.  - She is advised to maintain a daily caloric intake of at least 1200 calories, with a focus on protein consumption. She is also encouraged to monitor her blood glucose levels at home.  - The decision has been made to discontinue metformin for the time being.    2. Bilateral headaches.  - The patient reported a recent episode of vomiting associated with a headache, which occurred on 05/24/2025.  - No signs of concussion were noted on the CT scan.  - The patient was counseled on the symptoms of concussion and the importance of monitoring for any changes.  - A prescription for Maxalt will be provided for her to keep on hand. The prescription will be sent to the pharmacy.    3. Polyarthralgia.  - The patient reported a history of osteoarthritis and fibromyalgia.  - Previous referral to a rheumatologist was canceled, and the patient did not see a specialist.  - A referral to Dr. Stephanie Botello in [place] will be arranged.  - The diagnosis of polyarthralgia will be used for the referral.    4. Left foot fracture.  - The patient is currently wearing an orthopedic walking boot and is scheduled for a follow-up visit in one month.  - A bone density test will be ordered due to her postmenopausal status and recent fracture history.  - The patient mentioned a family history of osteoporosis, which will be considered in the assessment.    Follow-up  The patient is scheduled for a follow-up visit on 06/17/2025.          Follow Up     Return if symptoms worsen or fail to improve.    Patient was given instructions and counseling regarding her condition or for health maintenance advice. Please see specific information pulled into the AVS if appropriate.            Sahara STEVENSON [place]  reports that she has never smoked. She has been  exposed to tobacco smoke. She has never used smokeless tobacco. I have educated her on the risk of diseases from using tobacco products such as cancer, COPD, and heart disease.           Patient or patient representative verbalized consent for the use of Ambient Listening during the visit with  ROSEANNA Jameson for chart documentation. 5/29/2025  11:54 EDT

## 2025-06-04 ENCOUNTER — OFFICE VISIT (OUTPATIENT)
Dept: ORTHOPEDIC SURGERY | Facility: CLINIC | Age: 50
End: 2025-06-04
Payer: OTHER MISCELLANEOUS

## 2025-06-04 VITALS
WEIGHT: 166.01 LBS | HEART RATE: 88 BPM | HEIGHT: 68 IN | DIASTOLIC BLOOD PRESSURE: 64 MMHG | OXYGEN SATURATION: 98 % | BODY MASS INDEX: 25.16 KG/M2 | SYSTOLIC BLOOD PRESSURE: 105 MMHG

## 2025-06-04 DIAGNOSIS — Z98.890 S/P RIGHT KNEE ARTHROSCOPY: Primary | ICD-10-CM

## 2025-06-08 DIAGNOSIS — J45.30 MILD PERSISTENT ASTHMA WITHOUT COMPLICATION: ICD-10-CM

## 2025-06-08 DIAGNOSIS — J30.2 SEASONAL ALLERGIC RHINITIS, UNSPECIFIED TRIGGER: ICD-10-CM

## 2025-06-09 RX ORDER — MONTELUKAST SODIUM 10 MG/1
10 TABLET ORAL NIGHTLY
Qty: 90 TABLET | Refills: 0 | Status: SHIPPED | OUTPATIENT
Start: 2025-06-09

## 2025-06-09 NOTE — PROGRESS NOTES
Chief Complaint  Follow-up of the Right Knee     Subjective      History of Present Illness  The patient is a 49-year-old female who presents for a follow-up on her right knee. She is 9 weeks status post right knee arthroscopy with partial medial meniscectomy, medial femoral condyle, and patella chondroplasty performed by Dr. Lester on 03/13/2025. This is a workmen's compensation case. She has attended physical therapy at  in [place]. She had returned to work but reported at the last visit that it was too soon and felt she had a setback due to the increased activity. She took a week and a half hiatus from PT, which she found beneficial. At the last visit, lifting work restrictions was considered, but to avoid regressing her progress, it was decided to continue with the current work restrictions. She is here today to reassess progress and work restrictions.    Two weeks ago, she experienced a syncopal episode resulting in a fall that caused fractures in two locations of her foot. She sought immediate care at an urgent care facility and was subsequently referred to a podiatrist. Presents today NWB to the left LE on scooter with CAM boot. Despite the fall, her right knee remained uninjured. However, due to the increased use of a scooter, she has been experiencing stiffness in her knee at night. Prior to the fall, she was managing well with her knee condition and was prepared to lift the work restrictions. Currently, she is under work restrictions imposed by her podiatrist. She has not been able to bear weight on her heel and has been advised against full weight-bearing. She has completed her physical therapy for the knee.        Allergies   Allergen Reactions    Bactrim [Sulfamethoxazole-Trimethoprim] Rash    Codeine Rash        Social History     Socioeconomic History    Marital status:    Tobacco Use    Smoking status: Never     Passive exposure: Yes    Smokeless tobacco: Never   Vaping Use    Vaping  "status: Never Used   Substance and Sexual Activity    Alcohol use: Never    Drug use: Never    Sexual activity: Yes     Partners: Male     Birth control/protection: Vasectomy, Hysterectomy        Tobacco Use: Medium Risk (6/8/2025)    Patient History     Smoking Tobacco Use: Never     Smokeless Tobacco Use: Never     Passive Exposure: Yes     Patient reports that they are a nonsmoker; cessation education not applicable.     I reviewed the patient's chief complaint, history of present illness, review of systems, past medical history, surgical history, family history, social history, medications, and allergy list.     Review of Systems     Constitutional: Denies fevers, chills, weight loss  Cardiovascular: Denies chest pain, shortness of breath  Skin: Denies rashes, acute skin changes  Neurologic: Denies headache, loss of consciousness    Vital Signs:   /64   Pulse 88   Ht 172.7 cm (68\")   Wt 75.3 kg (166 lb 0.1 oz)   SpO2 98%   BMI 25.24 kg/m²          Physical Exam  General: Alert. No acute distress    Ortho Exam    General: Alert, no acute distress  Right lower extremity:   Well-healed arthroscopy portals about right knee. No redness or active drainage noted. No knee effusion. No tenderness to the medial joint line. No tenderness to the lateral joint line. 125 degrees of flexion. 0 degrees extension. Knee extensor mechanism intact. Knee stable to varus and valgus stress. Calf soft, nontender. Demonstrates active ankle plantarflexion and dorsiflexion. Sensation intact over the dorsal and plantar foot.  Palpable pedal pulses.      Physical Exam              Assessment and Plan     Diagnoses and all orders for this visit:    1. S/P right knee arthroscopy (Primary)        Assessment & Plan  1. Post-operative status following right knee arthroscopy:    Given satisfactory knee mobility, minimal pain, and swelling, lifting current work restrictions related to the knee condition is appropriate. Continue knee " strengthening exercises as tolerated. Ice/elevate as needed for swelling as RLE is now compensating for lack of use of LLE    Follow-up  Follow up in 2 months to assess R knee, sooner if concerns/questions.    Patient or patient representative verbalized consent for the use of Ambient Listening during the visit with  Jacinta Arias PA-C for chart documentation. 6/8/2025  22:00 EDT    Follow Up   There are no Patient Instructions on file for this visit.        Patient was given instructions and counseling regarding her condition or for health maintenance advice. Please see specific information pulled into the AVS if appropriate.     Jacinta Arias PA-C   06/04/2025  21:57 EDT        Dictated Utilizing Dragon Dictation. Please note that portions of this note were completed with a voice recognition program. Part of this note may be an electronic transcription/translation of spoken language to printed text using the Dragon Dictation System.        Answers submitted by the patient for this visit:  Post Operative Visit (Submitted on 6/3/2025)  Chief Complaint: Follow-up  Pain Control: controlled  Fever: no fever  Diet: adequate intake  Activity: returning to normal  Operative Site Issues: No

## 2025-06-17 ENCOUNTER — HOSPITAL ENCOUNTER (OUTPATIENT)
Dept: BONE DENSITY | Facility: HOSPITAL | Age: 50
Discharge: HOME | End: 2025-06-17
Admitting: NURSE PRACTITIONER
Payer: OTHER GOVERNMENT

## 2025-06-17 ENCOUNTER — RESULTS FOLLOW-UP (OUTPATIENT)
Dept: FAMILY MEDICINE CLINIC | Facility: CLINIC | Age: 50
End: 2025-06-17
Payer: OTHER GOVERNMENT

## 2025-06-17 DIAGNOSIS — Z13.820 SCREENING FOR OSTEOPOROSIS: ICD-10-CM

## 2025-06-17 DIAGNOSIS — Z78.0 POSTMENOPAUSAL: ICD-10-CM

## 2025-06-17 PROCEDURE — 77080 DXA BONE DENSITY AXIAL: CPT

## 2025-06-17 NOTE — LETTER
Saharajoyce Delarosa  [address]  [place] [address]    June 17, 2025     Dear Ms. Delarosa:    Sahara the bone density was normal and so the recommendation is for you to continue doing good weightbearing exercises like walking 30 to 60 minutes daily and good calcium with vitamin D intake with your calcium equaling 1200 to 1500 mg daily through your diet and supplement combined together     Below are the results from your recent visit:    Resulted Orders   DEXA Bone Density Axial    Narrative    DEXA BONE DENSITY AXIAL    Date of Exam: 6/17/2025 1:53 PM EDT    Indication: screening and pt postmenopausal and recent fracture of foot.    Comparison: None available    Findings:  Lumbar Spine  The BMD measured in the L1-L4 region is 1.165 g/cm2 with a T-score of -0.2.  This patient is considered normal according to World Health Organization (WHO) criteria.    Femoral Neck  The BMD measured at the left femoral neck is 0.996 g/cm2 with a T-score of -0.3.  Unable to evaluate right femoral neck secondary to history of right THR.  This patient is considered normal according to World Health Organization (WHO) criteria.    Total Hip  The BMD of the total left hip is 1.012 g/cm2 with a T-score of 0.0.  Unable to evaluate total right hip secondary to history of right THR.  This patient is considered normal according to World Health Organization (WHO) criteria.    FRAX Score: The estimated 10 year risk of a major osteoporotic fracture is calculated to be 12% and a hip fracture of 0.3%.      Impression    Impression:  Normal Bone Mineral Density.          Report dictated by: Ary Miller      I have personally reviewed this case and agree with the findings above:    Electronically Signed: Riley Phillips MD    6/17/2025 2:57 PM EDT    Workstation ID: [id number]         If you have any questions or concerns, please don't hesitate to call.         Sincerely,        ROSEANNA Jameson

## 2025-06-17 NOTE — PROGRESS NOTES
Please mail letter to patient stating    Sahara the bone density was normal and so the recommendation is for you to continue doing good weightbearing exercises like walking 30 to 60 minutes daily and good calcium with vitamin D intake with your calcium equaling 1200 to 1500 mg daily through your diet and supplement combined together

## 2025-07-01 ENCOUNTER — OFFICE VISIT (OUTPATIENT)
Dept: FAMILY MEDICINE CLINIC | Facility: CLINIC | Age: 50
End: 2025-07-01
Payer: COMMERCIAL

## 2025-07-01 VITALS
TEMPERATURE: 97.4 F | OXYGEN SATURATION: 97 % | WEIGHT: 170 LBS | SYSTOLIC BLOOD PRESSURE: 106 MMHG | HEIGHT: 68 IN | DIASTOLIC BLOOD PRESSURE: 58 MMHG | BODY MASS INDEX: 25.76 KG/M2 | HEART RATE: 83 BPM

## 2025-07-01 DIAGNOSIS — K21.9 GASTROESOPHAGEAL REFLUX DISEASE WITHOUT ESOPHAGITIS: ICD-10-CM

## 2025-07-01 DIAGNOSIS — R55 VASOVAGAL EPISODE: ICD-10-CM

## 2025-07-01 DIAGNOSIS — E55.9 VITAMIN D DEFICIENCY: ICD-10-CM

## 2025-07-01 DIAGNOSIS — J45.30 MILD PERSISTENT ASTHMA WITHOUT COMPLICATION: ICD-10-CM

## 2025-07-01 DIAGNOSIS — F41.9 ANXIETY: ICD-10-CM

## 2025-07-01 DIAGNOSIS — F33.9 RECURRENT MAJOR DEPRESSIVE DISORDER, REMISSION STATUS UNSPECIFIED: ICD-10-CM

## 2025-07-01 DIAGNOSIS — R51.9 BILATERAL HEADACHES: ICD-10-CM

## 2025-07-01 DIAGNOSIS — G47.00 INSOMNIA, UNSPECIFIED TYPE: ICD-10-CM

## 2025-07-01 DIAGNOSIS — Z87.828 HISTORY OF HEAD INJURY: ICD-10-CM

## 2025-07-01 DIAGNOSIS — E16.1 HYPOGLYCEMIC REACTION: ICD-10-CM

## 2025-07-01 DIAGNOSIS — M79.7 FIBROMYALGIA: Primary | ICD-10-CM

## 2025-07-01 DIAGNOSIS — E53.8 VITAMIN B 12 DEFICIENCY: ICD-10-CM

## 2025-07-01 DIAGNOSIS — J30.2 SEASONAL ALLERGIC RHINITIS, UNSPECIFIED TRIGGER: ICD-10-CM

## 2025-07-01 LAB
25(OH)D3 SERPL-MCNC: 65.2 NG/ML (ref 30–100)
ALBUMIN SERPL-MCNC: 4.1 G/DL (ref 3.5–5.2)
ALBUMIN/GLOB SERPL: 1.8 G/DL
ALP SERPL-CCNC: 80 U/L (ref 39–117)
ALT SERPL W P-5'-P-CCNC: 15 U/L (ref 1–33)
ANION GAP SERPL CALCULATED.3IONS-SCNC: 9.3 MMOL/L (ref 5–15)
AST SERPL-CCNC: 12 U/L (ref 1–32)
BASOPHILS # BLD AUTO: 0.02 10*3/MM3 (ref 0–0.2)
BASOPHILS NFR BLD AUTO: 0.5 % (ref 0–1.5)
BILIRUB SERPL-MCNC: 0.4 MG/DL (ref 0–1.2)
BILIRUB UR QL STRIP: NEGATIVE
BUN SERPL-MCNC: 12 MG/DL (ref 6–20)
BUN/CREAT SERPL: 17.4 (ref 7–25)
CALCIUM SPEC-SCNC: 9.5 MG/DL (ref 8.6–10.5)
CHLORIDE SERPL-SCNC: 108 MMOL/L (ref 98–107)
CHOLEST SERPL-MCNC: 164 MG/DL (ref 0–200)
CLARITY UR: CLEAR
CO2 SERPL-SCNC: 25.7 MMOL/L (ref 22–29)
COLOR UR: YELLOW
CREAT SERPL-MCNC: 0.69 MG/DL (ref 0.57–1)
DEPRECATED RDW RBC AUTO: 44.1 FL (ref 37–54)
EGFRCR SERPLBLD CKD-EPI 2021: 105.2 ML/MIN/1.73
EOSINOPHIL # BLD AUTO: 0.07 10*3/MM3 (ref 0–0.4)
EOSINOPHIL NFR BLD AUTO: 1.8 % (ref 0.3–6.2)
ERYTHROCYTE [DISTWIDTH] IN BLOOD BY AUTOMATED COUNT: 12.3 % (ref 12.3–15.4)
FOLATE SERPL-MCNC: 7.79 NG/ML (ref 4.78–24.2)
GLOBULIN UR ELPH-MCNC: 2.3 GM/DL
GLUCOSE SERPL-MCNC: 89 MG/DL (ref 65–99)
GLUCOSE UR STRIP-MCNC: NEGATIVE MG/DL
HBA1C MFR BLD: 4.7 % (ref 4.8–5.6)
HCT VFR BLD AUTO: 40 % (ref 34–46.6)
HDLC SERPL-MCNC: 45 MG/DL (ref 40–60)
HGB BLD-MCNC: 12.8 G/DL (ref 12–15.9)
HGB UR QL STRIP.AUTO: NEGATIVE
HOLD SPECIMEN: NORMAL
IMM GRANULOCYTES # BLD AUTO: 0.01 10*3/MM3 (ref 0–0.05)
IMM GRANULOCYTES NFR BLD AUTO: 0.3 % (ref 0–0.5)
KETONES UR QL STRIP: NEGATIVE
LDLC SERPL CALC-MCNC: 106 MG/DL (ref 0–100)
LDLC/HDLC SERPL: 2.35 {RATIO}
LEUKOCYTE ESTERASE UR QL STRIP.AUTO: NEGATIVE
LYMPHOCYTES # BLD AUTO: 1.01 10*3/MM3 (ref 0.7–3.1)
LYMPHOCYTES NFR BLD AUTO: 26.2 % (ref 19.6–45.3)
MCH RBC QN AUTO: 31.1 PG (ref 26.6–33)
MCHC RBC AUTO-ENTMCNC: 32 G/DL (ref 31.5–35.7)
MCV RBC AUTO: 97.3 FL (ref 79–97)
MONOCYTES # BLD AUTO: 0.28 10*3/MM3 (ref 0.1–0.9)
MONOCYTES NFR BLD AUTO: 7.3 % (ref 5–12)
NEUTROPHILS NFR BLD AUTO: 2.46 10*3/MM3 (ref 1.7–7)
NEUTROPHILS NFR BLD AUTO: 63.9 % (ref 42.7–76)
NITRITE UR QL STRIP: NEGATIVE
NRBC BLD AUTO-RTO: 0 /100 WBC (ref 0–0.2)
PH UR STRIP.AUTO: 6.5 [PH] (ref 5–8)
PLATELET # BLD AUTO: 233 10*3/MM3 (ref 140–450)
PMV BLD AUTO: 9.9 FL (ref 6–12)
POTASSIUM SERPL-SCNC: 4.1 MMOL/L (ref 3.5–5.2)
PROT SERPL-MCNC: 6.4 G/DL (ref 6–8.5)
PROT UR QL STRIP: NEGATIVE
RBC # BLD AUTO: 4.11 10*6/MM3 (ref 3.77–5.28)
SODIUM SERPL-SCNC: 143 MMOL/L (ref 136–145)
SP GR UR STRIP: 1.02 (ref 1–1.03)
TRIGL SERPL-MCNC: 66 MG/DL (ref 0–150)
TSH SERPL DL<=0.05 MIU/L-ACNC: 1.99 UIU/ML (ref 0.27–4.2)
UROBILINOGEN UR QL STRIP: NORMAL
VIT B12 BLD-MCNC: 369 PG/ML (ref 211–946)
VLDLC SERPL-MCNC: 13 MG/DL (ref 5–40)
WBC NRBC COR # BLD AUTO: 3.85 10*3/MM3 (ref 3.4–10.8)

## 2025-07-01 PROCEDURE — 80061 LIPID PANEL: CPT | Performed by: NURSE PRACTITIONER

## 2025-07-01 PROCEDURE — 82306 VITAMIN D 25 HYDROXY: CPT | Performed by: NURSE PRACTITIONER

## 2025-07-01 PROCEDURE — 82607 VITAMIN B-12: CPT | Performed by: NURSE PRACTITIONER

## 2025-07-01 PROCEDURE — 80050 GENERAL HEALTH PANEL: CPT | Performed by: NURSE PRACTITIONER

## 2025-07-01 PROCEDURE — 81003 URINALYSIS AUTO W/O SCOPE: CPT | Performed by: NURSE PRACTITIONER

## 2025-07-01 PROCEDURE — 83036 HEMOGLOBIN GLYCOSYLATED A1C: CPT | Performed by: NURSE PRACTITIONER

## 2025-07-01 PROCEDURE — 82746 ASSAY OF FOLIC ACID SERUM: CPT | Performed by: NURSE PRACTITIONER

## 2025-07-01 RX ORDER — BUSPIRONE HYDROCHLORIDE 15 MG/1
15 TABLET ORAL 2 TIMES DAILY PRN
Qty: 180 TABLET | Refills: 1 | Status: SHIPPED | OUTPATIENT
Start: 2025-07-01

## 2025-07-01 RX ORDER — FLUTICASONE PROPIONATE 50 MCG
2 SPRAY, SUSPENSION (ML) NASAL DAILY
Qty: 48 G | Refills: 1 | Status: SHIPPED | OUTPATIENT
Start: 2025-07-01

## 2025-07-01 RX ORDER — CYANOCOBALAMIN 1000 UG/ML
1000 INJECTION, SOLUTION INTRAMUSCULAR; SUBCUTANEOUS
Qty: 3 ML | Refills: 3 | Status: SHIPPED | OUTPATIENT
Start: 2025-07-01

## 2025-07-01 RX ORDER — MONTELUKAST SODIUM 10 MG/1
10 TABLET ORAL NIGHTLY
Qty: 90 TABLET | Refills: 1 | Status: SHIPPED | OUTPATIENT
Start: 2025-07-01

## 2025-07-01 RX ORDER — OMEPRAZOLE 40 MG/1
40 CAPSULE, DELAYED RELEASE ORAL DAILY
Qty: 90 CAPSULE | Refills: 1 | Status: SHIPPED | OUTPATIENT
Start: 2025-07-01

## 2025-07-01 RX ORDER — PREGABALIN 100 MG/1
CAPSULE ORAL
Qty: 90 CAPSULE | Refills: 2 | Status: SHIPPED | OUTPATIENT
Start: 2025-07-01

## 2025-07-01 RX ORDER — RIZATRIPTAN BENZOATE 5 MG/1
TABLET, ORALLY DISINTEGRATING ORAL
Qty: 6 TABLET | Refills: 2 | Status: SHIPPED | OUTPATIENT
Start: 2025-07-01

## 2025-07-01 RX ORDER — DESVENLAFAXINE 50 MG/1
50 TABLET, FILM COATED, EXTENDED RELEASE ORAL DAILY
Qty: 90 TABLET | Refills: 1 | Status: SHIPPED | OUTPATIENT
Start: 2025-07-01

## 2025-07-01 RX ORDER — TRAZODONE HYDROCHLORIDE 150 MG/1
150 TABLET ORAL NIGHTLY
Qty: 90 TABLET | Refills: 1 | Status: SHIPPED | OUTPATIENT
Start: 2025-07-01

## 2025-07-01 NOTE — PROGRESS NOTES
..  Venipuncture Blood Specimen Collection  Venipuncture performed in Lt arm by Armida Leal MA with good hemostasis. Patient tolerated the procedure well without complications.   07/01/25   Armida Leal MA

## 2025-07-01 NOTE — PROGRESS NOTES
Chief Complaint  Loss of Consciousness (Follow up), Obesity (Medication refills. ), and Insomnia (Not sleeping)    Subjective            Sahara Delarosa presents to [facility]    3 month  Symptoms include: joint pain, diaphoresis, fatigue, headaches, joint swelling and neck pain.   Pertinent negative symptoms include no abdominal pain, no anorexia, no change in stool, no chest pain, no chills, no congestion, no cough, no fever, no myalgias, no nausea, no numbness, no rash, no sore throat, no swollen glands, no dysuria, no visual change, no vomiting and no weakness.       History of Present Illness  The patient is a 49-year-old female who presents today for follow-up.    Patient reports that she recently consulted with Dr. Rahman from [facility] regarding a vasovagal syncopal episode. An echocardiogram was performed yesterday, which yielded normal results. She is scheduled to wear a heart monitor for two weeks. Dr. Rahman suspects the vagus nerve may be involved but is not certain. She has experienced three episodes of near-fainting within a month. She reports feeling unwell, experiencing poor sleep, and having low energy levels. She also mentions occasional chest pain and all of these s/s were mentioned with the cardiac workup cardiology did as well. She has an appointment with Dr. Rahman on 09/17/2025 to discuss the next steps after all tests are completed.    Patient reports that her blood pressure and blood sugar levels have been unusually low, with a reading of 106/58 this morning and random blood sugar levels around 65. She has been off Wegovy 2.4 mg weekly injection--for two weeks due to these symptoms but continues to experience hypoglycemia at times even dips into the 60s on her regarding her glucose. She has lost weight since starting Wegovy and is concerned about potential weight gain if she discontinues it. She reports no excessive thirst, hunger, or  urination. She has been consuming peanut butter crackers and beef sticks throughout the day.  And she reports that she did mention this to Dr. Rahman and he did not act as though he felt as though the shot had anything to do with it instead she reports that he is leaning more towards vasovagal and that is why he is continuing the workup    She reports no suicidal thoughts  and no HI and states that her anxiety and depression are well-managed.     And then patient is on Lyrica for the fibromyalgia has been on this dose for quite some time done well with this no SI/HI no dosage changes no side effects no issues overall stable and shows no aberrant behaviors no signs and symptoms of misuse nor abuse overall very stable and improves her overall quality of life    However, she is experiencing headaches, which have persisted since a head injury sustained during a fall in 05/2025. She had a headache this morning and had difficulty focusing her eyes, which she attributes to the headache. She has not seen a neurologist as yet.    She continues to take B12 injections and has completed her vitamin D course. She has been on buspirone for some time and has not made any changes to her trazodone dosage.     She has discontinued Symbicort after one use due to adverse effects but continues to use albuterol as needed. She has not required a refill of albuterol as she has not had any issues. Her symptoms typically worsen when she is ill or overexerts herself, but she has been avoiding overexertion due to fatigue.     She continues to take omeprazole for the GERD and overall is stable, Flonase for the allergies and overall stable,     and Pristiq for the depression and is on BuSpar for the anxiety and trazodone for the insomnia and overall stable no SI/HI no side effects no issues and also remains in pain management as well.    She broke her foot in the middle of 05/2025 and is currently wearing a walking boot on her left foot and  ankle. She has an appointment with an orthopedist on 07/30/2025.      PHQ-2 Total Score: 0    PHQ-9 Total Score:        Past Medical History:   Diagnosis Date    Allergic     Allergic rhinitis     Anxiety and depression     Arthritis of neck     Asthma     exercise induced    Back pain     PAIN MGMT, PT HAS NERVE STIMULATOR LEFT FLANK AREA    Bowel incontinence     Bursitis of hip     Condyloma acuminata     Diverticulitis of colon     Endometrial polyp     Family history of breast cancer     Fatty liver     Fibroid     Fibroids     Fibromyalgia, primary     GERD (gastroesophageal reflux disease)     Hernia     HIATAL HERNIA    Hip arthrosis     Irritable bowel syndrome     Knee swelling     Low back pain     Lumbosacral disc disease     Metabolic syndrome     PT DENIES DM BUT IS ON METFORMIN    Osteoarthritis     In addition to fibromyalgia    Ovarian cyst     Scoliosis     Sleep apnea     CPAP    Tear of meniscus of knee     Urinary frequency     Uterine prolapse     Vitamin B 12 deficiency        Allergies   Allergen Reactions    Bactrim [Sulfamethoxazole-Trimethoprim] Rash    Codeine Rash        Past Surgical History:   Procedure Laterality Date    BREAST BIOPSY Left 2013    Hyper-pigmented lesion, left nipple.= Benign    COLONOSCOPY      CYST REMOVAL      on chest and groin area     ENDOMETRIAL ABLATION  2015    HSC, DNC, Novasure Endometrial Ablation    ENDOSCOPY  2017    EGD: Stomach inflammation, Neg for H Pylori.    HIP ARTHROPLASTY Right 09/2021    HIP SURGERY Right 2019    LABRUM REPAIR    HYSTEROSCOPY  2022    JOINT REPLACEMENT  September 2021    Rt Hip    KNEE ARTHROSCOPY W/ MENISCECTOMY Right 03/13/2025    Procedure: RIGHT KNEE ARTHROSCOPY WITH PARTIAL MEDIAL MENISCECTOMY AND CHONDROPLASTY: SURGICAL PROCEDURE USING A JOINT CAMERA TO REMOVE TORN TISSUE OF RIGHT KNEE;  Surgeon: German Lester MD;  Location: [facility] OR [facility];  Service: Orthopedics;  Laterality: Right;    KNEE SURGERY  3/13/2025    LUMBAR  SPINE SURGERY  02/2024    Pain Device    TOTAL LAPAROSCOPIC HYSTERECTOMY SALPINGO OOPHORECTOMY Bilateral 03/30/2022    Procedure: TOTAL LAPAROSCOPIC HYSTERECTOMY, BILATERAL SALPINGECTOMY, AND CYSTOSCOPY;  Surgeon: Rosalba Chiu MD;  Location: [facility];  Service: Obstetrics/Gynecology;  Laterality: Bilateral;    UPPER GASTROINTESTINAL ENDOSCOPY      VAGINAL PROLAPSE REPAIR  2022    WISDOM TOOTH EXTRACTION          Social History     Tobacco Use    Smoking status: Never     Passive exposure: Yes    Smokeless tobacco: Never   Vaping Use    Vaping status: Never Used   Substance Use Topics    Alcohol use: Never    Drug use: Never       Family History   Problem Relation Age of Onset    Diabetes type II Father     Diabetes Father     Arthritis Mother     Breast cancer Mother 70        Double mastectomy     Cancer Mother         Breast    Fibromyalgia Sister     No Known Problems Son     No Known Problems Daughter     Brain cancer Paternal Grandfather         dx >50    No Known Problems Paternal Grandmother     Osteoporosis Maternal Grandmother         ?    Parkinsonism Maternal Grandmother     Stomach cancer Maternal Grandfather         dx >50    Breast cancer Maternal Aunt         4 different mat aunts with breast cancer.    Cancer Maternal Aunt         Breast    Breast cancer Maternal Aunt     Cancer Maternal Aunt         Breast    Breast cancer Maternal Aunt     Cancer Maternal Aunt         Breast    Breast cancer Maternal Aunt     Cancer Maternal Aunt         Breast    Breast cancer Paternal Aunt 60        dx ~ age 60    Diabetes Paternal Aunt     Cancer Paternal Aunt         breast cancer    Cancer Maternal Aunt         Breast Cancer    BRCA 1/2 Neg Hx     Colon cancer Neg Hx     Endometrial cancer Neg Hx     Ovarian cancer Neg Hx     Malig Hyperthermia Neg Hx     Uterine cancer Neg Hx         There are no preventive care reminders to display for this patient.     Current Outpatient Medications on File  Prior to Visit   Medication Sig    albuterol (PROVENTIL) (2.5 MG/3ML) 0.083% nebulizer solution Take 2.5 mg by nebulization Every 4 (Four) Hours As Needed for Wheezing or Shortness of Air.    albuterol sulfate  (90 Base) MCG/ACT inhaler Inhale 2 puffs Every 6 (Six) Hours As Needed for Wheezing.    clobetasol (TEMOVATE) 0.05 % external solution APPLY TOPICALLY TO THE SCALP TWICE DAILY FOR 2 WEEKS AS NEEDED FOR ITCHING    Linzess 145 MCG capsule capsule Take 1 capsule by mouth Every Morning Before Breakfast.    nabumetone (RELAFEN) 500 MG tablet Take 1 tablet by mouth.    ondansetron (ZOFRAN) 8 MG tablet Take 1 tablet by mouth Every 8 (Eight) Hours As Needed for Nausea or Vomiting.    oxyCODONE-acetaminophen (PERCOCET) 5-325 MG per tablet oxycodone-acetaminophen 5-325 mg oral tablet 1 every 8 hrs as needed.   Active    Semaglutide-Weight Management (Wegovy) 2.4 MG/0.75ML solution auto-injector Inject 0.75 mL under the skin into the appropriate area as directed 1 (One) Time Per Week. (Patient taking differently: Inject 0.75 mL under the skin into the appropriate area as directed 1 (One) Time Per Week. Pt held x 2 weeks right now)    vitamin D (ERGOCALCIFEROL) 1.25 MG (15107 UT) capsule capsule TAKE 1 CAPSULE BY MOUTH 1 TIME EVERY WEEK    [DISCONTINUED] budesonide-formoterol (SYMBICORT) 80-4.5 MCG/ACT inhaler Inhale 2 puffs 2 (Two) Times a Day.    [DISCONTINUED] busPIRone (BUSPAR) 15 MG tablet Take 1 tablet by mouth 2 (Two) Times a Day As Needed (anxiety). for anxiety    [DISCONTINUED] cyanocobalamin 1000 MCG/ML injection Inject 1 mL into the appropriate muscle as directed by prescriber Every 30 (Thirty) Days.    [DISCONTINUED] desvenlafaxine (PRISTIQ) 50 MG 24 hr tablet Take 1 tablet by mouth Daily.    [DISCONTINUED] fluticasone (FLONASE) 50 MCG/ACT nasal spray SHAKE LIQUID AND USE 2 SPRAYS IN EACH NOSTRIL DAILY    [DISCONTINUED] montelukast (SINGULAIR) 10 MG tablet TAKE 1 TABLET BY MOUTH EVERY NIGHT     [DISCONTINUED] omeprazole (priLOSEC) 40 MG capsule Take 1 capsule by mouth Daily.    [DISCONTINUED] pregabalin (LYRICA) 100 MG capsule TAKE 1 CAPSULE BY MOUTH EVERY MORNING AND 2 CAPSULES BY MOUTH AT BEDTIME    [DISCONTINUED] rizatriptan MLT (MAXALT-MLT) 5 MG disintegrating tablet Take one at onset of headache May repeat in 2 hours if needed    [DISCONTINUED] traZODone (DESYREL) 150 MG tablet Take 1 tablet by mouth Every Night.     No current facility-administered medications on file prior to visit.       Immunization History   Administered Date(s) Administered    COVID-19 (MODERNA) 1st,2nd,3rd Dose Monovalent 02/03/2021, 03/03/2021, 11/05/2021    COVID-19 (UNSPECIFIED) 02/03/2021, 03/03/2021, 11/05/2021    Flu Vaccine Quad PF >36MO 10/19/2016, 09/20/2017, 10/14/2018    Flu Vaccine Split Quad 10/19/2016, 09/20/2017, 10/14/2018    Fluzone  >6mos 10/03/2024    Fluzone (or Fluarix & Flulaval for VFC) >6mos 10/19/2016, 09/20/2017, 11/08/2022    Hepatitis A 12/06/2018    Influenza Injectable Mdck Pf Quad 11/08/2022, 11/07/2023    Influenza, Unspecified 10/14/2018, 10/01/2019    Pneumococcal Polysaccharide (PPSV23) 02/23/2022    Tdap 10/14/2018       Review of Systems   Constitutional:  Positive for diaphoresis and fatigue. Negative for chills, fever, unexpected weight gain and unexpected weight loss.        Gets sweaty at times    HENT:  Negative for congestion, sore throat and swollen glands.    Eyes:  Negative for blurred vision, double vision and visual disturbance.        At times hard time focusing her eyes    Respiratory:  Negative for cough.    Cardiovascular:  Negative for chest pain.   Gastrointestinal:  Negative for abdominal pain, anorexia, blood in stool, nausea and vomiting.   Endocrine: Negative for polydipsia, polyphagia and polyuria.   Genitourinary:  Negative for dysuria and vaginal bleeding.   Musculoskeletal:  Positive for joint pain and neck pain. Negative for myalgias.   Skin:  Negative for rash.  "  Neurological:  Positive for headache. Negative for dizziness, seizures, syncope, weakness, light-headedness and numbness.        Since the head injury    Psychiatric/Behavioral:  Positive for sleep disturbance and depressed mood. Negative for self-injury and suicidal ideas. The patient is nervous/anxious.         Objective     /58   Pulse 83   Temp 97.4 °F (36.3 °C) (Temporal)   Ht 172.7 cm (68\")   Wt 77.1 kg (170 lb)   SpO2 97%   BMI 25.85 kg/m²       Physical Exam  Vitals and nursing note reviewed.   Constitutional:       Appearance: Normal appearance.   HENT:      Head: Normocephalic.      Right Ear: External ear normal.      Left Ear: External ear normal.      Nose: Nose normal.      Mouth/Throat:      Mouth: Mucous membranes are moist.   Eyes:      Pupils: Pupils are equal, round, and reactive to light.   Cardiovascular:      Rate and Rhythm: Normal rate and regular rhythm.      Heart sounds: Normal heart sounds.   Pulmonary:      Effort: Pulmonary effort is normal.      Breath sounds: Normal breath sounds.   Abdominal:      General: Bowel sounds are normal.      Palpations: Abdomen is soft.      Tenderness: There is no abdominal tenderness.   Musculoskeletal:         General: Tenderness present.      Cervical back: Normal range of motion and neck supple.      Comments: Still in walking boot left foot ankle ---and then fibro pain chronic with the muscles and tenderness to the upper and mid and lower back muscles and BUE and BLE    Skin:     General: Skin is warm and dry.   Neurological:      Mental Status: She is alert and oriented to person, place, and time.   Psychiatric:         Mood and Affect: Mood normal.         Behavior: Behavior normal.         Thought Content: Thought content normal.         Judgment: Judgment normal.         Result Review :                Office Visit with Ekta Taveras APRN (05/22/2025)   Reviewed visit with cardiology DR Rahman 6/17/25, 6/30/25   Results  Urine " Drug Screen - Urine, Clean Catch (10/04/2024 10:09)  Fentanyl, Urine - Urine, Clean Catch (10/04/2024 10:09)      Imaging   - Echocardiogram: Normal study               Assessment and Plan      Diagnoses and all orders for this visit:    1. Fibromyalgia (Primary)  -     pregabalin (LYRICA) 100 MG capsule; TAKE 1 CAPSULE BY MOUTH EVERY MORNING AND 2 CAPSULES BY MOUTH AT BEDTIME  Dispense: 90 capsule; Refill: 2  -     Comprehensive Metabolic Panel    2. Hypoglycemic reaction  -     Vitamin D,25-Hydroxy  -     Vitamin B12 & Folate  -     Urinalysis With Culture If Indicated -  -     CBC & Differential  -     Comprehensive Metabolic Panel  -     Hemoglobin A1c  -     Lipid Panel  -     TSH Rfx On Abnormal To Free T4    3. Vitamin B 12 deficiency  -     cyanocobalamin 1000 MCG/ML injection; Inject 1 mL into the appropriate muscle as directed by prescriber Every 30 (Thirty) Days.  Dispense: 3 mL; Refill: 3  -     Vitamin B12 & Folate    4. Mild persistent asthma without complication  -     montelukast (SINGULAIR) 10 MG tablet; Take 1 tablet by mouth Every Night.  Dispense: 90 tablet; Refill: 1    5. Recurrent major depressive disorder, remission status unspecified  -     desvenlafaxine (PRISTIQ) 50 MG 24 hr tablet; Take 1 tablet by mouth Daily.  Dispense: 90 tablet; Refill: 1  -     Vitamin D,25-Hydroxy  -     Vitamin B12 & Folate  -     Urinalysis With Culture If Indicated -  -     CBC & Differential  -     Comprehensive Metabolic Panel  -     Hemoglobin A1c  -     Lipid Panel  -     TSH Rfx On Abnormal To Free T4    6. Anxiety  -     busPIRone (BUSPAR) 15 MG tablet; Take 1 tablet by mouth 2 (Two) Times a Day As Needed (anxiety). for anxiety  Dispense: 180 tablet; Refill: 1  -     Vitamin D,25-Hydroxy  -     Vitamin B12 & Folate  -     Urinalysis With Culture If Indicated -  -     CBC & Differential  -     Comprehensive Metabolic Panel  -     Hemoglobin A1c  -     Lipid Panel  -     TSH Rfx On Abnormal To Free T4    7.  Insomnia, unspecified type  -     traZODone (DESYREL) 150 MG tablet; Take 1 tablet by mouth Every Night.  Dispense: 90 tablet; Refill: 1  -     Vitamin D,25-Hydroxy  -     Vitamin B12 & Folate  -     Urinalysis With Culture If Indicated -  -     CBC & Differential  -     Comprehensive Metabolic Panel  -     Hemoglobin A1c  -     Lipid Panel  -     TSH Rfx On Abnormal To Free T4    8. Gastroesophageal reflux disease without esophagitis  -     omeprazole (priLOSEC) 40 MG capsule; Take 1 capsule by mouth Daily.  Dispense: 90 capsule; Refill: 1    9. Bilateral headaches  -     rizatriptan MLT (MAXALT-MLT) 5 MG disintegrating tablet; Take one at onset of headache May repeat in 2 hours if needed  Dispense: 6 tablet; Refill: 2  -     Ambulatory Referral to Neurology    10. Vitamin D deficiency  -     Vitamin D,25-Hydroxy    11. Seasonal allergic rhinitis, unspecified trigger  -     montelukast (SINGULAIR) 10 MG tablet; Take 1 tablet by mouth Every Night.  Dispense: 90 tablet; Refill: 1  -     fluticasone (FLONASE) 50 MCG/ACT nasal spray; 2 sprays by Each Nare route Daily. Shake well before using.  Dispense: 48 g; Refill: 1    12. Vasovagal episode  -     Vitamin D,25-Hydroxy  -     Vitamin B12 & Folate  -     Urinalysis With Culture If Indicated -  -     CBC & Differential  -     Comprehensive Metabolic Panel  -     Hemoglobin A1c  -     Lipid Panel  -     TSH Rfx On Abnormal To Free T4    13. History of head injury  -     Ambulatory Referral to Neurology    Medications refilled as noted above    Labs as noted above    Referral to neurology since she is still experiencing the frequent headaches since the head injury back in May    Refilled the Lyrica and she will be due when she follows up in 3 months for her updated yearly urine tox screen and safe use controlled substance agreement as well    And then patient reports that she is putting on the monitor for 2 weeks for the heart regarding the suspected vasovagal that  cardiology is suspecting-and then she will be following up once this test is completed with cardiology for further evaluation and treatment of these presyncopal and syncopal episodes    And for now with regards to her obesity her body mass index is actually only 25.8 with the walking boot on and she said actually if she did not have the walking boot on would be in normal range for her body mass index and she is already held the Wegovy 2.4 mg once weekly for 2 weeks now and she will continue to hold this at least for another few weeks and then we will see how she is doing and then if indeed cardiology continues to think that it is more vasovagal and not anything to do with the Wegovy and all the labs come back good-we can restart her but we would start her back at a lower dose since she is on max dosing as the patient is quite concerned that she would lose ground with how hard she has worked to lose the weight and get back into a and normal weight range  Assessment & Plan  1. Vasovagal syncope.  - Recently seen by Dr. Rahman for a vasovagal syncopal episode.  - Echocardiogram was normal.  - A 2-week heart monitor will be used to capture any potential issues.  - Further evaluation for vagus nerve involvement may be necessary if the heart monitor does not reveal significant findings.    2. Hypoglycemia.  - Reports episodes of low blood sugar, with readings as low as 65, despite not having a history of diabetes.  - Could be related to her medication, Wegovy, which she has not taken for the past 2 weeks.  - Advised to eat small, frequent meals every 3 hours while awake, including protein and complex carbohydrates such as cheese and crackers or peanut butter.  - Insulin level will be checked.    3. Headaches.  - Experiencing headaches since a head injury in 05/2025.  - Referral to a neurologist will be made for further evaluation.    4. Medication management.  - Will continue current medications, including B12  injections, buspirone, trazodone, omeprazole, Flonase, and Pristiq.  - Symbicort has been discontinued, and albuterol will be used as needed.  - Prescriptions will be sent to pharmacy.          Follow Up     Return in about 3 months (around 10/1/2025), or if symptoms worsen or fail to improve, for Recheck.    Patient was given instructions and counseling regarding her condition or for health maintenance advice. Please see specific information pulled into the AVS if appropriate.            Sahara Delarosa  reports that she has never smoked. She has been exposed to tobacco smoke. She has never used smokeless tobacco. I have educated her on the risk of diseases from using tobacco products such as cancer, COPD, and heart disease.              Patient or patient representative verbalized consent for the use of Ambient Listening during the visit with  ROSEANNA Jameson for chart documentation. 7/1/2025  09:37 EDT

## 2025-07-03 DIAGNOSIS — E66.3 OVERWEIGHT (BMI 25.0-29.9): ICD-10-CM

## 2025-07-03 RX ORDER — SEMAGLUTIDE 2.4 MG/.75ML
INJECTION, SOLUTION SUBCUTANEOUS
Qty: 3 ML | Refills: 2 | OUTPATIENT
Start: 2025-07-03

## 2025-07-23 ENCOUNTER — OFFICE VISIT (OUTPATIENT)
Dept: FAMILY MEDICINE CLINIC | Facility: CLINIC | Age: 50
End: 2025-07-23
Payer: COMMERCIAL

## 2025-07-23 VITALS
OXYGEN SATURATION: 100 % | DIASTOLIC BLOOD PRESSURE: 65 MMHG | HEART RATE: 90 BPM | TEMPERATURE: 98 F | WEIGHT: 168.3 LBS | BODY MASS INDEX: 25.59 KG/M2 | SYSTOLIC BLOOD PRESSURE: 104 MMHG

## 2025-07-23 DIAGNOSIS — R05.1 ACUTE COUGH: ICD-10-CM

## 2025-07-23 DIAGNOSIS — J02.9 SORE THROAT: Primary | ICD-10-CM

## 2025-07-23 DIAGNOSIS — J06.9 ACUTE URI: ICD-10-CM

## 2025-07-23 LAB
EXPIRATION DATE: NORMAL
INTERNAL CONTROL: NORMAL
Lab: 433
S PYO AG THROAT QL: NEGATIVE

## 2025-07-23 PROCEDURE — 87880 STREP A ASSAY W/OPTIC: CPT | Performed by: FAMILY MEDICINE

## 2025-07-23 PROCEDURE — 99213 OFFICE O/P EST LOW 20 MIN: CPT | Performed by: FAMILY MEDICINE

## 2025-07-23 RX ORDER — IPRATROPIUM BROMIDE AND ALBUTEROL SULFATE 2.5; .5 MG/3ML; MG/3ML
SOLUTION RESPIRATORY (INHALATION)
COMMUNITY

## 2025-07-23 RX ORDER — DEXTROMETHORPHAN HYDROBROMIDE AND PROMETHAZINE HYDROCHLORIDE 15; 6.25 MG/5ML; MG/5ML
5 SYRUP ORAL NIGHTLY PRN
Qty: 118 ML | Refills: 0 | Status: SHIPPED | OUTPATIENT
Start: 2025-07-23

## 2025-07-23 NOTE — PROGRESS NOTES
Chief Complaint  Sore Throat (Sore throat x three days )    Subjective      Sahara Delarosa is a 50 y.o. female who presents to [facility]     Sore throat for the past 3 days. Difficulty swallowing, congestion. Her niece and nephew came down with RSV about a week ago. Has some exertional dyspnea. She has a little cough but not too bad. No fevers or chills. Some wheezing, she has an inhaler and nebulizer at home. She has asthma.                                                                                                                                                                                                                                                                                                                                                                                                                                                                                                                                                                                                                                                                                                                                                                                                                                                                                                                                                                                                                                                                                                                                                                                                                                                                                                                                                                                                                                                                                                                                                                                                                                                                                                                                                                                                                                                                                                                                                                                                                                                                                                                                                                                                                                                                                                                                                                                                                                                                                                                                                                                                                                                                                                                                                                                                                                                                                                                  Objective   Vital Signs:   Vitals:    07/23/25 0935   BP: 104/65   Pulse: 90   Temp: 98 °F (36.7 °C)   SpO2: 100%   Weight: 76.3 kg (168 lb 4.8 oz)     Body mass index is 25.59 kg/m².    Wt Readings from Last 3 Encounters:   07/23/25 76.3 kg (168 lb 4.8 oz)   07/01/25 77.1 kg (170 lb)   06/04/25 75.3 kg (166 lb 0.1 oz)     BP Readings from Last 3 Encounters:   07/23/25 104/65   07/01/25 106/58   06/04/25 105/64       Health Maintenance   Topic Date Due    Pneumococcal Vaccine 50+ (2 of 2 - PCV) 09/29/2025 (Originally 2/23/2023)    COVID-19 Vaccine (7 - 2024-25 season) 12/26/2025 (Originally 9/1/2024)    ZOSTER VACCINE (1 of 2)  02/03/2026 (Originally 7/6/2023)    INFLUENZA VACCINE  10/01/2025    MAMMOGRAM  02/07/2026    ANNUAL PHYSICAL  04/02/2026    TDAP/TD VACCINES (2 - Td or Tdap) 10/14/2028    COLORECTAL CANCER SCREENING  06/30/2030    HEPATITIS C SCREENING  Completed    HEMOGLOBIN A1C  Discontinued       Physical Exam  Vitals and nursing note reviewed.   Constitutional:       General: She is not in acute distress.     Appearance: Normal appearance.   HENT:      Head: Normocephalic and atraumatic.      Nose: Rhinorrhea present.      Mouth/Throat:      Pharynx: Posterior oropharyngeal erythema present. No oropharyngeal exudate.   Cardiovascular:      Rate and Rhythm: Normal rate and regular rhythm.      Heart sounds: No murmur heard.  Pulmonary:      Effort: Pulmonary effort is normal. No respiratory distress.      Breath sounds: Normal breath sounds. No wheezing.   Skin:     General: Skin is warm and dry.   Neurological:      Mental Status: She is alert.   Psychiatric:         Mood and Affect: Mood normal.         Behavior: Behavior normal.          Result Review :  The following data was reviewed by: Ayden Zuluaga MD on 07/23/2025:                                Lab Results   Lab 07/23/25  0959   RAPID STREP A SCREEN Negative                                    Procedures          Assessment & Plan  Sore throat    Orders:    POCT rapid strep A    Acute cough    Orders:    promethazine-dextromethorphan (PROMETHAZINE-DM) 6.25-15 MG/5ML syrup; Take 5 mL by mouth At Night As Needed for Cough.    Acute URI  Swabs negative in office. Advised supportive measures such as hydration and rest. Can do OTC supportive medications such as an antihistamine or decongestant if needed. Honey can be soothing to a sore throat. Return if new or worsening symptoms.    Discussed if symptoms worsen with her asthma and she feels like she needs steroid course to help with breathing, to give us a call and I could send that in.  However she currently has no  wheezing, her oxygen saturation is 100%, so I did not start her on a steroid course currently.                      FOLLOW UP  Return if symptoms worsen or fail to improve.  Patient was given instructions and counseling regarding her condition or for health maintenance advice. Please see specific information pulled into the AVS if appropriate.       Ayden Zuluaga MD  07/23/25  10:20 EDT    CURRENT & DISCONTINUED MEDICATIONS  Current Outpatient Medications   Medication Instructions    albuterol (PROVENTIL) 2.5 mg, Nebulization, Every 4 Hours PRN    albuterol sulfate  (90 Base) MCG/ACT inhaler 2 puffs, Inhalation, Every 6 Hours PRN    busPIRone (BUSPAR) 15 mg, Oral, 2 Times Daily PRN, for anxiety    clobetasol (TEMOVATE) 0.05 % external solution APPLY TOPICALLY TO THE SCALP TWICE DAILY FOR 2 WEEKS AS NEEDED FOR ITCHING    cyanocobalamin 1,000 mcg, Intramuscular, Every 30 Days    desvenlafaxine (PRISTIQ) 50 mg, Oral, Daily    fluticasone (FLONASE) 50 MCG/ACT nasal spray 2 sprays, Each Nare, Daily, Shake well before using.    ipratropium-albuterol (DUO-NEB) 0.5-2.5 mg/3 ml nebulizer Inhale 3 mL 4 times a day by nebulization route.    linaclotide (LINZESS) 145 mcg, Oral, Every Morning Before Breakfast    montelukast (SINGULAIR) 10 mg, Oral, Nightly    nabumetone (RELAFEN) 500 mg    omeprazole (PRILOSEC) 40 mg, Oral, Daily    ondansetron (ZOFRAN) 8 mg, Oral, Every 8 Hours PRN    oxyCODONE-acetaminophen (PERCOCET) 5-325 MG per tablet oxycodone-acetaminophen 5-325 mg oral tablet 1 every 8 hrs as needed.   Active    pregabalin (LYRICA) 100 MG capsule TAKE 1 CAPSULE BY MOUTH EVERY MORNING AND 2 CAPSULES BY MOUTH AT BEDTIME    promethazine-dextromethorphan (PROMETHAZINE-DM) 6.25-15 MG/5ML syrup 5 mL, Oral, Nightly PRN    rizatriptan MLT (MAXALT-MLT) 5 MG disintegrating tablet Take one at onset of headache May repeat in 2 hours if needed    traZODone (DESYREL) 150 mg, Oral, Nightly    vitamin D (ERGOCALCIFEROL)  50,000 Units, Oral, Weekly    Wegovy 2.4 mg, Subcutaneous, Weekly       There are no discontinued medications.

## 2025-07-26 ENCOUNTER — TELEPHONE (OUTPATIENT)
Dept: FAMILY MEDICINE CLINIC | Facility: CLINIC | Age: 50
End: 2025-07-26
Payer: COMMERCIAL

## 2025-07-26 DIAGNOSIS — J06.9 UPPER RESPIRATORY TRACT INFECTION, UNSPECIFIED TYPE: Primary | ICD-10-CM

## 2025-07-26 RX ORDER — METHYLPREDNISOLONE 4 MG/1
TABLET ORAL
Qty: 21 TABLET | Refills: 0 | Status: SHIPPED | OUTPATIENT
Start: 2025-07-26

## 2025-07-26 RX ORDER — AZITHROMYCIN 250 MG/1
TABLET, FILM COATED ORAL
Qty: 6 TABLET | Refills: 0 | Status: SHIPPED | OUTPATIENT
Start: 2025-07-26

## 2025-07-26 NOTE — TELEPHONE ENCOUNTER
She was here on 7.23 and saw Dr. Zuluaga for cough, congestion, URI symptoms. She said she has been sick since 2-3 days prior to that appointment. She called wanting to know if someone could send her in an antibiotic and/or steroid. Cough, hoarse, congestion are a few of symptoms. I let her know that she saw Dr. Zuluaga, and that with this being a Saturday he is not here today and that we would message provider who is and see if this can be sent to [facility] or if she needs another appointment. Please advise.

## 2025-07-30 ENCOUNTER — OFFICE VISIT (OUTPATIENT)
Dept: ORTHOPEDIC SURGERY | Facility: CLINIC | Age: 50
End: 2025-07-30
Payer: OTHER MISCELLANEOUS

## 2025-07-30 VITALS
WEIGHT: 168.21 LBS | SYSTOLIC BLOOD PRESSURE: 130 MMHG | HEIGHT: 68 IN | OXYGEN SATURATION: 98 % | BODY MASS INDEX: 25.49 KG/M2 | HEART RATE: 79 BPM | DIASTOLIC BLOOD PRESSURE: 77 MMHG

## 2025-07-30 DIAGNOSIS — Z98.890 S/P RIGHT KNEE ARTHROSCOPY: Primary | ICD-10-CM

## 2025-07-30 NOTE — PROGRESS NOTES
Chief Complaint  Follow-up of the Right Knee     Subjective      History of Present Illness  The patient is a 50-year-old female here to follow up on her right knee.  She is 4.5 months postoperative from right knee arthroscopy with partial medial meniscectomy, medial femoral condyle and patella chondroplasties performed by Dr. Lester on 03/13/2025. This is a workmen's compensation case. She has attended physical therapy at [facility] in [place] and had returned to work with restrictions, which initially was quite challenging, but she was doing okay.  When she was last seen, she was in a boot and nonweightbearing with a scooter secondary to a foot fracture and was following with podiatry. Her knee was doing well despite putting a lot of pressure on it due to left foot fracture.     I had lifted her off work restrictions at last visit, and she states since her last visit podiatry has also sent her back without restriction.  She reports good and bad days with the right knee which she believes are influenced by the amount she uses it.  She was on vacation a couple weeks ago and did a lot of walking and it did have some swelling which resolved with conservative measure.  She experiences difficulty in standing up from a seated position, such as when using public restrooms, and is unsure if this is due to her foot or knee condition, and admits that she has been a bit limited in her home exercise program for quad strengthening given the foot injury.     PAST SURGICAL HISTORY:  Right knee arthroscopy with partial medial meniscectomy, medial femoral condyle and patella chondroplasties on 03/13/2025.    SOCIAL HISTORY  Occupations: The patient is back at work with lifting restrictions.  Exercise: The patient engages in strengthening exercises as much as possible.      Allergies   Allergen Reactions    Bactrim [Sulfamethoxazole-Trimethoprim] Rash    Codeine Rash        Social History     Socioeconomic History    Marital status:  "   Tobacco Use    Smoking status: Never     Passive exposure: Yes    Smokeless tobacco: Never   Vaping Use    Vaping status: Never Used   Substance and Sexual Activity    Alcohol use: Never    Drug use: Never    Sexual activity: Yes     Partners: Male     Birth control/protection: Vasectomy, Hysterectomy        Tobacco Use: Medium Risk (7/30/2025)    Patient History     Smoking Tobacco Use: Never     Smokeless Tobacco Use: Never     Passive Exposure: Yes     Patient reports that they are a nonsmoker; cessation education not applicable.     I reviewed the patient's chief complaint, history of present illness, review of systems, past medical history, surgical history, family history, social history, medications, and allergy list.     Review of Systems     Constitutional: Denies fevers, chills, weight loss  Cardiovascular: Denies chest pain, shortness of breath  Skin: Denies rashes, acute skin changes  Neurologic: Denies headache, loss of consciousness    Vital Signs:   /77   Pulse 79   Ht 172.7 cm (68\")   Wt 76.3 kg (168 lb 3.4 oz)   SpO2 98%   BMI 25.58 kg/m²          Physical Exam  General: Alert. No acute distress    Ortho Exam    General: Alert, no acute distress.  She presents today with a boot on the left foot.  Right lower extremity: Well-healed arthroscopy portals about right knee. No redness or active drainage noted.  No knee effusion.  No tenderness to the medial joint line.  No tenderness to the lateral joint line.  125 degrees of flexion.  0 degrees extension. Knee extensor mechanism intact. Knee stable to varus and valgus stress. Calf soft, nontender. Demonstrates active ankle plantarflexion and dorsiflexion. Sensation intact over the dorsal and plantar foot.  Palpable pedal pulses.      Physical Exam          Assessment and Plan     Diagnoses and all orders for this visit:    1. S/P right knee arthroscopy (Primary)        Assessment & Plan  1. Postoperative status of right knee " arthroscopy:  She is 4.5 months postoperative from right knee arthroscopy with partial medial meniscectomy, medial femoral condyle and patella chondroplasties performed by Dr. Steinberg on 03/13/2025. This is a workmen's compensation case.  Patient has completed physical therapy.  She was doing well with HEP but has been limited somewhat with her recent left foot fracture.    Recommend she continue with HEP to progress quad strengthening.    She is back at work with restriction.  Work note provided for work comp.    She has completed treatment plan, therefore we discussed MMI today.  Advised if she has issues with the knee moving forward she can contact the office for follow-up.     Otherwise followup PRN.        Patient or patient representative verbalized consent for the use of Ambient Listening during the visit with  Jacinta Arias PA-C for chart documentation. 7/30/2025  14:01 EDT    Follow Up   There are no Patient Instructions on file for this visit.        Patient was given instructions and counseling regarding her condition or for health maintenance advice. Please see specific information pulled into the AVS if appropriate.     Jacinta Arias PA-C   07/30/2025  12:28 EDT        Dictated Utilizing Dragon Dictation. Please note that portions of this note were completed with a voice recognition program. Part of this note may be an electronic transcription/translation of spoken language to printed text using the Dragon Dictation System.

## 2025-08-27 ENCOUNTER — TELEPHONE (OUTPATIENT)
Dept: SLEEP MEDICINE | Facility: HOSPITAL | Age: 50
End: 2025-08-27
Payer: COMMERCIAL

## (undated) DEVICE — GLV SURG BIOGEL LTX PF 6 1/2

## (undated) DEVICE — GLOVE,SURG,SENSICARE SLT,LF,PF,7: Brand: MEDLINE

## (undated) DEVICE — TROCAR: Brand: KII SLEEVE

## (undated) DEVICE — GLV SURG SENSICARE PI MIC PF SZ6.5 LF STRL

## (undated) DEVICE — ENDOPATH XCEL BLADELESS TROCARS WITH STABILITY SLEEVES: Brand: ENDOPATH XCEL

## (undated) DEVICE — SYRINGE, LUER LOCK, 60ML: Brand: MEDLINE

## (undated) DEVICE — IRRIGATOR BULB ASEPTO 60CC STRL

## (undated) DEVICE — COVER,MAYO STAND,STERILE: Brand: MEDLINE

## (undated) DEVICE — MANIP UTER RUMI TP 5.1MM 6CM LAV

## (undated) DEVICE — THE STERILE LIGHT HANDLE COVER IS USED WITH STERIS SURGICAL LIGHTING AND VISUALIZATION SYSTEMS.

## (undated) DEVICE — 3M™ STERI-DRAPE™ INSTRUMENT POUCH 1018L: Brand: STERI-DRAPE™

## (undated) DEVICE — KNEE ARTHROSCOPY-LF: Brand: MEDLINE INDUSTRIES, INC.

## (undated) DEVICE — GOWN,SIRUS,POLYRNF,BRTHSLV,XL,30/CS: Brand: MEDLINE

## (undated) DEVICE — ENDOPATH PNEUMONEEDLE INSUFFLATION NEEDLES WITH LUER LOCK CONNECTORS 120MM: Brand: ENDOPATH

## (undated) DEVICE — STERILE POLYISOPRENE POWDER-FREE SURGICAL GLOVES: Brand: PROTEXIS

## (undated) DEVICE — MANIP UTER RUMI 2 KOH EFFICIENT SS CP 3.5CM

## (undated) DEVICE — SYR LUERLOK 5CC

## (undated) DEVICE — SPNG GZ WOVN 4X4IN 12PLY 10/BX STRL

## (undated) DEVICE — BLD CUT FORMLA AGGR PLS 4.0MM

## (undated) DEVICE — SUT MNCRYL PLS ANTIB UD 4/0 PS2 18IN

## (undated) DEVICE — LAPAROSCOPIC SMOKE FILTRATION SYSTEM: Brand: PALL LAPAROSHIELD® PLUS LAPAROSCOPIC SMOKE FILTRATION SYSTEM

## (undated) DEVICE — SUT VIC 0 CT1 27IN DYED J340H

## (undated) DEVICE — STERILE POLYISOPRENE POWDER-FREE SURGICAL GLOVES WITH EMOLLIENT COATING: Brand: PROTEXIS

## (undated) DEVICE — BNDG ELAS MATRX V/CLS 6INX10YD LF

## (undated) DEVICE — ENCORE® LATEX ORTHO SIZE 8, STERILE LATEX POWDER-FREE SURGICAL GLOVE: Brand: ENCORE

## (undated) DEVICE — APPL CHLORAPREP HI/LITE 26ML ORNG

## (undated) DEVICE — DRESSING,GAUZE,XEROFORM,CURAD,1"X8",ST: Brand: CURAD

## (undated) DEVICE — LOU GYN LAPAROSCOPY: Brand: MEDLINE INDUSTRIES, INC.

## (undated) DEVICE — SUT ETHLN 3-0 FS118IN 663H

## (undated) DEVICE — DRSNG PAD ABD 8X10IN STRL

## (undated) DEVICE — HARMONIC ACE +7 LAPAROSCOPIC SHEARS ADVANCED HEMOSTASIS 5MM DIAMETER 36CM SHAFT LENGTH  FOR USE WITH GRAY HAND PIECE ONLY: Brand: HARMONIC ACE

## (undated) DEVICE — DRAPE,UNDERBUTTOCKS,PCH,STERILE: Brand: MEDLINE

## (undated) DEVICE — ENDOCUT SCISSOR TIP, DISPOSABLE: Brand: RENEW

## (undated) DEVICE — SOL NACL 0.9PCT 1000ML

## (undated) DEVICE — 2, DISPOSABLE SUCTION/IRRIGATOR WITH DISPOSABLE TIP: Brand: STRYKEFLOW

## (undated) DEVICE — TROCAR: Brand: KII OPTICAL ACCESS SYSTEM

## (undated) DEVICE — VISUALIZATION SYSTEM: Brand: CLEARIFY

## (undated) DEVICE — SYR LUERLOK 30CC

## (undated) DEVICE — TOTAL TRAY, 16FR 10ML SIL FOLEY, URN: Brand: MEDLINE

## (undated) DEVICE — TBG INFLOW/OUTFLOW DUALWAVE 1P/U

## (undated) DEVICE — LAPAROSCOPIC DISSECTOR: Brand: DEROYAL

## (undated) DEVICE — SOL IRR NACL 0.9PCT 3000ML